# Patient Record
Sex: MALE | Race: WHITE | Employment: OTHER | URBAN - NONMETROPOLITAN AREA
[De-identification: names, ages, dates, MRNs, and addresses within clinical notes are randomized per-mention and may not be internally consistent; named-entity substitution may affect disease eponyms.]

---

## 2017-02-17 ENCOUNTER — TELEPHONE (OUTPATIENT)
Dept: NEUROLOGY | Age: 82
End: 2017-02-17

## 2017-02-19 RX ORDER — DULOXETIN HYDROCHLORIDE 30 MG/1
30 CAPSULE, DELAYED RELEASE ORAL DAILY
Qty: 30 CAPSULE | Refills: 2 | Status: SHIPPED | OUTPATIENT
Start: 2017-02-19 | End: 2017-04-21 | Stop reason: SDUPTHER

## 2017-03-21 ENCOUNTER — OFFICE VISIT (OUTPATIENT)
Dept: NEUROLOGY | Age: 82
End: 2017-03-21
Payer: MEDICARE

## 2017-03-21 VITALS
SYSTOLIC BLOOD PRESSURE: 164 MMHG | BODY MASS INDEX: 20.76 KG/M2 | HEIGHT: 70 IN | DIASTOLIC BLOOD PRESSURE: 83 MMHG | WEIGHT: 145 LBS | RESPIRATION RATE: 18 BRPM | HEART RATE: 65 BPM

## 2017-03-21 DIAGNOSIS — R41.3 MEMORY LOSS: ICD-10-CM

## 2017-03-21 DIAGNOSIS — L29.9 PRURITUS: ICD-10-CM

## 2017-03-21 DIAGNOSIS — G50.0 TRIGEMINAL NEURALGIA OF RIGHT SIDE OF FACE: Primary | ICD-10-CM

## 2017-03-21 DIAGNOSIS — F32.5 MAJOR DEPRESSIVE DISORDER WITH SINGLE EPISODE, IN FULL REMISSION (HCC): ICD-10-CM

## 2017-03-21 PROCEDURE — 99214 OFFICE O/P EST MOD 30 MIN: CPT | Performed by: PSYCHIATRY & NEUROLOGY

## 2017-03-21 RX ORDER — CETIRIZINE HYDROCHLORIDE 10 MG/1
10 TABLET ORAL DAILY
COMMUNITY
End: 2017-09-21

## 2017-03-21 RX ORDER — CARBAMAZEPINE 200 MG/1
200 TABLET ORAL DAILY
COMMUNITY
End: 2022-06-28 | Stop reason: SDUPTHER

## 2017-03-21 RX ORDER — HYDROXYZINE PAMOATE 25 MG/1
25 CAPSULE ORAL 3 TIMES DAILY PRN
Qty: 90 CAPSULE | Refills: 1 | Status: SHIPPED | OUTPATIENT
Start: 2017-03-21 | End: 2017-04-04

## 2017-04-21 RX ORDER — DULOXETIN HYDROCHLORIDE 30 MG/1
CAPSULE, DELAYED RELEASE ORAL
Qty: 30 CAPSULE | Refills: 2 | Status: SHIPPED | OUTPATIENT
Start: 2017-04-21 | End: 2020-09-24

## 2017-09-21 ENCOUNTER — OFFICE VISIT (OUTPATIENT)
Dept: NEUROLOGY | Age: 82
End: 2017-09-21
Payer: MEDICARE

## 2017-09-21 VITALS
DIASTOLIC BLOOD PRESSURE: 68 MMHG | WEIGHT: 147 LBS | BODY MASS INDEX: 21.05 KG/M2 | OXYGEN SATURATION: 97 % | HEART RATE: 70 BPM | SYSTOLIC BLOOD PRESSURE: 138 MMHG | HEIGHT: 70 IN

## 2017-09-21 DIAGNOSIS — R23.3 EASY BRUISABILITY: ICD-10-CM

## 2017-09-21 DIAGNOSIS — R41.3 MEMORY LOSS: ICD-10-CM

## 2017-09-21 DIAGNOSIS — Z86.73 HISTORY OF STROKE: ICD-10-CM

## 2017-09-21 DIAGNOSIS — L29.9 PRURITUS: ICD-10-CM

## 2017-09-21 DIAGNOSIS — G50.0 TRIGEMINAL NEURALGIA OF RIGHT SIDE OF FACE: Primary | ICD-10-CM

## 2017-09-21 PROCEDURE — 99214 OFFICE O/P EST MOD 30 MIN: CPT | Performed by: PSYCHIATRY & NEUROLOGY

## 2017-09-21 RX ORDER — PANTOPRAZOLE SODIUM 40 MG/1
TABLET, DELAYED RELEASE ORAL
Qty: 30 TABLET | Refills: 11 | Status: SHIPPED | OUTPATIENT
Start: 2017-09-21 | End: 2018-09-25 | Stop reason: SDUPTHER

## 2017-09-21 NOTE — MR AVS SNAPSHOT
After Visit Summary             Sunita Hdez   2017 11:30 AM   Office Visit    Description:  Male : 1935   Provider:  Alix Bailey MD   Department:  [facility] Neuro & Sleep              Your Follow-Up and Future Appointments         Below is a list of your follow-up and future appointments. This may not be a complete list as you may have made appointments directly with providers that we are not aware of or your providers may have made some for you. Please call your providers to confirm appointments. It is important to keep your appointments. Please bring your current insurance card, photo ID, co-pay, and all medication bottles to your appointment. If self-pay, payment is expected at the time of service. Your To-Do List     Future Appointments Provider Department Dept Phone    3/22/2018 11:45 AM Alix Bailey MD [facility] Neuro & Sleep [phone]    Please arrive 15 minutes prior to appointment, bring photo ID and insurance card. Follow-Up    Return in about 6 months (around 3/21/2018). Information from Your Visit        Department     Name Address Phone Fax    [address] [place] [address] [address]  [address] [phone]      You Were Seen for:         Comments    Trigeminal neuralgia of right side of face   [[id number]]         Vital Signs     Blood Pressure Pulse Height Weight Oxygen Saturation Body Mass Index    138/68 (Site: Right Arm, Position: Sitting, Cuff Size: Medium Adult) 70 5' 10\" (1.778 m) 147 lb (66.7 kg) 97% 21.09 kg/m2    Smoking Status                   Current Every Day Smoker           Instructions    INSTRUCTIONS:  1. Continue the Tegretol  2. Try using a full spectrum lamp for 20 minutes a day. Today's Medication Changes          These changes are accurate as of: 17 12:35 PM.  If you have any questions, ask your nurse or doctor.                CHANGE how you take these medications pantoprazole 40 MG tablet   Commonly known as:  PROTONIX   Instructions:  TAKE 1 TABLET DAILY IN THE MORNING. Quantity:  30 tablet   Refills:  11   What changed:  See the new instructions. Changed by: Jena Denton MD         STOP taking these medications           cetirizine 10 MG tablet   Commonly known as:  ZYRTEC   Stopped by: Jena Denton MD            Where to Get Your Medications      These medications were sent to [address], [address] [facility] [phone] [facility] [phone]  [address] [place] [id number]     Phone:  [phone]     pantoprazole 40 MG tablet               Your Current Medications Are              pantoprazole (PROTONIX) 40 MG tablet TAKE 1 TABLET DAILY IN THE MORNING.     DULoxetine (CYMBALTA) 30 MG extended release capsule TAKE 1 CAPSULE BY MOUTH ONCE A DAY    carBAMazepine (TEGRETOL) 200 MG tablet Take 200 mg by mouth 2 times daily 1 1/2 tablet morning and evening    albuterol sulfate  (90 BASE) MCG/ACT inhaler Inhale 2 puffs into the lungs    amLODIPine (NORVASC) 5 MG tablet Take 5 mg by mouth    dutasteride (AVODART) 0.5 MG capsule Take 0.5 mg by mouth    bisoprolol-hydrochlorothiazide (ZIAC) 2.5-6.25 MG per tablet Take 1 tablet by mouth    levothyroxine (SYNTHROID) 50 MCG tablet Take 50 mcg by mouth    rosuvastatin (CRESTOR) 10 MG tablet     felodipine (PLENDIL) 5 MG tablet Take 5 mg by mouth daily    ipratropium (ATROVENT) 0.03 % nasal spray 2 sprays by Nasal route every 12 hours    isosorbide mononitrate (IMDUR) 60 MG CR tablet Take 60 mg by mouth daily    magnesium chloride (MAG DELAY 64) 535 (64 MG) MG TBCR CR tablet Take 64 mg by mouth daily    nitroGLYCERIN (NITRODUR) 0.2 MG/HR Place 1 patch onto the skin daily    clopidogrel (PLAVIX) 75 MG tablet Take 75 mg by mouth daily      Allergies              Lyrica [Pregabalin] Swelling         Additional Information        Basic Information 9. Click Sign Up. You can now view your medical record. Additional Information  If you have questions, please contact the physician practice where you receive care. Remember, [employer] is NOT to be used for urgent needs. For medical emergencies, dial 911. For questions regarding your [employer] account call [phone]. If you have a clinical question, please call your doctor's office.

## 2017-09-21 NOTE — PROGRESS NOTES
[facility] Neurology Follow Up Encounter  2017 12:16 PM    Information:   Patient Name: Sunita Hdez  :   1935  Age:   80 y.o. MRN:   [id number]  Account #:  [de-identified]  Today:  17    Provider: Alix Bailey M.D. Chief Complaint:   Chief Complaint   Patient presents with    Numbness     6 month follow up for trigeminal neuralgia, pt states it is about the same       Subjective:   Sunita Hdez is a 80 y.o. man with a history of right trigeminal neuralgia who is following up. His neuralgia has been dong very well. He has had one brief jab in the last 6 months. His mood is doing well. He has had some forgetfulness. He remains independent. He complains a an itchy scalp. He has tried various creams, ointments, lotions, and shampoos. He has seen Dr. Gomes. Objective:     Past Medical History:  Past Medical History:   Diagnosis Date    CAD (coronary artery disease)     Cancer ([address]     Diabetes ([address]     HTN (hypertension)     Hyperlipidemia     Stroke ([address]     Trigeminal neuralgia of right side of face        Past Surgical History:   Procedure Laterality Date    CARDIAC CATHETERIZATION      CORONARY ARTERY BYPASS GRAFT      EXTERNAL EAR SURGERY      OTHER SURGICAL HISTORY  2013    Cyberknife treatment for RTGN       Recent Hospitalizations  · None    Significant Injuries  · None    Habits  Sunita Hdez reports that he has been smoking Cigarettes. He has a 30.00 pack-year smoking history. He has never used smokeless tobacco. He reports that he does not drink alcohol or use illicit drugs. History reviewed. No pertinent family history. Social History  Sunita Hdez is , lives in [place], [place], and is retired.     Medications:  Current Outpatient Prescriptions   Medication Sig Dispense Refill    DULoxetine (CYMBALTA) 30 MG extended release capsule TAKE 1 CAPSULE BY MOUTH ONCE A DAY 30 capsule 2    carBAMazepine (TEGRETOL) 200 MG tablet Take 200 mg by mouth 2 is precise. Extremity strength is normal in both uppers and lowers. Deep tendon reflexes are intact and symmetrical. Rapid alternating movements are unimpaired. Finger-to-nose testing is performed well, without dysmetria. Gait is normal.      Assessment:       ICD-10-CM ICD-9-CM    1. Trigeminal neuralgia of right side of face G50.0 350.1    2. Memory loss R41.3 780.93    3. Pruritus L29.9 698.9    I discussed the above with him. He has been doing clinically well. Plan:   1. Continue the Tegretol  2. Try using a full spectrum lamp for 20 minutes a day. 3.  FU in 6 months.     Electronically signed by Laverne Prater MD on 9/21/2017

## 2018-03-22 ENCOUNTER — OFFICE VISIT (OUTPATIENT)
Dept: NEUROLOGY | Age: 83
End: 2018-03-22
Payer: COMMERCIAL

## 2018-03-22 VITALS
BODY MASS INDEX: 21.13 KG/M2 | HEART RATE: 93 BPM | DIASTOLIC BLOOD PRESSURE: 71 MMHG | SYSTOLIC BLOOD PRESSURE: 121 MMHG | OXYGEN SATURATION: 96 % | WEIGHT: 147.6 LBS | HEIGHT: 70 IN

## 2018-03-22 DIAGNOSIS — G50.0 TRIGEMINAL NEURALGIA OF RIGHT SIDE OF FACE: Primary | ICD-10-CM

## 2018-03-22 DIAGNOSIS — G50.0 TRIGEMINAL NEURALGIA OF RIGHT SIDE OF FACE: ICD-10-CM

## 2018-03-22 DIAGNOSIS — R41.3 MEMORY LOSS: ICD-10-CM

## 2018-03-22 DIAGNOSIS — L29.9 PRURITUS: ICD-10-CM

## 2018-03-22 LAB
ALBUMIN SERPL-MCNC: 4.1 G/DL (ref 3.5–5.2)
ALP BLD-CCNC: 123 U/L (ref 40–130)
ALT SERPL-CCNC: 11 U/L (ref 5–41)
ANION GAP SERPL CALCULATED.3IONS-SCNC: 18 MMOL/L (ref 7–19)
AST SERPL-CCNC: 15 U/L (ref 5–40)
BASOPHILS ABSOLUTE: 0.1 K/UL (ref 0–0.2)
BASOPHILS RELATIVE PERCENT: 0.8 % (ref 0–1)
BILIRUB SERPL-MCNC: 0.3 MG/DL (ref 0.2–1.2)
BUN BLDV-MCNC: 22 MG/DL (ref 8–23)
CALCIUM SERPL-MCNC: 9.3 MG/DL (ref 8.8–10.2)
CARBAMAZEPINE LEVEL: 11.1 UG/ML (ref 4–12)
CHLORIDE BLD-SCNC: 95 MMOL/L (ref 98–111)
CO2: 22 MMOL/L (ref 22–29)
CREAT SERPL-MCNC: 0.8 MG/DL (ref 0.5–1.2)
EOSINOPHILS ABSOLUTE: 0.1 K/UL (ref 0–0.6)
EOSINOPHILS RELATIVE PERCENT: 1.3 % (ref 0–5)
FOLATE: 16.8 NG/ML (ref 4.5–32.2)
GFR NON-AFRICAN AMERICAN: >60
GLUCOSE BLD-MCNC: 91 MG/DL (ref 74–109)
HCT VFR BLD CALC: 41.8 % (ref 42–52)
HEMOGLOBIN: 14.1 G/DL (ref 14–18)
LYMPHOCYTES ABSOLUTE: 1.9 K/UL (ref 1.1–4.5)
LYMPHOCYTES RELATIVE PERCENT: 29.6 % (ref 20–40)
MCH RBC QN AUTO: 31.8 PG (ref 27–31)
MCHC RBC AUTO-ENTMCNC: 33.7 G/DL (ref 33–37)
MCV RBC AUTO: 94.1 FL (ref 80–94)
MONOCYTES ABSOLUTE: 0.8 K/UL (ref 0–0.9)
MONOCYTES RELATIVE PERCENT: 12.5 % (ref 0–10)
NEUTROPHILS ABSOLUTE: 3.5 K/UL (ref 1.5–7.5)
NEUTROPHILS RELATIVE PERCENT: 55.5 % (ref 50–65)
PDW BLD-RTO: 12.9 % (ref 11.5–14.5)
PLATELET # BLD: 243 K/UL (ref 130–400)
PMV BLD AUTO: 8.8 FL (ref 9.4–12.4)
POTASSIUM SERPL-SCNC: 4.6 MMOL/L (ref 3.5–5)
RBC # BLD: 4.44 M/UL (ref 4.7–6.1)
SODIUM BLD-SCNC: 135 MMOL/L (ref 136–145)
TOTAL PROTEIN: 7.3 G/DL (ref 6.6–8.7)
TSH SERPL DL<=0.05 MIU/L-ACNC: 0.95 UIU/ML (ref 0.27–4.2)
VITAMIN B-12: 551 PG/ML (ref 211–946)
WBC # BLD: 6.3 K/UL (ref 4.8–10.8)

## 2018-03-22 PROCEDURE — 99213 OFFICE O/P EST LOW 20 MIN: CPT | Performed by: PSYCHIATRY & NEUROLOGY

## 2018-03-27 ENCOUNTER — TELEPHONE (OUTPATIENT)
Dept: NEUROLOGY | Age: 83
End: 2018-03-27

## 2018-05-09 ENCOUNTER — APPOINTMENT (OUTPATIENT)
Dept: CT IMAGING | Facility: HOSPITAL | Age: 83
End: 2018-05-09

## 2018-05-09 ENCOUNTER — HOSPITAL ENCOUNTER (OUTPATIENT)
Facility: HOSPITAL | Age: 83
Setting detail: OBSERVATION
Discharge: HOME | End: 2018-05-10
Attending: EMERGENCY MEDICINE | Admitting: INTERNAL MEDICINE

## 2018-05-09 ENCOUNTER — APPOINTMENT (OUTPATIENT)
Dept: CARDIOLOGY | Facility: HOSPITAL | Age: 83
End: 2018-05-09
Attending: INTERNAL MEDICINE

## 2018-05-09 DIAGNOSIS — R07.9 CHEST PAIN, UNSPECIFIED TYPE: Primary | ICD-10-CM

## 2018-05-09 DIAGNOSIS — I48.0 PAROXYSMAL ATRIAL FIBRILLATION (HCC): ICD-10-CM

## 2018-05-09 DIAGNOSIS — J44.9 CHRONIC OBSTRUCTIVE PULMONARY DISEASE, UNSPECIFIED COPD TYPE (HCC): ICD-10-CM

## 2018-05-09 LAB
APTT PPP: 42.2 SECONDS (ref 24.1–34.8)
HOLD SPECIMEN: NORMAL
HOLD SPECIMEN: NORMAL
INR PPP: 1 (ref 0.91–1.09)
PROTHROMBIN TIME: 13.5 SECONDS (ref 11.9–14.6)
TROPONIN I SERPL-MCNC: <0.012 NG/ML (ref 0–0.03)
WHOLE BLOOD HOLD SPECIMEN: NORMAL
WHOLE BLOOD HOLD SPECIMEN: NORMAL

## 2018-05-09 PROCEDURE — 25010000002 ONDANSETRON PER 1 MG: Performed by: EMERGENCY MEDICINE

## 2018-05-09 PROCEDURE — 25010000002 PROMETHAZINE PER 50 MG: Performed by: INTERNAL MEDICINE

## 2018-05-09 PROCEDURE — 93005 ELECTROCARDIOGRAM TRACING: CPT | Performed by: EMERGENCY MEDICINE

## 2018-05-09 PROCEDURE — 25010000002 METHYLPREDNISOLONE PER 125 MG: Performed by: EMERGENCY MEDICINE

## 2018-05-09 PROCEDURE — 25010000002 ENOXAPARIN PER 10 MG: Performed by: INTERNAL MEDICINE

## 2018-05-09 PROCEDURE — 94799 UNLISTED PULMONARY SVC/PX: CPT

## 2018-05-09 PROCEDURE — 96374 THER/PROPH/DIAG INJ IV PUSH: CPT

## 2018-05-09 PROCEDURE — 0 IOPAMIDOL PER 1 ML: Performed by: EMERGENCY MEDICINE

## 2018-05-09 PROCEDURE — 99285 EMERGENCY DEPT VISIT HI MDM: CPT

## 2018-05-09 PROCEDURE — 96366 THER/PROPH/DIAG IV INF ADDON: CPT

## 2018-05-09 PROCEDURE — 96367 TX/PROPH/DG ADDL SEQ IV INF: CPT

## 2018-05-09 PROCEDURE — 94640 AIRWAY INHALATION TREATMENT: CPT

## 2018-05-09 PROCEDURE — 96375 TX/PRO/DX INJ NEW DRUG ADDON: CPT

## 2018-05-09 PROCEDURE — 96372 THER/PROPH/DIAG INJ SC/IM: CPT

## 2018-05-09 PROCEDURE — 93306 TTE W/DOPPLER COMPLETE: CPT | Performed by: INTERNAL MEDICINE

## 2018-05-09 PROCEDURE — 25010000002 HYDROMORPHONE PER 4 MG: Performed by: EMERGENCY MEDICINE

## 2018-05-09 PROCEDURE — 84484 ASSAY OF TROPONIN QUANT: CPT | Performed by: INTERNAL MEDICINE

## 2018-05-09 PROCEDURE — 96365 THER/PROPH/DIAG IV INF INIT: CPT

## 2018-05-09 PROCEDURE — 93010 ELECTROCARDIOGRAM REPORT: CPT | Performed by: INTERNAL MEDICINE

## 2018-05-09 PROCEDURE — 99204 OFFICE O/P NEW MOD 45 MIN: CPT | Performed by: INTERNAL MEDICINE

## 2018-05-09 PROCEDURE — G0378 HOSPITAL OBSERVATION PER HR: HCPCS

## 2018-05-09 PROCEDURE — 71275 CT ANGIOGRAPHY CHEST: CPT

## 2018-05-09 PROCEDURE — 93306 TTE W/DOPPLER COMPLETE: CPT

## 2018-05-09 PROCEDURE — 84484 ASSAY OF TROPONIN QUANT: CPT | Performed by: EMERGENCY MEDICINE

## 2018-05-09 PROCEDURE — 94760 N-INVAS EAR/PLS OXIMETRY 1: CPT

## 2018-05-09 PROCEDURE — 85730 THROMBOPLASTIN TIME PARTIAL: CPT | Performed by: EMERGENCY MEDICINE

## 2018-05-09 PROCEDURE — 85610 PROTHROMBIN TIME: CPT | Performed by: EMERGENCY MEDICINE

## 2018-05-09 PROCEDURE — 25010000002 MORPHINE PER 10 MG: Performed by: EMERGENCY MEDICINE

## 2018-05-09 RX ORDER — ROSUVASTATIN CALCIUM 10 MG/1
10 TABLET, COATED ORAL NIGHTLY
Status: DISCONTINUED | OUTPATIENT
Start: 2018-05-09 | End: 2018-05-10 | Stop reason: HOSPADM

## 2018-05-09 RX ORDER — ROSUVASTATIN CALCIUM 40 MG/1
20 TABLET, COATED ORAL NIGHTLY
COMMUNITY

## 2018-05-09 RX ORDER — AMLODIPINE BESYLATE 5 MG/1
5 TABLET ORAL DAILY
COMMUNITY
End: 2018-05-10 | Stop reason: HOSPADM

## 2018-05-09 RX ORDER — CARBAMAZEPINE 100 MG/1
200 TABLET, EXTENDED RELEASE ORAL EVERY 12 HOURS SCHEDULED
Status: DISCONTINUED | OUTPATIENT
Start: 2018-05-09 | End: 2018-05-10 | Stop reason: HOSPADM

## 2018-05-09 RX ORDER — HYDROMORPHONE HCL 110MG/55ML
0.5 PATIENT CONTROLLED ANALGESIA SYRINGE INTRAVENOUS ONCE
Status: COMPLETED | OUTPATIENT
Start: 2018-05-09 | End: 2018-05-09

## 2018-05-09 RX ORDER — CLOPIDOGREL BISULFATE 75 MG/1
75 TABLET ORAL DAILY
Status: ON HOLD | COMMUNITY
End: 2021-05-21

## 2018-05-09 RX ORDER — DILTIAZEM HYDROCHLORIDE 120 MG/1
120 CAPSULE, COATED, EXTENDED RELEASE ORAL
Status: DISCONTINUED | OUTPATIENT
Start: 2018-05-09 | End: 2018-05-09

## 2018-05-09 RX ORDER — DILTIAZEM HYDROCHLORIDE 5 MG/ML
20 INJECTION INTRAVENOUS ONCE
Status: COMPLETED | OUTPATIENT
Start: 2018-05-09 | End: 2018-05-09

## 2018-05-09 RX ORDER — IPRATROPIUM BROMIDE 21 UG/1
2 SPRAY, METERED NASAL EVERY 12 HOURS
Status: ON HOLD | COMMUNITY
End: 2021-05-21

## 2018-05-09 RX ORDER — VALSARTAN AND HYDROCHLOROTHIAZIDE 80; 12.5 MG/1; MG/1
1 TABLET, FILM COATED ORAL DAILY
COMMUNITY
End: 2018-05-10 | Stop reason: HOSPADM

## 2018-05-09 RX ORDER — PANTOPRAZOLE SODIUM 40 MG/1
40 TABLET, DELAYED RELEASE ORAL DAILY
COMMUNITY

## 2018-05-09 RX ORDER — LEVOTHYROXINE SODIUM 0.05 MG/1
50 TABLET ORAL DAILY
Status: DISCONTINUED | OUTPATIENT
Start: 2018-05-09 | End: 2018-05-10 | Stop reason: HOSPADM

## 2018-05-09 RX ORDER — SODIUM CHLORIDE 0.9 % (FLUSH) 0.9 %
1-10 SYRINGE (ML) INJECTION AS NEEDED
Status: DISCONTINUED | OUTPATIENT
Start: 2018-05-09 | End: 2018-05-10 | Stop reason: HOSPADM

## 2018-05-09 RX ORDER — METHYLPREDNISOLONE SODIUM SUCCINATE 125 MG/2ML
125 INJECTION, POWDER, LYOPHILIZED, FOR SOLUTION INTRAMUSCULAR; INTRAVENOUS ONCE
Status: COMPLETED | OUTPATIENT
Start: 2018-05-09 | End: 2018-05-09

## 2018-05-09 RX ORDER — LANOLIN ALCOHOL/MO/W.PET/CERES
1000 CREAM (GRAM) TOPICAL DAILY
Status: ON HOLD | COMMUNITY
End: 2021-05-21

## 2018-05-09 RX ORDER — MORPHINE SULFATE 4 MG/ML
4 INJECTION, SOLUTION INTRAMUSCULAR; INTRAVENOUS ONCE
Status: COMPLETED | OUTPATIENT
Start: 2018-05-09 | End: 2018-05-09

## 2018-05-09 RX ORDER — ACETAMINOPHEN 325 MG/1
650 TABLET ORAL EVERY 4 HOURS PRN
Status: DISCONTINUED | OUTPATIENT
Start: 2018-05-09 | End: 2018-05-10 | Stop reason: HOSPADM

## 2018-05-09 RX ORDER — SODIUM CHLORIDE 0.9 % (FLUSH) 0.9 %
10 SYRINGE (ML) INJECTION AS NEEDED
Status: DISCONTINUED | OUTPATIENT
Start: 2018-05-09 | End: 2018-05-10 | Stop reason: HOSPADM

## 2018-05-09 RX ORDER — BISOPROLOL FUMARATE AND HYDROCHLOROTHIAZIDE 2.5; 6.25 MG/1; MG/1
1 TABLET ORAL DAILY
Status: ON HOLD | COMMUNITY
End: 2018-05-09

## 2018-05-09 RX ORDER — LEVOTHYROXINE SODIUM 0.05 MG/1
50 TABLET ORAL DAILY
COMMUNITY

## 2018-05-09 RX ORDER — BUDESONIDE AND FORMOTEROL FUMARATE DIHYDRATE 80; 4.5 UG/1; UG/1
2 AEROSOL RESPIRATORY (INHALATION)
COMMUNITY
End: 2018-05-10 | Stop reason: HOSPADM

## 2018-05-09 RX ORDER — BUDESONIDE AND FORMOTEROL FUMARATE DIHYDRATE 160; 4.5 UG/1; UG/1
2 AEROSOL RESPIRATORY (INHALATION)
Status: DISCONTINUED | OUTPATIENT
Start: 2018-05-09 | End: 2018-05-10 | Stop reason: HOSPADM

## 2018-05-09 RX ORDER — NICOTINE 21 MG/24HR
1 PATCH, TRANSDERMAL 24 HOURS TRANSDERMAL EVERY 24 HOURS
Status: DISCONTINUED | OUTPATIENT
Start: 2018-05-09 | End: 2018-05-10 | Stop reason: HOSPADM

## 2018-05-09 RX ORDER — PROMETHAZINE HYDROCHLORIDE 25 MG/ML
12.5 INJECTION, SOLUTION INTRAMUSCULAR; INTRAVENOUS EVERY 6 HOURS PRN
Status: DISCONTINUED | OUTPATIENT
Start: 2018-05-09 | End: 2018-05-10 | Stop reason: HOSPADM

## 2018-05-09 RX ORDER — NITROGLYCERIN 0.4 MG/1
0.4 TABLET SUBLINGUAL
Status: DISCONTINUED | OUTPATIENT
Start: 2018-05-09 | End: 2018-05-10 | Stop reason: HOSPADM

## 2018-05-09 RX ORDER — AMLODIPINE BESYLATE 5 MG/1
5 TABLET ORAL DAILY
Status: DISCONTINUED | OUTPATIENT
Start: 2018-05-09 | End: 2018-05-09

## 2018-05-09 RX ORDER — DILTIAZEM HYDROCHLORIDE 120 MG/1
120 CAPSULE, COATED, EXTENDED RELEASE ORAL ONCE
Status: COMPLETED | OUTPATIENT
Start: 2018-05-09 | End: 2018-05-09

## 2018-05-09 RX ORDER — ONDANSETRON 2 MG/ML
4 INJECTION INTRAMUSCULAR; INTRAVENOUS EVERY 6 HOURS PRN
Status: DISCONTINUED | OUTPATIENT
Start: 2018-05-09 | End: 2018-05-10 | Stop reason: HOSPADM

## 2018-05-09 RX ORDER — ONDANSETRON 2 MG/ML
4 INJECTION INTRAMUSCULAR; INTRAVENOUS ONCE
Status: COMPLETED | OUTPATIENT
Start: 2018-05-09 | End: 2018-05-09

## 2018-05-09 RX ORDER — ASPIRIN 81 MG/1
81 TABLET ORAL DAILY
Status: DISCONTINUED | OUTPATIENT
Start: 2018-05-09 | End: 2018-05-10 | Stop reason: HOSPADM

## 2018-05-09 RX ORDER — FINASTERIDE 5 MG/1
5 TABLET, FILM COATED ORAL DAILY
Status: DISCONTINUED | OUTPATIENT
Start: 2018-05-09 | End: 2018-05-10 | Stop reason: HOSPADM

## 2018-05-09 RX ORDER — GUAIFENESIN 600 MG/1
1200 TABLET, EXTENDED RELEASE ORAL EVERY 12 HOURS SCHEDULED
Status: DISCONTINUED | OUTPATIENT
Start: 2018-05-09 | End: 2018-05-10 | Stop reason: HOSPADM

## 2018-05-09 RX ORDER — DUTASTERIDE 0.5 MG/1
0.5 CAPSULE, LIQUID FILLED ORAL DAILY
Status: ON HOLD | COMMUNITY
End: 2021-05-21

## 2018-05-09 RX ORDER — ALBUTEROL SULFATE 2.5 MG/3ML
2.5 SOLUTION RESPIRATORY (INHALATION) EVERY 4 HOURS PRN
COMMUNITY

## 2018-05-09 RX ORDER — PANTOPRAZOLE SODIUM 40 MG/1
40 TABLET, DELAYED RELEASE ORAL DAILY
Status: DISCONTINUED | OUTPATIENT
Start: 2018-05-09 | End: 2018-05-10 | Stop reason: HOSPADM

## 2018-05-09 RX ORDER — CARBAMAZEPINE 200 MG/1
200 TABLET, EXTENDED RELEASE ORAL DAILY
Status: ON HOLD | COMMUNITY
End: 2021-05-21

## 2018-05-09 RX ORDER — CLOPIDOGREL BISULFATE 75 MG/1
75 TABLET ORAL DAILY
Status: DISCONTINUED | OUTPATIENT
Start: 2018-05-09 | End: 2018-05-10 | Stop reason: HOSPADM

## 2018-05-09 RX ADMIN — GUAIFENESIN 1200 MG: 600 TABLET, EXTENDED RELEASE ORAL at 11:58

## 2018-05-09 RX ADMIN — ONDANSETRON 4 MG: 2 INJECTION, SOLUTION INTRAMUSCULAR; INTRAVENOUS at 06:28

## 2018-05-09 RX ADMIN — PANTOPRAZOLE SODIUM 40 MG: 40 TABLET, DELAYED RELEASE ORAL at 11:56

## 2018-05-09 RX ADMIN — CLOPIDOGREL 75 MG: 75 TABLET, FILM COATED ORAL at 11:56

## 2018-05-09 RX ADMIN — HYDROMORPHONE HYDROCHLORIDE 0.5 MG: 2 INJECTION INTRAMUSCULAR; INTRAVENOUS; SUBCUTANEOUS at 07:24

## 2018-05-09 RX ADMIN — MORPHINE SULFATE 4 MG: 4 INJECTION INTRAVENOUS at 06:28

## 2018-05-09 RX ADMIN — CARBAMAZEPINE 200 MG: 100 TABLET, EXTENDED RELEASE ORAL at 11:57

## 2018-05-09 RX ADMIN — DILTIAZEM HYDROCHLORIDE 20 MG: 5 INJECTION INTRAVENOUS at 07:44

## 2018-05-09 RX ADMIN — IOPAMIDOL 82 ML: 755 INJECTION, SOLUTION INTRAVENOUS at 04:42

## 2018-05-09 RX ADMIN — BUDESONIDE AND FORMOTEROL FUMARATE DIHYDRATE 2 PUFF: 160; 4.5 AEROSOL RESPIRATORY (INHALATION) at 20:24

## 2018-05-09 RX ADMIN — ASPIRIN 81 MG: 81 TABLET ORAL at 11:58

## 2018-05-09 RX ADMIN — PROMETHAZINE HYDROCHLORIDE 12.5 MG: 25 INJECTION INTRAMUSCULAR; INTRAVENOUS at 10:38

## 2018-05-09 RX ADMIN — FINASTERIDE 5 MG: 5 TABLET, FILM COATED ORAL at 12:01

## 2018-05-09 RX ADMIN — LEVOTHYROXINE SODIUM 50 MCG: 50 TABLET ORAL at 11:57

## 2018-05-09 RX ADMIN — NITROGLYCERIN 0.4 MG: 0.4 TABLET SUBLINGUAL at 07:57

## 2018-05-09 RX ADMIN — METHYLPREDNISOLONE SODIUM SUCCINATE 125 MG: 125 INJECTION, POWDER, FOR SOLUTION INTRAMUSCULAR; INTRAVENOUS at 06:31

## 2018-05-09 RX ADMIN — DILTIAZEM HYDROCHLORIDE 120 MG: 120 CAPSULE, COATED, EXTENDED RELEASE ORAL at 08:04

## 2018-05-09 RX ADMIN — DILTIAZEM HYDROCHLORIDE 5 MG/HR: 5 INJECTION INTRAVENOUS at 10:32

## 2018-05-09 RX ADMIN — BUDESONIDE AND FORMOTEROL FUMARATE DIHYDRATE 2 PUFF: 160; 4.5 AEROSOL RESPIRATORY (INHALATION) at 10:20

## 2018-05-09 RX ADMIN — ENOXAPARIN SODIUM 40 MG: 40 INJECTION SUBCUTANEOUS at 11:58

## 2018-05-09 RX ADMIN — ROSUVASTATIN CALCIUM 10 MG: 10 TABLET, FILM COATED ORAL at 20:19

## 2018-05-09 NOTE — H&P
[facility] Medicine Services  HISTORY AND PHYSICAL    Date of Admission: 5/9/2018  Primary Care Physician: Javid Grajeda MD    Subjective     Chief Complaint: chest discomfort    History of Present Illness  This is an 82-year-old  gentleman who resides in [place], [place].  He lives with his wife, Anita.  He sees Dr. Grajeda for primary care.  He presents to our emergency department early this morning after being transferred here from [facility] in [place], [place].  He went there in the middle of the night with complaints of chest discomfort and palpitations.  He has a history of coronary artery disease.  His stents were done very remotely by Dr. Sosa in [place], [place].  He tells me that Dr. Sosa has since passed away.  He follows with Dr. Brennon Araujo in [place] or his coronary disease.  He does not believe that he has had any formal stress testing her heart catheterizations in greater than 10 years.  He sees Dr. Jeremiah Christie in [place] for a history of peripheral vascular disease and abdominal aortic aneurysm repair.    His chest discomfort occurred yesterday at rest and has continued to be present since that point in time.  He describes it is a sharp discomfort.  It may be worse with deep breathing.  He has also had some pain radiating to his back at times.  No jaw pain or arm discomfort.  No significant increase in his baseline shortness of breath with his COPD.  He typically has a dry to productive cough of white sputum each morning that has not changed.  No hemoptysis.  No increased wheezing or chest congestion.    He has been having palpitations.  His wife tells me that he has had atrial fibrillation twice within the last 15 years that she knows of.  He apparently had to go to the emergency department several years ago in [place] and required being placed on a diltiazem drip.  He apparently has not  required any other interventions for this in the past and has never taken full dose anticoagulation.    He denies recent dizziness, headache.  No weakness.    He does not wear home oxygen.  He does continue to smoke.  He is down to half pack per day, but used to smoke much more heavily than this.  He does not see a pulmonologist.  He uses his albuterol nebulization twice per day whether he feels like he needs it or not.  He is on Symbicort.  However, he only takes the 80 formulation.    Review of Systems   Otherwise complete ROS reviewed and negative except as mentioned in the HPI.    Past Medical History:   Past Medical History:   Diagnosis Date   • CAD (coronary artery disease)    • COPD (chronic obstructive pulmonary disease)    • Hiatal hernia    • Hyperlipidemia    • Hypertension    • Stroke      Past Surgical History:  Past Surgical History:   Procedure Laterality Date   • ABDOMINAL AORTIC ANEURYSM REPAIR     • CORONARY ANGIOPLASTY WITH STENT PLACEMENT     • FEMORAL ARTERY STENT     • TRIGEMINAL NERVE DECOMPRESSION       Social History:  reports that he has been smoking.  He has been smoking about 0.50 packs per day. He does not have any smokeless tobacco history on file. He reports that he does not drink alcohol or use drugs.    Family History: Coronary disease, HTN.     Allergies:  Allergies   Allergen Reactions   • Lyrica [Pregabalin] Unknown (See Comments)     Pt does not remember     Medications:  Prior to Admission medications    Medication Sig Start Date End Date Taking? Authorizing Provider   albuterol (PROVENTIL) (2.5 MG/3ML) 0.083% nebulizer solution Take 2.5 mg by nebulization Every 4 (Four) Hours As Needed for Wheezing.   Yes Historical Provider, MD   amLODIPine (NORVASC) 5 MG tablet Take 5 mg by mouth Daily.   Yes Historical Provider, MD   bisoprolol-hydrochlorothiazide (ZIAC) 2.5-6.25 MG per tablet Take 1 tablet by mouth Daily.   Yes Historical Provider, MD   budesonide-formoterol (SYMBICORT)  "80-4.5 MCG/ACT inhaler Inhale 2 puffs 2 (Two) Times a Day.   Yes Historical Provider, MD   carBAMazepine XR (TEGretol  XR) 200 MG 12 hr tablet Take 200 mg by mouth 2 (Two) Times a Day.   Yes Historical Provider, MD   clopidogrel (PLAVIX) 75 MG tablet Take 75 mg by mouth Daily.   Yes Historical Provider, MD   dutasteride (AVODART) 0.5 MG capsule Take 0.5 mg by mouth Daily.   Yes Historical Provider, MD   levothyroxine (SYNTHROID, LEVOTHROID) 50 MCG tablet Take 50 mcg by mouth Daily.   Yes Historical Provider, MD   pantoprazole (PROTONIX) 40 MG EC tablet Take 40 mg by mouth Daily.   Yes Historical Provider, MD   rosuvastatin (CRESTOR) 10 MG tablet Take 10 mg by mouth Daily.   Yes Historical Provider, MD     Objective     Vital Signs: /81 (BP Location: Right arm, Patient Position: Lying)   Pulse 116   Temp 98.9 °F (37.2 °C) (Axillary)   Resp 16   Ht 177.8 cm (70\")   Wt 63.9 kg (140 lb 12.8 oz)   SpO2 94%   BMI 20.20 kg/m²   Physical Exam   Constitutional: He is oriented to person, place, and time. He appears well-developed and well-nourished.   Up in bed.  No distress.  His wife and son-in-law are present with him.   HENT:   Head: Normocephalic and atraumatic.   Eyes: Conjunctivae and EOM are normal. Pupils are equal, round, and reactive to light.   Neck: Neck supple. No JVD present.   Cardiovascular: Normal rate, regular rhythm, normal heart sounds and intact distal pulses.  Exam reveals no gallop and no friction rub.    No murmur heard.  Pulmonary/Chest: Effort normal and breath sounds normal. No respiratory distress. He has no wheezes. He has no rales. He exhibits no tenderness.   Abdominal: Soft. Bowel sounds are normal. He exhibits no distension. There is no tenderness. There is no rebound and no guarding.   Musculoskeletal: Normal range of motion. He exhibits no edema, tenderness or deformity.   Neurological: He is alert and oriented to person, place, and time. He displays normal reflexes. No cranial " nerve deficit. He exhibits normal muscle tone.   Skin: Skin is warm and dry. No rash noted.   Psychiatric: He has a normal mood and affect. His behavior is normal. Judgment and thought content normal.     Results Reviewed:  Marcin Castro workup:   1. EKG is not concerning for ischemia.    2. CBC showed white blood cell count 8.0.  Hemoglobin and hematocrit 10.4 and 40.8 respectively. Platelets 224,000.  3.  Chemistry showed a sodium of 133, potassium 4.5.  Chloride 100 and bicarbonate 23.  BUN and creatinine 19 and 1.05 respectively.  Calcium 8.3 and glucose 104.  Hepatic function panel within normal limits.  4.  Magnesium 2.2.  5.  BMP was just slightly elevated by their standards at 805.  6.  Troponin less than 0.02.    Lab Results (last 24 hours)     Procedure Component Value Units Date/Time    aPTT [[id number]]  (Abnormal) Collected:  05/09/18 0412    Specimen:  Blood Updated:  05/09/18 0818     PTT 42.2 (H) seconds     Protime-INR [[id number]]  (Normal) Collected:  05/09/18 0412    Specimen:  Blood Updated:  05/09/18 0818     Protime 13.5 Seconds      INR 1.00    Troponin [[id number]]  (Normal) Collected:  05/09/18 0736    Specimen:  Blood from Arm, Right Updated:  05/09/18 0814     Troponin I <0.012 ng/mL     [place] Draw [[id number]] Collected:  05/09/18 0412    Specimen:  Blood Updated:  05/09/18 0515    Narrative:       The following orders were created for panel order [place] Draw.  Procedure                               Abnormality         Status                     ---------                               -----------         ------                     Light Blue Top[[id number]]                                   Final result               Green Top (Gel)[[id number]]                                  Final result               Lavender Top[[id number]]                                     Final result               Red Top[[id number]]                                          Final result                 Please  view results for these tests on the individual orders.    Light Blue Top [[id number]] Collected:  05/09/18 0412    Specimen:  Blood Updated:  05/09/18 0515     Extra Tube hold for add-on     Comment: Auto resulted       Green Top (Gel) [[id number]] Collected:  05/09/18 0412    Specimen:  Blood Updated:  05/09/18 0515     Extra Tube Hold for add-ons.     Comment: Auto resulted.       Lavender Top [[id number]] Collected:  05/09/18 0412    Specimen:  Blood Updated:  05/09/18 0515     Extra Tube hold for add-on     Comment: Auto resulted       Red Top [[id number]] Collected:  05/09/18 0412    Specimen:  Blood Updated:  05/09/18 0515     Extra Tube Hold for add-ons.     Comment: Auto resulted.       Troponin [[id number]]  (Normal) Collected:  05/09/18 0412    Specimen:  Blood Updated:  05/09/18 0452     Troponin I <0.012 ng/mL         Imaging Results (last 24 hours)     Procedure Component Value Units Date/Time    CT Angiogram Chest With Contrast [[id number]] Collected:  05/09/18 0830     Updated:  05/09/18 0900    Narrative:       CT ANGIOGRAM CHEST W CONTRAST- 5/9/2018 4:36 AM CDT     HISTORY: Chest pain, acute, nonspecific, low prob CAD      COMPARISON: None     DOSE LENGTH PRODUCT: 342 mGy cm. Automated exposure control was also  utilized to decrease patient radiation dose.     TECHNIQUE: Axial images of the chest are obtained following IV contrast.  2-D and maximal intensity projection images are reconstructed and  reviewed.     FINDINGS:  No pulmonary emboli are identified. There is diffuse vascular  calcification involving the normal caliber thoracic aorta with dense  coronary artery calcification. There is no pericardial or pleural  effusion.     There is partial visualization of an endovascular graft within the  abdominal aorta.     There are emphysematous changes of the lungs. There is prominent  scarring within the lung apices. There is dependent basilar atelectasis.  There is no lobar consolidation. There is no  pneumothorax.     Osteopenia is suspected of the regional skeleton. Is chronic compression  of the T4 and T9 vertebra. Small stones seen along the endplates of T10.  There is minimal compression of the superior endplate of T2.       Impression:       1. No pulmonary emboli.  2. Emphysematous changes with prominent apical lung scarring. Dependent  basilar atelectasis.  3. Diffuse vascular calcification with involvement of the coronary  arteries.  4. Chronic appearing compression deformities of the thoracic spine (T2,  T4, T9). Osteopenia.        Comments: A preliminary report is issued to the ER by the [facility]  radiology service. I agree with this preliminary impression.  This report was finalized on 05/09/2018 08:57 by Dr. Hanane Coffey MD.        I have personally reviewed and interpreted the radiology studies and ECG obtained at time of admission.     Assessment / Plan     Assessment:   1.  Chest discomfort in the setting of known coronary artery disease.  2.  Paroxysmal atrial fibrillation with rapid ventricular response.  3.  Chronic obstructive pulmonary disease without exacerbation.  4.  Ongoing tobacco abuse.  5.  Peripheral vascular disease.        - with a history of lower extremity stenting and abdominal aortic aneurysm repair.  6.  Systemic arterial hypertension.  7.  Hyperlipidemia.  8.  Hypothyroidism.  9.  Seizure disorder followed by Dr. Kendrick.   10.  Mild hyponatremia.  11.  Normocytic anemia.    Plan:   He will be admitted my service here at [facility].  He currently is on observation status.  He has had 2 negative troponins.  Check a third.  EKG unrevealing for ischemia.  Follow on telemetry.  Myocardial perfusion scan tomorrow per Dr. Restrepo.  Continue Plavix.  Aspirin. Continue Crestor.    Check a 2-D echocardiogram today.  TSH.  Continue IV Cardizem as needed and start on oral Cardizem.  Discontinue amlodipine.    Check modifiable risk factors to include a fasting lipid panel,  hemoglobin A1c, and TSH.  Follow blood pressure closely.    Stop hydrochlorothiazide.  Recheck sodium in the morning.    Increase Symbicort dose.  As needed half-strength albuterol.  Mucinex and incentive spirometry.  No current need for steroids or antibiotics.  I do not believe that he is currently exacerbated.    Offer nicotine patch and counseled for tobacco cessation.    Lovenox for DVT prophylaxis.    Code Status: Full.      I discussed the patients findings and my recommendations with the patient and his family.     Estimated length of stay is at least overnight.     Solomon Falk,    05/09/18   9:13 AM

## 2018-05-09 NOTE — PLAN OF CARE
Problem: Fall Risk (Adult)  Goal: Identify Related Risk Factors and Signs and Symptoms  Outcome: Outcome(s) achieved Date Met: 05/09/18    Goal: Absence of Fall  Outcome: Ongoing (interventions implemented as appropriate)      Problem: Patient Care Overview  Goal: Plan of Care Review  Outcome: Ongoing (interventions implemented as appropriate)   05/09/18 5399   Coping/Psychosocial   Plan of Care Reviewed With patient   OTHER   Outcome Summary Patient has denied pain since being admitted from ER. Patient has been in and out of Afib on tele. Cardizem gtt continued per Md order. Continue to monitor overall condition and notify Md of any changes.        Problem: Cardiac: ACS (Acute Coronary Syndrome) (Adult)  Goal: Signs and Symptoms of Listed Potential Problems Will be Absent, Minimized or Managed (Cardiac: ACS)  Outcome: Ongoing (interventions implemented as appropriate)

## 2018-05-09 NOTE — ED PROVIDER NOTES
"Subjective   83 y/o male with history of CAD (cardiac stents in his 50s) , CVA, vascular stents and COPD who continues to smoke [place] [facility] [place] of chest pain that did radiate to his back worse with breathing and coughing with some sob and cough. He denies nausea, vomiting, hemoptysis, falls, trauma, history of PE or DVT. He states his pain has currently abated. At [facility] he was found to have a slightly elevated bnp to >800, normal trop and unrevealing chemistries and CBC. He was given a \"triple neb,\" lasix, nitro and morphine and arrives in NAD without active pain.             Review of Systems   All other systems reviewed and are negative.      Past Medical History:   Diagnosis Date   • CAD (coronary artery disease)    • COPD (chronic obstructive pulmonary disease)    • Hiatal hernia    • Hyperlipidemia    • Hypertension    • Stroke        Allergies   Allergen Reactions   • Lyrica [Pregabalin] Unknown (See Comments)     Pt does not remember       Past Surgical History:   Procedure Laterality Date   • ABDOMINAL AORTIC ANEURYSM REPAIR     • CORONARY ANGIOPLASTY WITH STENT PLACEMENT     • FEMORAL ARTERY STENT     • TRIGEMINAL NERVE DECOMPRESSION         History reviewed. No pertinent family history.    Social History     Social History   • Marital status:      Social History Main Topics   • Smoking status: Current Every Day Smoker     Packs/day: 0.50   • Alcohol use No   • Drug use: No     Other Topics Concern   • Not on file           Objective   Physical Exam   Constitutional: He is oriented to person, place, and time. He appears well-developed and well-nourished.   HENT:   Head: Normocephalic.   Nose: Nose normal.   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    Eyes: Conjunctivae and EOM are normal. Pupils are equal, round, and reactive to light.   Neck: Normal range of motion. Neck supple. No JVD present.   Cardiovascular: Normal rate, regular rhythm, normal heart " "sounds and intact distal pulses.    Pulmonary/Chest: Effort normal and breath sounds normal.   Abdominal: Soft. Bowel sounds are normal.   Musculoskeletal: Normal range of motion. He exhibits no edema.   Neurological: He is alert and oriented to person, place, and time. He has normal reflexes.   Gait is normal.    Skin: Skin is warm. Capillary refill takes less than 2 seconds.   Psychiatric: He has a normal mood and affect. His behavior is normal.   Vitals reviewed.      ECG 12 Lead    Date/Time: 5/9/2018 4:04 AM  Performed by: MARCELA SMITH  Authorized by: MARCELA SMITH   Interpreted by physician  Rhythm: sinus rhythm  Rate: normal  BPM: 86  QRS axis: normal  Comments: upsloaping of st segements in inferior leads is more concave in nature, no actual st elevation noted.     No change in EKG from [facility] which was done at 0156 and a later repeat.           ECG 12 Lead    Date/Time: 5/9/2018 7:24 AM  Performed by: MARCELA SMITH  Authorized by: MARCELA SMITH   Interpreted by physician  Rhythm: sinus tachycardia  Rate: tachycardic  BPM: 107  QRS axis: normal                   ED Course  ED Course      CT Angiogram Chest With Contrast    (Results Pending)           Labs, cxr reviewed from [facility]. CXR shows copd changse. Given pleuritic type pain will do CTA.     Dr. Rios does not wish to admit, wishes for stress in ED and discharge    CTA without acute findings.     Patient has had return of pain despite morphine. I did offer him further nitro but he stated this did not do anything last time. His wife is very anxious in the room stating that \"he is in afib! You must be missing something!\"     Aside from increased rate, the ekg really has not changed. The ST segments are unchanged. He has two CE that are unchanged from here and [facility]. Will re-check.    Given I cannot admit the patient at this time nor can I reassure the patient/family, I will see if cards can come to see the patient and weigh in " on their opinion.     After morphine he states his pain is still an 8/10    In the room I note his HR to go to 145 and appears to be afib. I cannot capture this on an EKG however and EKG looks to be sinus tach. Will provide cardizem however given likely afib.     Dr. Restrepo will admit     On rhythm strip I was able to capture afib.     He got ASA at other hospital.     No INR thus did not give lovenox     Pain now a 3/10                MDM      Final diagnoses:   Chest pain, unspecified type   Chronic obstructive pulmonary disease, unspecified COPD type   Paroxysmal atrial fibrillation            Lazaro Jimenez MD  05/09/18 0730       Lazaro Jimenez MD  05/09/18 0731       Lazaro Jimenez MD  05/09/18 0739       Lazaro Jimenez MD  05/09/18 0743       Lazaro Jimenez MD  05/09/18 0744       Lazaro Jimenez MD  05/09/18 0758       Lazaro Jimenez MD  05/09/18 0759       Lazaro Jimenez MD  05/09/18 0800

## 2018-05-10 ENCOUNTER — APPOINTMENT (OUTPATIENT)
Dept: CARDIOLOGY | Facility: HOSPITAL | Age: 83
End: 2018-05-10

## 2018-05-10 VITALS
HEART RATE: 72 BPM | WEIGHT: 140.8 LBS | RESPIRATION RATE: 20 BRPM | OXYGEN SATURATION: 96 % | DIASTOLIC BLOOD PRESSURE: 71 MMHG | BODY MASS INDEX: 20.16 KG/M2 | SYSTOLIC BLOOD PRESSURE: 160 MMHG | HEIGHT: 70 IN | TEMPERATURE: 98.2 F

## 2018-05-10 LAB
ANION GAP SERPL CALCULATED.3IONS-SCNC: 9 MMOL/L (ref 4–13)
ARTICHOKE IGE QN: 57 MG/DL (ref 0–99)
BH CV ECHO MEAS - AO MAX PG (FULL): 1.2 MMHG
BH CV ECHO MEAS - AO MAX PG: 7.4 MMHG
BH CV ECHO MEAS - AO MEAN PG (FULL): 0 MMHG
BH CV ECHO MEAS - AO MEAN PG: 4 MMHG
BH CV ECHO MEAS - AO ROOT AREA (BSA CORRECTED): 1.7
BH CV ECHO MEAS - AO ROOT AREA: 7.1 CM^2
BH CV ECHO MEAS - AO ROOT DIAM: 3 CM
BH CV ECHO MEAS - AO V2 MAX: 136 CM/SEC
BH CV ECHO MEAS - AO V2 MEAN: 89.1 CM/SEC
BH CV ECHO MEAS - AO V2 VTI: 22.9 CM
BH CV ECHO MEAS - AVA(I,A): 3.4 CM^2
BH CV ECHO MEAS - AVA(I,D): 3.4 CM^2
BH CV ECHO MEAS - AVA(V,A): 2.9 CM^2
BH CV ECHO MEAS - AVA(V,D): 2.9 CM^2
BH CV ECHO MEAS - BSA(HAYCOCK): 1.8 M^2
BH CV ECHO MEAS - BSA: 1.8 M^2
BH CV ECHO MEAS - BZI_BMI: 20.1 KILOGRAMS/M^2
BH CV ECHO MEAS - BZI_METRIC_HEIGHT: 177.8 CM
BH CV ECHO MEAS - BZI_METRIC_WEIGHT: 63.5 KG
BH CV ECHO MEAS - CONTRAST EF 4CH: 53 ML/M^2
BH CV ECHO MEAS - EDV(CUBED): 74.6 ML
BH CV ECHO MEAS - EDV(MOD-SP4): 46 ML
BH CV ECHO MEAS - EDV(TEICH): 79 ML
BH CV ECHO MEAS - EF(CUBED): 68 %
BH CV ECHO MEAS - EF(MOD-SP4): 53 %
BH CV ECHO MEAS - EF(TEICH): 59.9 %
BH CV ECHO MEAS - ESV(CUBED): 23.9 ML
BH CV ECHO MEAS - ESV(MOD-SP4): 21.6 ML
BH CV ECHO MEAS - ESV(TEICH): 31.7 ML
BH CV ECHO MEAS - FS: 31.6 %
BH CV ECHO MEAS - IVS/LVPW: 1.5
BH CV ECHO MEAS - IVSD: 1.2 CM
BH CV ECHO MEAS - LA DIMENSION: 3.4 CM
BH CV ECHO MEAS - LA/AO: 1.1
BH CV ECHO MEAS - LAT PEAK E' VEL: 3.9 CM/SEC
BH CV ECHO MEAS - LV DIASTOLIC VOL/BSA (35-75): 25.6 ML/M^2
BH CV ECHO MEAS - LV MASS(C)D: 143.4 GRAMS
BH CV ECHO MEAS - LV MASS(C)DI: 79.9 GRAMS/M^2
BH CV ECHO MEAS - LV MAX PG: 6.2 MMHG
BH CV ECHO MEAS - LV MEAN PG: 4 MMHG
BH CV ECHO MEAS - LV SYSTOLIC VOL/BSA (12-30): 12 ML/M^2
BH CV ECHO MEAS - LV V1 MAX: 124 CM/SEC
BH CV ECHO MEAS - LV V1 MEAN: 89.5 CM/SEC
BH CV ECHO MEAS - LV V1 VTI: 24.5 CM
BH CV ECHO MEAS - LVIDD: 4.2 CM
BH CV ECHO MEAS - LVIDS: 2.9 CM
BH CV ECHO MEAS - LVLD AP4: 7.2 CM
BH CV ECHO MEAS - LVLS AP4: 6.6 CM
BH CV ECHO MEAS - LVOT AREA (M): 3.1 CM^2
BH CV ECHO MEAS - LVOT AREA: 3.1 CM^2
BH CV ECHO MEAS - LVOT DIAM: 2 CM
BH CV ECHO MEAS - LVPWD: 0.83 CM
BH CV ECHO MEAS - MED PEAK E' VEL: 6.31 CM/SEC
BH CV ECHO MEAS - MV A MAX VEL: 106 CM/SEC
BH CV ECHO MEAS - MV DEC SLOPE: 273 CM/SEC^2
BH CV ECHO MEAS - MV DEC TIME: 0.28 SEC
BH CV ECHO MEAS - MV E MAX VEL: 71.8 CM/SEC
BH CV ECHO MEAS - MV E/A: 0.68
BH CV ECHO MEAS - MV MAX PG: 5.2 MMHG
BH CV ECHO MEAS - MV MEAN PG: 2 MMHG
BH CV ECHO MEAS - MV P1/2T MAX VEL: 81.4 CM/SEC
BH CV ECHO MEAS - MV P1/2T: 87.3 MSEC
BH CV ECHO MEAS - MV V2 MAX: 114 CM/SEC
BH CV ECHO MEAS - MV V2 MEAN: 54 CM/SEC
BH CV ECHO MEAS - MV V2 VTI: 29.1 CM
BH CV ECHO MEAS - MVA P1/2T LCG: 2.7 CM^2
BH CV ECHO MEAS - MVA(P1/2T): 2.5 CM^2
BH CV ECHO MEAS - MVA(VTI): 2.6 CM^2
BH CV ECHO MEAS - PA MAX PG: 2.1 MMHG
BH CV ECHO MEAS - PA V2 MAX: 72.8 CM/SEC
BH CV ECHO MEAS - RAP SYSTOLE: 5 MMHG
BH CV ECHO MEAS - RVSP: 35 MMHG
BH CV ECHO MEAS - SI(AO): 90.2 ML/M^2
BH CV ECHO MEAS - SI(CUBED): 28.3 ML/M^2
BH CV ECHO MEAS - SI(LVOT): 42.9 ML/M^2
BH CV ECHO MEAS - SI(MOD-SP4): 13.6 ML/M^2
BH CV ECHO MEAS - SI(TEICH): 26.4 ML/M^2
BH CV ECHO MEAS - SV(AO): 161.9 ML
BH CV ECHO MEAS - SV(CUBED): 50.7 ML
BH CV ECHO MEAS - SV(LVOT): 77 ML
BH CV ECHO MEAS - SV(MOD-SP4): 24.4 ML
BH CV ECHO MEAS - SV(TEICH): 47.4 ML
BH CV ECHO MEAS - TR MAX VEL: 274 CM/SEC
BH CV ECHO MEASUREMENTS AVERAGE E/E' RATIO: 14.06
BH CV NUCLEAR PRIOR STUDY: 3
BH CV STRESS BP STAGE 1: NORMAL
BH CV STRESS COMMENTS STAGE 1: NORMAL
BH CV STRESS DOSE REGADENOSON STAGE 1: 0.4
BH CV STRESS DURATION MIN STAGE 1: 0
BH CV STRESS DURATION SEC STAGE 1: 10
BH CV STRESS HR STAGE 1: 96
BH CV STRESS PROTOCOL 1: NORMAL
BH CV STRESS RECOVERY BP: NORMAL MMHG
BH CV STRESS RECOVERY HR: 88 BPM
BH CV STRESS STAGE 1: 1
BUN BLD-MCNC: 28 MG/DL (ref 5–21)
BUN/CREAT SERPL: 26.2 (ref 7–25)
CALCIUM SPEC-SCNC: 8.5 MG/DL (ref 8.4–10.4)
CHLORIDE SERPL-SCNC: 93 MMOL/L (ref 98–110)
CHOLEST SERPL-MCNC: 140 MG/DL (ref 130–200)
CO2 SERPL-SCNC: 27 MMOL/L (ref 24–31)
CREAT BLD-MCNC: 1.07 MG/DL (ref 0.5–1.4)
DEPRECATED RDW RBC AUTO: 39.3 FL (ref 40–54)
ERYTHROCYTE [DISTWIDTH] IN BLOOD BY AUTOMATED COUNT: 12.2 % (ref 12–15)
GFR SERPL CREATININE-BSD FRML MDRD: 66 ML/MIN/1.73
GLUCOSE BLD-MCNC: 103 MG/DL (ref 70–100)
HBA1C MFR BLD: 5.6 %
HCT VFR BLD AUTO: 36.9 % (ref 40–52)
HDLC SERPL-MCNC: 54 MG/DL
HGB BLD-MCNC: 13 G/DL (ref 14–18)
LDLC/HDLC SERPL: 1.17 {RATIO}
LEFT ATRIUM VOLUME INDEX: 16.1 ML/M2
LEFT ATRIUM VOLUME: 28.8 CM3
LV EF 2D ECHO EST: 65 %
LV EF NUC BP: 71 %
MAXIMAL PREDICTED HEART RATE: 140 BPM
MAXIMAL PREDICTED HEART RATE: 140 BPM
MCH RBC QN AUTO: 31 PG (ref 28–32)
MCHC RBC AUTO-ENTMCNC: 35.2 G/DL (ref 33–36)
MCV RBC AUTO: 87.9 FL (ref 82–95)
PERCENT MAX PREDICTED HR: 68.57 %
PLATELET # BLD AUTO: 204 10*3/MM3 (ref 130–400)
PMV BLD AUTO: 9.3 FL (ref 6–12)
POTASSIUM BLD-SCNC: 3.6 MMOL/L (ref 3.5–5.3)
RBC # BLD AUTO: 4.2 10*6/MM3 (ref 4.8–5.9)
SODIUM BLD-SCNC: 129 MMOL/L (ref 135–145)
STRESS BASELINE BP: NORMAL MMHG
STRESS BASELINE HR: 79 BPM
STRESS PERCENT HR: 81 %
STRESS POST EXERCISE DUR MIN: 0 MIN
STRESS POST EXERCISE DUR SEC: 10 SEC
STRESS POST PEAK BP: NORMAL MMHG
STRESS POST PEAK HR: 96 BPM
STRESS TARGET HR: 119 BPM
STRESS TARGET HR: 119 BPM
TRIGL SERPL-MCNC: 115 MG/DL (ref 0–149)
TSH SERPL DL<=0.05 MIU/L-ACNC: 0.84 MIU/ML (ref 0.47–4.68)
WBC NRBC COR # BLD: 8.19 10*3/MM3 (ref 4.8–10.8)

## 2018-05-10 PROCEDURE — G0378 HOSPITAL OBSERVATION PER HR: HCPCS

## 2018-05-10 PROCEDURE — 94799 UNLISTED PULMONARY SVC/PX: CPT

## 2018-05-10 PROCEDURE — 25010000002 ENOXAPARIN PER 10 MG: Performed by: INTERNAL MEDICINE

## 2018-05-10 PROCEDURE — 80061 LIPID PANEL: CPT | Performed by: INTERNAL MEDICINE

## 2018-05-10 PROCEDURE — 78452 HT MUSCLE IMAGE SPECT MULT: CPT

## 2018-05-10 PROCEDURE — 85027 COMPLETE CBC AUTOMATED: CPT | Performed by: INTERNAL MEDICINE

## 2018-05-10 PROCEDURE — 0 TECHNETIUM SESTAMIBI: Performed by: INTERNAL MEDICINE

## 2018-05-10 PROCEDURE — 96372 THER/PROPH/DIAG INJ SC/IM: CPT

## 2018-05-10 PROCEDURE — 80048 BASIC METABOLIC PNL TOTAL CA: CPT | Performed by: INTERNAL MEDICINE

## 2018-05-10 PROCEDURE — 84443 ASSAY THYROID STIM HORMONE: CPT | Performed by: INTERNAL MEDICINE

## 2018-05-10 PROCEDURE — 99213 OFFICE O/P EST LOW 20 MIN: CPT | Performed by: INTERNAL MEDICINE

## 2018-05-10 PROCEDURE — 93017 CV STRESS TEST TRACING ONLY: CPT

## 2018-05-10 PROCEDURE — 94760 N-INVAS EAR/PLS OXIMETRY 1: CPT

## 2018-05-10 PROCEDURE — 93018 CV STRESS TEST I&R ONLY: CPT | Performed by: INTERNAL MEDICINE

## 2018-05-10 PROCEDURE — 96366 THER/PROPH/DIAG IV INF ADDON: CPT

## 2018-05-10 PROCEDURE — A9500 TC99M SESTAMIBI: HCPCS | Performed by: INTERNAL MEDICINE

## 2018-05-10 PROCEDURE — 83036 HEMOGLOBIN GLYCOSYLATED A1C: CPT | Performed by: INTERNAL MEDICINE

## 2018-05-10 PROCEDURE — 78452 HT MUSCLE IMAGE SPECT MULT: CPT | Performed by: INTERNAL MEDICINE

## 2018-05-10 PROCEDURE — 25010000002 REGADENOSON 0.4 MG/5ML SOLUTION: Performed by: INTERNAL MEDICINE

## 2018-05-10 RX ORDER — BUDESONIDE AND FORMOTEROL FUMARATE DIHYDRATE 160; 4.5 UG/1; UG/1
2 AEROSOL RESPIRATORY (INHALATION)
Qty: 1 INHALER | Refills: 0 | Status: SHIPPED | OUTPATIENT
Start: 2018-05-10

## 2018-05-10 RX ORDER — VALSARTAN 80 MG/1
80 TABLET ORAL DAILY
Qty: 30 TABLET | Refills: 0 | Status: ON HOLD | OUTPATIENT
Start: 2018-05-10 | End: 2021-05-21

## 2018-05-10 RX ORDER — DILTIAZEM HYDROCHLORIDE 180 MG/1
180 CAPSULE, COATED, EXTENDED RELEASE ORAL DAILY
Qty: 30 CAPSULE | Refills: 0 | Status: SHIPPED | OUTPATIENT
Start: 2018-05-10

## 2018-05-10 RX ADMIN — DILTIAZEM HYDROCHLORIDE 30 MG: 30 TABLET, FILM COATED ORAL at 18:11

## 2018-05-10 RX ADMIN — FINASTERIDE 5 MG: 5 TABLET, FILM COATED ORAL at 08:46

## 2018-05-10 RX ADMIN — APIXABAN 5 MG: 5 TABLET, FILM COATED ORAL at 18:12

## 2018-05-10 RX ADMIN — CLOPIDOGREL 75 MG: 75 TABLET, FILM COATED ORAL at 13:51

## 2018-05-10 RX ADMIN — PANTOPRAZOLE SODIUM 40 MG: 40 TABLET, DELAYED RELEASE ORAL at 08:46

## 2018-05-10 RX ADMIN — LEVOTHYROXINE SODIUM 50 MCG: 50 TABLET ORAL at 08:46

## 2018-05-10 RX ADMIN — TECHNETIUM TC 99M SESTAMIBI 1 DOSE: 1 INJECTION INTRAVENOUS at 12:43

## 2018-05-10 RX ADMIN — TECHNETIUM TC 99M SESTAMIBI 1 DOSE: 1 INJECTION INTRAVENOUS at 10:25

## 2018-05-10 RX ADMIN — GUAIFENESIN 1200 MG: 600 TABLET, EXTENDED RELEASE ORAL at 08:46

## 2018-05-10 RX ADMIN — REGADENOSON 0.4 MG: 0.08 INJECTION, SOLUTION INTRAVENOUS at 12:42

## 2018-05-10 RX ADMIN — BUDESONIDE AND FORMOTEROL FUMARATE DIHYDRATE 2 PUFF: 160; 4.5 AEROSOL RESPIRATORY (INHALATION) at 08:53

## 2018-05-10 RX ADMIN — DILTIAZEM HYDROCHLORIDE 30 MG: 30 TABLET, FILM COATED ORAL at 08:46

## 2018-05-10 RX ADMIN — ENOXAPARIN SODIUM 40 MG: 40 INJECTION SUBCUTANEOUS at 13:51

## 2018-05-10 NOTE — DISCHARGE SUMMARY
[facility] Medicine Services  DISCHARGE SUMMARY       Date of Admission: 5/9/2018  Date of Discharge:  5/10/2018  Primary Care Physician: Javid Grajeda MD    Presenting Problem/History of Present Illness:  Chest discomfort.     Final Discharge Diagnoses:  1.  Chest discomfort in the setting of known coronary artery disease.  Lexiscan read as low risk for ischemia.  2.  Paroxysmal atrial fibrillation with rapid ventricular response.  3.  Chronic obstructive pulmonary disease without exacerbation.  4.  Ongoing tobacco abuse.  5.  Peripheral vascular disease.        - with a history of lower extremity stenting and abdominal aortic aneurysm repair.  6.  Systemic arterial hypertension.  7.  Hyperlipidemia.  8.  Hypothyroidism.  9.  Trigeminal neuralgia followed by Dr. Kendrick.   10.  Mild hyponatremia. Likely related to HCTZ  11.  Normocytic anemia.    Consults:   1.  Dr. Ildefonso Restrepo- Cardiology    Procedures Performed:     Pertinent Test Results:   Lab Results (all)     Procedure Component Value Units Date/Time    Hemoglobin A1c [[id number]] Collected:  05/10/18 0527    Specimen:  Blood Updated:  05/10/18 0737     Hemoglobin A1C 5.6 %     Narrative:       Less than 6.0           Non-Diabetic Range  6.0-7.0                 ADA Therapeutic Target  Greater than 7.0        Action Suggested    TSH [[id number]]  (Normal) Collected:  05/10/18 0527    Specimen:  Blood Updated:  05/10/18 0701     TSH 0.844 mIU/mL     Lipid Panel [[id number]] Collected:  05/10/18 0527    Specimen:  Blood Updated:  05/10/18 0634     Total Cholesterol 140 mg/dL      Triglycerides 115 mg/dL      HDL Cholesterol 54 mg/dL      LDL Cholesterol  57 mg/dL      LDL/HDL Ratio 1.17    Basic Metabolic Panel [[id number]]  (Abnormal) Collected:  05/10/18 0527    Specimen:  Blood Updated:  05/10/18 0624     Glucose 103 (H) mg/dL      BUN 28 (H) mg/dL      Creatinine 1.07 mg/dL      Sodium 129 (L) mmol/L       Potassium 3.6 mmol/L      Chloride 93 (L) mmol/L      CO2 27.0 mmol/L      Calcium 8.5 mg/dL      eGFR Non African Amer 66 mL/min/1.73      BUN/Creatinine Ratio 26.2 (H)     Anion Gap 9.0 mmol/L     Narrative:       The MDRD GFR formula is only valid for adults with stable renal function between ages 18 and 70.    CBC (No Diff) [[id number]]  (Abnormal) Collected:  05/10/18 0527    Specimen:  Blood Updated:  05/10/18 0613     WBC 8.19 10*3/mm3      RBC 4.20 (L) 10*6/mm3      Hemoglobin 13.0 (L) g/dL      Hematocrit 36.9 (L) %      MCV 87.9 fL      MCH 31.0 pg      MCHC 35.2 g/dL      RDW 12.2 %      RDW-SD 39.3 (L) fl      MPV 9.3 fL      Platelets 204 10*3/mm3     Troponin [[id number]]  (Normal) Collected:  05/09/18 1058    Specimen:  Blood Updated:  05/09/18 1144     Troponin I <0.012 ng/mL     aPTT [[id number]]  (Abnormal) Collected:  05/09/18 0412    Specimen:  Blood Updated:  05/09/18 0818     PTT 42.2 (H) seconds     Protime-INR [[id number]]  (Normal) Collected:  05/09/18 0412    Specimen:  Blood Updated:  05/09/18 0818     Protime 13.5 Seconds      INR 1.00    Troponin [[id number]]  (Normal) Collected:  05/09/18 0736    Specimen:  Blood from Arm, Right Updated:  05/09/18 0814     Troponin I <0.012 ng/mL      Comment: Auto resulted.       Troponin [[id number]]  (Normal) Collected:  05/09/18 0412    Specimen:  Blood Updated:  05/09/18 0452     Troponin I <0.012 ng/mL         History of Present Illness on Day of Discharge: Patient is resting in bed with wife and daughters at bedside.  He has had no complaints of chest pain today.  He denies any shortness of breath worse than his baseline.    Hospital Course:  Mr. Vance is a pleasant 82-year-old  male who follows Dr. Javid Grajeda for primary care.  He has a past medical history significant for coronary artery disease status post stent placement, chronic objective pulmonary disease, hyperlipidemia, hypertension, and CVA.  He was transferred to our facility  on 05/09/2018 from [facility].  He presented to their facility with complaints of chest discomfort and palpitations.  The chest discomfort occurred the day prior to admission at rest, described as sharp in nature and worse with deep inspiration.  This did radiate into his back.  He denied any worsening shortness of breath, nausea, or diaphoresis.  He also been having palpitations and did have two episodes of atrial fibrillation in the last 15 years that his wife is aware of.  EKG in our emergency department shows sinus rhythm with PACs.  The patient was transferred to our facility for cardiology services.  He was admitted to the hospitalist service for further evaluation and management with cardiology consulted.    The patient had 3 negative troponin levels at our facility.  TSH and hemoglobin A1c are within normal limits, as well as lipid panel.  The patient was seen in consultation by Dr. Ildefonso Restrepo with cardiology.  It was recommended to proceed with Lexiscan.  The test indicated a small sized area of ischemia located septal wall, but overall the impression was consistent with a low risk study.  2-D echocardiogram was also obtained which showed left ventricular diastolic dysfunction with preserved ejection fraction of 65% . He does follow cardiologist Dr. Brennon Araujo in [facility], and he can follow-up with him in the next 2-3 weeks.    The patient was placed on Cardizem drip for atrial fibrillation, which was weaned off earlier today and he has been placed on oral Cardizem.  He is tolerating this well.  He has remained in sinus arrhythmia with frequent PACs on telemetry with rate controlled in the 70s and 80s.  His amlodipine has been discontinued, as well as the HCTZ component of his Diovan due to hyponatremia.  With a CHADS2-VASC score of 5, the decision was made to place the patient on low-dose anticoagulation with Eliquis, and he will continue on his Plavix as well.    The  "patient is overall hemodynamically stable and is appropriate for discharge home today.  He will need to follow-up with Dr. Grajeda in 1 week, and will need his sodium monitored in the near future to gauge response to discontinuation of HCTZ.     Condition on Discharge:  Stable    Physical Exam on Discharge:  /71 (BP Location: Right arm, Patient Position: Lying)   Pulse 72   Temp 98.2 °F (36.8 °C) (Temporal Artery )   Resp 20   Ht 177.8 cm (70\")   Wt 63.9 kg (140 lb 12.8 oz)   SpO2 96%   BMI 20.20 kg/m²   Physical Exam   Constitutional: He is oriented to person, place, and time. He appears well-developed and well-nourished. No distress.   HENT:   Head: Normocephalic and atraumatic.   Neck: Normal range of motion. Neck supple. No JVD present. No tracheal deviation present. No thyromegaly present.   Cardiovascular: Normal rate, normal heart sounds and intact distal pulses.  Exam reveals no gallop and no friction rub.    No murmur heard.  Irregular rhythm. Sinus arrhythmia with frequent PAC's 70-80's   Pulmonary/Chest: Effort normal. He has wheezes (Right lower lobe exipratory wheeze). He has no rales.   Abdominal: Soft. Bowel sounds are normal. He exhibits no distension. There is no tenderness. There is no guarding.   Musculoskeletal: Normal range of motion. He exhibits no edema or tenderness.   Lymphadenopathy:     He has no cervical adenopathy.   Neurological: He is alert and oriented to person, place, and time. No cranial nerve deficit.   Skin: Skin is warm and dry. No erythema. No pallor.   Psychiatric: He has a normal mood and affect. His behavior is normal. Judgment and thought content normal.   Vitals reviewed.    Discharge Disposition:  Home or Self Care    Discharge Medications:   Brennon Vance   Home Medication Instructions ADRIEL:[id number]    Printed on:05/10/18 6113   Medication Information                      albuterol (PROVENTIL) (2.5 MG/3ML) 0.083% nebulizer solution  Take 2.5 mg by " nebulization Every 4 (Four) Hours As Needed for Wheezing.             apixaban (ELIQUIS) 5 MG tablet tablet  Take 1 tablet by mouth Every 12 (Twelve) Hours.             budesonide-formoterol (SYMBICORT) 160-4.5 MCG/ACT inhaler  Inhale 2 puffs 2 (Two) Times a Day.             carBAMazepine XR (TEGretol  XR) 200 MG 12 hr tablet  Take 200 mg by mouth Daily.             carbamide peroxide (DEBROX) 6.5 % otic solution  Administer 3 drops into both ears 2 (Two) Times a Day.             Cholecalciferol 20607 units tablet  Take 1,000 Units by mouth Daily.             clopidogrel (PLAVIX) 75 MG tablet  Take 75 mg by mouth Daily.             diltiaZEM CD (CARDIZEM CD) 180 MG 24 hr capsule  Take 1 capsule by mouth Daily.             dutasteride (AVODART) 0.5 MG capsule  Take 0.5 mg by mouth Daily.             ipratropium (ATROVENT) 0.03 % nasal spray  2 sprays into each nostril Every 12 (Twelve) Hours.             levothyroxine (SYNTHROID, LEVOTHROID) 50 MCG tablet  Take 50 mcg by mouth Daily.             MAGNESIUM PO  Take 64 mg by mouth 2 (Two) Times a Day.             pantoprazole (PROTONIX) 40 MG EC tablet  Take 40 mg by mouth Daily.             rosuvastatin (CRESTOR) 10 MG tablet  Take 20 mg by mouth Daily.             valsartan (DIOVAN) 80 MG tablet  Take 1 tablet by mouth Daily.             vitamin B-12 (CYANOCOBALAMIN) 1000 MCG tablet  Take 1,000 mcg by mouth Daily.               Discharge Diet:   Diet Instructions     Diet: Cardiac       Discharge Diet:  Cardiac        Activity at Discharge:   Activity Instructions     Activity as Tolerated           Discharge Care Plan/Instructions:   1.  Return for any acute or worsening symptoms  2.  Prescription for Eliquis 5 mg twice daily  3.  Valsartan/HCTZ discontinued in favor of plain valsartan  4.  Symbicort increased to COPD maintenance treatment of 160-4.5 mcg/act  5.  Amlodipine discontinued  6.  Prescription for Cardizem  mg Daily    Follow-up Appointments:   1.   Dr. Javid Grajeda in 1 week- will need sodium level monitored on an outpatient basis  2.  Dr. Brennon Araujo in 2-3 weeks    Test Results Pending at Discharge: None    CONCEPCION Celeste  05/10/18  5:11 PM    Time: 25 minutes    I personally evaluated and examined the patient in conjunction with CONCEPCION Miranda and agree with the assessment, treatment plan, and disposition of the patient as recorded by her. My history, exam, and further recommendations are:     I discussed the patient with Dr. Restrepo this evening and his echocardiogram was reasonable in his Lexiscan was read as a low risk study.  The patient feels well and wants to go home.  He is pain free. He is not short of breath. This case was discussed with he and his wife as well as his daughters. Maria Del Carmen and I saw them together.     He is currently maintaining sinus rhythm.  We will continue Cardizem CD.  We also discussed placing the patient on Eliquis for stroke prophylaxis of his atrial fibrillation.  He and his family are in agreement.  He will continue his Plavix.  No aspirin.    I changed the dose of his Symbicort.    I stopped his hydrochlorothiazide as this may be precipitating some of his hyponatremia.  He did have a CT angiogram of his chest that felt to show any pulmonary abnormalities that could drive hyponatremia.    Solomon Falk DO  05/10/18  5:35 PM

## 2018-05-10 NOTE — PROGRESS NOTES
Discharge Planning Assessment  [facility]     Patient Name: Brennon Vance  MRN: [id number]  Today's Date: 5/10/2018    Admit Date: 5/9/2018          Discharge Needs Assessment     Row Name 05/10/18 1402       Living Environment    Lives With spouse    Name(s) of Who Lives With Patient Anita    Current Living Arrangements home/apartment/condo    Primary Care Provided by self    Provides Primary Care For no one    Family Caregiver if Needed spouse    Quality of Family Relationships helpful;involved;supportive    Able to Return to Prior Arrangements yes       Resource/Environmental Concerns    Resource/Environmental Concerns none    Transportation Concerns car, none       Transition Planning    Patient/Family Anticipates Transition to home with family    Patient/Family Anticipated Services at Transition none    Transportation Anticipated family or friend will provide       Discharge Needs Assessment    Readmission Within the Last 30 Days no previous admission in last 30 days    Concerns to be Addressed no discharge needs identified    Equipment Currently Used at Home cane, straight;shower chair    Anticipated Changes Related to Illness none    Equipment Needed After Discharge none            Discharge Plan     Row Name 05/10/18 0368       Plan    Plan Home    Patient/Family in Agreement with Plan yes    Plan Comments Spoke with pt to assess for home needs. Pt lives with wife and plans same, she is currently in room.  Pt is independent at present and no home needs identified. They do not feel HH needed. Will follow.         Destination     No service coordination in this encounter.      Durable Medical Equipment     No service coordination in this encounter.      Dialysis/Infusion     No service coordination in this encounter.      Home Medical Care     No service coordination in this encounter.      Social Care     No service coordination in this encounter.                Demographic Summary    No documentation.            Functional Status    No documentation.           Psychosocial    No documentation.           Abuse/Neglect    No documentation.           Legal    No documentation.           Substance Abuse    No documentation.           Patient Forms    No documentation.         DIAZ Wang

## 2018-05-10 NOTE — PLAN OF CARE
Problem: Patient Care Overview  Goal: Plan of Care Review  Outcome: Ongoing (interventions implemented as appropriate)   05/10/18 7398   Coping/Psychosocial   Plan of Care Reviewed With patient   OTHER   Outcome Summary denies pain or nausea, stress test completed, awaiting final results. cardizem gtt dcd, continue po cardizem po every 6 hours, family at bedside. tele reads [id number] with frequesnt pacs. pt fed chf teaching started.   Plan of Care Review   Progress improving

## 2018-05-10 NOTE — CONSULTS
LOS: 0 days   Patient Care Team:  Javid Grajeda MD as PCP - General (Family Medicine)    Chief Complaint: Active Problems:    Chest pain    Chief complaints  chest pain    History of current illness 82 year  male presented to the emergency room with complaints of chest pain.  Emergency room physician asked me to see the patient.  The subsequently patient was admitted to internal medicine service and I will be seeing the patient in consultation.  Patient essentially presented to [facility] with complaints of substernal chest pain this is mostly pleuritic in nature.  Has had intermittent skipped beats at times he feels his heart races.  Has mild shortness of breath.  The pain sometimes radiates into the intrascapular region.  Does not have any high-grade fever or chills.  Has had mild cough.  Chest pain is not made worse it is eating or drinking.  Chest pain not related to postural changes    EKG shows sinus rhythm with premature atrial contractions      Telemetry: no malignant arrhythmia. No significant pauses.    Review of Systems   Constitutional: No chills   Has fatigue   No fever.   HENT: Negative.    Eyes: Negative.    Respiratory: Negative for cough,   No chest wall soreness,   Mild shortness of breath,   no wheezing, no stridor.    Cardiovascular:Positive for  chest pain, Intermittent  palpitations   No significant  leg swelling.   Gastrointestinal: Negative for abdominal distention,  No abdominal pain,   No blood in stool,   No constipation,   No diarrhea,   No nausea   No vomiting.   Endocrine: Negative.    Genitourinary: Negative for difficulty urinating, dysuria, flank pain and hematuria.   Musculoskeletal: Negative.    Skin: Negative for rash and wound.   Allergic/Immunologic: Negative.    Neurological: Negative for dizziness, syncope, weakness,   No light-headedness  No  headaches.   Hematological: Does not bruise/bleed easily.   Psychiatric/Behavioral: Negative  for agitation or behavioral problems,   No confusion,   the patient is  nervous/anxious.       History:   Past Medical History:   Diagnosis Date   • CAD (coronary artery disease)    • COPD (chronic obstructive pulmonary disease)    • Hiatal hernia    • Hyperlipidemia    • Hypertension    • Stroke      Past Surgical History:   Procedure Laterality Date   • ABDOMINAL AORTIC ANEURYSM REPAIR     • CORONARY ANGIOPLASTY WITH STENT PLACEMENT     • FEMORAL ARTERY STENT     • TRIGEMINAL NERVE DECOMPRESSION       Social History     Social History   • Marital status:      Spouse name: N/A   • Number of children: N/A   • Years of education: N/A     Occupational History   • Not on file.     Social History Main Topics   • Smoking status: Current Every Day Smoker     Packs/day: 0.50   • Smokeless tobacco: Not on file   • Alcohol use No   • Drug use: No   • Sexual activity: Not on file     Other Topics Concern   • Not on file     Social History Narrative   • No narrative on file     History reviewed. No pertinent family history.    Labs:  WBC No results found for: WBC   HGB No results found for: HGB   HCT No results found for: HCT   Platlets No results found for: PLT   MCV No results found for: MCV         Invalid input(s): LABALBU, PROT  Lab Results   Component Value Date    TROPONINI <0.012 05/09/2018     PT/INR:    Protime   Date Value Ref Range Status   05/09/2018 13.5 11.9 - 14.6 Seconds Final   /  INR   Date Value Ref Range Status   05/09/2018 1.00 0.91 - 1.09 Final       Imaging Results (last 72 hours)     Procedure Component Value Units Date/Time    CT Angiogram Chest With Contrast [[id number]] Collected:  05/09/18 0830     Updated:  05/09/18 0900    Narrative:       CT ANGIOGRAM CHEST W CONTRAST- 5/9/2018 4:36 AM CDT     HISTORY: Chest pain, acute, nonspecific, low prob CAD      COMPARISON: None     DOSE LENGTH PRODUCT: 342 mGy cm. Automated exposure control was also  utilized to decrease patient radiation dose.      TECHNIQUE: Axial images of the chest are obtained following IV contrast.  2-D and maximal intensity projection images are reconstructed and  reviewed.     FINDINGS:  No pulmonary emboli are identified. There is diffuse vascular  calcification involving the normal caliber thoracic aorta with dense  coronary artery calcification. There is no pericardial or pleural  effusion.     There is partial visualization of an endovascular graft within the  abdominal aorta.     There are emphysematous changes of the lungs. There is prominent  scarring within the lung apices. There is dependent basilar atelectasis.  There is no lobar consolidation. There is no pneumothorax.     Osteopenia is suspected of the regional skeleton. Is chronic compression  of the T4 and T9 vertebra. Small stones seen along the endplates of T10.  There is minimal compression of the superior endplate of T2.       Impression:       1. No pulmonary emboli.  2. Emphysematous changes with prominent apical lung scarring. Dependent  basilar atelectasis.  3. Diffuse vascular calcification with involvement of the coronary  arteries.  4. Chronic appearing compression deformities of the thoracic spine (T2,  T4, T9). Osteopenia.        Comments: A preliminary report is issued to the ER by the [facility]  radiology service. I agree with this preliminary impression.  This report was finalized on 05/09/2018 08:57 by Dr. Hanane Coffey MD.          Objective     Allergies   Allergen Reactions   • Lyrica [Pregabalin] Unknown (See Comments)     Pt does not remember       Medication Review: Performed  Current Facility-Administered Medications   Medication Dose Route Frequency Provider Last Rate Last Dose   • acetaminophen (TYLENOL) tablet 650 mg  650 mg Oral Q4H PRN Solomon Falk DO       • albuterol (PROVENTIL) nebulizer solution 0.5% 2.5 mg/0.5mL  1.25 mg Nebulization Q6H PRN Solomon Falk DO        And   • ipratropium (ATROVENT) nebulizer solution 0.5 mg  0.5 mg  Nebulization Q6H PRN Solomon Falk DO       • aspirin EC tablet 81 mg  81 mg Oral Daily Solomon Falk DO   81 mg at 05/09/18 1158   • budesonide-formoterol (SYMBICORT) 160-4.5 MCG/ACT inhaler 2 puff  2 puff Inhalation BID - RT Solomon Falk DO   2 puff at 05/09/18 2024   • carBAMazepine XR (TEGretol  XR) 12 hr tablet 200 mg  200 mg Oral Q12H Solomon Falk DO   200 mg at 05/09/18 1157   • clopidogrel (PLAVIX) tablet 75 mg  75 mg Oral Daily Solomon Falk DO   75 mg at 05/09/18 1156   • diltiaZEM (CARDIZEM) 125 mg in sodium chloride 0.9 % 125 mL (1 mg/mL) infusion  5-15 mg/hr Intravenous Titrated Solomon Falk DO 5 mL/hr at 05/09/18 1936 5 mg/hr at 05/09/18 1936   • [START ON 5/10/2018] diltiaZEM (CARDIZEM) tablet 30 mg  30 mg Oral Q6H Solomon Falk DO       • enoxaparin (LOVENOX) syringe 40 mg  40 mg Subcutaneous Q24H Solomon Falk DO   40 mg at 05/09/18 1158   • finasteride (PROSCAR) tablet 5 mg  5 mg Oral Daily Solomon Falk DO   5 mg at 05/09/18 1201   • guaiFENesin (MUCINEX) 12 hr tablet 1,200 mg  1,200 mg Oral Q12H Solomon Falk DO   1,200 mg at 05/09/18 1158   • levothyroxine (SYNTHROID, LEVOTHROID) tablet 50 mcg  50 mcg Oral Daily Solomon Falk DO   50 mcg at 05/09/18 1157   • nicotine (NICODERM CQ) 21 MG/24HR patch 1 patch  1 patch Transdermal Q24H Solomon Falk DO       • nitroglycerin (NITROSTAT) SL tablet 0.4 mg  0.4 mg Sublingual Q5 Min PRN Lazaro Jimenez MD   0.4 mg at 05/09/18 0757   • ondansetron (ZOFRAN) injection 4 mg  4 mg Intravenous Q6H PRN Solomon Falk DO       • pantoprazole (PROTONIX) EC tablet 40 mg  40 mg Oral Daily Solomon Falk DO   40 mg at 05/09/18 1156   • promethazine (PHENERGAN) injection 12.5 mg  12.5 mg Intravenous Q6H PRN Solomon Falk DO   12.5 mg at 05/09/18 1038   • rosuvastatin (CRESTOR) tablet 10 mg  10 mg Oral Nightly Solomon Falk DO   10 mg at 05/09/18 2019   • sodium chloride 0.9 % flush 1-10 mL  1-10 mL Intravenous PRN Solomon Falk,  "DO       • sodium chloride 0.9 % flush 10 mL  10 mL Intravenous PRN Lazaro Jimenez MD           Vital Sign Min/Max for last 24 hours  Temp  Min: 98 °F (36.7 °C)  Max: 98.9 °F (37.2 °C)   BP  Min: 103/68  Max: 169/66   Pulse  Min: 78  Max: 134   Resp  Min: 15  Max: 20   SpO2  Min: 90 %  Max: 99 %   No Data Recorded   Weight  Min: 63.9 kg (140 lb 12.8 oz)  Max: 65.8 kg (145 lb)     Flowsheet Rows    Flowsheet Row First Filed Value   Admission Height 177.8 cm (70\") Documented at 05/09/2018 0408   Admission Weight 65.8 kg (145 lb) Documented at 05/09/2018 0408               Physical Exam:  General Appearance: Awake, alert, in no acute distress  Eyes: Pupils equal and reactive    Ears: Appear intact with no abnormalities noted  Nose: Nares normal, no drainage  Neck: supple, trachea midline, no carotid bruit and no JVD  Back: no kyphosis present,    Lungs: respirations regular, respirations even and respirations unlabored  Heart: normal S1, S2,  2/6 pansystolic murmur in the left sternal border,  no rub and no click  Abdomen: normal bowel sounds, no masses, no hepatomegaly, no splenomegaly, guarding and no rebound tenderness   Skin: no bleeding, bruising or rash  Extremities: no cyanosis  Psychiatric/Behavioral: Negative for agitation, behavioral problems, confusion, the patient does  appear to be nervous/anxious.          Results Review:   I reviewed the patient's new clinical results.  I reviewed the patient's new imaging results and agree with the interpretation.  I reviewed the patient's other test results and agree with the interpretation  I personally viewed and interpreted the patient's EKG/Telemetry data  Discussed with patient, and present family member(s)     Assessment/Plan     Active Problems:    Chest pain  Coronary artery disease with prior stent placement  Pleuritic chest pain, CT angiography negative for pulmonary embolism.    Advanced age  Peripheral arterial disease  Paroxysmal atrial " fibrillation  Essential hypertension  Dyslipidemia  Hypothyroidism  Gastroesophageal reflux disease        Plan    Patient will be admitted to medicine service  Check echocardiogram  Would get Lexiscan Cardiolite stress test    avoid excessive beta agonists   Supportive care  Telemetry  Optimal medical therapy  Deep vein thrombosis prophylaxis/precautions  Appropriate diet, fluid, sodium, caffeine, stimulants intake   Compliance to diet and medications   Avoid NSAIDS  Will follow patient  Thank you for the option of participating in his care    Ildefonso Restrepo MD  05/09/18  9:33 PM

## 2018-05-10 NOTE — PLAN OF CARE
Problem: Fall Risk (Adult)  Goal: Absence of Fall  Outcome: Ongoing (interventions implemented as appropriate)      Problem: Patient Care Overview  Goal: Plan of Care Review  Outcome: Ongoing (interventions implemented as appropriate)   05/10/18 0604   Coping/Psychosocial   Plan of Care Reviewed With patient;family   OTHER   Outcome Summary Pt has had uneventful shift. Remains on Cardizem gtt. BP stable. Pt held NPO for Lexiscan this AM.    Plan of Care Review   Progress no change       Problem: Cardiac: ACS (Acute Coronary Syndrome) (Adult)  Goal: Signs and Symptoms of Listed Potential Problems Will be Absent, Minimized or Managed (Cardiac: ACS)  Outcome: Ongoing (interventions implemented as appropriate)      Problem: Arrhythmia/Dysrhythmia (Symptomatic) (Adult)  Goal: Signs and Symptoms of Listed Potential Problems Will be Absent, Minimized or Managed (Arrhythmia/Dysrhythmia)  Outcome: Ongoing (interventions implemented as appropriate)

## 2018-05-11 NOTE — PROGRESS NOTES
LOS: 0 days   Patient Care Team:  Javid Grajeda MD as PCP - General (Family Medicine)    Chief Complaint: Active Problems:    Chest pain    Shortness of breath    Subjective  Feeling  better  No chest pain   Mild shortness of breath  Moderate fatigue  Generalized weakness  Quite anxious  Physical debility      Telemetry: no malignant arrhythmia. No significant pauses.    Review of Systems   Constitutional: No chills   Has fatigue   No fever.   HENT: Negative.    Eyes: Negative.    Respiratory: Negative for cough,   No chest wall soreness,   Mild shortness of breath,   no wheezing, no stridor.    Cardiovascular: Negative for chest pain,   No palpitations   No significant  leg swelling.   Gastrointestinal: Negative for abdominal distention,  No abdominal pain,   No blood in stool,   No constipation,   No diarrhea,   No nausea   No vomiting.   Endocrine: Negative.    Genitourinary: Negative for difficulty urinating, dysuria, flank pain and hematuria.   Musculoskeletal: Negative.    Skin: Negative for rash and wound.   Allergic/Immunologic: Negative.    Neurological: Negative for dizziness, syncope, weakness,   No light-headedness  No  headaches.   Hematological: Does not bruise/bleed easily.   Psychiatric/Behavioral: Negative for agitation or behavioral problems,   No confusion,   the patient is  nervous/anxious.       History:   Past Medical History:   Diagnosis Date   • CAD (coronary artery disease)    • COPD (chronic obstructive pulmonary disease)    • Hiatal hernia    • Hyperlipidemia    • Hypertension    • Stroke      Past Surgical History:   Procedure Laterality Date   • ABDOMINAL AORTIC ANEURYSM REPAIR     • CORONARY ANGIOPLASTY WITH STENT PLACEMENT     • FEMORAL ARTERY STENT     • TRIGEMINAL NERVE DECOMPRESSION       Social History     Social History   • Marital status:      Spouse name: N/A   • Number of children: N/A   • Years of education: N/A     Occupational History   • Not on file.      Social History Main Topics   • Smoking status: Current Every Day Smoker     Packs/day: 0.50   • Smokeless tobacco: Not on file   • Alcohol use No   • Drug use: No   • Sexual activity: Not on file     Other Topics Concern   • Not on file     Social History Narrative   • No narrative on file     History reviewed. No pertinent family history.    Labs:  WBC WBC   Date Value Ref Range Status   05/10/2018 8.19 4.80 - 10.80 10*3/mm3 Final      HGB Hemoglobin   Date Value Ref Range Status   05/10/2018 13.0 (L) 14.0 - 18.0 g/dL Final      HCT Hematocrit   Date Value Ref Range Status   05/10/2018 36.9 (L) 40.0 - 52.0 % Final      Platlets Platelets   Date Value Ref Range Status   05/10/2018 204 130 - 400 10*3/mm3 Final      MCV MCV   Date Value Ref Range Status   05/10/2018 87.9 82.0 - 95.0 fL Final        Results from last 7 days  Lab Units 05/10/18  0527   SODIUM mmol/L 129*   POTASSIUM mmol/L 3.6   CHLORIDE mmol/L 93*   CO2 mmol/L 27.0   BUN mg/dL 28*   CREATININE mg/dL 1.07   CALCIUM mg/dL 8.5   GLUCOSE mg/dL 103*     Lab Results   Component Value Date    TROPONINI <0.012 05/09/2018     PT/INR:    Protime   Date Value Ref Range Status   05/09/2018 13.5 11.9 - 14.6 Seconds Final   /  INR   Date Value Ref Range Status   05/09/2018 1.00 0.91 - 1.09 Final       Imaging Results (last 72 hours)     Procedure Component Value Units Date/Time    SCANNED - IMAGING [[id number]] Resulted:  05/09/18      Updated:  05/10/18 1146    CT Angiogram Chest With Contrast [[id number]] Collected:  05/09/18 0830     Updated:  05/09/18 0900    Narrative:       CT ANGIOGRAM CHEST W CONTRAST- 5/9/2018 4:36 AM CDT     HISTORY: Chest pain, acute, nonspecific, low prob CAD      COMPARISON: None     DOSE LENGTH PRODUCT: 342 mGy cm. Automated exposure control was also  utilized to decrease patient radiation dose.     TECHNIQUE: Axial images of the chest are obtained following IV contrast.  2-D and maximal intensity projection images are reconstructed  and  reviewed.     FINDINGS:  No pulmonary emboli are identified. There is diffuse vascular  calcification involving the normal caliber thoracic aorta with dense  coronary artery calcification. There is no pericardial or pleural  effusion.     There is partial visualization of an endovascular graft within the  abdominal aorta.     There are emphysematous changes of the lungs. There is prominent  scarring within the lung apices. There is dependent basilar atelectasis.  There is no lobar consolidation. There is no pneumothorax.     Osteopenia is suspected of the regional skeleton. Is chronic compression  of the T4 and T9 vertebra. Small stones seen along the endplates of T10.  There is minimal compression of the superior endplate of T2.       Impression:       1. No pulmonary emboli.  2. Emphysematous changes with prominent apical lung scarring. Dependent  basilar atelectasis.  3. Diffuse vascular calcification with involvement of the coronary  arteries.  4. Chronic appearing compression deformities of the thoracic spine (T2,  T4, T9). Osteopenia.        Comments: A preliminary report is issued to the ER by the [facility]  radiology service. I agree with this preliminary impression.  This report was finalized on 05/09/2018 08:57 by Dr. Hanane Coffey MD.          Objective     Allergies   Allergen Reactions   • Lyrica [Pregabalin] Unknown (See Comments)     Pt does not remember       Medication Review: Performed  No current facility-administered medications for this encounter.      Current Outpatient Prescriptions   Medication Sig Dispense Refill   • albuterol (PROVENTIL) (2.5 MG/3ML) 0.083% nebulizer solution Take 2.5 mg by nebulization Every 4 (Four) Hours As Needed for Wheezing.     • carBAMazepine XR (TEGretol  XR) 200 MG 12 hr tablet Take 200 mg by mouth Daily.     • carbamide peroxide (DEBROX) 6.5 % otic solution Administer 3 drops into both ears 2 (Two) Times a Day.     • Cholecalciferol 49028 units tablet Take  "1,000 Units by mouth Daily.     • clopidogrel (PLAVIX) 75 MG tablet Take 75 mg by mouth Daily.     • dutasteride (AVODART) 0.5 MG capsule Take 0.5 mg by mouth Daily.     • ipratropium (ATROVENT) 0.03 % nasal spray 2 sprays into each nostril Every 12 (Twelve) Hours.     • levothyroxine (SYNTHROID, LEVOTHROID) 50 MCG tablet Take 50 mcg by mouth Daily.     • MAGNESIUM PO Take 64 mg by mouth 2 (Two) Times a Day.     • pantoprazole (PROTONIX) 40 MG EC tablet Take 40 mg by mouth Daily.     • rosuvastatin (CRESTOR) 10 MG tablet Take 20 mg by mouth Daily.     • vitamin B-12 (CYANOCOBALAMIN) 1000 MCG tablet Take 1,000 mcg by mouth Daily.     • apixaban (ELIQUIS) 5 MG tablet tablet Take 1 tablet by mouth Every 12 (Twelve) Hours. 60 tablet 0   • budesonide-formoterol (SYMBICORT) 160-4.5 MCG/ACT inhaler Inhale 2 puffs 2 (Two) Times a Day. 1 inhaler 0   • diltiaZEM CD (CARDIZEM CD) 180 MG 24 hr capsule Take 1 capsule by mouth Daily. 30 capsule 0   • valsartan (DIOVAN) 80 MG tablet Take 1 tablet by mouth Daily. 30 tablet 0       Vital Sign Min/Max for last 24 hours  Temp  Min: 97.2 °F (36.2 °C)  Max: 98.2 °F (36.8 °C)   BP  Min: 131/75  Max: 160/71   Pulse  Min: 72  Max: 84   Resp  Min: 16  Max: 20   SpO2  Min: 94 %  Max: 98 %   No Data Recorded   No Data Recorded     Flowsheet Rows    Flowsheet Row First Filed Value   Admission Height 177.8 cm (70\") Documented at 05/09/2018 0408   Admission Weight 65.8 kg (145 lb) Documented at 05/09/2018 0408          Results for orders placed during the hospital encounter of 05/09/18   Adult Transthoracic Echo Complete W/ Cont if Necessary Per Protocol    Narrative · Left ventricular systolic function is normal. Estimated EF = 65%.  · Left ventricular diastolic dysfunction.  · No evidence of pulmonary hypertension is present.          Physical Exam:  General Appearance: Awake, alert, in no acute distress  Eyes: Pupils equal and reactive    Ears: Appear intact with no abnormalities noted  Nose: " Nares normal, no drainage  Neck: supple, trachea midline, no carotid bruit and no JVD  Back: no kyphosis present,    Lungs: respirations regular, respirations even and respirations unlabored  Heart: normal S1, S2,  2/6 pansystolic murmur in the left sternal border,  no rub and no click  Abdomen: normal bowel sounds, no masses, no hepatomegaly, no splenomegaly, guarding and no rebound tenderness   Skin: no bleeding, bruising or rash  Extremities: no cyanosis  Psychiatric/Behavioral: Negative for agitation, behavioral problems, confusion, the patient does  appear to be nervous/anxious.          Results Review:   I reviewed the patient's new clinical results.  I reviewed the patient's new imaging results and agree with the interpretation.  I reviewed the patient's other test results and agree with the interpretation  I personally viewed and interpreted the patient's EKG/Telemetry data  Discussed with patient, and present family member(s)     Assessment/Plan     Active Problems:    Chest pain  Low risk stress test  Normal left ventricular ejection fraction  Left ventricle diastolic dysfunction  Benign prostatic hypertrophy  Gastroesophageal reflux disease.  Chronic compression fracture of thoracic spine    Plan    OK to discharge  Low salt cardiac diet.   Discharge to home and or self care with improvement or resolution of presenting symptoms or complaints  Ambulating  Optimal medical therapy  Deep vein thrombosis precautions with hydration and ambulation  Counseled regarding disease appropriate diet, fluid, sodium, caffeine, stimulants intake   Stressed compliance to diet and medications   Avoid NSAIDS  Follow up with primary provider and primary cardiologist as scheduled   Overall improved and deemed fit for discharge  Will be provided with written discharge instructions including diet and medications including prescriptions as required and labs as applicable  Go to nearest emergency room if recurrence of primary  complaints or any suspected disabling or life threatening symptoms or complaints such as chest pain, increasing shortness of breath, profound dizziness, weakness, significant palpitations, passing out episodes, cold sweats, excessive nausea etc  See me in office in approximately 3 months after discharge    Ildefonso Restrepo MD  05/10/18  9:43 PM

## 2018-09-25 RX ORDER — PANTOPRAZOLE SODIUM 40 MG/1
TABLET, DELAYED RELEASE ORAL
Qty: 30 TABLET | Refills: 11 | Status: SHIPPED | OUTPATIENT
Start: 2018-09-25 | End: 2019-08-26 | Stop reason: SDUPTHER

## 2019-05-02 ENCOUNTER — OFFICE VISIT (OUTPATIENT)
Dept: NEUROLOGY | Age: 84
End: 2019-05-02
Payer: COMMERCIAL

## 2019-05-02 VITALS
RESPIRATION RATE: 14 BRPM | HEIGHT: 70 IN | HEART RATE: 77 BPM | DIASTOLIC BLOOD PRESSURE: 81 MMHG | BODY MASS INDEX: 21.02 KG/M2 | WEIGHT: 146.8 LBS | SYSTOLIC BLOOD PRESSURE: 136 MMHG

## 2019-05-02 DIAGNOSIS — R41.3 MEMORY LOSS: ICD-10-CM

## 2019-05-02 DIAGNOSIS — G50.0 TRIGEMINAL NEURALGIA OF RIGHT SIDE OF FACE: Primary | ICD-10-CM

## 2019-05-02 DIAGNOSIS — Z86.73 HISTORY OF STROKE: ICD-10-CM

## 2019-05-02 PROCEDURE — 99214 OFFICE O/P EST MOD 30 MIN: CPT | Performed by: PSYCHIATRY & NEUROLOGY

## 2019-05-02 RX ORDER — LEVOTHYROXINE SODIUM 0.05 MG/1
TABLET ORAL
COMMUNITY
End: 2020-09-24

## 2019-05-02 RX ORDER — PANTOPRAZOLE SODIUM 40 MG/1
TABLET, DELAYED RELEASE ORAL
COMMUNITY
End: 2019-08-26

## 2019-05-02 RX ORDER — ALBUTEROL SULFATE 2.5 MG/3ML
2.5 SOLUTION RESPIRATORY (INHALATION)
COMMUNITY

## 2019-05-02 RX ORDER — BUDESONIDE AND FORMOTEROL FUMARATE DIHYDRATE 160; 4.5 UG/1; UG/1
2 AEROSOL RESPIRATORY (INHALATION)
COMMUNITY
Start: 2018-05-10

## 2019-05-02 RX ORDER — VALSARTAN 160 MG/1
160 TABLET ORAL
COMMUNITY
End: 2020-09-24

## 2019-05-02 RX ORDER — ISOSORBIDE MONONITRATE 60 MG/1
TABLET, EXTENDED RELEASE ORAL
COMMUNITY

## 2019-05-02 RX ORDER — CARVEDILOL 3.12 MG/1
3.12 TABLET ORAL
COMMUNITY

## 2019-05-02 NOTE — PROGRESS NOTES
tablet Take 3.125 mg by mouth      valsartan (DIOVAN) 160 MG tablet Take 160 mg by mouth      pantoprazole (PROTONIX) 40 MG tablet TAKE 1 TABLET BY MOUTH EVERY MORNING 30 tablet 11    carBAMazepine (TEGRETOL) 200 MG tablet Take 200 mg by mouth daily       albuterol sulfate  (90 BASE) MCG/ACT inhaler Inhale 2 puffs into the lungs      dutasteride (AVODART) 0.5 MG capsule Take 0.5 mg by mouth      levothyroxine (SYNTHROID) 50 MCG tablet Take 50 mcg by mouth      rosuvastatin (CRESTOR) 10 MG tablet       felodipine (PLENDIL) 5 MG tablet Take 5 mg by mouth daily      isosorbide mononitrate (IMDUR) 60 MG CR tablet Take 60 mg by mouth daily      magnesium chloride (MAG DELAY 64) 535 (64 MG) MG TBCR CR tablet Take 64 mg by mouth daily      albuterol (PROVENTIL) (2.5 MG/3ML) 0.083% nebulizer solution Inhale 2.5 mg into the lungs      isosorbide mononitrate (IMDUR) 60 MG extended release tablet isosorbide mononitrate ER 60 mg tablet,extended release 24 hr   Take 1 tablet every day by oral route.  levothyroxine (SYNTHROID) 50 MCG tablet levothyroxine 50 mcg tablet   Take 1 tablet every day by oral route.  pantoprazole (PROTONIX) 40 MG tablet Protonix 40 mg tablet,delayed release   Take 1 tablet every day by oral route.  DULoxetine (CYMBALTA) 30 MG extended release capsule TAKE 1 CAPSULE BY MOUTH ONCE A DAY 30 capsule 2    amLODIPine (NORVASC) 5 MG tablet Take 5 mg by mouth      bisoprolol-hydrochlorothiazide (ZIAC) 2.5-6.25 MG per tablet Take 1 tablet by mouth      ipratropium (ATROVENT) 0.03 % nasal spray 2 sprays by Nasal route every 12 hours      nitroGLYCERIN (NITRODUR) 0.2 MG/HR Place 1 patch onto the skin daily      clopidogrel (PLAVIX) 75 MG tablet Take 75 mg by mouth daily       No current facility-administered medications for this visit. Allergies:   Allergies as of 05/02/2019 - Review Complete 05/02/2019   Allergen Reaction Noted    Lyrica [pregabalin] Swelling 07/26/2016    Crestor  [rosuvastatin calcium]  05/02/2019       Review of Systems:  General ROS: negative for - chills or fever  Psychological ROS: negative for - anxiety or depression  ENT ROS: negative for - headaches or sinus pain  Hematological and Lymphatic ROS: negative for - bleeding problems, bruising or swollen lymph nodes  Respiratory ROS: negative for - cough, hemoptysis or shortness of breath  Cardiovascular ROS: negative for - chest pain or palpitations  Gastrointestinal ROS: negative for - blood in stools, constipation, diarrhea or nausea/vomiting  Genito-Urinary ROS: negative for - hematuria or urinary frequency/urgency  Musculoskeletal ROS: negative for - joint pain, joint stiffness or joint swelling  Neurological ROS: negative for - numbness/tingling or weakness     Examination:  Vitals:  /81   Pulse 77   Resp 14   Ht 5' 10\" (1.778 m)   Wt 146 lb 12.8 oz (66.6 kg)   BMI 21.06 kg/m²   General appearance: alert and cooperative with exam  HEENT: Sclera clear, anicteric, Oropharynx clear, no lesions, Neck supple with midline trachea, Thyroid without masses and Trachea midline  Heart:: regular rate and rhythm, S1, S2 normal, no murmur, click, rub or gallop  Lungs: clear to auscultation bilaterally  Extremities: extremities normal, atraumatic, no cyanosis or edema  Neurologic: Extraocular movements are intact without nystagmus. Visual fields are full to confrontation. Facial movements are symmetrical and normal. Speech is precise. Extremity strength is normal in both uppers and lowers. Deep tendon reflexes are intact and symmetrical. Rapid alternating movements are unimpaired. Finger-to-nose testing is performed well, without dysmetria. Gait is normal.    [facility] Mental Status Exam    1. Orientation (8)  Name, address, current location (building), city, state, date (day), month, year:       8  2. Attention (7)  Digit span:  2-5-1-0;  6-1-3-7-2;  2-8-9-6-3-4;  4-1-7-1-3-0-6:       6  3.

## 2019-08-26 RX ORDER — PANTOPRAZOLE SODIUM 40 MG/1
TABLET, DELAYED RELEASE ORAL
Qty: 30 TABLET | Refills: 11 | Status: SHIPPED | OUTPATIENT
Start: 2019-08-26 | End: 2020-09-22

## 2020-09-22 RX ORDER — PANTOPRAZOLE SODIUM 40 MG/1
TABLET, DELAYED RELEASE ORAL
Qty: 30 TABLET | Refills: 5 | Status: SHIPPED | OUTPATIENT
Start: 2020-09-22 | End: 2020-09-24

## 2020-09-22 NOTE — TELEPHONE ENCOUNTER
Requested Prescriptions     Pending Prescriptions Disp Refills    pantoprazole (PROTONIX) 40 MG tablet [Pharmacy Med Name: PANTOPRAZOLE SOD DR 40 MG T 40 TAB] 30 tablet 0     Sig: TAKE 1 TABLET BY MOUTH EVERY MORNING       Last Office Visit: 5/2/2019  Next Office Visit: 9/24/2020  Last Medication Refill: 8/26/19 with 11 refills

## 2020-09-24 ENCOUNTER — OFFICE VISIT (OUTPATIENT)
Dept: NEUROLOGY | Age: 85
End: 2020-09-24
Payer: COMMERCIAL

## 2020-09-24 VITALS
SYSTOLIC BLOOD PRESSURE: 134 MMHG | DIASTOLIC BLOOD PRESSURE: 79 MMHG | HEART RATE: 82 BPM | BODY MASS INDEX: 19.25 KG/M2 | WEIGHT: 127 LBS | RESPIRATION RATE: 16 BRPM | HEIGHT: 68 IN

## 2020-09-24 PROCEDURE — 99214 OFFICE O/P EST MOD 30 MIN: CPT | Performed by: PSYCHIATRY & NEUROLOGY

## 2020-09-24 RX ORDER — FUROSEMIDE 40 MG/1
40 TABLET ORAL 2 TIMES DAILY
COMMUNITY

## 2020-09-24 RX ORDER — PANTOPRAZOLE SODIUM 40 MG/1
40 TABLET, DELAYED RELEASE ORAL
Qty: 90 TABLET | Refills: 3 | Status: SHIPPED | OUTPATIENT
Start: 2020-09-24 | End: 2021-07-14

## 2020-09-24 RX ORDER — MEMANTINE HYDROCHLORIDE 10 MG/1
10 TABLET ORAL 2 TIMES DAILY
Qty: 180 TABLET | Refills: 3 | Status: SHIPPED | OUTPATIENT
Start: 2020-09-24

## 2020-09-24 NOTE — PROGRESS NOTES
[id number] [facility]  [address] [place] [address] [address]  [address] [facility] [address]  Phone [phone]  Fax [phone] [address] [facility] Neurology Follow Up Encounter  20 9:35 AM CDT    Information:   Patient Name: Johnnie Harmon  :   1935  Age:   82 y.o. MRN:   [id number]  Account #:  [de-identified]  Today:  20    Provider: Nakul Gupta M.D. Chief Complaint:   Chief Complaint   Patient presents with    Follow-up       Subjective:   Johnnie Harmon is a 82 y.o. man with a history of right trigeminal neuralgia, stroke, and memory loss who is following up. He has had rare and brief right facial pains. He has had no stroke symptoms. His memory has worsened. Last visit over a year ago, his MSE was 29/34. He declined medications for dementia at that time. Objective:     Past Medical History:  Past Medical History:   Diagnosis Date    CAD (coronary artery disease)     [facility]     Diabetes ([facility]     HTN (hypertension)     Hyperlipidemia     Stroke ([facility]     Trigeminal neuralgia of right side of face        Past Surgical History:   Procedure Laterality Date    CARDIAC CATHETERIZATION      CORONARY ARTERY BYPASS GRAFT      EXTERNAL EAR SURGERY      OTHER SURGICAL HISTORY  2013    Cyberknife treatment for RTGN       Recent Hospitalizations  · None    Significant Injuries  · None    Habits  Johnnie Harmon reports that he has been smoking cigarettes. He has a 30.00 pack-year smoking history. He has never used smokeless tobacco. He reports that he does not drink alcohol or use drugs. History reviewed. No pertinent family history. Social History  Lizzy Foy , lives in [address] [place] [address], and is retired.     Medications:  Current Outpatient Medications   Medication Sig Dispense Refill    furosemide (LASIX) 40 MG tablet Take 40 mg by mouth 2 times daily      pantoprazole (PROTONIX) 40 MG tablet TAKE 1 TABLET BY MOUTH EVERY MORNING 30 tablet 5    apixaban (ELIQUIS) 5 MG TABS tablet Eliquis 5 mg tablet   Take 1 tablet twice a day by oral route.  budesonide-formoterol (SYMBICORT) 160-4.5 MCG/ACT AERO Inhale 2 puffs into the lungs      carvedilol (COREG) 3.125 MG tablet Take 3.125 mg by mouth      albuterol (PROVENTIL) (2.5 MG/3ML) 0.083% nebulizer solution Inhale 2.5 mg into the lungs      carBAMazepine (TEGRETOL) 200 MG tablet Take 200 mg by mouth daily       albuterol sulfate  (90 BASE) MCG/ACT inhaler Inhale 2 puffs into the lungs      levothyroxine (SYNTHROID) 50 MCG tablet Take 50 mcg by mouth      rosuvastatin (CRESTOR) 10 MG tablet       ipratropium (ATROVENT) 0.03 % nasal spray 2 sprays by Nasal route every 12 hours      isosorbide mononitrate (IMDUR) 60 MG CR tablet Take 60 mg by mouth daily      magnesium chloride (MAG DELAY 64) 535 (64 MG) MG TBCR CR tablet Take 64 mg by mouth daily      isosorbide mononitrate (IMDUR) 60 MG extended release tablet isosorbide mononitrate ER 60 mg tablet,extended release 24 hr   Take 1 tablet every day by oral route. No current facility-administered medications for this visit. Allergies:   Allergies as of 09/24/2020 - Review Complete 09/24/2020   Allergen Reaction Noted    Lyrica [pregabalin] Swelling 07/26/2016    Crestor  [rosuvastatin calcium]  05/02/2019       Review of Systems:  Constitutional: negative for - chills and fever  Eyes:  negative for - visual disturbance and photophobia  HENMT: negative for - headaches and sinus pain  Respiratory: negative for - cough, hemoptysis, and shortness of breath  Cardiovascular: negative for - chest pain and palpitations  Gastrointestinal: negative for - blood in stools, constipation, diarrhea, nausea, and vomiting  Genito-Urinary: negative for - hematuria, urinary frequency, urinary urgency, and urinary retention  Musculoskeletal: positive for - joint pain, joint stiffness, and joint swelling  Hematological and Lymphatic: negative for - bleeding problems, abnormal bruising, are intact and symmetrical in both upper and lower extremities. Chao's signs are absent bilaterally. There is no ankle clonus on either side. Coordination:  Rapid alternating movements are normal in both upper and lower extremities. Finger to nose testing is unimpaired bilaterally. Gait:  Normal station and gait. Pertinent Diagnostic Studies:  Labs were OK. MRI head from 2020    MSE 5/2/2019 was 29/34    Assessment:       ICD-10-CM    1. Right trigeminal neuralgia  G50.0    2. History of stroke  Z86.73    3. Memory loss  R41.3    His symptoms or worsening memory are concerning for dementia. His MSE was abnormal last year. Plan:   1. Start Namenda 10 mg once a day for a week then increase to twice a day  2.  Continue the other medications  3. FU in 6 months    Electronically signed by Nakul Gupta MD on 9/24/20

## 2020-09-24 NOTE — PATIENT INSTRUCTIONS
INSTRUCTIONS:  1. Start Namenda 10 mg once a day for a week then increase to twice a day  2.  Continue the other medications

## 2020-09-29 ENCOUNTER — TELEPHONE (OUTPATIENT)
Dept: NEUROLOGY | Age: 85
End: 2020-09-29

## 2020-10-01 ENCOUNTER — TELEPHONE (OUTPATIENT)
Dept: NEUROLOGY | Age: 85
End: 2020-10-01

## 2020-10-01 NOTE — TELEPHONE ENCOUNTER
Patient would like to get his MRI done at [facility] call the patient.

## 2020-10-07 NOTE — TELEPHONE ENCOUNTER
[facility] called and said that have yet to receive the order for MRI for this patient. Please fax to: [phone].

## 2020-10-07 NOTE — TELEPHONE ENCOUNTER
Spoke to wife and let her know that I had faxed 2 x to a number I was given when I called [facility], and have now faxed it to a different number.

## 2020-10-08 ENCOUNTER — TELEPHONE (OUTPATIENT)
Dept: NEUROLOGY | Age: 85
End: 2020-10-08

## 2020-10-08 NOTE — TELEPHONE ENCOUNTER
Richy Campbell from [facility] left message requesting to know if we have a pre-cert for the MRI yet and if so she will need that information.  Patient has been scheduled for 10/12 and must arrive at 9:45 AM.  Please call [phone] if you have any questions

## 2020-10-08 NOTE — TELEPHONE ENCOUNTER
Called [facility] and left message that patients MRI that is Monday 10-12-20 will need to be rescheduled due to having trouble getting PA.

## 2020-10-12 ENCOUNTER — TELEPHONE (OUTPATIENT)
Dept: NEUROLOGY | Age: 85
End: 2020-10-12

## 2020-10-12 NOTE — TELEPHONE ENCOUNTER
Patient wife called requesting to speak to you, I tried to help her but she insisted on only speaking to you.

## 2020-11-03 ENCOUNTER — TELEPHONE (OUTPATIENT)
Dept: NEUROLOGY | Age: 85
End: 2020-11-03

## 2020-11-03 NOTE — TELEPHONE ENCOUNTER
Spoke to patient and gave results of MRI head, showed age related changes of small vessel cerebrovascular disease and atrophy. He voiced understanding.

## 2021-05-19 ENCOUNTER — HOSPITAL ENCOUNTER (INPATIENT)
Facility: HOSPITAL | Age: 86
LOS: 2 days | Discharge: HOME | End: 2021-05-21
Attending: INTERNAL MEDICINE | Admitting: INTERNAL MEDICINE

## 2021-05-19 DIAGNOSIS — I25.10 CORONARY ARTERY DISEASE INVOLVING NATIVE CORONARY ARTERY OF NATIVE HEART WITHOUT ANGINA PECTORIS: ICD-10-CM

## 2021-05-19 DIAGNOSIS — R07.2 PRECORDIAL PAIN: ICD-10-CM

## 2021-05-19 DIAGNOSIS — I21.4 NSTEMI, INITIAL EPISODE OF CARE (HCC): ICD-10-CM

## 2021-05-19 DIAGNOSIS — I21.4 NSTEMI (NON-ST ELEVATED MYOCARDIAL INFARCTION) (HCC): Primary | ICD-10-CM

## 2021-05-19 PROBLEM — I48.0 PAROXYSMAL ATRIAL FIBRILLATION (HCC): Status: ACTIVE | Noted: 2021-05-19

## 2021-05-19 PROBLEM — I42.9 CARDIOMYOPATHY (HCC): Status: ACTIVE | Noted: 2021-05-19

## 2021-05-19 PROBLEM — E78.5 HYPERLIPIDEMIA LDL GOAL <70: Status: ACTIVE | Noted: 2021-05-19

## 2021-05-19 PROBLEM — Z72.0 TOBACCO ABUSE: Status: ACTIVE | Noted: 2021-05-19

## 2021-05-19 PROBLEM — J43.8 OTHER EMPHYSEMA (HCC): Status: ACTIVE | Noted: 2021-05-19

## 2021-05-19 PROBLEM — I10 ESSENTIAL HYPERTENSION: Status: ACTIVE | Noted: 2021-05-19

## 2021-05-19 LAB
APTT PPP: 36.7 SECONDS (ref 24.1–35)
APTT PPP: 82.7 SECONDS (ref 24.1–35)
BASOPHILS # BLD AUTO: 0.06 10*3/MM3 (ref 0–0.2)
BASOPHILS NFR BLD AUTO: 1 % (ref 0–1.5)
DEPRECATED RDW RBC AUTO: 45.6 FL (ref 37–54)
EOSINOPHIL # BLD AUTO: 0.12 10*3/MM3 (ref 0–0.4)
EOSINOPHIL NFR BLD AUTO: 1.9 % (ref 0.3–6.2)
ERYTHROCYTE [DISTWIDTH] IN BLOOD BY AUTOMATED COUNT: 13.5 % (ref 12.3–15.4)
HCT VFR BLD AUTO: 39.8 % (ref 37.5–51)
HGB BLD-MCNC: 13.7 G/DL (ref 13–17.7)
IMM GRANULOCYTES # BLD AUTO: 0.02 10*3/MM3 (ref 0–0.05)
IMM GRANULOCYTES NFR BLD AUTO: 0.3 % (ref 0–0.5)
INR PPP: 1.07 (ref 0.91–1.09)
LYMPHOCYTES # BLD AUTO: 1.87 10*3/MM3 (ref 0.7–3.1)
LYMPHOCYTES NFR BLD AUTO: 30.2 % (ref 19.6–45.3)
MCH RBC QN AUTO: 31.4 PG (ref 26.6–33)
MCHC RBC AUTO-ENTMCNC: 34.4 G/DL (ref 31.5–35.7)
MCV RBC AUTO: 91.1 FL (ref 79–97)
MONOCYTES # BLD AUTO: 0.72 10*3/MM3 (ref 0.1–0.9)
MONOCYTES NFR BLD AUTO: 11.6 % (ref 5–12)
NEUTROPHILS NFR BLD AUTO: 3.4 10*3/MM3 (ref 1.7–7)
NEUTROPHILS NFR BLD AUTO: 55 % (ref 42.7–76)
NRBC BLD AUTO-RTO: 0 /100 WBC (ref 0–0.2)
PLATELET # BLD AUTO: 192 10*3/MM3 (ref 140–450)
PMV BLD AUTO: 9.7 FL (ref 6–12)
PROTHROMBIN TIME: 13.1 SECONDS (ref 11.5–13.4)
RBC # BLD AUTO: 4.37 10*6/MM3 (ref 4.14–5.8)
WBC # BLD AUTO: 6.19 10*3/MM3 (ref 3.4–10.8)

## 2021-05-19 PROCEDURE — 85025 COMPLETE CBC W/AUTO DIFF WBC: CPT | Performed by: NURSE PRACTITIONER

## 2021-05-19 PROCEDURE — 85730 THROMBOPLASTIN TIME PARTIAL: CPT | Performed by: NURSE PRACTITIONER

## 2021-05-19 PROCEDURE — 93010 ELECTROCARDIOGRAM REPORT: CPT | Performed by: INTERNAL MEDICINE

## 2021-05-19 PROCEDURE — 85610 PROTHROMBIN TIME: CPT | Performed by: NURSE PRACTITIONER

## 2021-05-19 PROCEDURE — 85730 THROMBOPLASTIN TIME PARTIAL: CPT | Performed by: INTERNAL MEDICINE

## 2021-05-19 PROCEDURE — 94799 UNLISTED PULMONARY SVC/PX: CPT

## 2021-05-19 PROCEDURE — 99223 1ST HOSP IP/OBS HIGH 75: CPT | Performed by: INTERNAL MEDICINE

## 2021-05-19 PROCEDURE — 25010000002 HEPARIN (PORCINE) PER 1000 UNITS: Performed by: NURSE PRACTITIONER

## 2021-05-19 PROCEDURE — 93005 ELECTROCARDIOGRAM TRACING: CPT | Performed by: NURSE PRACTITIONER

## 2021-05-19 PROCEDURE — 94640 AIRWAY INHALATION TREATMENT: CPT

## 2021-05-19 RX ORDER — CARVEDILOL 6.25 MG/1
6.25 TABLET ORAL 2 TIMES DAILY WITH MEALS
Status: DISCONTINUED | OUTPATIENT
Start: 2021-05-19 | End: 2021-05-19

## 2021-05-19 RX ORDER — DIAZEPAM 2 MG/1
2 TABLET ORAL EVERY 6 HOURS PRN
Status: DISCONTINUED | OUTPATIENT
Start: 2021-05-19 | End: 2021-05-19

## 2021-05-19 RX ORDER — FINASTERIDE 5 MG/1
5 TABLET, FILM COATED ORAL DAILY
Status: DISCONTINUED | OUTPATIENT
Start: 2021-05-19 | End: 2021-05-21

## 2021-05-19 RX ORDER — PANTOPRAZOLE SODIUM 40 MG/1
40 TABLET, DELAYED RELEASE ORAL DAILY
Status: DISCONTINUED | OUTPATIENT
Start: 2021-05-19 | End: 2021-05-21 | Stop reason: HOSPADM

## 2021-05-19 RX ORDER — HEPARIN SODIUM 1000 [USP'U]/ML
30 INJECTION, SOLUTION INTRAVENOUS; SUBCUTANEOUS AS NEEDED
Status: DISCONTINUED | OUTPATIENT
Start: 2021-05-19 | End: 2021-05-20

## 2021-05-19 RX ORDER — METOPROLOL SUCCINATE 25 MG/1
25 TABLET, EXTENDED RELEASE ORAL
Status: DISCONTINUED | OUTPATIENT
Start: 2021-05-20 | End: 2021-05-20

## 2021-05-19 RX ORDER — LEVOTHYROXINE SODIUM 0.05 MG/1
50 TABLET ORAL
Status: DISCONTINUED | OUTPATIENT
Start: 2021-05-20 | End: 2021-05-21 | Stop reason: HOSPADM

## 2021-05-19 RX ORDER — CHOLECALCIFEROL (VITAMIN D3) 125 MCG
1000 CAPSULE ORAL DAILY
Status: DISCONTINUED | OUTPATIENT
Start: 2021-05-19 | End: 2021-05-21 | Stop reason: HOSPADM

## 2021-05-19 RX ORDER — ATORVASTATIN CALCIUM 40 MG/1
40 TABLET, FILM COATED ORAL NIGHTLY
Status: DISCONTINUED | OUTPATIENT
Start: 2021-05-19 | End: 2021-05-21 | Stop reason: HOSPADM

## 2021-05-19 RX ORDER — ALBUTEROL SULFATE 2.5 MG/3ML
2.5 SOLUTION RESPIRATORY (INHALATION) EVERY 4 HOURS PRN
Status: DISCONTINUED | OUTPATIENT
Start: 2021-05-19 | End: 2021-05-21 | Stop reason: HOSPADM

## 2021-05-19 RX ORDER — BUDESONIDE AND FORMOTEROL FUMARATE DIHYDRATE 160; 4.5 UG/1; UG/1
2 AEROSOL RESPIRATORY (INHALATION)
Status: DISCONTINUED | OUTPATIENT
Start: 2021-05-19 | End: 2021-05-21 | Stop reason: HOSPADM

## 2021-05-19 RX ORDER — HEPARIN SODIUM 10000 [USP'U]/100ML
12 INJECTION, SOLUTION INTRAVENOUS
Status: DISCONTINUED | OUTPATIENT
Start: 2021-05-19 | End: 2021-05-20

## 2021-05-19 RX ORDER — CARBAMAZEPINE 100 MG/1
200 TABLET, EXTENDED RELEASE ORAL DAILY
Status: DISCONTINUED | OUTPATIENT
Start: 2021-05-20 | End: 2021-05-21

## 2021-05-19 RX ORDER — ASPIRIN 81 MG/1
81 TABLET ORAL DAILY
Status: DISCONTINUED | OUTPATIENT
Start: 2021-05-20 | End: 2021-05-21 | Stop reason: HOSPADM

## 2021-05-19 RX ORDER — HEPARIN SODIUM 1000 [USP'U]/ML
60 INJECTION, SOLUTION INTRAVENOUS; SUBCUTANEOUS AS NEEDED
Status: DISCONTINUED | OUTPATIENT
Start: 2021-05-19 | End: 2021-05-20

## 2021-05-19 RX ADMIN — HEPARIN SODIUM 3360 UNITS: 1000 INJECTION, SOLUTION INTRAVENOUS; SUBCUTANEOUS at 16:35

## 2021-05-19 RX ADMIN — HEPARIN SODIUM 14 UNITS/KG/HR: 10000 INJECTION, SOLUTION INTRAVENOUS at 16:39

## 2021-05-19 RX ADMIN — BUDESONIDE AND FORMOTEROL FUMARATE DIHYDRATE 2 PUFF: 160; 4.5 AEROSOL RESPIRATORY (INHALATION) at 21:50

## 2021-05-19 RX ADMIN — ATORVASTATIN CALCIUM 40 MG: 40 TABLET, FILM COATED ORAL at 21:36

## 2021-05-20 ENCOUNTER — APPOINTMENT (OUTPATIENT)
Dept: INTERVENTIONAL RADIOLOGY/VASCULAR | Facility: HOSPITAL | Age: 86
End: 2021-05-20

## 2021-05-20 ENCOUNTER — ANESTHESIA (OUTPATIENT)
Dept: PERIOP | Facility: HOSPITAL | Age: 86
End: 2021-05-20

## 2021-05-20 ENCOUNTER — ANESTHESIA EVENT (OUTPATIENT)
Dept: PERIOP | Facility: HOSPITAL | Age: 86
End: 2021-05-20

## 2021-05-20 ENCOUNTER — APPOINTMENT (OUTPATIENT)
Dept: ULTRASOUND IMAGING | Facility: HOSPITAL | Age: 86
End: 2021-05-20

## 2021-05-20 LAB
APTT PPP: 46.5 SECONDS (ref 24.1–35)
BASOPHILS # BLD AUTO: 0.06 10*3/MM3 (ref 0–0.2)
BASOPHILS NFR BLD AUTO: 1.1 % (ref 0–1.5)
DEPRECATED RDW RBC AUTO: 45.8 FL (ref 37–54)
EOSINOPHIL # BLD AUTO: 0.13 10*3/MM3 (ref 0–0.4)
EOSINOPHIL NFR BLD AUTO: 2.4 % (ref 0.3–6.2)
ERYTHROCYTE [DISTWIDTH] IN BLOOD BY AUTOMATED COUNT: 13.5 % (ref 12.3–15.4)
HCT VFR BLD AUTO: 38.6 % (ref 37.5–51)
HGB BLD-MCNC: 13.1 G/DL (ref 13–17.7)
IMM GRANULOCYTES # BLD AUTO: 0.02 10*3/MM3 (ref 0–0.05)
IMM GRANULOCYTES NFR BLD AUTO: 0.4 % (ref 0–0.5)
LYMPHOCYTES # BLD AUTO: 1.88 10*3/MM3 (ref 0.7–3.1)
LYMPHOCYTES NFR BLD AUTO: 34.4 % (ref 19.6–45.3)
MCH RBC QN AUTO: 31.2 PG (ref 26.6–33)
MCHC RBC AUTO-ENTMCNC: 33.9 G/DL (ref 31.5–35.7)
MCV RBC AUTO: 91.9 FL (ref 79–97)
MONOCYTES # BLD AUTO: 0.85 10*3/MM3 (ref 0.1–0.9)
MONOCYTES NFR BLD AUTO: 15.5 % (ref 5–12)
NEUTROPHILS NFR BLD AUTO: 2.53 10*3/MM3 (ref 1.7–7)
NEUTROPHILS NFR BLD AUTO: 46.2 % (ref 42.7–76)
NRBC BLD AUTO-RTO: 0 /100 WBC (ref 0–0.2)
PLATELET # BLD AUTO: 194 10*3/MM3 (ref 140–450)
PMV BLD AUTO: 10.1 FL (ref 6–12)
RBC # BLD AUTO: 4.2 10*6/MM3 (ref 4.14–5.8)
WBC # BLD AUTO: 5.47 10*3/MM3 (ref 3.4–10.8)

## 2021-05-20 PROCEDURE — 04CK3ZZ EXTIRPATION OF MATTER FROM RIGHT FEMORAL ARTERY, PERCUTANEOUS APPROACH: ICD-10-PCS | Performed by: SURGERY

## 2021-05-20 PROCEDURE — 85730 THROMBOPLASTIN TIME PARTIAL: CPT | Performed by: INTERNAL MEDICINE

## 2021-05-20 PROCEDURE — 99152 MOD SED SAME PHYS/QHP 5/>YRS: CPT | Performed by: INTERNAL MEDICINE

## 2021-05-20 PROCEDURE — 25010000002 HEPARIN (PORCINE) 1000-0.9 UT/500ML-% SOLUTION: Performed by: INTERNAL MEDICINE

## 2021-05-20 PROCEDURE — B41F1ZZ FLUOROSCOPY OF RIGHT LOWER EXTREMITY ARTERIES USING LOW OSMOLAR CONTRAST: ICD-10-PCS | Performed by: SURGERY

## 2021-05-20 PROCEDURE — 25010000002 FENTANYL CITRATE (PF) 100 MCG/2ML SOLUTION: Performed by: NURSE ANESTHETIST, CERTIFIED REGISTERED

## 2021-05-20 PROCEDURE — 93926 LOWER EXTREMITY STUDY: CPT

## 2021-05-20 PROCEDURE — C1769 GUIDE WIRE: HCPCS | Performed by: INTERNAL MEDICINE

## 2021-05-20 PROCEDURE — C1760 CLOSURE DEV, VASC: HCPCS | Performed by: INTERNAL MEDICINE

## 2021-05-20 PROCEDURE — 4A023N7 MEASUREMENT OF CARDIAC SAMPLING AND PRESSURE, LEFT HEART, PERCUTANEOUS APPROACH: ICD-10-PCS | Performed by: INTERNAL MEDICINE

## 2021-05-20 PROCEDURE — L1830 KO IMMOB CANVAS LONG PRE OTS: HCPCS | Performed by: INTERNAL MEDICINE

## 2021-05-20 PROCEDURE — 25010000002 CEFAZOLIN PER 500 MG: Performed by: SURGERY

## 2021-05-20 PROCEDURE — 25010000003 CEFAZOLIN PER 500 MG: Performed by: NURSE ANESTHETIST, CERTIFIED REGISTERED

## 2021-05-20 PROCEDURE — C1768 GRAFT, VASCULAR: HCPCS | Performed by: SURGERY

## 2021-05-20 PROCEDURE — B2151ZZ FLUOROSCOPY OF LEFT HEART USING LOW OSMOLAR CONTRAST: ICD-10-PCS | Performed by: INTERNAL MEDICINE

## 2021-05-20 PROCEDURE — C1889 IMPLANT/INSERT DEVICE, NOC: HCPCS | Performed by: SURGERY

## 2021-05-20 PROCEDURE — 25010000002 HEPARIN (PORCINE) PER 1000 UNITS: Performed by: SURGERY

## 2021-05-20 PROCEDURE — 04UK3KZ SUPPLEMENT RIGHT FEMORAL ARTERY WITH NONAUTOLOGOUS TISSUE SUBSTITUTE, PERCUTANEOUS APPROACH: ICD-10-PCS | Performed by: SURGERY

## 2021-05-20 PROCEDURE — 99222 1ST HOSP IP/OBS MODERATE 55: CPT | Performed by: SURGERY

## 2021-05-20 PROCEDURE — 0 IOPAMIDOL PER 1 ML: Performed by: INTERNAL MEDICINE

## 2021-05-20 PROCEDURE — 25010000003 CEFAZOLIN PER 500 MG: Performed by: SURGERY

## 2021-05-20 PROCEDURE — 25010000002 IOPAMIDOL 61 % SOLUTION: Performed by: SURGERY

## 2021-05-20 PROCEDURE — 94799 UNLISTED PULMONARY SVC/PX: CPT

## 2021-05-20 PROCEDURE — C1894 INTRO/SHEATH, NON-LASER: HCPCS | Performed by: INTERNAL MEDICINE

## 2021-05-20 PROCEDURE — 93458 L HRT ARTERY/VENTRICLE ANGIO: CPT | Performed by: INTERNAL MEDICINE

## 2021-05-20 PROCEDURE — 25010000002 DIPHENHYDRAMINE PER 50 MG: Performed by: INTERNAL MEDICINE

## 2021-05-20 PROCEDURE — C1757 CATH, THROMBECTOMY/EMBOLECT: HCPCS | Performed by: SURGERY

## 2021-05-20 PROCEDURE — 99223 1ST HOSP IP/OBS HIGH 75: CPT | Performed by: THORACIC SURGERY (CARDIOTHORACIC VASCULAR SURGERY)

## 2021-05-20 PROCEDURE — 85025 COMPLETE CBC W/AUTO DIFF WBC: CPT | Performed by: NURSE PRACTITIONER

## 2021-05-20 PROCEDURE — 25010000002 HEPARIN (PORCINE) 2000-0.9 UNIT/L-% SOLUTION: Performed by: INTERNAL MEDICINE

## 2021-05-20 PROCEDURE — 25010000002 MIDAZOLAM HCL (PF) 5 MG/5ML SOLUTION: Performed by: INTERNAL MEDICINE

## 2021-05-20 PROCEDURE — 76000 FLUOROSCOPY <1 HR PHYS/QHP: CPT

## 2021-05-20 PROCEDURE — 25010000002 HEPARIN (PORCINE) PER 1000 UNITS: Performed by: NURSE ANESTHETIST, CERTIFIED REGISTERED

## 2021-05-20 PROCEDURE — 25010000002 PROPOFOL 10 MG/ML EMULSION: Performed by: NURSE ANESTHETIST, CERTIFIED REGISTERED

## 2021-05-20 PROCEDURE — B2111ZZ FLUOROSCOPY OF MULTIPLE CORONARY ARTERIES USING LOW OSMOLAR CONTRAST: ICD-10-PCS | Performed by: INTERNAL MEDICINE

## 2021-05-20 PROCEDURE — 35371 RECHANNELING OF ARTERY: CPT | Performed by: SURGERY

## 2021-05-20 PROCEDURE — 75710 ARTERY X-RAYS ARM/LEG: CPT

## 2021-05-20 PROCEDURE — 25010000002 FENTANYL CITRATE (PF) 100 MCG/2ML SOLUTION: Performed by: INTERNAL MEDICINE

## 2021-05-20 DEVICE — PTCH VASC XENOSURE BIO 0.8X8CM: Type: IMPLANTABLE DEVICE | Site: GROIN | Status: FUNCTIONAL

## 2021-05-20 DEVICE — LIGACLIP MCA MULTIPLE CLIP APPLIERS, 20 MEDIUM CLIPS
Type: IMPLANTABLE DEVICE | Site: GROIN | Status: FUNCTIONAL
Brand: LIGACLIP

## 2021-05-20 DEVICE — LIGACLIP MCA MULTIPLE CLIP APPLIERS, 20 SMALL CLIPS
Type: IMPLANTABLE DEVICE | Site: GROIN | Status: FUNCTIONAL
Brand: LIGACLIP

## 2021-05-20 RX ORDER — FENTANYL CITRATE 50 UG/ML
INJECTION, SOLUTION INTRAMUSCULAR; INTRAVENOUS AS NEEDED
Status: DISCONTINUED | OUTPATIENT
Start: 2021-05-20 | End: 2021-05-20 | Stop reason: HOSPADM

## 2021-05-20 RX ORDER — ONDANSETRON 4 MG/1
4 TABLET, FILM COATED ORAL EVERY 6 HOURS PRN
Status: DISCONTINUED | OUTPATIENT
Start: 2021-05-20 | End: 2021-05-20 | Stop reason: SDUPTHER

## 2021-05-20 RX ORDER — BUPIVACAINE HCL/0.9 % NACL/PF 0.1 %
2 PLASTIC BAG, INJECTION (ML) EPIDURAL EVERY 8 HOURS
Status: COMPLETED | OUTPATIENT
Start: 2021-05-20 | End: 2021-05-21

## 2021-05-20 RX ORDER — HYDROMORPHONE HYDROCHLORIDE 1 MG/ML
0.2 INJECTION, SOLUTION INTRAMUSCULAR; INTRAVENOUS; SUBCUTANEOUS
Status: DISCONTINUED | OUTPATIENT
Start: 2021-05-20 | End: 2021-05-20 | Stop reason: HOSPADM

## 2021-05-20 RX ORDER — ASPIRIN 81 MG/1
81 TABLET ORAL DAILY
Status: DISCONTINUED | OUTPATIENT
Start: 2021-05-20 | End: 2021-05-20 | Stop reason: SDUPTHER

## 2021-05-20 RX ORDER — HEPARIN SODIUM 200 [USP'U]/100ML
INJECTION, SOLUTION INTRAVENOUS AS NEEDED
Status: DISCONTINUED | OUTPATIENT
Start: 2021-05-20 | End: 2021-05-20 | Stop reason: HOSPADM

## 2021-05-20 RX ORDER — SODIUM CHLORIDE, SODIUM LACTATE, POTASSIUM CHLORIDE, CALCIUM CHLORIDE 600; 310; 30; 20 MG/100ML; MG/100ML; MG/100ML; MG/100ML
100 INJECTION, SOLUTION INTRAVENOUS CONTINUOUS
Status: DISCONTINUED | OUTPATIENT
Start: 2021-05-20 | End: 2021-05-20

## 2021-05-20 RX ORDER — ACETAMINOPHEN 325 MG/1
650 TABLET ORAL EVERY 4 HOURS PRN
Status: DISCONTINUED | OUTPATIENT
Start: 2021-05-20 | End: 2021-05-21 | Stop reason: HOSPADM

## 2021-05-20 RX ORDER — NICOTINE 21 MG/24HR
1 PATCH, TRANSDERMAL 24 HOURS TRANSDERMAL EVERY 24 HOURS
Status: DISCONTINUED | OUTPATIENT
Start: 2021-05-20 | End: 2021-05-21 | Stop reason: HOSPADM

## 2021-05-20 RX ORDER — ONDANSETRON 2 MG/ML
4 INJECTION INTRAMUSCULAR; INTRAVENOUS EVERY 6 HOURS PRN
Status: DISCONTINUED | OUTPATIENT
Start: 2021-05-20 | End: 2021-05-20

## 2021-05-20 RX ORDER — LIDOCAINE HYDROCHLORIDE 10 MG/ML
0.5 INJECTION, SOLUTION EPIDURAL; INFILTRATION; INTRACAUDAL; PERINEURAL ONCE AS NEEDED
Status: DISCONTINUED | OUTPATIENT
Start: 2021-05-20 | End: 2021-05-20 | Stop reason: HOSPADM

## 2021-05-20 RX ORDER — DIPHENHYDRAMINE HCL 25 MG
25 CAPSULE ORAL EVERY 6 HOURS PRN
Status: DISCONTINUED | OUTPATIENT
Start: 2021-05-20 | End: 2021-05-21 | Stop reason: HOSPADM

## 2021-05-20 RX ORDER — DIPHENHYDRAMINE HYDROCHLORIDE 50 MG/ML
INJECTION INTRAMUSCULAR; INTRAVENOUS AS NEEDED
Status: DISCONTINUED | OUTPATIENT
Start: 2021-05-20 | End: 2021-05-20 | Stop reason: HOSPADM

## 2021-05-20 RX ORDER — NALOXONE HCL 0.4 MG/ML
0.04 VIAL (ML) INJECTION AS NEEDED
Status: DISCONTINUED | OUTPATIENT
Start: 2021-05-20 | End: 2021-05-20 | Stop reason: HOSPADM

## 2021-05-20 RX ORDER — NITROGLYCERIN 0.4 MG/1
0.4 TABLET SUBLINGUAL
Status: DISCONTINUED | OUTPATIENT
Start: 2021-05-20 | End: 2021-05-20

## 2021-05-20 RX ORDER — LIDOCAINE HYDROCHLORIDE 20 MG/ML
INJECTION, SOLUTION EPIDURAL; INFILTRATION; INTRACAUDAL; PERINEURAL AS NEEDED
Status: DISCONTINUED | OUTPATIENT
Start: 2021-05-20 | End: 2021-05-20 | Stop reason: SURG

## 2021-05-20 RX ORDER — BUPIVACAINE HYDROCHLORIDE 5 MG/ML
INJECTION, SOLUTION PERINEURAL AS NEEDED
Status: DISCONTINUED | OUTPATIENT
Start: 2021-05-20 | End: 2021-05-20 | Stop reason: HOSPADM

## 2021-05-20 RX ORDER — HYDROCODONE BITARTRATE AND ACETAMINOPHEN 5; 325 MG/1; MG/1
1 TABLET ORAL EVERY 4 HOURS PRN
Status: DISCONTINUED | OUTPATIENT
Start: 2021-05-20 | End: 2021-05-21 | Stop reason: HOSPADM

## 2021-05-20 RX ORDER — ONDANSETRON 4 MG/1
4 TABLET, FILM COATED ORAL EVERY 6 HOURS PRN
Status: DISCONTINUED | OUTPATIENT
Start: 2021-05-20 | End: 2021-05-21 | Stop reason: HOSPADM

## 2021-05-20 RX ORDER — ONDANSETRON 2 MG/ML
4 INJECTION INTRAMUSCULAR; INTRAVENOUS EVERY 6 HOURS PRN
Status: DISCONTINUED | OUTPATIENT
Start: 2021-05-20 | End: 2021-05-21 | Stop reason: HOSPADM

## 2021-05-20 RX ORDER — ACETAMINOPHEN 325 MG/1
650 TABLET ORAL EVERY 4 HOURS PRN
Status: DISCONTINUED | OUTPATIENT
Start: 2021-05-20 | End: 2021-05-21 | Stop reason: SDUPTHER

## 2021-05-20 RX ORDER — MIDAZOLAM HYDROCHLORIDE 1 MG/ML
INJECTION, SOLUTION INTRAMUSCULAR; INTRAVENOUS AS NEEDED
Status: DISCONTINUED | OUTPATIENT
Start: 2021-05-20 | End: 2021-05-20 | Stop reason: HOSPADM

## 2021-05-20 RX ORDER — SODIUM CHLORIDE 0.9 % (FLUSH) 0.9 %
3-10 SYRINGE (ML) INJECTION AS NEEDED
Status: DISCONTINUED | OUTPATIENT
Start: 2021-05-20 | End: 2021-05-20

## 2021-05-20 RX ORDER — SODIUM CHLORIDE 9 MG/ML
1 INJECTION, SOLUTION INTRAVENOUS CONTINUOUS
Status: DISPENSED | OUTPATIENT
Start: 2021-05-20 | End: 2021-05-20

## 2021-05-20 RX ORDER — SODIUM CHLORIDE 0.9 % (FLUSH) 0.9 %
3 SYRINGE (ML) INJECTION EVERY 12 HOURS SCHEDULED
Status: DISCONTINUED | OUTPATIENT
Start: 2021-05-20 | End: 2021-05-20 | Stop reason: HOSPADM

## 2021-05-20 RX ORDER — ONDANSETRON 2 MG/ML
4 INJECTION INTRAMUSCULAR; INTRAVENOUS AS NEEDED
Status: DISCONTINUED | OUTPATIENT
Start: 2021-05-20 | End: 2021-05-20 | Stop reason: HOSPADM

## 2021-05-20 RX ORDER — FLUMAZENIL 0.1 MG/ML
0.2 INJECTION INTRAVENOUS AS NEEDED
Status: DISCONTINUED | OUTPATIENT
Start: 2021-05-20 | End: 2021-05-20 | Stop reason: HOSPADM

## 2021-05-20 RX ORDER — ONDANSETRON 2 MG/ML
4 INJECTION INTRAMUSCULAR; INTRAVENOUS EVERY 6 HOURS PRN
Status: DISCONTINUED | OUTPATIENT
Start: 2021-05-20 | End: 2021-05-20 | Stop reason: SDUPTHER

## 2021-05-20 RX ORDER — LISINOPRIL 10 MG/1
10 TABLET ORAL
Status: DISCONTINUED | OUTPATIENT
Start: 2021-05-20 | End: 2021-05-21 | Stop reason: HOSPADM

## 2021-05-20 RX ORDER — SODIUM CHLORIDE 0.9 % (FLUSH) 0.9 %
3-10 SYRINGE (ML) INJECTION AS NEEDED
Status: DISCONTINUED | OUTPATIENT
Start: 2021-05-20 | End: 2021-05-20 | Stop reason: HOSPADM

## 2021-05-20 RX ORDER — CARVEDILOL 6.25 MG/1
12.5 TABLET ORAL 2 TIMES DAILY WITH MEALS
Status: DISCONTINUED | OUTPATIENT
Start: 2021-05-20 | End: 2021-05-21 | Stop reason: HOSPADM

## 2021-05-20 RX ORDER — NALOXONE HCL 0.4 MG/ML
0.4 VIAL (ML) INJECTION
Status: DISCONTINUED | OUTPATIENT
Start: 2021-05-20 | End: 2021-05-21 | Stop reason: HOSPADM

## 2021-05-20 RX ORDER — SODIUM CHLORIDE 9 MG/ML
50 INJECTION, SOLUTION INTRAVENOUS CONTINUOUS
Status: DISCONTINUED | OUTPATIENT
Start: 2021-05-20 | End: 2021-05-21 | Stop reason: HOSPADM

## 2021-05-20 RX ORDER — CLOPIDOGREL BISULFATE 75 MG/1
75 TABLET ORAL DAILY
Status: DISCONTINUED | OUTPATIENT
Start: 2021-05-20 | End: 2021-05-21 | Stop reason: HOSPADM

## 2021-05-20 RX ORDER — CALCIUM CARBONATE 200(500)MG
2 TABLET,CHEWABLE ORAL 2 TIMES DAILY PRN
Status: DISCONTINUED | OUTPATIENT
Start: 2021-05-20 | End: 2021-05-21 | Stop reason: HOSPADM

## 2021-05-20 RX ORDER — CEFAZOLIN SODIUM 1 G/3ML
INJECTION, POWDER, FOR SOLUTION INTRAMUSCULAR; INTRAVENOUS AS NEEDED
Status: DISCONTINUED | OUTPATIENT
Start: 2021-05-20 | End: 2021-05-20 | Stop reason: SURG

## 2021-05-20 RX ORDER — HYDROXYZINE HYDROCHLORIDE 25 MG/1
25 TABLET, FILM COATED ORAL ONCE
Status: COMPLETED | OUTPATIENT
Start: 2021-05-20 | End: 2021-05-20

## 2021-05-20 RX ORDER — FENTANYL CITRATE 50 UG/ML
25 INJECTION, SOLUTION INTRAMUSCULAR; INTRAVENOUS
Status: DISCONTINUED | OUTPATIENT
Start: 2021-05-20 | End: 2021-05-20 | Stop reason: HOSPADM

## 2021-05-20 RX ORDER — LABETALOL HYDROCHLORIDE 5 MG/ML
5 INJECTION, SOLUTION INTRAVENOUS
Status: DISCONTINUED | OUTPATIENT
Start: 2021-05-20 | End: 2021-05-20 | Stop reason: HOSPADM

## 2021-05-20 RX ORDER — SODIUM CHLORIDE 0.9 % (FLUSH) 0.9 %
3 SYRINGE (ML) INJECTION EVERY 12 HOURS SCHEDULED
Status: DISCONTINUED | OUTPATIENT
Start: 2021-05-20 | End: 2021-05-20

## 2021-05-20 RX ORDER — HEPARIN SODIUM 1000 [USP'U]/ML
INJECTION, SOLUTION INTRAVENOUS; SUBCUTANEOUS AS NEEDED
Status: DISCONTINUED | OUTPATIENT
Start: 2021-05-20 | End: 2021-05-20 | Stop reason: SURG

## 2021-05-20 RX ORDER — FENTANYL CITRATE 50 UG/ML
INJECTION, SOLUTION INTRAMUSCULAR; INTRAVENOUS AS NEEDED
Status: DISCONTINUED | OUTPATIENT
Start: 2021-05-20 | End: 2021-05-20 | Stop reason: SURG

## 2021-05-20 RX ORDER — LORAZEPAM 2 MG/ML
1 INJECTION INTRAMUSCULAR EVERY 6 HOURS PRN
Status: DISCONTINUED | OUTPATIENT
Start: 2021-05-20 | End: 2021-05-21 | Stop reason: HOSPADM

## 2021-05-20 RX ORDER — LIDOCAINE HYDROCHLORIDE 20 MG/ML
INJECTION, SOLUTION INFILTRATION; PERINEURAL AS NEEDED
Status: DISCONTINUED | OUTPATIENT
Start: 2021-05-20 | End: 2021-05-20 | Stop reason: HOSPADM

## 2021-05-20 RX ADMIN — CEFAZOLIN 1 G: 330 INJECTION, POWDER, FOR SOLUTION INTRAMUSCULAR; INTRAVENOUS at 11:43

## 2021-05-20 RX ADMIN — ASPIRIN 81 MG: 81 TABLET, FILM COATED ORAL at 07:08

## 2021-05-20 RX ADMIN — HEPARIN SODIUM 5000 UNITS: 1000 INJECTION, SOLUTION INTRAVENOUS; SUBCUTANEOUS at 12:04

## 2021-05-20 RX ADMIN — CARVEDILOL 12.5 MG: 6.25 TABLET, FILM COATED ORAL at 17:36

## 2021-05-20 RX ADMIN — ATORVASTATIN CALCIUM 40 MG: 40 TABLET, FILM COATED ORAL at 20:55

## 2021-05-20 RX ADMIN — VASOPRESSIN 1 ML: 20 INJECTION INTRAVENOUS at 12:05

## 2021-05-20 RX ADMIN — HYDROCODONE BITARTRATE AND ACETAMINOPHEN 1 TABLET: 5; 325 TABLET ORAL at 16:03

## 2021-05-20 RX ADMIN — CEFAZOLIN SODIUM 2 G: 10 INJECTION, POWDER, FOR SOLUTION INTRAVENOUS at 20:55

## 2021-05-20 RX ADMIN — NICOTINE 1 PATCH: 21 PATCH, EXTENDED RELEASE TRANSDERMAL at 15:02

## 2021-05-20 RX ADMIN — CLOPIDOGREL 75 MG: 75 TABLET, FILM COATED ORAL at 15:04

## 2021-05-20 RX ADMIN — SODIUM CHLORIDE 50 ML/HR: 9 INJECTION, SOLUTION INTRAVENOUS at 14:54

## 2021-05-20 RX ADMIN — SODIUM CHLORIDE, POTASSIUM CHLORIDE, SODIUM LACTATE AND CALCIUM CHLORIDE 100 ML/HR: 600; 310; 30; 20 INJECTION, SOLUTION INTRAVENOUS at 14:18

## 2021-05-20 RX ADMIN — FENTANYL CITRATE 50 MCG: 50 INJECTION, SOLUTION INTRAMUSCULAR; INTRAVENOUS at 11:54

## 2021-05-20 RX ADMIN — VASOPRESSIN 0.5 ML: 20 INJECTION INTRAVENOUS at 12:35

## 2021-05-20 RX ADMIN — LISINOPRIL 10 MG: 10 TABLET ORAL at 15:03

## 2021-05-20 RX ADMIN — FENTANYL CITRATE 50 MCG: 50 INJECTION, SOLUTION INTRAMUSCULAR; INTRAVENOUS at 11:43

## 2021-05-20 RX ADMIN — HYDROXYZINE HYDROCHLORIDE 25 MG: 25 TABLET, FILM COATED ORAL at 20:55

## 2021-05-20 RX ADMIN — PROPOFOL 50 MCG/KG/MIN: 10 INJECTION, EMULSION INTRAVENOUS at 11:41

## 2021-05-20 RX ADMIN — LIDOCAINE HYDROCHLORIDE 25 MG: 20 INJECTION, SOLUTION EPIDURAL; INFILTRATION; INTRACAUDAL; PERINEURAL at 11:39

## 2021-05-20 RX ADMIN — BUDESONIDE AND FORMOTEROL FUMARATE DIHYDRATE 2 PUFF: 160; 4.5 AEROSOL RESPIRATORY (INHALATION) at 20:35

## 2021-05-20 NOTE — ANESTHESIA POSTPROCEDURE EVALUATION
"Patient: Brennon Vance    Procedure Summary     Date: 05/20/21 Room / Location: [facility] OR  /  PAD HYBRID OR 12    Anesthesia Start: 1133 Anesthesia Stop: 1301    Procedure: RT GROIN EXPLORATION (Right Groin) Diagnosis:     Surgeons: Geoff Remy DO Provider: Randall Bell CRNA    Anesthesia Type: Not recorded ASA Status: 4 - Emergent          Anesthesia Type: No value filed.    Vitals  Vitals Value Taken Time   /72 05/20/21 1416   Temp 98.5 °F (36.9 °C) 05/20/21 1416   Pulse 66 05/20/21 1416   Resp 16 05/20/21 1416   SpO2 97 % 05/20/21 1416           Post Anesthesia Care and Evaluation    Patient location during evaluation: PACU  Patient participation: complete - patient participated  Level of consciousness: awake and alert  Pain management: adequate  Airway patency: patent  Anesthetic complications: No anesthetic complications    Cardiovascular status: acceptable  Respiratory status: acceptable  Hydration status: acceptable    Comments: Blood pressure 170/72, pulse 66, temperature 98.5 °F (36.9 °C), temperature source Temporal, resp. rate 16, height 177.8 cm (70\"), weight 56 kg (123 lb 6.4 oz), SpO2 97 %.    Pt discharged from PACU based on joe score >8      "

## 2021-05-20 NOTE — ANESTHESIA PREPROCEDURE EVALUATION
Anesthesia Evaluation     NPO Solid Status: > 8 hours  NPO Liquid Status: > 8 hours           Airway   Mallampati: I  TM distance: >3 FB  Neck ROM: full  No difficulty expected  Dental      Pulmonary    (+) COPD,   Cardiovascular   Exercise tolerance: poor (<4 METS)    (+) hypertension, past MI , CAD, dysrhythmias Atrial Fib, hyperlipidemia,     ROS comment: Pt admitted with NSTEMI, recurrent chest pain years after stent placement in [place]    Heart cath 5/20/21  Left ventriculography:  1. The aortic and mitral valves appear normal. The aortic outflow tract is normal. Trace MR noted  2. The left ventricle is normal in wall thickness and chamber size.  3. The contractility pattern of the LV shows mild inferior wall hypokinesis  4.  LVEF=60%     Selective coronary angiography:   Dominance: right  Left Main coronary artery: no obstructive disease   Left anterior descending artery: proximal Ca+. Mid 50%   Left circumflex:  The first OM is high and tiny and ok. The OM#2 has stent material and at the proximal end of the stent is a 75% stenosis and this stenosis starts in the AVG. The distal Cx is small   Right coronary artery:  This is a dominant vessel. There is a proximal  and left to right collaterals distally. There are several distal branches but their size and condition cannot be assessed. There is stent material from the proximal thru mid vessel/     Left subclavian:  The ostium could not be cannulated. Large RUE/LUE bp differential noted suggesting a severe stenosis in the left subclavian artery    Impression:  1. MINIMAL LV DYSFUNCTION  2. 3V CAD - COMPLEX  3. 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 SYSTOLIC HTN    Neuro/Psych  GI/Hepatic/Renal/Endo    (+)   thyroid problem hypothyroidism    Musculoskeletal     Abdominal    Substance History      OB/GYN          Other                      Anesthesia Plan    ASA 4 - emergent     intravenous induction     Anesthetic plan, all risks, benefits, and alternatives  have been provided, discussed and informed consent has been obtained with: patient.

## 2021-05-21 VITALS
BODY MASS INDEX: 16.81 KG/M2 | HEIGHT: 70 IN | DIASTOLIC BLOOD PRESSURE: 72 MMHG | TEMPERATURE: 97.4 F | RESPIRATION RATE: 16 BRPM | SYSTOLIC BLOOD PRESSURE: 120 MMHG | HEART RATE: 82 BPM | WEIGHT: 117.4 LBS | OXYGEN SATURATION: 97 %

## 2021-05-21 LAB
ANION GAP SERPL CALCULATED.3IONS-SCNC: 11 MMOL/L (ref 5–15)
BUN SERPL-MCNC: 23 MG/DL (ref 8–23)
BUN/CREAT SERPL: 13.6 (ref 7–25)
CALCIUM SPEC-SCNC: 8.2 MG/DL (ref 8.6–10.5)
CHLORIDE SERPL-SCNC: 105 MMOL/L (ref 98–107)
CO2 SERPL-SCNC: 24 MMOL/L (ref 22–29)
CREAT SERPL-MCNC: 1.69 MG/DL (ref 0.76–1.27)
DEPRECATED RDW RBC AUTO: 46.2 FL (ref 37–54)
ERYTHROCYTE [DISTWIDTH] IN BLOOD BY AUTOMATED COUNT: 13.5 % (ref 12.3–15.4)
GFR SERPL CREATININE-BSD FRML MDRD: 39 ML/MIN/1.73
GLUCOSE SERPL-MCNC: 85 MG/DL (ref 65–99)
HCT VFR BLD AUTO: 36.1 % (ref 37.5–51)
HGB BLD-MCNC: 12 G/DL (ref 13–17.7)
MCH RBC QN AUTO: 30.8 PG (ref 26.6–33)
MCHC RBC AUTO-ENTMCNC: 33.2 G/DL (ref 31.5–35.7)
MCV RBC AUTO: 92.8 FL (ref 79–97)
PLATELET # BLD AUTO: 170 10*3/MM3 (ref 140–450)
PMV BLD AUTO: 10 FL (ref 6–12)
POTASSIUM SERPL-SCNC: 3.7 MMOL/L (ref 3.5–5.2)
QT INTERVAL: 476 MS
QTC INTERVAL: 491 MS
RBC # BLD AUTO: 3.89 10*6/MM3 (ref 4.14–5.8)
SODIUM SERPL-SCNC: 140 MMOL/L (ref 136–145)
WBC # BLD AUTO: 6.72 10*3/MM3 (ref 3.4–10.8)

## 2021-05-21 PROCEDURE — 80048 BASIC METABOLIC PNL TOTAL CA: CPT | Performed by: SURGERY

## 2021-05-21 PROCEDURE — 85027 COMPLETE CBC AUTOMATED: CPT | Performed by: SURGERY

## 2021-05-21 PROCEDURE — 99024 POSTOP FOLLOW-UP VISIT: CPT | Performed by: NURSE PRACTITIONER

## 2021-05-21 PROCEDURE — 94799 UNLISTED PULMONARY SVC/PX: CPT

## 2021-05-21 PROCEDURE — 99231 SBSQ HOSP IP/OBS SF/LOW 25: CPT | Performed by: NURSE PRACTITIONER

## 2021-05-21 PROCEDURE — 25010000002 CEFAZOLIN PER 500 MG: Performed by: SURGERY

## 2021-05-21 PROCEDURE — 99239 HOSP IP/OBS DSCHRG MGMT >30: CPT | Performed by: INTERNAL MEDICINE

## 2021-05-21 RX ORDER — CARBAMAZEPINE 200 MG/1
200 TABLET ORAL DAILY
COMMUNITY

## 2021-05-21 RX ORDER — FELODIPINE 5 MG/1
5 TABLET, EXTENDED RELEASE ORAL EVERY EVENING
COMMUNITY
End: 2021-06-07

## 2021-05-21 RX ORDER — GUAIFENESIN 600 MG/1
1200 TABLET, EXTENDED RELEASE ORAL 2 TIMES DAILY
COMMUNITY

## 2021-05-21 RX ORDER — MELATONIN
1000 DAILY
COMMUNITY

## 2021-05-21 RX ORDER — CARBAMAZEPINE 200 MG/1
200 TABLET ORAL DAILY
Status: DISCONTINUED | OUTPATIENT
Start: 2021-05-22 | End: 2021-05-21 | Stop reason: HOSPADM

## 2021-05-21 RX ORDER — CARVEDILOL 6.25 MG/1
3.12 TABLET ORAL 2 TIMES DAILY WITH MEALS
COMMUNITY
End: 2021-05-21 | Stop reason: HOSPADM

## 2021-05-21 RX ORDER — HYDROXYZINE HYDROCHLORIDE 25 MG/1
25 TABLET, FILM COATED ORAL 4 TIMES DAILY PRN
Status: DISCONTINUED | OUTPATIENT
Start: 2021-05-21 | End: 2021-05-21 | Stop reason: HOSPADM

## 2021-05-21 RX ORDER — ISOSORBIDE MONONITRATE 60 MG/1
60 TABLET, EXTENDED RELEASE ORAL DAILY
COMMUNITY

## 2021-05-21 RX ORDER — ASPIRIN 81 MG/1
81 TABLET ORAL DAILY
Refills: 6
Start: 2021-05-22 | End: 2021-06-07

## 2021-05-21 RX ORDER — CARVEDILOL 12.5 MG/1
12.5 TABLET ORAL 2 TIMES DAILY WITH MEALS
Qty: 60 TABLET | Refills: 6 | Status: SHIPPED | OUTPATIENT
Start: 2021-05-22 | End: 2021-05-21

## 2021-05-21 RX ORDER — TESTOSTERONE CYPIONATE 200 MG/ML
100 INJECTION, SOLUTION INTRAMUSCULAR
COMMUNITY

## 2021-05-21 RX ORDER — ASPIRIN 81 MG/1
81 TABLET ORAL DAILY
COMMUNITY
End: 2021-05-21 | Stop reason: HOSPADM

## 2021-05-21 RX ORDER — ALBUTEROL SULFATE 90 UG/1
2 AEROSOL, METERED RESPIRATORY (INHALATION) EVERY 4 HOURS PRN
COMMUNITY

## 2021-05-21 RX ORDER — CARVEDILOL 12.5 MG/1
12.5 TABLET ORAL 2 TIMES DAILY WITH MEALS
Qty: 60 TABLET | Refills: 6 | Status: SHIPPED | OUTPATIENT
Start: 2021-05-22

## 2021-05-21 RX ORDER — ASPIRIN 81 MG/1
81 TABLET ORAL DAILY
Refills: 6
Start: 2021-05-22 | End: 2021-05-21

## 2021-05-21 RX ORDER — POTASSIUM CHLORIDE 20 MEQ/1
20 TABLET, EXTENDED RELEASE ORAL DAILY
COMMUNITY

## 2021-05-21 RX ADMIN — BUDESONIDE AND FORMOTEROL FUMARATE DIHYDRATE 2 PUFF: 160; 4.5 AEROSOL RESPIRATORY (INHALATION) at 07:42

## 2021-05-21 RX ADMIN — HYDROXYZINE HYDROCHLORIDE 25 MG: 25 TABLET ORAL at 18:14

## 2021-05-21 RX ADMIN — CARVEDILOL 12.5 MG: 6.25 TABLET, FILM COATED ORAL at 08:16

## 2021-05-21 RX ADMIN — HYDROXYZINE HYDROCHLORIDE 25 MG: 25 TABLET ORAL at 00:39

## 2021-05-21 RX ADMIN — ASPIRIN 81 MG: 81 TABLET, FILM COATED ORAL at 08:16

## 2021-05-21 RX ADMIN — FINASTERIDE 5 MG: 5 TABLET, FILM COATED ORAL at 08:17

## 2021-05-21 RX ADMIN — CEFAZOLIN SODIUM 2 G: 10 INJECTION, POWDER, FOR SOLUTION INTRAVENOUS at 05:04

## 2021-05-21 RX ADMIN — CARBAMAZEPINE 200 MG: 100 TABLET, EXTENDED RELEASE ORAL at 08:16

## 2021-05-21 RX ADMIN — Medication 1000 MCG: at 08:17

## 2021-05-21 RX ADMIN — CLOPIDOGREL 75 MG: 75 TABLET, FILM COATED ORAL at 08:16

## 2021-05-21 RX ADMIN — PANTOPRAZOLE SODIUM 40 MG: 40 TABLET, DELAYED RELEASE ORAL at 08:16

## 2021-05-21 RX ADMIN — NICOTINE 1 PATCH: 21 PATCH, EXTENDED RELEASE TRANSDERMAL at 08:19

## 2021-05-21 RX ADMIN — CARVEDILOL 12.5 MG: 6.25 TABLET, FILM COATED ORAL at 17:26

## 2021-05-21 RX ADMIN — LISINOPRIL 10 MG: 10 TABLET ORAL at 08:16

## 2021-05-21 RX ADMIN — LEVOTHYROXINE SODIUM 50 MCG: 50 TABLET ORAL at 05:04

## 2021-05-22 ENCOUNTER — READMISSION MANAGEMENT (OUTPATIENT)
Dept: CALL CENTER | Facility: HOSPITAL | Age: 86
End: 2021-05-22

## 2021-05-22 NOTE — OUTREACH NOTE
Prep Survey      Responses   [facility] facility patient discharged from?  [facility]   Is LACE score < 7 ?  No   Emergency Room discharge w/ pulse ox?  No   Eligibility  Readm Mgmt   Discharge diagnosis  NSTEMI   Does the patient have one of the following disease processes/diagnoses(primary or secondary)?  Acute MI (STEMI,NSTEMI)   Does the patient have Home health ordered?  No   Is there a DME ordered?  No   Comments regarding appointments  to be notified   Prep survey completed?  Yes          Rosa Isela Estrada RN

## 2021-05-23 NOTE — PAYOR COMM NOTE
"[facility] HOME 5-21-21  [id number]    Haley Plummer (85 y.o. Male)     Date of Birth Social Security Number Address Home Phone MRN    1935  [id number] [facility] [address] [phone] [id number]    Zoroastrianism Marital Status          Hindu        Admission Date Admission Type Admitting Provider Attending Provider Department, Room/Bed    5/19/21 Urgent Harrison Alcantara MD  [facility] 4B, 406/1    Discharge Date Discharge Disposition Discharge Destination        5/21/2021 Home or Self Care              Attending Provider: (none)   Allergies: Lyrica [Pregabalin]    Isolation: None   Infection: None   Code Status: Prior    Ht: 177.8 cm (70\")   Wt: 53.3 kg (117 lb 6.4 oz)    Admission Cmt: None   Principal Problem: NSTEMI, initial episode of care (CMS/HCC) [I21.4]                 Active Insurance as of 5/19/2021     Primary Coverage     Payor Plan Insurance Group Employer/Plan Group    ANTHEM MEDICARE REPLACEMENT ANTHEM MEDICARE ADVANTAGE [id number]     Payor Plan Address Payor Plan Phone Number Payor Plan Fax Number Effective Dates    [address] [phone]  1/1/2018 - None Entered    [facility] [address]       Subscriber Name Subscriber Birth Date Member ID       HALEY PLUMMER 1935 [id number]                 Emergency Contacts      (Rel.) Home Phone Work Phone Mobile Phone    Anita Plummer (Spouse) [phone] -- --    Tierra Gorman (Relative) [phone] -- --    Gaby Rankin (Daughter) [phone] -- --    Homa Patrick (Daughter) [phone] -- --               Discharge Summary      Haley Dorado MD at 05/21/21 1820          [facility] DISCHARGE    Date of Discharge:  5/21/2021    Discharge Diagnosis:     NSTEMI, initial episode of care (CMS/HCC)    Coronary artery disease involving native coronary artery of native heart without angina pectoris    Paroxysmal atrial fibrillation (CMS/HCC)    Other emphysema (CMS/HCC)    Essential " hypertension    Precordial pain    Hyperlipidemia LDL goal <70    Tobacco abuse    Cardiomyopathy (CMS/HCC)    NSTEMI (non-ST elevated myocardial infarction) (CMS/[facility])      Presenting Problem/History of Present Illness  NSTEMI (non-ST elevated myocardial infarction) (CMS/[facility]) [I21.4]    Hospital Course  Patient is a 85 y.o. male who presented to [facility] with a complaint of chest pain. MI ruled in. Cath was performed my Dr. Alcantara which revealed a 75% stenosis of the LCX/OM and a  of the RCA. Post cath he developed acute closure of his right femoral artery and required vasscular surgery to repair the artery. CT surgery consult was obtained and they recommended CABG but patient refused. He was offered a  procedure but he wants to think about it. He was started on a beta blocker and discharged home. He will follow up with Dr. Araujo in [facility]. He will call us if he decides to proceed with a  procedure of the RCA and stent to the LCX    Procedures Performed  Procedure(s):  RT GROIN EXPLORATION       Consults:   Consults     Date and Time Order Name Status Description    5/20/2021 10:11 AM Inpatient Vascular Surgery Consult Completed     5/20/2021  9:21 AM Inpatient Consult to Cardiothoracic Surgery Completed           Condition on Discharge:  stable    Physical Exam at Discharge  Patient Vitals for the past 24 hrs:   BP Temp Temp src Pulse Resp SpO2 Weight   05/21/21 1536 120/72 97.4 °F (36.3 °C) Oral 82 16 97 % --   05/21/21 0815 140/80 98.4 °F (36.9 °C) Oral 78 16 95 % --   05/21/21 0746 -- -- -- 76 16 -- --   05/21/21 0742 -- -- -- 95 16 96 % --   05/21/21 0500 -- -- -- -- -- -- 53.3 kg (117 lb 6.4 oz)   05/21/21 0340 107/62 98.3 °F (36.8 °C) Oral 72 16 96 % --   05/20/21 2152 96/69 96.8 °F (36 °C) Oral 85 18 96 % --   05/20/21 2035 -- -- -- 70 18 97 % --     Pulmonary:      Effort: Pulmonary effort is normal.      Breath sounds: Normal breath sounds.   Cardiovascular:      Normal rate.  Regular rhythm.      Murmurs: There is no murmur.   Edema:     Peripheral edema absent.         Discharge Disposition  Home or Self Care    Discharge Medications     Discharge Medications      Changes to Medications      Instructions Start Date   aspirin 81 MG EC tablet  What changed: Another medication with the same name was added. Make sure you understand how and when to take each.   81 mg, Oral, Daily      aspirin 81 MG EC tablet  What changed: You were already taking a medication with the same name, and this prescription was added. Make sure you understand how and when to take each.   81 mg, Oral, Daily   Start Date: May 22, 2021     carvedilol 6.25 MG tablet  Commonly known as: COREG  What changed: Another medication with the same name was added. Make sure you understand how and when to take each.   3.125 mg, Oral, 2 Times Daily With Meals      carvedilol 12.5 MG tablet  Commonly known as: COREG  What changed: You were already taking a medication with the same name, and this prescription was added. Make sure you understand how and when to take each.   12.5 mg, Oral, 2 Times Daily With Meals   Start Date: May 22, 2021        Continue These Medications      Instructions Start Date   albuterol (2.5 MG/3ML) 0.083% nebulizer solution  Commonly known as: PROVENTIL   2.5 mg, Nebulization, Every 4 Hours PRN      albuterol sulfate  (90 Base) MCG/ACT inhaler  Commonly known as: PROVENTIL HFA;VENTOLIN HFA;PROAIR HFA   2 puffs, Inhalation, Every 4 Hours PRN      apixaban 5 MG tablet tablet  Commonly known as: ELIQUIS   2.5 mg, Oral, 2 Times Daily      budesonide-formoterol 160-4.5 MCG/ACT inhaler  Commonly known as: SYMBICORT   2 puffs, Inhalation, 2 Times Daily - RT      carBAMazepine 200 MG tablet  Commonly known as: TEGretol   200 mg, Oral, Daily      cholecalciferol 25 MCG (1000 UT) tablet  Commonly known as: VITAMIN D3   1,000 Units, Oral, Daily      Depo-Testosterone 200 MG/ML injection  Generic drug:  Testosterone Cypionate   100 mg, Intramuscular, Every 28 Days, Pt gets 0.5 ml every month.       dilTIAZem  MG 24 hr capsule  Commonly known as: Cardizem CD   180 mg, Oral, Daily      felodipine 5 MG 24 hr tablet  Commonly known as: PLENDIL   5 mg, Oral, Every Evening      guaiFENesin 600 MG 12 hr tablet  Commonly known as: MUCINEX   1,200 mg, Oral, 2 Times Daily      isosorbide mononitrate 60 MG 24 hr tablet  Commonly known as: IMDUR   60 mg, Oral, Daily      levothyroxine 50 MCG tablet  Commonly known as: SYNTHROID, LEVOTHROID   50 mcg, Oral, Daily      mg-plus protein 133 MG elemental magnesium tablet   1 tablet, Oral, 2 Times Daily      pantoprazole 40 MG EC tablet  Commonly known as: PROTONIX   40 mg, Oral, Daily      potassium chloride 20 MEQ CR tablet  Commonly known as: K-DUR,KLOR-CON   20 mEq, Oral, Daily      rosuvastatin 40 MG tablet  Commonly known as: CRESTOR   20 mg, Oral, Nightly             Activity at Discharge: gradually increase activity    Follow-up Appointments  In my office prn.  Follow up with his regular cardilogist Dr. Araujo in [place]        Brennon Dorado MD  05/21/21  18:29 CDT    Time: Discharge 35 min       Electronically signed by Brennon Dorado MD at 05/21/21 1270

## 2021-05-26 ENCOUNTER — READMISSION MANAGEMENT (OUTPATIENT)
Dept: CALL CENTER | Facility: HOSPITAL | Age: 86
End: 2021-05-26

## 2021-05-26 NOTE — OUTREACH NOTE
AMI Week 1 Survey      Responses   [facility] [place] patient discharged from?  [facility]   Does the patient have one of the following disease processes/diagnoses(primary or secondary)?  Acute MI (STEMI,NSTEMI)   Week 1 attempt successful?  No   Unsuccessful attempts  Attempt 1          Dominique Whitley RN

## 2021-06-02 ENCOUNTER — TELEPHONE (OUTPATIENT)
Dept: VASCULAR SURGERY | Facility: CLINIC | Age: 86
End: 2021-06-02

## 2021-06-02 NOTE — TELEPHONE ENCOUNTER
I returned phone call and spoke with patient and his wife.  It does sound like he is having some nerve irritation related to his open incision.  He reports his foot is nice and warm he is walking without difficulty but along his thigh below the incision is tender to pressure against.  He reports no significant swelling.  We will discuss further at his appointment on the June 7.  He voices understanding.  He was also seen by cardiologist at a different facility 2 days who reported everything looked okay to him.      ----- Message from Paula Jensen sent at 6/2/2021  3:48 PM CDT -----  Mr Vance called in stating he had a procedure with Dr Remy on 05/20/2021 after a heart cath and he is having pain on the inner thigh and back of the knee. He comes Monday, 06/07/2021 for his post op appointment. Looks like Dr Remy done a Right Groin Exploration

## 2021-06-04 ENCOUNTER — TELEPHONE (OUTPATIENT)
Dept: VASCULAR SURGERY | Facility: CLINIC | Age: 86
End: 2021-06-04

## 2021-06-04 NOTE — TELEPHONE ENCOUNTER
Spoke with Mr Vance reminding him of his appointment for Monday, June 7th, 2021 at 1130 am with Katelyn JAVIER. Mr Vance confirmed he would be here.

## 2021-06-07 ENCOUNTER — OFFICE VISIT (OUTPATIENT)
Dept: VASCULAR SURGERY | Facility: CLINIC | Age: 86
End: 2021-06-07

## 2021-06-07 VITALS
SYSTOLIC BLOOD PRESSURE: 126 MMHG | OXYGEN SATURATION: 97 % | BODY MASS INDEX: 17.9 KG/M2 | HEART RATE: 71 BPM | WEIGHT: 125 LBS | HEIGHT: 70 IN | DIASTOLIC BLOOD PRESSURE: 68 MMHG

## 2021-06-07 DIAGNOSIS — I65.23 BILATERAL CAROTID ARTERY STENOSIS: ICD-10-CM

## 2021-06-07 DIAGNOSIS — I10 ESSENTIAL HYPERTENSION: ICD-10-CM

## 2021-06-07 DIAGNOSIS — E78.5 HYPERLIPIDEMIA LDL GOAL <70: ICD-10-CM

## 2021-06-07 DIAGNOSIS — I73.9 PAD (PERIPHERAL ARTERY DISEASE) (HCC): Primary | ICD-10-CM

## 2021-06-07 PROCEDURE — 99024 POSTOP FOLLOW-UP VISIT: CPT | Performed by: NURSE PRACTITIONER

## 2021-06-07 RX ORDER — FUROSEMIDE 40 MG/1
40 TABLET ORAL 2 TIMES DAILY
COMMUNITY

## 2021-06-07 NOTE — PROGRESS NOTES
"06/07/2021      Javid Grajeda MD  [address] [place] [address] [place] [address]        Brennon Vance  1935    Chief Complaint   Patient presents with   • Follow-up     2 Week Post-Op Follow Up For RT GROIN EXPLORATION. Patient denies any stroke like symptoms.    • Smoker     Patient is a Current Everyday Smoker    • Med Management     Verbally verified medications with patient/wife        Dear Javid Grajeda MD:    HPI     I had the pleasure of seeing you patient in the office today for follow up.  As you recall, the patient is a 85 y.o. male who we recently saw while hospitalized.  He initially presented to [facility] with chest pain and found to be in atrial fibrillation with RVR.  Once he arrived here, he was taken for heart catheterization by Dr. Alcantara.  Postoperatively, he was noted to have no pulses from the groin down with decreased motor and sensation.  He was taken urgently for right common femoral artery cutdown with thrombectomy of the common femoral and superficial arteries.  His groin has healed.  Cardiology will not be able to access right groin for minimum of 3 months per Dr. Remy.  CT surgery was consulted and recommended CABG but patient wanted to further discuss possible  procedure with Dr. Dorado.  He reports he has not heard from their office at this time.  He is maintained on Eliquis and Crestor.    Review of Systems   Constitutional: Negative.    HENT: Negative.    Eyes: Negative.    Respiratory: Negative.    Cardiovascular: Negative.    Gastrointestinal: Negative.    Endocrine: Negative.    Genitourinary: Negative.    Musculoskeletal: Negative.    Skin: Negative.    Allergic/Immunologic: Negative.    Neurological: Negative.    Hematological: Negative.    Psychiatric/Behavioral: Negative.    All other systems reviewed and are negative.      /68 (BP Location: Right arm, Patient Position: Sitting, Cuff Size: Adult)   Pulse 71   Ht 177.8 cm (70\")   Wt 56.7 kg " (125 lb)   SpO2 97%   BMI 17.94 kg/m²   Physical Exam  Vitals and nursing note reviewed.   Constitutional:       General: He is not in acute distress.     Appearance: Normal appearance. He is well-developed. He is not diaphoretic.   HENT:      Head: Normocephalic and atraumatic.   Neck:      Vascular: No carotid bruit or JVD.   Cardiovascular:      Rate and Rhythm: Normal rate and regular rhythm.      Pulses: Normal pulses.           Femoral pulses are 2+ on the right side and 2+ on the left side.       Popliteal pulses are 2+ on the right side and 2+ on the left side.        Dorsalis pedis pulses are 2+ on the right side and 2+ on the left side.        Posterior tibial pulses are 2+ on the right side and 2+ on the left side.      Heart sounds: Normal heart sounds, S1 normal and S2 normal. No murmur heard.   No friction rub. No gallop.       Comments: Right groin healed  Pulmonary:      Effort: Pulmonary effort is normal.      Breath sounds: Normal breath sounds.   Abdominal:      General: Bowel sounds are normal. There is no abdominal bruit.      Palpations: Abdomen is soft.      Tenderness: There is no abdominal tenderness.   Musculoskeletal:         General: Normal range of motion.   Skin:     General: Skin is warm and dry.   Neurological:      General: No focal deficit present.      Mental Status: He is alert and oriented to person, place, and time.      Cranial Nerves: No cranial nerve deficit.   Psychiatric:         Mood and Affect: Mood normal.         Behavior: Behavior normal.         Thought Content: Thought content normal.         Judgment: Judgment normal.         Patient Active Problem List   Diagnosis   • Chest pain   • NSTEMI, initial episode of care (CMS/[facility])   • Coronary artery disease involving native coronary artery of native heart without angina pectoris   • Paroxysmal atrial fibrillation (CMS/[facility])   • Other emphysema (CMS/[facility])   • Essential hypertension   • Precordial pain   • Hyperlipidemia  LDL goal <70   • Tobacco abuse   • Cardiomyopathy (CMS/[facility])   • NSTEMI (non-ST elevated myocardial infarction) (CMS/[facility])         ICD-10-CM ICD-9-CM   1. PAD (peripheral artery disease) (CMS/[facility])  I73.9 443.9   2. Bilateral carotid artery stenosis  I65.23 433.10     433.30   3. Essential hypertension  I10 401.9   4. Hyperlipidemia LDL goal <70  E78.5 272.4           PLAN: After thoroughly evaluating Brennon Vance, I and please report overall he is doing well status post right groin exploration and thrombectomy.  His groin incision has healed.  He does routinely follow with Dr. Araujo in [place].  He reports he is going to undergo cardiac rehab in [place].  He is awaiting an appointment with Dr. Dorado to discuss possible heart catheterization.  He wanted to pursue this versus CABG.  Dr. Remy does not want anyone to access his right groin for minimum of 3 months from surgery.  He is free to resume normal activity from our standpoint.  We will see him back in 6 months with repeat noninvasive testing for continued surveillance, including ABIs and a carotid duplex for surveillance. I did discuss vascular risk factors as they pertain to the progression of vascular disease including controlling his hypertension, hyperlipidemia, and smoking cessation.  His blood pressure is stable on his current medication regimen.  He is maintained on Crestor for his hyperlipidemia.  Unfortunately he does not have a desire to quit smoking at this time.  The patient is to continue taking their medications as previously discussed.   This was all discussed in full with complete understanding.  Thank you for allowing me to participate in the care of your patient.  Please do not hesitate to call with any questions or concerns.  We will keep you aware of any further encounters with Brennon Vance.      Sincerely Yours,      CONCEPCION Albrecht

## 2021-06-14 ENCOUNTER — OFFICE VISIT (OUTPATIENT)
Dept: NEUROLOGY | Age: 86
End: 2021-06-14
Payer: COMMERCIAL

## 2021-06-14 VITALS
BODY MASS INDEX: 19.25 KG/M2 | HEART RATE: 67 BPM | WEIGHT: 127 LBS | TEMPERATURE: 96.5 F | SYSTOLIC BLOOD PRESSURE: 142 MMHG | DIASTOLIC BLOOD PRESSURE: 72 MMHG | OXYGEN SATURATION: 97 % | HEIGHT: 68 IN

## 2021-06-14 DIAGNOSIS — G50.0 RIGHT TRIGEMINAL NEURALGIA: Primary | ICD-10-CM

## 2021-06-14 DIAGNOSIS — R41.3 MEMORY LOSS: ICD-10-CM

## 2021-06-14 DIAGNOSIS — Z86.73 HISTORY OF STROKE: ICD-10-CM

## 2021-06-14 PROCEDURE — 99214 OFFICE O/P EST MOD 30 MIN: CPT | Performed by: PSYCHIATRY & NEUROLOGY

## 2021-06-14 NOTE — PROGRESS NOTES
[facility] Neurology  [address] [place] [address] Drive, [address]  [place], [place]  Phone [phone]  Fax [phone]     [facility] Neurology Follow Up Encounter  21 2:46 PM CDT    Information:   Patient Name: Jean Claude Sánchez  :   1935  Age:   82 y.o. MRN:   [id number]  Account #:  [de-identified]  Today:  21    Provider: Bria Jones M.D. Chief Complaint:   Chief Complaint   Patient presents with    Follow-up     right trigeminal neuralgia        Subjective:   Jean Claude Sánchez is a 82 y.o. man with a history of right trigeminal neuralgia, history of stroke, and memory losswho is following up. He has had rare and brief right facial pains.  He has had no stroke symptoms.  Last visit, he had had worsened memory and was started on Namenda. That seems to have helped. He remains independent. He says his memory is about the same. He is forgetful but functions fine. Objective:     Past Medical History:  Past Medical History:   Diagnosis Date    CAD (coronary artery disease)     [facility]     Diabetes ([facility]     HTN (hypertension)     Hyperlipidemia     Stroke ([facility]     Trigeminal neuralgia of right side of face        Past Surgical History:   Procedure Laterality Date    CARDIAC CATHETERIZATION      CORONARY ARTERY BYPASS GRAFT      EXTERNAL EAR SURGERY      OTHER SURGICAL HISTORY  2013    Cyberknife treatment for RTGN       Recent Hospitalizations  · None    Significant Injuries  · None    Habits  Jean Claude Sánchez reports that he has been smoking cigarettes. He has a 30.00 pack-year smoking history. He has never used smokeless tobacco. He reports that he does not drink alcohol and does not use drugs. No family history on file. Social History  Ruth Crawley , lives in [place] [address] [address], and is retired.     Medications:  Current Outpatient Medications   Medication Sig Dispense Refill    furosemide (LASIX) 40 MG tablet Take 40 mg by mouth 2 times daily      pantoprazole (PROTONIX) 40 MG tablet Take 1 tablet by mouth every morning (before breakfast) 90 tablet 3    memantine (NAMENDA) 10 MG tablet Take 1 tablet by mouth 2 times daily 180 tablet 3    apixaban (ELIQUIS) 5 MG TABS tablet Eliquis 5 mg tablet   Take 1 tablet twice a day by oral route.  budesonide-formoterol (SYMBICORT) 160-4.5 MCG/ACT AERO Inhale 2 puffs into the lungs      carvedilol (COREG) 3.125 MG tablet Take 3.125 mg by mouth      albuterol (PROVENTIL) (2.5 MG/3ML) 0.083% nebulizer solution Inhale 2.5 mg into the lungs      isosorbide mononitrate (IMDUR) 60 MG extended release tablet isosorbide mononitrate ER 60 mg tablet,extended release 24 hr   Take 1 tablet every day by oral route.  carBAMazepine (TEGRETOL) 200 MG tablet Take 200 mg by mouth daily       albuterol sulfate  (90 BASE) MCG/ACT inhaler Inhale 2 puffs into the lungs      levothyroxine (SYNTHROID) 50 MCG tablet Take 50 mcg by mouth      rosuvastatin (CRESTOR) 10 MG tablet       ipratropium (ATROVENT) 0.03 % nasal spray 2 sprays by Nasal route every 12 hours      magnesium chloride (MAG DELAY 64) 535 (64 MG) MG TBCR CR tablet Take 64 mg by mouth daily       No current facility-administered medications for this visit. Allergies:   Allergies as of 06/14/2021 - Fully Reviewed 06/14/2021   Allergen Reaction Noted    Lyrica [pregabalin] Swelling 07/26/2016    Crestor  [rosuvastatin calcium]  05/02/2019       Review of Systems:  Constitutional: negative for - chills and fever  Eyes:  negative for - visual disturbance and photophobia  HENMT: negative for - headaches and sinus pain  Respiratory: negative for - cough, hemoptysis, and shortness of breath  Cardiovascular: negative for - chest pain and palpitations  Gastrointestinal: negative for - blood in stools, constipation, diarrhea, nausea, and vomiting  Genito-Urinary: negative for - hematuria, urinary frequency, urinary urgency, and urinary retention  Musculoskeletal: positive for - joint pain, joint stiffness, and joint swelling  Hematological and Lymphatic: negative for - bleeding problems, abnormal bruising, and swollen lymph nodes  Endocrine:  negative for - polydipsia and polyphagia  Allergy/Immunology:  negative for - rhinorrhea, sinus congestion, hives  Integument:  negative for - negative for - rash, change in moles, new or changing lesions  Psychological: negative for - anxiety and depression  Neurological: positive for - memory loss  Negative for - numbness/tingling, and weakness     PHYSICAL EXAMINATION:  Vitals:  BP (!) 142/72 (Site: Left Upper Arm, Position: Sitting, Cuff Size: Medium Adult) Comment: pt says bp runs high on the top number  Pulse 67   Temp 96.5 °F (35.8 °C)   Ht 5' 8\" (1.727 m)   Wt 127 lb (57.6 kg)   SpO2 97%   BMI 19.31 kg/m²   General appearance:  Alert, well developed, well nourished, in no distress  HEENT:  normocephalic, atraumatic, sclera appear normal, no nasal abnormalities, no rhinorrhea, Ears appear normal, oral mucous membranes are moist without erythema, trachea midline, thyroid is normal, no lymphadenopathy or neck mass. Cardiovascular:  Regular rate and rhythm without murmer. No peripheral edema, No cyanosis or clubbing. No carotid bruits. Pulmonary:  Lungs are clear to auscultation. Breathing appears normal, good expansion, normal effort without use of accessory muscles  Musculoskeletal:  Joints are arthritic. Integument:  No rash, erythema, or pallor  Psychiatric:  Mood, affect, and behavior appear normal      NEUROLOGIC EXAMINATION:  Mental Status:  alert, oriented to person, place, and time.   Speech:  Clear without dysarthria or dysphonia  Language:  Fluent without aphasia  Cranial Nerves:   II Visual fields are full to confrontation   III,IV, VI Extraocular movements are full   VII Facial movements are symmetrical without weakness   VIII Hearing is intact   IX,X Shoulder shrug and head rotation strength are intact   XII No tongue atrophy or fasciculations. Normal tongue protrusion. No tongue weakness  Motor:  Normal strength in both upper and lower extremities. Normal muscle tone and bulk. Deep tendon reflexes are intact and symmetrical in both upper and lower extremities. Chao's signs are absent bilaterally. There is no ankle clonus on either side. Coordination:  Rapid alternating movements are normal in both upper and lower extremities. Finger to nose testing is unimpaired bilaterally. Gait:  Mildly unsteady. Assessment:       ICD-10-CM    1. Right trigeminal neuralgia  G50.0    2. History of stroke  Z86.73    3. Memory loss  R41.3    Clinically stable    Plan:   1. Continue Tegretol 200 mg 1 to 2 a day  2.  Continue Namenda 10 mg BID  3. FU in a year    Electronically signed by Merwyn Baumgarten, MD on 6/14/21

## 2021-06-22 ENCOUNTER — TELEPHONE (OUTPATIENT)
Dept: CARDIOLOGY | Facility: CLINIC | Age: 86
End: 2021-06-22

## 2021-06-22 NOTE — TELEPHONE ENCOUNTER
----- Message from CONCEPCION Thomas sent at 6/21/2021  9:20 PM CDT -----  From the discharge summary it looks like he was going to follow up with Dr. Araujo in [place]. Can you call this patient and see who he plans to follow with, cardiology wise?     Thanks.   ----- Message -----  From: Katelyn Amanda APRN  Sent: 6/18/2021   7:02 PM CDT  To: CONCEPCION Thomas    Can you check on this patient?  I saw him in office and he wanted me to contact as he thought he was going to be contacted regarding possible intervention with Dr. Dorado.  I know they discussed another cath and Dr. Remy did not want to allow access to the right groin for minimum of 3 months.  Looks like per discharge note the patient was going to contact urologist office however I just think this got lost in translation.  He stated to me that he was awaiting an appointment with Dr. Dorado but nobody had contacted him.  Can you please reach out to him?

## 2021-06-22 NOTE — TELEPHONE ENCOUNTER
Attempted to reach the patient with no answer.  Message is left asking him to call back to clarify who he is planning on seeing for his cardiology f/u from his recent hospital admission.  We are under the impression you were going to f/u with Dr. Araujo in [place], but if not, we would like to get you scheduled here.  Please call us back to advise.  Lakeshia Patrick MA

## 2021-06-28 NOTE — TELEPHONE ENCOUNTER
Once again attempted to reach the patient with no answer.  A message is left to call back and advise us on who he plans to have follow his cardiac care and if Dr. Dorado to please schedule a f/u appointment.    Lakeshia Patrick MA

## 2021-07-14 RX ORDER — PANTOPRAZOLE SODIUM 40 MG/1
TABLET, DELAYED RELEASE ORAL
Qty: 90 TABLET | Refills: 3 | Status: SHIPPED | OUTPATIENT
Start: 2021-07-14 | End: 2022-06-27

## 2021-07-14 NOTE — TELEPHONE ENCOUNTER
Requested Prescriptions     Pending Prescriptions Disp Refills    pantoprazole (PROTONIX) 40 MG tablet [Pharmacy Med Name: PANTOPRAZOLE SOD DR 40 MG T 40 Tablet] 90 tablet 3     Sig: TAKE 1 TABLET BY MOUTH EVERY MORNING BEFORE BREAKFAST       Last Office Visit: 6/14/2021  Next Office Visit: 6/14/2022  Last Medication Refill: 9/24/2020 3 refills    *Dr. Mateus Mahan patient

## 2021-12-02 ENCOUNTER — TELEPHONE (OUTPATIENT)
Dept: VASCULAR SURGERY | Facility: CLINIC | Age: 86
End: 2021-12-02

## 2021-12-02 NOTE — TELEPHONE ENCOUNTER
PT CALLED STATING AS OF RIGHT NOW HE NO LONGER WANTS TO COME TO THE OFFICE. HE CANCELLED TESTING AND APPT. HE STATED HE WILL CALL IF HE NEEDS TO COME BACK TO SEE ANYONE.

## 2021-12-17 ENCOUNTER — APPOINTMENT (OUTPATIENT)
Dept: ULTRASOUND IMAGING | Facility: HOSPITAL | Age: 86
End: 2021-12-17

## 2022-06-23 ENCOUNTER — TELEPHONE (OUTPATIENT)
Dept: NEUROLOGY | Age: 87
End: 2022-06-23

## 2022-06-23 NOTE — TELEPHONE ENCOUNTER
Patients wife called today stating that the [place] will not give the patient theyre Tegretol and the patient would need a new script. The [facility] Ell[facility]. Would you like the patient on Tegretol and if so it needs to be sent to [address] [place] [address] per the patients wife. Patient feels they still need to continue the medication.      Please advise

## 2022-06-27 RX ORDER — PANTOPRAZOLE SODIUM 40 MG/1
TABLET, DELAYED RELEASE ORAL
Qty: 90 TABLET | Refills: 3 | Status: SHIPPED | OUTPATIENT
Start: 2022-06-27 | End: 2022-09-20 | Stop reason: SDUPTHER

## 2022-06-27 NOTE — TELEPHONE ENCOUNTER
Requested Prescriptions     Pending Prescriptions Disp Refills    pantoprazole (PROTONIX) 40 MG tablet [Pharmacy Med Name: PANTOPRAZOLE SOD DR 40 MG T 40 Tablet] 90 tablet 3     Sig: TAKE 1 TABLET BY MOUTH EVERY MORNING BEFORE BREAKFAST       Last Office Visit: 6/14/2021  Next Office Visit: 9/20/2022  Last Medication Refill: 07/14/2021 with 3 RF

## 2022-06-27 NOTE — TELEPHONE ENCOUNTER
Patients wife called stated that patient has been on Carbamazepine 200 mg for some time he has been taking it for his TN and has always gotten it from the [facility]. Now they will not give it to him due to him being on Eliquis .  Please advise

## 2022-06-28 RX ORDER — CARBAMAZEPINE 200 MG/1
200 TABLET ORAL DAILY
Qty: 90 TABLET | Refills: 3 | Status: SHIPPED | OUTPATIENT
Start: 2022-06-28 | End: 2022-09-20 | Stop reason: SDUPTHER

## 2022-06-28 NOTE — TELEPHONE ENCOUNTER
Requested Prescriptions     Pending Prescriptions Disp Refills    carBAMazepine (TEGRETOL) 200 MG tablet 90 tablet 3     Sig: Take 1 tablet by mouth daily       Last Office Visit: 6/14/2021  Next Office Visit: 9/20/2022

## 2022-09-20 ENCOUNTER — OFFICE VISIT (OUTPATIENT)
Dept: NEUROLOGY | Age: 87
End: 2022-09-20
Payer: COMMERCIAL

## 2022-09-20 VITALS
RESPIRATION RATE: 16 BRPM | BODY MASS INDEX: 20.61 KG/M2 | HEART RATE: 77 BPM | DIASTOLIC BLOOD PRESSURE: 82 MMHG | HEIGHT: 68 IN | SYSTOLIC BLOOD PRESSURE: 138 MMHG | WEIGHT: 136 LBS

## 2022-09-20 DIAGNOSIS — R41.3 MEMORY LOSS: ICD-10-CM

## 2022-09-20 DIAGNOSIS — G50.0 RIGHT TRIGEMINAL NEURALGIA: Primary | ICD-10-CM

## 2022-09-20 DIAGNOSIS — Z86.73 HISTORY OF STROKE: ICD-10-CM

## 2022-09-20 PROCEDURE — 1123F ACP DISCUSS/DSCN MKR DOCD: CPT | Performed by: PSYCHIATRY & NEUROLOGY

## 2022-09-20 PROCEDURE — 99213 OFFICE O/P EST LOW 20 MIN: CPT | Performed by: PSYCHIATRY & NEUROLOGY

## 2022-09-20 RX ORDER — PANTOPRAZOLE SODIUM 40 MG/1
TABLET, DELAYED RELEASE ORAL
Qty: 90 TABLET | Refills: 3 | Status: SHIPPED | OUTPATIENT
Start: 2022-09-20

## 2022-09-20 RX ORDER — CARBAMAZEPINE 200 MG/1
200 TABLET ORAL 2 TIMES DAILY
Qty: 180 TABLET | Refills: 3 | Status: SHIPPED | OUTPATIENT
Start: 2022-09-20 | End: 2023-03-19

## 2022-09-20 NOTE — PROGRESS NOTES
[address] [facility] Neurology  [address] [place] [address] Drive, [address] [facility] [address]  [place]  Phone [phone]  Fax [phone] [address] [facility] Neurology Follow Up Encounter  22 10:22 AM CDT    Information:   Patient Name: Cedrick Guido  :   1935  Age:   82 y.o. MRN:   [id number]  Account #:  [de-identified]  Today:  22    Provider: Willam Lyons M.D. Chief Complaint:   Chief Complaint   Patient presents with    Follow-up       Subjective:   Cedrick Guido is a 82 y.o. man with a history of right trigeminal neuralgia, stroke, and memory loss who is following up. He is doing well. He has had no stroke symptoms. He remains on Eliquis. He has had some minor right periocular pains for which an extra Tegretol for a few days helped. He tolerated it. He generally just takes 200 mg once a day. Objective:     Past Medical History:  Past Medical History:   Diagnosis Date    CAD (coronary artery disease)     Cancer ([facility]     Diabetes ([facility]     HTN (hypertension)     Hyperlipidemia     Stroke ([facility]     Trigeminal neuralgia of right side of face        Past Surgical History:   Procedure Laterality Date    CARDIAC CATHETERIZATION      CORONARY ARTERY BYPASS GRAFT      EXTERNAL EAR SURGERY      OTHER SURGICAL HISTORY  2013    Cyberknife treatment for RTGN       Recent Hospitalizations  None    Significant Injuries  None    Habits  Cedrick Guido reports that he has been smoking cigarettes. He has a 30.00 pack-year smoking history. He has never used smokeless tobacco. He reports that he does not drink alcohol and does not use drugs. No family history on file. Social History  Nicanor Chaudhry is , lives in [place], [place], and is retired.     Medications:  Current Outpatient Medications   Medication Sig Dispense Refill    carBAMazepine (TEGRETOL) 200 MG tablet Take 1 tablet by mouth daily 90 tablet 3    pantoprazole (PROTONIX) 40 MG tablet TAKE 1 TABLET BY MOUTH EVERY MORNING BEFORE BREAKFAST 90 tablet 3    furosemide (LASIX) 40 MG tablet Take 40 mg by mouth 2 times daily      memantine (NAMENDA) 10 MG tablet Take 1 tablet by mouth 2 times daily 180 tablet 3    apixaban (ELIQUIS) 5 MG TABS tablet Eliquis 5 mg tablet   Take 1 tablet twice a day by oral route. budesonide-formoterol (SYMBICORT) 160-4.5 MCG/ACT AERO Inhale 2 puffs into the lungs      carvedilol (COREG) 3.125 MG tablet Take 3.125 mg by mouth      albuterol (PROVENTIL) (2.5 MG/3ML) 0.083% nebulizer solution Inhale 2.5 mg into the lungs      isosorbide mononitrate (IMDUR) 60 MG extended release tablet isosorbide mononitrate ER 60 mg tablet,extended release 24 hr   Take 1 tablet every day by oral route. albuterol sulfate  (90 BASE) MCG/ACT inhaler Inhale 2 puffs into the lungs      levothyroxine (SYNTHROID) 50 MCG tablet Take 50 mcg by mouth      rosuvastatin (CRESTOR) 10 MG tablet       ipratropium (ATROVENT) 0.03 % nasal spray 2 sprays by Nasal route every 12 hours      magnesium chloride (MAG DELAY 64) 535 (64 MG) MG TBCR CR tablet Take 64 mg by mouth daily       No current facility-administered medications for this visit. Allergies:   Allergies as of 09/20/2022 - Fully Reviewed 09/20/2022   Allergen Reaction Noted    Lyrica [pregabalin] Swelling 07/26/2016    Crestor  [rosuvastatin calcium]  05/02/2019       Review of Systems:  Constitutional: negative for - chills and fever  Eyes:  negative for - visual disturbance and photophobia  HENMT: negative for - headaches and sinus pain  Respiratory: negative for - cough, hemoptysis, and shortness of breath  Cardiovascular: negative for - chest pain and palpitations  Gastrointestinal: negative for - blood in stools, constipation, diarrhea, nausea, and vomiting  Genito-Urinary: negative for - hematuria, urinary frequency, urinary urgency, and urinary retention  Musculoskeletal: posiive for - joint pain, joint stiffness, and joint swelling  Hematological and Lymphatic: negative for - bleeding problems, abnormal bruising, and swollen lymph nodes  Endocrine:  negative for - polydipsia and polyphagia  Allergy/Immunology:  negative for - rhinorrhea, sinus congestion, hives  Integument:  negative for - negative for - rash, change in moles, new or changing lesions  Psychological: negative for - anxiety and depression  Neurological: positive for - memory loss  Negative for - numbness/tingling, and weakness     PHYSICAL EXAMINATION:  Vitals:  /82   Pulse 77   Resp 16   Ht 5' 8\" (1.727 m)   Wt 136 lb (61.7 kg)   BMI 20.68 kg/m²   General appearance:  Alert, well developed, well nourished, in no distress  HEENT:  normocephalic, atraumatic, sclera appear normal, no nasal abnormalities, no rhinorrhea, Ears appear normal, oral mucous membranes are moist without erythema, trachea midline, thyroid is normal, no lymphadenopathy or neck mass. Cardiovascular:  Regular rate and rhythm without murmer. No peripheral edema, No cyanosis or clubbing. No carotid bruits. Pulmonary:  Lungs are clear to auscultation. Breathing appears normal, good expansion, normal effort without use of accessory muscles  Musculoskeletal:  Joints are osteoarthritic  Integument:  No rash, erythema, or pallor  Psychiatric:  Mood, affect, and behavior appear normal      NEUROLOGIC EXAMINATION:  Mental Status:  alert, oriented to person, place, and time. Speech:  Clear without dysarthria or dysphonia  Language:  Fluent without aphasia  Cranial Nerves:   II Visual fields are full to confrontation   III,IV, VI Extraocular movements are full   VII Facial movements are symmetrical without weakness   VIII Hearing is intact   IX,X Shoulder shrug and head rotation strength are intact   XII No tongue atrophy or fasciculations. Normal tongue protrusion. No tongue weakness  Motor:  Normal strength in both upper and lower extremities. Normal muscle tone and bulk. Deep tendon reflexes are intact and symmetrical in both upper and lower extremities.   Chao's

## 2023-03-27 RX ORDER — CARBAMAZEPINE 200 MG/1
200 TABLET ORAL 2 TIMES DAILY
Qty: 180 TABLET | Refills: 3 | Status: SHIPPED | OUTPATIENT
Start: 2023-03-27

## 2023-03-27 NOTE — TELEPHONE ENCOUNTER
Requested Prescriptions     Pending Prescriptions Disp Refills    carBAMazepine (TEGRETOL) 200 MG tablet [Pharmacy Med Name: CARBAMAZEPINE 200 MG TABS 200 Tablet] 180 tablet 3     Sig: Take 1 tablet by mouth 2 times daily       Last Office Visit: 9/20/2022  Next Office Visit: 9/21/2023  Last Medication Refill: 9/20/22 with 3 RF

## 2023-06-26 RX ORDER — CARBAMAZEPINE 200 MG/1
200 TABLET ORAL 2 TIMES DAILY
Qty: 180 TABLET | Refills: 3 | Status: SHIPPED | OUTPATIENT
Start: 2023-06-26

## 2023-09-20 RX ORDER — PANTOPRAZOLE SODIUM 40 MG/1
TABLET, DELAYED RELEASE ORAL
Qty: 90 TABLET | Refills: 3 | Status: SHIPPED | OUTPATIENT
Start: 2023-09-20

## 2023-09-20 NOTE — TELEPHONE ENCOUNTER
Requested Prescriptions     Pending Prescriptions Disp Refills    pantoprazole (PROTONIX) 40 MG tablet 90 tablet 3     Sig: TAKE 1 TABLET BY MOUTH EVERY MORNING BEFORE BREAKFAST       Last Office Visit: 9/20/2022  Next Office Visit: 1/9/2024  Last Medication Refill: 9/20/2022 with 3 RF

## 2023-09-24 ENCOUNTER — APPOINTMENT (OUTPATIENT)
Dept: CT IMAGING | Facility: HOSPITAL | Age: 88
End: 2023-09-24
Payer: MEDICARE

## 2023-09-24 ENCOUNTER — HOSPITAL ENCOUNTER (OUTPATIENT)
Facility: HOSPITAL | Age: 88
Discharge: NURSING, REHAB OR LONG-TERM CARE | End: 2023-10-05
Attending: INTERNAL MEDICINE | Admitting: STUDENT IN AN ORGANIZED HEALTH CARE EDUCATION/TRAINING PROGRAM
Payer: MEDICARE

## 2023-09-24 DIAGNOSIS — S32.030G CLOSED COMPRESSION FRACTURE OF L3 LUMBAR VERTEBRA WITH DELAYED HEALING, SUBSEQUENT ENCOUNTER: ICD-10-CM

## 2023-09-24 DIAGNOSIS — Z74.09 IMPAIRED MOBILITY: ICD-10-CM

## 2023-09-24 DIAGNOSIS — S22.080G COMPRESSION FRACTURE OF T12 VERTEBRA WITH DELAYED HEALING: ICD-10-CM

## 2023-09-24 DIAGNOSIS — R06.02 SHORTNESS OF BREATH: ICD-10-CM

## 2023-09-24 DIAGNOSIS — Z78.9 DECREASED ACTIVITIES OF DAILY LIVING (ADL): ICD-10-CM

## 2023-09-24 DIAGNOSIS — N30.00 ACUTE CYSTITIS WITHOUT HEMATURIA: Primary | ICD-10-CM

## 2023-09-24 DIAGNOSIS — M54.40 ACUTE BILATERAL LOW BACK PAIN WITH SCIATICA, SCIATICA LATERALITY UNSPECIFIED: ICD-10-CM

## 2023-09-24 PROBLEM — N39.0 UTI (URINARY TRACT INFECTION): Status: ACTIVE | Noted: 2023-09-24

## 2023-09-24 PROBLEM — M54.59 INTRACTABLE LOW BACK PAIN: Status: ACTIVE | Noted: 2023-09-24

## 2023-09-24 PROBLEM — J69.0 ASPIRATION PNEUMONIA: Status: ACTIVE | Noted: 2023-09-24

## 2023-09-24 PROBLEM — N18.32 STAGE 3B CHRONIC KIDNEY DISEASE: Status: ACTIVE | Noted: 2023-09-24

## 2023-09-24 LAB
ALBUMIN SERPL-MCNC: 3.8 G/DL (ref 3.5–5.2)
ALBUMIN/GLOB SERPL: 1.2 G/DL
ALP SERPL-CCNC: 109 U/L (ref 39–117)
ALT SERPL W P-5'-P-CCNC: 7 U/L (ref 1–41)
ANION GAP SERPL CALCULATED.3IONS-SCNC: 21 MMOL/L (ref 5–15)
AST SERPL-CCNC: 17 U/L (ref 1–40)
BACTERIA UR QL AUTO: ABNORMAL /HPF
BASOPHILS # BLD AUTO: 0.06 10*3/MM3 (ref 0–0.2)
BASOPHILS NFR BLD AUTO: 0.6 % (ref 0–1.5)
BILIRUB SERPL-MCNC: 0.3 MG/DL (ref 0–1.2)
BILIRUB UR QL STRIP: NEGATIVE
BUN SERPL-MCNC: 40 MG/DL (ref 8–23)
BUN/CREAT SERPL: 23.7 (ref 7–25)
CALCIUM SPEC-SCNC: 9.3 MG/DL (ref 8.6–10.5)
CHLORIDE SERPL-SCNC: 101 MMOL/L (ref 98–107)
CLARITY UR: CLEAR
CO2 SERPL-SCNC: 14 MMOL/L (ref 22–29)
COLOR UR: YELLOW
CREAT SERPL-MCNC: 1.69 MG/DL (ref 0.76–1.27)
D DIMER PPP FEU-MCNC: 3.5 MCGFEU/ML (ref 0–0.85)
D-LACTATE SERPL-SCNC: 1.2 MMOL/L (ref 0.5–2)
DEPRECATED RDW RBC AUTO: 45.6 FL (ref 37–54)
EGFRCR SERPLBLD CKD-EPI 2021: 39.3 ML/MIN/1.73
EOSINOPHIL # BLD AUTO: 0.04 10*3/MM3 (ref 0–0.4)
EOSINOPHIL NFR BLD AUTO: 0.4 % (ref 0.3–6.2)
ERYTHROCYTE [DISTWIDTH] IN BLOOD BY AUTOMATED COUNT: 13.3 % (ref 12.3–15.4)
GLOBULIN UR ELPH-MCNC: 3.2 GM/DL
GLUCOSE SERPL-MCNC: 88 MG/DL (ref 65–99)
GLUCOSE UR STRIP-MCNC: NEGATIVE MG/DL
HCT VFR BLD AUTO: 38.1 % (ref 37.5–51)
HGB BLD-MCNC: 12.4 G/DL (ref 13–17.7)
HGB UR QL STRIP.AUTO: ABNORMAL
HYALINE CASTS UR QL AUTO: ABNORMAL /LPF
IMM GRANULOCYTES # BLD AUTO: 0.07 10*3/MM3 (ref 0–0.05)
IMM GRANULOCYTES NFR BLD AUTO: 0.7 % (ref 0–0.5)
KETONES UR QL STRIP: ABNORMAL
LEUKOCYTE ESTERASE UR QL STRIP.AUTO: ABNORMAL
LYMPHOCYTES # BLD AUTO: 1.71 10*3/MM3 (ref 0.7–3.1)
LYMPHOCYTES NFR BLD AUTO: 16.5 % (ref 19.6–45.3)
MCH RBC QN AUTO: 30.6 PG (ref 26.6–33)
MCHC RBC AUTO-ENTMCNC: 32.5 G/DL (ref 31.5–35.7)
MCV RBC AUTO: 94.1 FL (ref 79–97)
MONOCYTES # BLD AUTO: 0.58 10*3/MM3 (ref 0.1–0.9)
MONOCYTES NFR BLD AUTO: 5.6 % (ref 5–12)
NEUTROPHILS NFR BLD AUTO: 7.91 10*3/MM3 (ref 1.7–7)
NEUTROPHILS NFR BLD AUTO: 76.2 % (ref 42.7–76)
NITRITE UR QL STRIP: POSITIVE
NRBC BLD AUTO-RTO: 0 /100 WBC (ref 0–0.2)
PH UR STRIP.AUTO: 5.5 [PH] (ref 5–8)
PLATELET # BLD AUTO: 295 10*3/MM3 (ref 140–450)
PMV BLD AUTO: 9 FL (ref 6–12)
POTASSIUM SERPL-SCNC: 4.8 MMOL/L (ref 3.5–5.2)
PROT SERPL-MCNC: 7 G/DL (ref 6–8.5)
PROT UR QL STRIP: ABNORMAL
RBC # BLD AUTO: 4.05 10*6/MM3 (ref 4.14–5.8)
RBC # UR STRIP: ABNORMAL /HPF
REF LAB TEST METHOD: ABNORMAL
SODIUM SERPL-SCNC: 136 MMOL/L (ref 136–145)
SP GR UR STRIP: 1.02 (ref 1–1.03)
SQUAMOUS #/AREA URNS HPF: ABNORMAL /HPF
UROBILINOGEN UR QL STRIP: ABNORMAL
WBC # UR STRIP: ABNORMAL /HPF
WBC NRBC COR # BLD: 10.37 10*3/MM3 (ref 3.4–10.8)

## 2023-09-24 PROCEDURE — G0378 HOSPITAL OBSERVATION PER HR: HCPCS

## 2023-09-24 PROCEDURE — 99285 EMERGENCY DEPT VISIT HI MDM: CPT

## 2023-09-24 PROCEDURE — 96375 TX/PRO/DX INJ NEW DRUG ADDON: CPT

## 2023-09-24 PROCEDURE — 80053 COMPREHEN METABOLIC PANEL: CPT | Performed by: INTERNAL MEDICINE

## 2023-09-24 PROCEDURE — 85025 COMPLETE CBC W/AUTO DIFF WBC: CPT | Performed by: INTERNAL MEDICINE

## 2023-09-24 PROCEDURE — 25010000002 PIPERACILLIN SOD-TAZOBACTAM PER 1 G: Performed by: STUDENT IN AN ORGANIZED HEALTH CARE EDUCATION/TRAINING PROGRAM

## 2023-09-24 PROCEDURE — 93005 ELECTROCARDIOGRAM TRACING: CPT | Performed by: INTERNAL MEDICINE

## 2023-09-24 PROCEDURE — 72131 CT LUMBAR SPINE W/O DYE: CPT

## 2023-09-24 PROCEDURE — 25010000002 HYDROMORPHONE PER 4 MG: Performed by: INTERNAL MEDICINE

## 2023-09-24 PROCEDURE — 96365 THER/PROPH/DIAG IV INF INIT: CPT

## 2023-09-24 PROCEDURE — 85379 FIBRIN DEGRADATION QUANT: CPT | Performed by: INTERNAL MEDICINE

## 2023-09-24 PROCEDURE — 81001 URINALYSIS AUTO W/SCOPE: CPT | Performed by: INTERNAL MEDICINE

## 2023-09-24 PROCEDURE — 71275 CT ANGIOGRAPHY CHEST: CPT

## 2023-09-24 PROCEDURE — 25510000001 IOPAMIDOL PER 1 ML: Performed by: INTERNAL MEDICINE

## 2023-09-24 PROCEDURE — 94640 AIRWAY INHALATION TREATMENT: CPT

## 2023-09-24 PROCEDURE — 25010000002 MORPHINE PER 10 MG: Performed by: INTERNAL MEDICINE

## 2023-09-24 PROCEDURE — 87186 SC STD MICRODIL/AGAR DIL: CPT | Performed by: INTERNAL MEDICINE

## 2023-09-24 PROCEDURE — 25010000002 CEFTRIAXONE PER 250 MG: Performed by: INTERNAL MEDICINE

## 2023-09-24 PROCEDURE — 87077 CULTURE AEROBIC IDENTIFY: CPT | Performed by: INTERNAL MEDICINE

## 2023-09-24 PROCEDURE — P9612 CATHETERIZE FOR URINE SPEC: HCPCS

## 2023-09-24 PROCEDURE — 83605 ASSAY OF LACTIC ACID: CPT | Performed by: INTERNAL MEDICINE

## 2023-09-24 PROCEDURE — 87086 URINE CULTURE/COLONY COUNT: CPT | Performed by: INTERNAL MEDICINE

## 2023-09-24 PROCEDURE — 93010 ELECTROCARDIOGRAM REPORT: CPT | Performed by: STUDENT IN AN ORGANIZED HEALTH CARE EDUCATION/TRAINING PROGRAM

## 2023-09-24 RX ORDER — GUAIFENESIN 600 MG/1
600 TABLET, EXTENDED RELEASE ORAL EVERY 12 HOURS SCHEDULED
Status: DISCONTINUED | OUTPATIENT
Start: 2023-09-24 | End: 2023-10-05 | Stop reason: HOSPADM

## 2023-09-24 RX ORDER — PANTOPRAZOLE SODIUM 40 MG/1
40 TABLET, DELAYED RELEASE ORAL DAILY
Status: DISCONTINUED | OUTPATIENT
Start: 2023-09-24 | End: 2023-10-05 | Stop reason: HOSPADM

## 2023-09-24 RX ORDER — AMOXICILLIN 250 MG
2 CAPSULE ORAL 2 TIMES DAILY
Status: DISCONTINUED | OUTPATIENT
Start: 2023-09-24 | End: 2023-10-05 | Stop reason: HOSPADM

## 2023-09-24 RX ORDER — FINASTERIDE 5 MG/1
5 TABLET, FILM COATED ORAL DAILY
Status: DISCONTINUED | OUTPATIENT
Start: 2023-09-25 | End: 2023-10-05 | Stop reason: HOSPADM

## 2023-09-24 RX ORDER — SODIUM CHLORIDE 9 MG/ML
40 INJECTION, SOLUTION INTRAVENOUS AS NEEDED
Status: DISCONTINUED | OUTPATIENT
Start: 2023-09-24 | End: 2023-10-05 | Stop reason: HOSPADM

## 2023-09-24 RX ORDER — ISOSORBIDE MONONITRATE 60 MG/1
60 TABLET, EXTENDED RELEASE ORAL DAILY
Status: DISCONTINUED | OUTPATIENT
Start: 2023-09-24 | End: 2023-10-05 | Stop reason: HOSPADM

## 2023-09-24 RX ORDER — CARBAMAZEPINE 200 MG/1
200 TABLET ORAL DAILY
Status: DISCONTINUED | OUTPATIENT
Start: 2023-09-24 | End: 2023-10-05 | Stop reason: HOSPADM

## 2023-09-24 RX ORDER — MORPHINE SULFATE 2 MG/ML
1 INJECTION, SOLUTION INTRAMUSCULAR; INTRAVENOUS ONCE
Status: COMPLETED | OUTPATIENT
Start: 2023-09-24 | End: 2023-09-24

## 2023-09-24 RX ORDER — HYDROCODONE BITARTRATE AND ACETAMINOPHEN 5; 325 MG/1; MG/1
1 TABLET ORAL EVERY 4 HOURS PRN
Status: DISPENSED | OUTPATIENT
Start: 2023-09-24 | End: 2023-10-01

## 2023-09-24 RX ORDER — DILTIAZEM HYDROCHLORIDE 180 MG/1
180 CAPSULE, COATED, EXTENDED RELEASE ORAL DAILY
Status: DISCONTINUED | OUTPATIENT
Start: 2023-09-24 | End: 2023-10-05 | Stop reason: HOSPADM

## 2023-09-24 RX ORDER — CHOLECALCIFEROL (VITAMIN D3) 125 MCG
5 CAPSULE ORAL NIGHTLY PRN
Status: DISCONTINUED | OUTPATIENT
Start: 2023-09-24 | End: 2023-10-05 | Stop reason: HOSPADM

## 2023-09-24 RX ORDER — BISACODYL 10 MG
10 SUPPOSITORY, RECTAL RECTAL DAILY PRN
Status: DISCONTINUED | OUTPATIENT
Start: 2023-09-24 | End: 2023-10-05 | Stop reason: HOSPADM

## 2023-09-24 RX ORDER — HYDROCODONE BITARTRATE AND ACETAMINOPHEN 7.5; 325 MG/1; MG/1
2 TABLET ORAL EVERY 4 HOURS PRN
Status: DISPENSED | OUTPATIENT
Start: 2023-09-24 | End: 2023-10-01

## 2023-09-24 RX ORDER — BUDESONIDE AND FORMOTEROL FUMARATE DIHYDRATE 160; 4.5 UG/1; UG/1
2 AEROSOL RESPIRATORY (INHALATION)
Status: DISCONTINUED | OUTPATIENT
Start: 2023-09-24 | End: 2023-10-05 | Stop reason: HOSPADM

## 2023-09-24 RX ORDER — LEVOTHYROXINE SODIUM 0.05 MG/1
50 TABLET ORAL
Status: DISCONTINUED | OUTPATIENT
Start: 2023-09-25 | End: 2023-10-05 | Stop reason: HOSPADM

## 2023-09-24 RX ORDER — HYDROMORPHONE HYDROCHLORIDE 1 MG/ML
0.5 INJECTION, SOLUTION INTRAMUSCULAR; INTRAVENOUS; SUBCUTANEOUS ONCE
Status: COMPLETED | OUTPATIENT
Start: 2023-09-24 | End: 2023-09-24

## 2023-09-24 RX ORDER — TAMSULOSIN HYDROCHLORIDE 0.4 MG/1
0.4 CAPSULE ORAL DAILY
Status: DISCONTINUED | OUTPATIENT
Start: 2023-09-25 | End: 2023-10-05 | Stop reason: HOSPADM

## 2023-09-24 RX ORDER — ROSUVASTATIN CALCIUM 20 MG/1
20 TABLET, COATED ORAL NIGHTLY
Status: DISCONTINUED | OUTPATIENT
Start: 2023-09-24 | End: 2023-10-05 | Stop reason: HOSPADM

## 2023-09-24 RX ORDER — SODIUM CHLORIDE 0.9 % (FLUSH) 0.9 %
10 SYRINGE (ML) INJECTION AS NEEDED
Status: DISCONTINUED | OUTPATIENT
Start: 2023-09-24 | End: 2023-10-05 | Stop reason: HOSPADM

## 2023-09-24 RX ORDER — ACETAMINOPHEN 325 MG/1
650 TABLET ORAL EVERY 4 HOURS PRN
Status: DISCONTINUED | OUTPATIENT
Start: 2023-09-24 | End: 2023-10-05 | Stop reason: HOSPADM

## 2023-09-24 RX ORDER — MELATONIN
1000 DAILY
Status: DISCONTINUED | OUTPATIENT
Start: 2023-09-24 | End: 2023-10-05 | Stop reason: HOSPADM

## 2023-09-24 RX ORDER — POTASSIUM CHLORIDE 20 MEQ/1
20 TABLET, EXTENDED RELEASE ORAL DAILY
Status: DISCONTINUED | OUTPATIENT
Start: 2023-09-24 | End: 2023-10-05 | Stop reason: HOSPADM

## 2023-09-24 RX ORDER — ACETAMINOPHEN 160 MG/5ML
650 SOLUTION ORAL EVERY 4 HOURS PRN
Status: DISCONTINUED | OUTPATIENT
Start: 2023-09-24 | End: 2023-10-05 | Stop reason: HOSPADM

## 2023-09-24 RX ORDER — SODIUM CHLORIDE 0.9 % (FLUSH) 0.9 %
10 SYRINGE (ML) INJECTION EVERY 12 HOURS SCHEDULED
Status: DISCONTINUED | OUTPATIENT
Start: 2023-09-24 | End: 2023-10-05 | Stop reason: HOSPADM

## 2023-09-24 RX ORDER — BISACODYL 5 MG/1
5 TABLET, DELAYED RELEASE ORAL DAILY PRN
Status: DISCONTINUED | OUTPATIENT
Start: 2023-09-24 | End: 2023-10-05 | Stop reason: HOSPADM

## 2023-09-24 RX ORDER — CARVEDILOL 6.25 MG/1
12.5 TABLET ORAL 2 TIMES DAILY WITH MEALS
Status: DISCONTINUED | OUTPATIENT
Start: 2023-09-24 | End: 2023-10-05 | Stop reason: HOSPADM

## 2023-09-24 RX ORDER — MIRTAZAPINE 15 MG/1
45 TABLET, FILM COATED ORAL NIGHTLY
Status: DISCONTINUED | OUTPATIENT
Start: 2023-09-24 | End: 2023-10-05 | Stop reason: HOSPADM

## 2023-09-24 RX ORDER — ALBUTEROL SULFATE 2.5 MG/3ML
2.5 SOLUTION RESPIRATORY (INHALATION) EVERY 4 HOURS PRN
Status: DISCONTINUED | OUTPATIENT
Start: 2023-09-24 | End: 2023-10-05 | Stop reason: HOSPADM

## 2023-09-24 RX ORDER — POLYETHYLENE GLYCOL 3350 17 G/17G
17 POWDER, FOR SOLUTION ORAL DAILY PRN
Status: DISCONTINUED | OUTPATIENT
Start: 2023-09-24 | End: 2023-10-05 | Stop reason: HOSPADM

## 2023-09-24 RX ORDER — ACETAMINOPHEN 650 MG/1
650 SUPPOSITORY RECTAL EVERY 4 HOURS PRN
Status: DISCONTINUED | OUTPATIENT
Start: 2023-09-24 | End: 2023-10-05 | Stop reason: HOSPADM

## 2023-09-24 RX ORDER — FUROSEMIDE 40 MG/1
40 TABLET ORAL DAILY
Status: DISCONTINUED | OUTPATIENT
Start: 2023-09-25 | End: 2023-09-26

## 2023-09-24 RX ADMIN — CARVEDILOL 12.5 MG: 6.25 TABLET, FILM COATED ORAL at 21:29

## 2023-09-24 RX ADMIN — ISOSORBIDE MONONITRATE 60 MG: 60 TABLET, EXTENDED RELEASE ORAL at 21:30

## 2023-09-24 RX ADMIN — ROSUVASTATIN CALCIUM 20 MG: 20 TABLET, FILM COATED ORAL at 21:30

## 2023-09-24 RX ADMIN — MIRTAZAPINE 45 MG: 15 TABLET, FILM COATED ORAL at 21:30

## 2023-09-24 RX ADMIN — BUDESONIDE AND FORMOTEROL FUMARATE DIHYDRATE 2 PUFF: 160; 4.5 AEROSOL RESPIRATORY (INHALATION) at 20:52

## 2023-09-24 RX ADMIN — IOPAMIDOL 70 ML: 755 INJECTION, SOLUTION INTRAVENOUS at 15:10

## 2023-09-24 RX ADMIN — GUAIFENESIN 600 MG: 600 TABLET, EXTENDED RELEASE ORAL at 21:29

## 2023-09-24 RX ADMIN — HYDROMORPHONE HYDROCHLORIDE 0.5 MG: 1 INJECTION, SOLUTION INTRAMUSCULAR; INTRAVENOUS; SUBCUTANEOUS at 14:31

## 2023-09-24 RX ADMIN — CARBAMAZEPINE 200 MG: 200 TABLET ORAL at 21:28

## 2023-09-24 RX ADMIN — APIXABAN 2.5 MG: 2.5 TABLET, FILM COATED ORAL at 21:28

## 2023-09-24 RX ADMIN — Medication 10 ML: at 21:32

## 2023-09-24 RX ADMIN — MORPHINE SULFATE 1 MG: 2 INJECTION, SOLUTION INTRAMUSCULAR; INTRAVENOUS at 13:43

## 2023-09-24 RX ADMIN — CEFTRIAXONE SODIUM 2000 MG: 2 INJECTION, POWDER, FOR SOLUTION INTRAMUSCULAR; INTRAVENOUS at 13:44

## 2023-09-24 RX ADMIN — Medication 1000 UNITS: at 21:30

## 2023-09-24 RX ADMIN — PANTOPRAZOLE SODIUM 40 MG: 40 TABLET, DELAYED RELEASE ORAL at 21:28

## 2023-09-24 RX ADMIN — Medication 5 MG: at 23:27

## 2023-09-24 RX ADMIN — DILTIAZEM HYDROCHLORIDE 180 MG: 180 CAPSULE, EXTENDED RELEASE ORAL at 21:29

## 2023-09-24 RX ADMIN — PIPERACILLIN SODIUM AND TAZOBACTAM SODIUM 3.38 G: 3; .375 INJECTION, SOLUTION INTRAVENOUS at 21:32

## 2023-09-24 RX ADMIN — POTASSIUM CHLORIDE 20 MEQ: 1500 TABLET, EXTENDED RELEASE ORAL at 21:28

## 2023-09-24 RX ADMIN — HYDROCODONE BITARTRATE AND ACETAMINOPHEN 1 TABLET: 5; 325 TABLET ORAL at 23:27

## 2023-09-24 NOTE — PROGRESS NOTES
Pharmacy Dosing Service  Antimicrobials  Zosyn    Assessment/Action/Plan:  Pharmacy to dose Zosyn for Pneumonia/UTI.   Initiated: Zosyn 3.375 gm IV every 8 hours via EI.   Day 1 of therapy  End date: 9/29/23  Pharmacy will continue to follow daily and adjust dose accordingly.     Subjective:  Brennon Vance is a 87 y.o. male     Objective:  Estimated Creatinine Clearance: 24.4 mL/min (A) (by C-G formula based on SCr of 1.69 mg/dL (H)).   Creatinine   Date Value Ref Range Status   09/24/2023 1.69 (H) 0.76 - 1.27 mg/dL Final   05/21/2021 1.69 (H) 0.76 - 1.27 mg/dL Final   09/06/2019 1.40 (H) 0.60 - 1.30 mg/dL Final   03/18/2019 1.00 0.60 - 1.30 mg/dL Final   05/10/2018 1.07 0.50 - 1.40 mg/dL Final   03/22/2018 0.8 0.5 - 1.2 mg/dL Final     Lab Results   Component Value Date    WBC 10.37 09/24/2023     Temp Readings from Last 1 Encounters:   09/24/23 98.3 °F (36.8 °C) (Temporal)       Culture Results:  Microbiology Results (last 10 days)       ** No results found for the last 240 hours. **          Current Antimicrobials:   Anti-Infectives (From admission, onward)      Ordered     Dose/Rate Route Frequency Start Stop    09/24/23 1823  piperacillin-tazobactam (ZOSYN) 3.375 g in iso-osmotic dextrose 50 ml (premix)        Ordering Provider: Micki Solano MD    3.375 g  over 4 Hours Intravenous Every 8 Hours 09/25/23 0100 09/29/23 1659    09/24/23 1823  piperacillin-tazobactam (ZOSYN) 3.375 g in iso-osmotic dextrose 50 ml (premix)        Ordering Provider: Micki Solano MD    3.375 g  over 30 Minutes Intravenous Once 09/24/23 1915      09/24/23 1637  Pharmacy to Dose Zosyn        Ordering Provider: Micki Solano MD     Does not apply Continuous PRN 09/24/23 1636 09/29/23 1635    09/24/23 1333  cefTRIAXone (ROCEPHIN) 2,000 mg in sodium chloride 0.9 % 100 mL IVPB        Ordering Provider: Erica Brown DO    2,000 mg  200 mL/hr over 30 Minutes Intravenous Once 09/24/23 1349 09/24/23 1431             Amador Mendez, PharmD  09/24/23 18:23 CDT

## 2023-09-24 NOTE — ED NOTES
The following fall interventions were initiated:    [] Patient and/or family given education   [] Call light within reach and educated on how to use   [] Bed rails up per protocol    [x] Bed locked and in the lowest position   [] Bed alarm set and on loudest setting   [x] Fall wrist band applied   [x] Non skid footwear applied   [x] Room free of clutter   [x] Patient items within reach   [x] Adequate lighting provided  [x] Falls sign present    [x] Patient moved closer to nursing station   [] Restraints applied

## 2023-09-24 NOTE — ED PROVIDER NOTES
"Subjective   History of Present Illness  87-year-old gentleman who presents emergency department with complaints of back pain and shortness of breath.  He is very thin and frail.  He states that his wife started seeing a new neurosurgeon.  He notes that he is having back pain where he hit his back at home.  This happened greater than a week ago.  He points to the left side about the waistband.  He does have a over-the-counter pain patch in this area.  He states that he is just \"full of pain.\"  He states he cannot get up due to back soreness and has a difficult time lying down and has to get help.  He also notes shortness of breath.  He denies any chest pain.  He has no acute bowel or kidney changes.  He does have urinary incontinence, this is chronic.    Review of Systems   Respiratory:  Positive for shortness of breath.    Musculoskeletal:  Positive for arthralgias and back pain.     Past Medical History:   Diagnosis Date    CAD (coronary artery disease)     COPD (chronic obstructive pulmonary disease)     Hiatal hernia     Hyperlipidemia     Hypertension     Stroke        Allergies   Allergen Reactions    Lyrica [Pregabalin] Other (See Comments)     Passing out/syncope       Past Surgical History:   Procedure Laterality Date    ABDOMINAL AORTIC ANEURYSM REPAIR      CARDIAC CATHETERIZATION N/A 5/20/2021    Procedure: Left Heart Cath;  Surgeon: Hrarison Alcantara MD;  Location:  [facility] CATH [facility]VASIVE LOCATION;  Service: Cardiology;  Laterality: N/A;    CORONARY ANGIOPLASTY WITH STENT PLACEMENT      FEMORAL ARTERY STENT      LEG THROMBECTOMY/EMBOLECTOMY Right 5/20/2021    Procedure: RT GROIN EXPLORATION;  Surgeon: Geoff Remy DO;  Location: [facility] HYBRID OR 12;  Service: Vascular;  Laterality: Right;    TRIGEMINAL NERVE DECOMPRESSION         History reviewed. No pertinent family history.    Social History     Socioeconomic History    Marital status:    Tobacco Use    Smoking status: Every Day     " Packs/day: 0.50     Types: Cigarettes   Substance and Sexual Activity    Alcohol use: No    Drug use: No           Objective   Physical Exam  Vitals reviewed.   Constitutional:       Comments: Very thin and frail.   HENT:      Head: Normocephalic and atraumatic.      Nose: Nose normal.   Eyes:      Conjunctiva/sclera: Conjunctivae normal.   Cardiovascular:      Rate and Rhythm: Normal rate and regular rhythm.      Heart sounds: Normal heart sounds.   Pulmonary:      Effort: Pulmonary effort is normal.      Breath sounds: Normal breath sounds.   Abdominal:      General: Bowel sounds are normal.      Palpations: Abdomen is soft.   Musculoskeletal:         General: No tenderness.      Cervical back: Normal range of motion and neck supple.   Skin:     General: Skin is warm and dry.      Comments: He has some redness in the skin in between his legs and on the lower part of the bottom.  At the area of pain, remove the pain patch and there is no redness bruising sores erythema.  He does not appear to have a sacral decubitus ulcer or any skin breakdown in the sacral area.  His area of pain he locates me to his the left SI joint.   Neurological:      General: No focal deficit present.      Mental Status: He is alert.      Cranial Nerves: No cranial nerve deficit.   Psychiatric:         Mood and Affect: Mood normal. Mood is not anxious.       Procedures           ED Course  ED Course as of 09/24/23 1544   Sun Sep 24, 2023   1415 The patient's daughter is currently at bedside.  She notes that the patient's pain was not controlled with morphine, and that usually Dilaudid is what controls his pain.  She does state he was given Percocet at home, #6.  He took these and it did not control his pain. [AJ]   1540 Hospitalist agreeable to admission. [AJ]      ED Course User Index  [AJ] Erica Brown DO           Lab Results (last 24 hours)       Procedure Component Value Units Date/Time    CBC & Differential [[id number]]   (Abnormal) Collected: 09/24/23 1236    Specimen: Blood Updated: 09/24/23 1248    Narrative:      The following orders were created for panel order CBC & Differential.  Procedure                               Abnormality         Status                     ---------                               -----------         ------                     CBC Auto Differential[[id number]]        Abnormal            Final result                 Please view results for these tests on the individual orders.    Comprehensive Metabolic Panel [[id number]]  (Abnormal) Collected: 09/24/23 1236    Specimen: Blood Updated: 09/24/23 1306     Glucose 88 mg/dL      BUN 40 mg/dL      Creatinine 1.69 mg/dL      Sodium 136 mmol/L      Potassium 4.8 mmol/L      Chloride 101 mmol/L      CO2 14.0 mmol/L      Calcium 9.3 mg/dL      Total Protein 7.0 g/dL      Albumin 3.8 g/dL      ALT (SGPT) 7 U/L      AST (SGOT) 17 U/L      Alkaline Phosphatase 109 U/L      Total Bilirubin 0.3 mg/dL      Globulin 3.2 gm/dL      A/G Ratio 1.2 g/dL      BUN/Creatinine Ratio 23.7     Anion Gap 21.0 mmol/L      eGFR 39.3 mL/min/1.73     Narrative:      GFR Normal >60  Chronic Kidney Disease <60  Kidney Failure <15    The GFR formula is only valid for adults with stable renal function between ages 18 and 70.    Lactic Acid, Plasma [[id number]]  (Normal) Collected: 09/24/23 1236    Specimen: Blood Updated: 09/24/23 1304     Lactate 1.2 mmol/L     CBC Auto Differential [[id number]]  (Abnormal) Collected: 09/24/23 1236    Specimen: Blood Updated: 09/24/23 1248     WBC 10.37 10*3/mm3      RBC 4.05 10*6/mm3      Hemoglobin 12.4 g/dL      Hematocrit 38.1 %      MCV 94.1 fL      MCH 30.6 pg      MCHC 32.5 g/dL      RDW 13.3 %      RDW-SD 45.6 fl      MPV 9.0 fL      Platelets 295 10*3/mm3      Neutrophil % 76.2 %      Lymphocyte % 16.5 %      Monocyte % 5.6 %      Eosinophil % 0.4 %      Basophil % 0.6 %      Immature Grans % 0.7 %      Neutrophils, Absolute 7.91 10*3/mm3       "Lymphocytes, Absolute 1.71 10*3/mm3      Monocytes, Absolute 0.58 10*3/mm3      Eosinophils, Absolute 0.04 10*3/mm3      Basophils, Absolute 0.06 10*3/mm3      Immature Grans, Absolute 0.07 10*3/mm3      nRBC 0.0 /100 WBC     D-dimer, Quantitative [[id number]]  (Abnormal) Collected: 09/24/23 1236    Specimen: Blood Updated: 09/24/23 1259     D-Dimer, Quantitative 3.50 MCGFEU/mL     Narrative:      According to the assay 's published package insert, a normal (<0.50 MCGFEU/mL) D-dimer result in conjunction with a non-high clinical probability assessment, excludes deep vein thrombosis (DVT) and pulmonary embolism (PE) with high sensitivity.    D-dimer values increase with age and this can make VTE exclusion of an older population difficult. To address this, the [employer], based on best available evidence and recent guidelines, recommends that clinicians use age-adjusted D-dimer thresholds in patients greater than 50 years of age with: a) a low probability of PE who do not meet all Pulmonary Embolism Rule Out Criteria, or b) in those with intermediate probability of PE.   The formula for an age-adjusted D-dimer cut-off is \"age/100\".  For example, a 60 year old patient would have an age-adjusted cut-off of 0.60 MCGFEU/mL and an 80 year old 0.80 MCGFEU/mL.    Urinalysis With Culture If Indicated - Straight Cath [[id number]]  (Abnormal) Collected: 09/24/23 1247    Specimen: Urine from Straight Cath Updated: 09/24/23 1317     Color, UA Yellow     Appearance, UA Clear     pH, UA 5.5     Specific Gravity, UA 1.019     Glucose, UA Negative     Ketones, UA 15 mg/dL (1+)     Bilirubin, UA Negative     Blood, UA Trace     Protein, UA Trace     Leuk Esterase, UA Moderate (2+)     Nitrite, UA Positive     Urobilinogen, UA 0.2 E.U./dL    Narrative:      In absence of clinical symptoms, the presence of pyuria, bacteria, and/or nitrites on the urinalysis result does not correlate with infection.    " Urinalysis, Microscopic Only - Straight Cath [[id number]]  (Abnormal) Collected: 09/24/23 1247    Specimen: Urine from Straight Cath Updated: 09/24/23 1317     RBC, UA 0-2 /HPF      WBC, UA Too Numerous to Count /HPF      Bacteria, UA Trace /HPF      Squamous Epithelial Cells, UA None Seen /HPF      Hyaline Casts, UA 0-2 /LPF      Methodology Manual Light Microscopy    Urine Culture - Urine, Straight Cath [[id number]] Collected: 09/24/23 1247    Specimen: Urine from Straight Cath Updated: 09/24/23 1317          CT Angiogram Chest   Final Result       No pulmonary embolism.       Mild left lower lobe bronchial wall thickening with airway secretions   which can be seen with aspiration.       Moderate emphysematous changes.       4 mm right upper lobe pulmonary nodule, previously 3 mm in 2018. Most   likely benign. Recommend follow-up chest CT in 12 months given minimal   enlargement.       Severe left subclavian artery origin stenosis.               This report was signed and finalized on 9/24/2023 3:32 PM CDT by Jorge Chau.          CT Lumbar Spine Without Contrast   Final Result   1. Chronic appearing T12 and L3 compression fractures.   2. No sign of recent lumbar spine surgery.   3. No significant stenosis is seen.                                                       This report was signed and finalized on 9/24/2023 1:42 PM CDT by Dr. Johnny Lubin MD.                                    [facility] reviewed by Micki Solano MD, Erica Brown DO       Medical Decision Making  Problems Addressed:  Acute bilateral low back pain with sciatica, sciatica laterality unspecified: complicated acute illness or injury  Acute cystitis without hematuria: complicated acute illness or injury  Shortness of breath: complicated acute illness or injury    Amount and/or Complexity of Data Reviewed  Labs: ordered.     Details: CBC CMP D-dimer UA  Radiology: ordered.     Details: CT lumbar spine  ECG/medicine tests:  ordered.    Risk  Prescription drug management.  Decision regarding hospitalization.        Final diagnoses:   Acute cystitis without hematuria   Acute bilateral low back pain with sciatica, sciatica laterality unspecified   Shortness of breath       ED Disposition  ED Disposition       ED Disposition   Decision to Admit    Condition   --    Comment   Level of Care: Med/Surg [1]   Diagnosis: UTI (urinary tract infection) [129703]   Admitting Physician: LAURA LUGO [[id number]]                 No follow-up provider specified.       Medication List      No changes were made to your prescriptions during this visit.            Erica Brown,   09/24/23 1302       Erica Brown,   09/24/23 1549

## 2023-09-24 NOTE — H&P
[facility] Medicine Services  HISTORY AND PHYSICAL    Date of Admission: 9/24/2023  Primary Care Physician: Javid Grajeda MD    Subjective   Primary Historian: Patient    Chief Complaint: Back pain    History of Present Illness  Patient is a 87-year-old male with history of chronic low back pain with chronic spinal compression fracture, CAD, COPD, hypertension, hyperlipidemia, BPH and stroke who presented to the ER due to worsening lower back pain with radiation to the left leg.  Patient's daughter who was in the room stated she spoke with neurosurgeon Dr. Brantley and mentioned that he will see the patient in the hospital.  In the ED patient was found to have UTI, and CTA of the chest showed possible aspiration pneumonia and moderate emphysematous changes.  CT of the lumbar spine showed chronic appearing T12 and L3 compression fractures with no signs of recent lumbar spine surgery or significant stenosis.  Patient was started on ceftriaxone and given morphine and Dilaudid in the ED.        Review of Systems   Constitutional:  Positive for activity change. Negative for chills and fever.   Respiratory:  Positive for cough. Negative for shortness of breath.    Cardiovascular:  Negative for chest pain, palpitations and leg swelling.   Gastrointestinal:  Negative for abdominal pain, diarrhea, nausea and vomiting.   Genitourinary:  Positive for frequency. Negative for difficulty urinating, dysuria, flank pain and hematuria.   Musculoskeletal:  Positive for arthralgias. Negative for neck pain.        Lower back pain   Skin:  Negative for rash and wound.   Neurological: Negative.     Otherwise complete ROS reviewed and negative except as mentioned in the HPI.    Past Medical History:   Past Medical History:   Diagnosis Date    CAD (coronary artery disease)     COPD (chronic obstructive pulmonary disease)     Hiatal hernia     Hyperlipidemia     Hypertension     Stroke      Past  Surgical History:  Past Surgical History:   Procedure Laterality Date    ABDOMINAL AORTIC ANEURYSM REPAIR      CARDIAC CATHETERIZATION N/A 5/20/2021    Procedure: Left Heart Cath;  Surgeon: Harrison Alcantara MD;  Location:  [facility] CATH INVASIVE LOCATION;  Service: Cardiology;  Laterality: N/A;    CORONARY ANGIOPLASTY WITH STENT PLACEMENT      FEMORAL ARTERY STENT      LEG THROMBECTOMY/EMBOLECTOMY Right 5/20/2021    Procedure: RT GROIN EXPLORATION;  Surgeon: Geoff Remy DO;  Location:  [facility] HYBRID OR 12;  Service: Vascular;  Laterality: Right;    TRIGEMINAL NERVE DECOMPRESSION       Social History:  reports that he has been smoking. He has been smoking an average of .5 packs per day. He does not have any smokeless tobacco history on file. He reports that he does not drink alcohol and does not use drugs.    Family History: family history is not on file.       Allergies:  Allergies   Allergen Reactions    Lyrica [Pregabalin] Other (See Comments)     Passing out/syncope       Medications:  Prior to Admission medications    Medication Sig Start Date End Date Taking? Authorizing Provider   albuterol (PROVENTIL) (2.5 MG/3ML) 0.083% nebulizer solution Take 2.5 mg by nebulization Every 4 (Four) Hours As Needed for Wheezing.    ProviderSarah MD   albuterol sulfate  (90 Base) MCG/ACT inhaler Inhale 2 puffs Every 4 (Four) Hours As Needed for Shortness of Air.    ProviderSarah MD   apixaban (ELIQUIS) 5 MG tablet tablet Take 2.5 mg by mouth 2 (Two) Times a Day.    Sarah Berg MD   budesonide-formoterol (SYMBICORT) 160-4.5 MCG/ACT inhaler Inhale 2 puffs 2 (Two) Times a Day. 5/10/18   Zulema Marc APRN   carBAMazepine (TEGretol) 200 MG tablet Take 200 mg by mouth Daily.    Sarah Berg MD   carvedilol (COREG) 12.5 MG tablet Take 1 tablet by mouth 2 (Two) Times a Day With Meals. 5/22/21   Brennon Dorado MD   cholecalciferol (VITAMIN D3) 25 MCG (1000 UT) tablet Take  "1,000 Units by mouth Daily.    Sarah Berg MD   diltiaZEM CD (CARDIZEM CD) 180 MG 24 hr capsule Take 1 capsule by mouth Daily. 5/10/18   Zulema Marc APRN   furosemide (LASIX) 40 MG tablet Take 40 mg by mouth 2 (Two) Times a Day.    Sarah Berg MD   guaiFENesin (MUCINEX) 600 MG 12 hr tablet Take 1,200 mg by mouth 2 (Two) Times a Day.    Sarah Berg MD   isosorbide mononitrate (IMDUR) 60 MG 24 hr tablet Take 60 mg by mouth Daily.    Sarah Berg MD   levothyroxine (SYNTHROID, LEVOTHROID) 50 MCG tablet Take 50 mcg by mouth Daily.    Sarah Berg MD   pantoprazole (PROTONIX) 40 MG EC tablet Take 40 mg by mouth Daily.    Sarah Berg MD   potassium chloride (K-DUR,KLOR-CON) 20 MEQ CR tablet Take 20 mEq by mouth Daily.    Sarah Berg MD   rosuvastatin (CRESTOR) 40 MG tablet Take 20 mg by mouth Every Night.    Sarah Berg MD   Specialty Vitamins Products (mg-plus protein) 133 MG elemental magnesium tablet Take 1 tablet by mouth 2 (Two) Times a Day.    Sarah Berg MD   Testosterone Cypionate (Depo-Testosterone) 200 MG/ML injection Inject 100 mg into the appropriate muscle as directed by prescriber Every 28 (Twenty-Eight) Days. Pt gets 0.5 ml every month.    Sarah Berg MD     I have utilized all available immediate resources to obtain, update, or review the patient's current medications (including all prescriptions, over-the-counter products, herbals, cannabis/cannabidiol products, and vitamin/mineral/dietary (nutritional) supplements).    Objective     Vital Signs: /60 (BP Location: Right arm, Patient Position: Sitting)   Pulse 83   Temp 98.3 °F (36.8 °C) (Temporal)   Resp 14   Ht 177.8 cm (70\")   Wt 54 kg (119 lb)   SpO2 98%   BMI 17.07 kg/m²   Physical Exam  Vitals and nursing note reviewed.   Constitutional:       General: He is in acute distress.      Appearance: He is underweight. He is not " ill-appearing, toxic-appearing or diaphoretic.   HENT:      Head: Normocephalic and atraumatic.      Right Ear: External ear normal.      Left Ear: External ear normal.   Eyes:      General: No scleral icterus.     Extraocular Movements: Extraocular movements intact.      Conjunctiva/sclera: Conjunctivae normal.   Cardiovascular:      Rate and Rhythm: Normal rate and regular rhythm.      Heart sounds: Normal heart sounds.   Pulmonary:      Effort: Pulmonary effort is normal. No respiratory distress.      Breath sounds: Normal breath sounds.   Chest:      Chest wall: No tenderness.   Abdominal:      General: Bowel sounds are normal.      Palpations: Abdomen is soft.      Tenderness: There is no abdominal tenderness. There is no right CVA tenderness or left CVA tenderness.   Musculoskeletal:         General: Tenderness (lumbar spine) present. No swelling or signs of injury.      Cervical back: Normal range of motion and neck supple.      Right lower leg: No edema.      Left lower leg: No edema.   Skin:     General: Skin is warm and dry.      Capillary Refill: Capillary refill takes less than 2 seconds.      Findings: No erythema or rash.   Neurological:      General: No focal deficit present.      Mental Status: He is alert and oriented to person, place, and time.      Cranial Nerves: No cranial nerve deficit.      Motor: No weakness.   Psychiatric:         Behavior: Behavior normal.            Results Reviewed:  Lab Results (last 24 hours)       Procedure Component Value Units Date/Time    Urinalysis With Culture If Indicated - Straight Cath [[id number]]  (Abnormal) Collected: 09/24/23 1247    Specimen: Urine from Straight Cath Updated: 09/24/23 1317     Color, UA Yellow     Appearance, UA Clear     pH, UA 5.5     Specific Gravity, UA 1.019     Glucose, UA Negative     Ketones, UA 15 mg/dL (1+)     Bilirubin, UA Negative     Blood, UA Trace     Protein, UA Trace     Leuk Esterase, UA Moderate (2+)     Nitrite, UA  Positive     Urobilinogen, UA 0.2 E.U./dL    Narrative:      In absence of clinical symptoms, the presence of pyuria, bacteria, and/or nitrites on the urinalysis result does not correlate with infection.    Urinalysis, Microscopic Only - Straight Cath [[id number]]  (Abnormal) Collected: 09/24/23 1247    Specimen: Urine from Straight Cath Updated: 09/24/23 1317     RBC, UA 0-2 /HPF      WBC, UA Too Numerous to Count /HPF      Bacteria, UA Trace /HPF      Squamous Epithelial Cells, UA None Seen /HPF      Hyaline Casts, UA 0-2 /LPF      Methodology Manual Light Microscopy    Urine Culture - Urine, Straight Cath [[id number]] Collected: 09/24/23 1247    Specimen: Urine from Straight Cath Updated: 09/24/23 1317    Comprehensive Metabolic Panel [[id number]]  (Abnormal) Collected: 09/24/23 1236    Specimen: Blood Updated: 09/24/23 1306     Glucose 88 mg/dL      BUN 40 mg/dL      Creatinine 1.69 mg/dL      Sodium 136 mmol/L      Potassium 4.8 mmol/L      Chloride 101 mmol/L      CO2 14.0 mmol/L      Calcium 9.3 mg/dL      Total Protein 7.0 g/dL      Albumin 3.8 g/dL      ALT (SGPT) 7 U/L      AST (SGOT) 17 U/L      Alkaline Phosphatase 109 U/L      Total Bilirubin 0.3 mg/dL      Globulin 3.2 gm/dL      A/G Ratio 1.2 g/dL      BUN/Creatinine Ratio 23.7     Anion Gap 21.0 mmol/L      eGFR 39.3 mL/min/1.73     Narrative:      GFR Normal >60  Chronic Kidney Disease <60  Kidney Failure <15    The GFR formula is only valid for adults with stable renal function between ages 18 and 70.    Lactic Acid, Plasma [[id number]]  (Normal) Collected: 09/24/23 1236    Specimen: Blood Updated: 09/24/23 1304     Lactate 1.2 mmol/L     D-dimer, Quantitative [[id number]]  (Abnormal) Collected: 09/24/23 1236    Specimen: Blood Updated: 09/24/23 1259     D-Dimer, Quantitative 3.50 MCGFEU/mL     Narrative:      According to the assay 's published package insert, a normal (<0.50 MCGFEU/mL) D-dimer result in conjunction with a non-high clinical  "probability assessment, excludes deep vein thrombosis (DVT) and pulmonary embolism (PE) with high sensitivity.    D-dimer values increase with age and this can make VTE exclusion of an older population difficult. To address this, the [employer], based on best available evidence and recent guidelines, recommends that clinicians use age-adjusted D-dimer thresholds in patients greater than 50 years of age with: a) a low probability of PE who do not meet all Pulmonary Embolism Rule Out Criteria, or b) in those with intermediate probability of PE.   The formula for an age-adjusted D-dimer cut-off is \"age/100\".  For example, a 60 year old patient would have an age-adjusted cut-off of 0.60 MCGFEU/mL and an 80 year old 0.80 MCGFEU/mL.    CBC & Differential [[id number]]  (Abnormal) Collected: 09/24/23 1236    Specimen: Blood Updated: 09/24/23 1248    Narrative:      The following orders were created for panel order CBC & Differential.  Procedure                               Abnormality         Status                     ---------                               -----------         ------                     CBC Auto Differential[[id number]]        Abnormal            Final result                 Please view results for these tests on the individual orders.    CBC Auto Differential [[id number]]  (Abnormal) Collected: 09/24/23 1236    Specimen: Blood Updated: 09/24/23 1248     WBC 10.37 10*3/mm3      RBC 4.05 10*6/mm3      Hemoglobin 12.4 g/dL      Hematocrit 38.1 %      MCV 94.1 fL      MCH 30.6 pg      MCHC 32.5 g/dL      RDW 13.3 %      RDW-SD 45.6 fl      MPV 9.0 fL      Platelets 295 10*3/mm3      Neutrophil % 76.2 %      Lymphocyte % 16.5 %      Monocyte % 5.6 %      Eosinophil % 0.4 %      Basophil % 0.6 %      Immature Grans % 0.7 %      Neutrophils, Absolute 7.91 10*3/mm3      Lymphocytes, Absolute 1.71 10*3/mm3      Monocytes, Absolute 0.58 10*3/mm3      Eosinophils, Absolute 0.04 10*3/mm3      " Basophils, Absolute 0.06 10*3/mm3      Immature Grans, Absolute 0.07 10*3/mm3      nRBC 0.0 /100 WBC           Imaging Results (Last 24 Hours)       Procedure Component Value Units Date/Time    CT Angiogram Chest [[id number]] Collected: 09/24/23 1520     Updated: 09/24/23 1535    Narrative:      EXAM: CT ANGIOGRAM CHEST- 9/24/2023 3:03 PM CDT     HISTORY: Shortness of air, SOA with positive d-dimer      COMPARISON: 5/9/2019     DOSE LENGTH PRODUCT: 218 mGy cm. Automated exposure control was also  utilized to decrease patient radiation dose.     TECHNIQUE: Serial helical tomographic images of the chest were acquired  following the intravenous infusion of contrast according to pulmonary  angiographic protocol. 3D and multiplanar reformatted images were  provided for review.     FINDINGS:      Support Devices: None.     Central Airways: Patent.     Lungs/Pleura:      Mild left lower lobe bronchial wall thickening with airway secretions.  Additionally there are secretions within the left mainstem bronchus.     Biapical scarring with calcifications, unchanged.     Moderate emphysematous changes.     Nodules: Anterior right apical 4 mm pulmonary nodule, previously 3 mm  (series 5 image 40).     Lower Neck: Subcentimeter left thyroid nodule.     Mediastinum/Hilum: No enlarged lymphadenopathy.     Heart/Vasculature: Cardiac size is normal. No pericardial effusion.  Severe coronary artery calcifications and/or stents. Mild aortic and  branch vessel valvular calcifications. Severe stenosis of the left  subclavian artery origin (series 4 image 50). Partially visualized  abdominal aortic stent.     Pulmonary artery: Pulmonary arteries are well-opacified to the segmental  level. No pulmonary embolism. Main pulmonary artery is normal in  caliber.     Chest wall/Soft Tissues: Mild symmetric bilateral gynecomastia.     Upper Abdomen: No acute or suspicious findings.     Osseous Structures: Remote T4, T8, T9, T12 compression  deformities. No  acute or suspicious osseous findings.       Impression:         No pulmonary embolism.     Mild left lower lobe bronchial wall thickening with airway secretions  which can be seen with aspiration.     Moderate emphysematous changes.     4 mm right upper lobe pulmonary nodule, previously 3 mm in 2018. Most  likely benign. Recommend follow-up chest CT in 12 months given minimal  enlargement.     Severe left subclavian artery origin stenosis.           This report was signed and finalized on 9/24/2023 3:32 PM CDT by Jorge Chau.       CT Lumbar Spine Without Contrast [[id number]] Collected: 09/24/23 1338     Updated: 09/24/23 1345    Narrative:      EXAMINATION: CT LUMBAR SPINE WO CONTRAST-      9/24/2023 1:07 PM CDT     HISTORY: Provided history of recent back surgery. Back pain and dysuria.     In order to have a CT radiation dose as low as reasonably achievable  Automated Exposure Control was utilized for adjustment of the mA and/or  KV according to patient size.     DLP in mGycm= 231.     Noncontrast lumbar spine CT.  Axial, sagittal, and coronal sequences.     No comparison.     No sign of lumbar spine surgery.  8 degrees LEFT convex scoliosis.  Aortic endograft present.     45% chronic appearing superior endplate compression of T12.  50% chronic appearing superior endplate compression of L3.  No paraspinal soft tissue edema is seen.  No spinal canal stenosis or high-grade foraminal stenosis.     Intact sacrum and symmetric SI joints.       Impression:      1. Chronic appearing T12 and L3 compression fractures.  2. No sign of recent lumbar spine surgery.  3. No significant stenosis is seen.                                         This report was signed and finalized on 9/24/2023 1:42 PM CDT by Dr. Johnny Lubin MD.             I have personally reviewed and interpreted the radiology studies and ECG obtained at time of admission.     Assessment / Plan   Assessment:   Active Hospital Problems     Diagnosis     **Intractable low back pain     UTI (urinary tract infection)     Aspiration pneumonia     Stage 3b chronic kidney disease     Paroxysmal atrial fibrillation     Other emphysema     Essential hypertension     Coronary artery disease involving native coronary artery of native heart without angina pectoris        Treatment Plan  The patient will be admitted to my service here at [facility].    Intractable low back pain: chronic appearing T12 and L3 compression fractures       Patient's daughter spoke with neurosurgeon Dr. Brantley who is agreeable to seeing patient.    - Neurosurgery consult placed  - Continue as needed pain medications    UTI: Continue IV antibiotics.  Incentive spirometry.  Obtain atypical labs.    3.  Aspiration pneumonia: Aspiration precautions.  Bedside swallow.  Incentive spirometry.  Continue antibiotics    4.  Paroxysmal A-fib: Currently on Eliquis.  Continue chronic medications.     Resumed chronic meds   PT/OT consult     DVT prophylaxis: Eliquis    Medical Decision Making  Number and Complexity of problems: 3 acute problems  Differential Diagnosis: As above    Conditions and Status        Condition is unchanged.     [facility] Data  External documents reviewed: Epic records  Cardiac tracing (EKG, telemetry) interpretation: EKG reviewed  Radiology interpretation: Imaging reviewed  Labs reviewed: Yes  Any tests that were considered but not ordered: None     Decision rules/scores evaluated (example [id number], [place], etc): Currently on chronic anticoagulation     Discussed with: Patient and daughter who was by the bedside.     Care Planning  Shared decision making: Patient apprised of current labs, vitals, imaging and treatment plan.  They are agreeable with proceeding with plans as discussed.    Code status and discussions:   Code Status and Medical Interventions:   Ordered at: 09/24/23 1620     Medical Intervention Limits:    NO intubation (DNI)     Level Of Support  Discussed With:    Patient    Health Care Surrogate     Code Status (Patient has no pulse and is not breathing):    CPR (Attempt to Resuscitate)     Medical Interventions (Patient has pulse or is breathing):    Limited Support         Disposition  Social Determinants of Health that impact treatment or disposition: Not able to care for self.  Estimated length of stay is 1 to 2 days.     I confirmed that the patient's advanced care plan is present, code status is documented, and a surrogate decision maker is listed in the patient's medical record.     The patient's surrogate decision maker is daughter Gaby Rankin.  Updated phone number on ACP documentation.    The patient was seen and examined by me on 9/24/2020 at 3:40 PM.    Electronically signed by Micki Solano MD, 09/24/23, 16:40 CDT.

## 2023-09-24 NOTE — ED NOTES
"Nursing report ED to floor  Brennon Vance  87 y.o.  male    HPI:   Chief Complaint   Patient presents with    Back Pain    Shortness of Breath       Admitting doctor:   Micki Solano MD    Consulting provider(s):  Consults       No orders found from 8/26/2023 to 9/25/2023.             Admitting diagnosis:   The primary encounter diagnosis was Acute cystitis without hematuria. Diagnoses of Acute bilateral low back pain with sciatica, sciatica laterality unspecified and Shortness of breath were also pertinent to this visit.    Code status:   Current Code Status       Date Active Code Status Order ID Comments User Context       Prior            Allergies:   Lyrica [pregabalin]    Intake and Output  No intake or output data in the 24 hours ending 09/24/23 1607    Weight:       09/24/23  1221   Weight: 54 kg (119 lb)       Most recent vitals:   Vitals:    09/24/23 1221   BP: 118/60   BP Location: Right arm   Patient Position: Sitting   Pulse: 83   Resp: 14   Temp: 98.3 °F (36.8 °C)   TempSrc: Temporal   SpO2: 98%   Weight: 54 kg (119 lb)   Height: 177.8 cm (70\")     Oxygen Therapy: .    Active LDAs/IV Access:   Lines, Drains & Airways       Active LDAs       Name Placement date Placement time Site Days    Peripheral IV 09/24/23 1300 Anterior;Distal;Left;Upper Arm 09/24/23  1300  Arm  less than 1                    Labs (abnormal labs have a star):   Labs Reviewed   COMPREHENSIVE METABOLIC PANEL - Abnormal; Notable for the following components:       Result Value    BUN 40 (*)     Creatinine 1.69 (*)     CO2 14.0 (*)     Anion Gap 21.0 (*)     eGFR 39.3 (*)     All other components within normal limits    Narrative:     GFR Normal >60  Chronic Kidney Disease <60  Kidney Failure <15    The GFR formula is only valid for adults with stable renal function between ages 18 and 70.   URINALYSIS W/ CULTURE IF INDICATED - Abnormal; Notable for the following components:    Ketones, UA 15 mg/dL (1+) (*)     Blood, UA Trace (*)     " "Protein, UA Trace (*)     Leuk Esterase, UA Moderate (2+) (*)     Nitrite, UA Positive (*)     All other components within normal limits    Narrative:     In absence of clinical symptoms, the presence of pyuria, bacteria, and/or nitrites on the urinalysis result does not correlate with infection.   CBC WITH AUTO DIFFERENTIAL - Abnormal; Notable for the following components:    RBC 4.05 (*)     Hemoglobin 12.4 (*)     Neutrophil % 76.2 (*)     Lymphocyte % 16.5 (*)     Immature Grans % 0.7 (*)     Neutrophils, Absolute 7.91 (*)     Immature Grans, Absolute 0.07 (*)     All other components within normal limits   D-DIMER, QUANTITATIVE - Abnormal; Notable for the following components:    D-Dimer, Quantitative 3.50 (*)     All other components within normal limits    Narrative:     According to the assay 's published package insert, a normal (<0.50 MCGFEU/mL) D-dimer result in conjunction with a non-high clinical probability assessment, excludes deep vein thrombosis (DVT) and pulmonary embolism (PE) with high sensitivity.    D-dimer values increase with age and this can make VTE exclusion of an older population difficult. To address this, the [employer], based on best available evidence and recent guidelines, recommends that clinicians use age-adjusted D-dimer thresholds in patients greater than 50 years of age with: a) a low probability of PE who do not meet all Pulmonary Embolism Rule Out Criteria, or b) in those with intermediate probability of PE.   The formula for an age-adjusted D-dimer cut-off is \"age/100\".  For example, a 60 year old patient would have an age-adjusted cut-off of 0.60 MCGFEU/mL and an 80 year old 0.80 MCGFEU/mL.   URINALYSIS, MICROSCOPIC ONLY - Abnormal; Notable for the following components:    RBC, UA 0-2 (*)     WBC, UA Too Numerous to Count (*)     Bacteria, UA Trace (*)     All other components within normal limits   LACTIC ACID, PLASMA - Normal   URINE CULTURE "   CBC AND DIFFERENTIAL    Narrative:     The following orders were created for panel order CBC & Differential.  Procedure                               Abnormality         Status                     ---------                               -----------         ------                     CBC Auto Differential[[id number]]        Abnormal            Final result                 Please view results for these tests on the individual orders.       Meds given in ED:   Medications   cefTRIAXone (ROCEPHIN) 2,000 mg in sodium chloride 0.9 % 100 mL IVPB (0 mg Intravenous Stopped 9/24/23 1431)   Morphine sulfate (PF) injection 1 mg (1 mg Intravenous Given 9/24/23 1343)   HYDROmorphone (DILAUDID) injection 0.5 mg (0.5 mg Intravenous Given 9/24/23 1431)   iopamidol (ISOVUE-370) 76 % injection 100 mL (70 mL Intravenous Given 9/24/23 1510)     No current facility-administered medications for this encounter.       NIH Stroke Scale:       Isolation/Infection(s):  No active isolations   No active infections     COVID Testing  Collected .  Resulted .    Nursing report ED to floor:  Mental status: .  Ambulatory status: .  Precautions: .    ED nurse phone extentsion- ..

## 2023-09-25 ENCOUNTER — APPOINTMENT (OUTPATIENT)
Dept: MRI IMAGING | Facility: HOSPITAL | Age: 88
End: 2023-09-25
Payer: MEDICARE

## 2023-09-25 LAB
ALBUMIN SERPL-MCNC: 3.2 G/DL (ref 3.5–5.2)
ALBUMIN/GLOB SERPL: 1.1 G/DL
ALP SERPL-CCNC: 94 U/L (ref 39–117)
ALT SERPL W P-5'-P-CCNC: 6 U/L (ref 1–41)
ANION GAP SERPL CALCULATED.3IONS-SCNC: 19 MMOL/L (ref 5–15)
AST SERPL-CCNC: 15 U/L (ref 1–40)
BASOPHILS # BLD AUTO: 0.05 10*3/MM3 (ref 0–0.2)
BASOPHILS NFR BLD AUTO: 0.6 % (ref 0–1.5)
BILIRUB SERPL-MCNC: 0.2 MG/DL (ref 0–1.2)
BUN SERPL-MCNC: 40 MG/DL (ref 8–23)
BUN/CREAT SERPL: 20.6 (ref 7–25)
CALCIUM SPEC-SCNC: 9.1 MG/DL (ref 8.6–10.5)
CHLORIDE SERPL-SCNC: 102 MMOL/L (ref 98–107)
CO2 SERPL-SCNC: 15 MMOL/L (ref 22–29)
CREAT SERPL-MCNC: 1.94 MG/DL (ref 0.76–1.27)
DEPRECATED RDW RBC AUTO: 47.7 FL (ref 37–54)
EGFRCR SERPLBLD CKD-EPI 2021: 33.3 ML/MIN/1.73
EOSINOPHIL # BLD AUTO: 0.05 10*3/MM3 (ref 0–0.4)
EOSINOPHIL NFR BLD AUTO: 0.6 % (ref 0.3–6.2)
ERYTHROCYTE [DISTWIDTH] IN BLOOD BY AUTOMATED COUNT: 13.5 % (ref 12.3–15.4)
GLOBULIN UR ELPH-MCNC: 3 GM/DL
GLUCOSE BLDC GLUCOMTR-MCNC: 187 MG/DL (ref 70–130)
GLUCOSE SERPL-MCNC: 103 MG/DL (ref 65–99)
HCT VFR BLD AUTO: 33.2 % (ref 37.5–51)
HGB BLD-MCNC: 10.5 G/DL (ref 13–17.7)
IMM GRANULOCYTES # BLD AUTO: 0.05 10*3/MM3 (ref 0–0.05)
IMM GRANULOCYTES NFR BLD AUTO: 0.6 % (ref 0–0.5)
LYMPHOCYTES # BLD AUTO: 1.81 10*3/MM3 (ref 0.7–3.1)
LYMPHOCYTES NFR BLD AUTO: 22 % (ref 19.6–45.3)
MCH RBC QN AUTO: 30.6 PG (ref 26.6–33)
MCHC RBC AUTO-ENTMCNC: 31.6 G/DL (ref 31.5–35.7)
MCV RBC AUTO: 96.8 FL (ref 79–97)
MONOCYTES # BLD AUTO: 0.71 10*3/MM3 (ref 0.1–0.9)
MONOCYTES NFR BLD AUTO: 8.6 % (ref 5–12)
NEUTROPHILS NFR BLD AUTO: 5.55 10*3/MM3 (ref 1.7–7)
NEUTROPHILS NFR BLD AUTO: 67.6 % (ref 42.7–76)
NRBC BLD AUTO-RTO: 0 /100 WBC (ref 0–0.2)
PLATELET # BLD AUTO: 268 10*3/MM3 (ref 140–450)
PMV BLD AUTO: 9.1 FL (ref 6–12)
POTASSIUM SERPL-SCNC: 5 MMOL/L (ref 3.5–5.2)
PROT SERPL-MCNC: 6.2 G/DL (ref 6–8.5)
RBC # BLD AUTO: 3.43 10*6/MM3 (ref 4.14–5.8)
SODIUM SERPL-SCNC: 136 MMOL/L (ref 136–145)
WBC NRBC COR # BLD: 8.22 10*3/MM3 (ref 3.4–10.8)

## 2023-09-25 PROCEDURE — 25010000002 LORAZEPAM PER 2 MG: Performed by: NURSE PRACTITIONER

## 2023-09-25 PROCEDURE — G0378 HOSPITAL OBSERVATION PER HR: HCPCS

## 2023-09-25 PROCEDURE — 80053 COMPREHEN METABOLIC PANEL: CPT | Performed by: STUDENT IN AN ORGANIZED HEALTH CARE EDUCATION/TRAINING PROGRAM

## 2023-09-25 PROCEDURE — 94660 CPAP INITIATION&MGMT: CPT

## 2023-09-25 PROCEDURE — 99214 OFFICE O/P EST MOD 30 MIN: CPT | Performed by: NURSE PRACTITIONER

## 2023-09-25 PROCEDURE — 94799 UNLISTED PULMONARY SVC/PX: CPT

## 2023-09-25 PROCEDURE — 36415 COLL VENOUS BLD VENIPUNCTURE: CPT | Performed by: STUDENT IN AN ORGANIZED HEALTH CARE EDUCATION/TRAINING PROGRAM

## 2023-09-25 PROCEDURE — 85025 COMPLETE CBC W/AUTO DIFF WBC: CPT | Performed by: STUDENT IN AN ORGANIZED HEALTH CARE EDUCATION/TRAINING PROGRAM

## 2023-09-25 PROCEDURE — 96375 TX/PRO/DX INJ NEW DRUG ADDON: CPT

## 2023-09-25 PROCEDURE — 96361 HYDRATE IV INFUSION ADD-ON: CPT

## 2023-09-25 PROCEDURE — 94664 DEMO&/EVAL PT USE INHALER: CPT

## 2023-09-25 PROCEDURE — 72148 MRI LUMBAR SPINE W/O DYE: CPT

## 2023-09-25 PROCEDURE — 82948 REAGENT STRIP/BLOOD GLUCOSE: CPT

## 2023-09-25 PROCEDURE — 25010000002 PIPERACILLIN SOD-TAZOBACTAM PER 1 G: Performed by: STUDENT IN AN ORGANIZED HEALTH CARE EDUCATION/TRAINING PROGRAM

## 2023-09-25 RX ORDER — HYDROXYZINE HYDROCHLORIDE 25 MG/1
50 TABLET, FILM COATED ORAL 2 TIMES DAILY PRN
Status: DISCONTINUED | OUTPATIENT
Start: 2023-09-25 | End: 2023-10-01

## 2023-09-25 RX ORDER — LORAZEPAM 2 MG/ML
0.5 INJECTION INTRAMUSCULAR ONCE
Status: COMPLETED | OUTPATIENT
Start: 2023-09-25 | End: 2023-09-25

## 2023-09-25 RX ORDER — AMOXICILLIN AND CLAVULANATE POTASSIUM 500; 125 MG/1; MG/1
1 TABLET, FILM COATED ORAL 2 TIMES DAILY
Status: COMPLETED | OUTPATIENT
Start: 2023-09-25 | End: 2023-09-29

## 2023-09-25 RX ADMIN — Medication 1000 UNITS: at 08:06

## 2023-09-25 RX ADMIN — HYDROCODONE BITARTRATE AND ACETAMINOPHEN 2 TABLET: 7.5; 325 TABLET ORAL at 05:48

## 2023-09-25 RX ADMIN — GUAIFENESIN 600 MG: 600 TABLET, EXTENDED RELEASE ORAL at 08:06

## 2023-09-25 RX ADMIN — PIPERACILLIN SODIUM AND TAZOBACTAM SODIUM 3.38 G: 3; .375 INJECTION, SOLUTION INTRAVENOUS at 02:19

## 2023-09-25 RX ADMIN — CARVEDILOL 12.5 MG: 6.25 TABLET, FILM COATED ORAL at 08:06

## 2023-09-25 RX ADMIN — BUDESONIDE AND FORMOTEROL FUMARATE DIHYDRATE 2 PUFF: 160; 4.5 AEROSOL RESPIRATORY (INHALATION) at 06:43

## 2023-09-25 RX ADMIN — SODIUM CHLORIDE 1000 ML: 9 INJECTION, SOLUTION INTRAVENOUS at 17:21

## 2023-09-25 RX ADMIN — FINASTERIDE 5 MG: 5 TABLET, FILM COATED ORAL at 08:08

## 2023-09-25 RX ADMIN — CARBAMAZEPINE 200 MG: 200 TABLET ORAL at 08:07

## 2023-09-25 RX ADMIN — Medication 10 ML: at 08:08

## 2023-09-25 RX ADMIN — ISOSORBIDE MONONITRATE 60 MG: 60 TABLET, EXTENDED RELEASE ORAL at 08:07

## 2023-09-25 RX ADMIN — PANTOPRAZOLE SODIUM 40 MG: 40 TABLET, DELAYED RELEASE ORAL at 08:05

## 2023-09-25 RX ADMIN — LEVOTHYROXINE SODIUM 50 MCG: 50 TABLET ORAL at 05:48

## 2023-09-25 RX ADMIN — PIPERACILLIN SODIUM AND TAZOBACTAM SODIUM 3.38 G: 3; .375 INJECTION, SOLUTION INTRAVENOUS at 08:00

## 2023-09-25 RX ADMIN — DOCUSATE SODIUM 50 MG AND SENNOSIDES 8.6 MG 2 TABLET: 8.6; 5 TABLET, FILM COATED ORAL at 08:06

## 2023-09-25 RX ADMIN — APIXABAN 2.5 MG: 2.5 TABLET, FILM COATED ORAL at 08:08

## 2023-09-25 RX ADMIN — SODIUM CHLORIDE 40 ML: 9 INJECTION, SOLUTION INTRAVENOUS at 08:08

## 2023-09-25 RX ADMIN — FUROSEMIDE 40 MG: 40 TABLET ORAL at 08:07

## 2023-09-25 RX ADMIN — POTASSIUM CHLORIDE 20 MEQ: 1500 TABLET, EXTENDED RELEASE ORAL at 08:07

## 2023-09-25 RX ADMIN — TAMSULOSIN HYDROCHLORIDE 0.4 MG: 0.4 CAPSULE ORAL at 08:06

## 2023-09-25 RX ADMIN — DILTIAZEM HYDROCHLORIDE 180 MG: 180 CAPSULE, EXTENDED RELEASE ORAL at 08:08

## 2023-09-25 RX ADMIN — LORAZEPAM 0.5 MG: 2 INJECTION INTRAMUSCULAR; INTRAVENOUS at 12:48

## 2023-09-25 NOTE — PROGRESS NOTES
[facility] Medicine Services  INPATIENT PROGRESS NOTE    Patient Name: Brennon Vance  Date of Admission: 9/24/2023  Today's Date: 09/25/23  Length of Stay: 0  Primary Care Physician: Javid Grajeda MD    Subjective   Chief Complaint: Low back pain  HPI  Mr. Vance is an 87-year-old male who presented to [facility] on 9/24 with worsening lower back pain with radiation to the left leg.  In the ED, he was found to have a urinary tract infection and CTA of the chest interpreted by radiology showed possible aspiration pneumonia and moderate emphysematous changes. CT of the lumbar spine showed chronic appearing T12 and L3 compression fractures with no signs of recent lumbar spine surgery or significant stenosis. Patient was started on ceftriaxone and given morphine and Dilaudid in the ED.     Lying in bed.  States he is feeling better today.  He has no shortness of breath, cough.  No fever.  No nausea, vomiting or abdominal pain.  No urinary symptoms.  Afebrile.  His only complaint is feeling tired today.  He passed bedside swallow.  He tolerated pills well this morning per RN.  He last had pain medicine around 6 AM.    Neurosurgery has evaluated and is recommending MRI lumbar spine.    Review of Systems   All pertinent negatives and positives are as above. All other systems have been reviewed and are negative unless otherwise stated.     Objective    Temp:  [97.6 °F (36.4 °C)-98.3 °F (36.8 °C)] 98 °F (36.7 °C)  Heart Rate:  [73-84] 81  Resp:  [14-19] 16  BP: (106-156)/(53-67) 119/56  Physical Exam  Vitals reviewed.   Constitutional:       General: He is not in acute distress.     Appearance: He is not toxic-appearing.      Comments: Lying in bed.  No acute distress.  On room air.  No family at bedside.  Discussed with his nurse Rhoda.   HENT:      Head: Normocephalic and atraumatic.      Mouth/Throat:      Mouth: Mucous membranes are moist.      Pharynx: Oropharynx is  clear.   Eyes:      Extraocular Movements: Extraocular movements intact.      Conjunctiva/sclera: Conjunctivae normal.      Pupils: Pupils are equal, round, and reactive to light.   Cardiovascular:      Rate and Rhythm: Normal rate and regular rhythm.      Pulses: Normal pulses.   Pulmonary:      Effort: Pulmonary effort is normal. No respiratory distress.      Breath sounds: Normal breath sounds. No wheezing or rhonchi.   Abdominal:      General: Bowel sounds are normal. There is no distension.      Palpations: Abdomen is soft.      Tenderness: There is no abdominal tenderness.   Musculoskeletal:         General: No swelling or tenderness. Normal range of motion.      Cervical back: Normal range of motion and neck supple. No muscular tenderness.   Skin:     General: Skin is warm and dry.      Findings: No erythema or rash.   Neurological:      General: No focal deficit present.      Mental Status: He is alert and oriented to person, place, and time.      Cranial Nerves: No cranial nerve deficit.      Motor: No weakness.   Psychiatric:         Mood and Affect: Mood normal.         Behavior: Behavior normal.     Results Review:  I have reviewed the labs, radiology results, and diagnostic studies.    Laboratory Data:   Results from last 7 days   Lab Units 09/25/23  0646 09/24/23  1236   WBC 10*3/mm3 8.22 10.37   HEMOGLOBIN g/dL 10.5* 12.4*   HEMATOCRIT % 33.2* 38.1   PLATELETS 10*3/mm3 268 295        Results from last 7 days   Lab Units 09/25/23  0646 09/24/23  1236   SODIUM mmol/L 136 136   POTASSIUM mmol/L 5.0 4.8   CHLORIDE mmol/L 102 101   CO2 mmol/L 15.0* 14.0*   BUN mg/dL 40* 40*   CREATININE mg/dL 1.94* 1.69*   CALCIUM mg/dL 9.1 9.3   BILIRUBIN mg/dL 0.2 0.3   ALK PHOS U/L 94 109   ALT (SGPT) U/L 6 7   AST (SGOT) U/L 15 17   GLUCOSE mg/dL 103* 88       Culture Data:   Microbiology Results (last 10 days)       ** No results found for the last 240 hours. **          Radiology Data:   Imaging Results (Last 24  Hours)       Procedure Component Value Units Date/Time    CT Angiogram Chest [[id number]] Collected: 09/24/23 1520     Updated: 09/24/23 1535    Narrative:      EXAM: CT ANGIOGRAM CHEST- 9/24/2023 3:03 PM CDT     HISTORY: Shortness of air, SOA with positive d-dimer      COMPARISON: 5/9/2019     DOSE LENGTH PRODUCT: 218 mGy cm. Automated exposure control was also  utilized to decrease patient radiation dose.     TECHNIQUE: Serial helical tomographic images of the chest were acquired  following the intravenous infusion of contrast according to pulmonary  angiographic protocol. 3D and multiplanar reformatted images were  provided for review.     FINDINGS:      Support Devices: None.     Central Airways: Patent.     Lungs/Pleura:      Mild left lower lobe bronchial wall thickening with airway secretions.  Additionally there are secretions within the left mainstem bronchus.     Biapical scarring with calcifications, unchanged.     Moderate emphysematous changes.     Nodules: Anterior right apical 4 mm pulmonary nodule, previously 3 mm  (series 5 image 40).     Lower Neck: Subcentimeter left thyroid nodule.     Mediastinum/Hilum: No enlarged lymphadenopathy.     Heart/Vasculature: Cardiac size is normal. No pericardial effusion.  Severe coronary artery calcifications and/or stents. Mild aortic and  branch vessel valvular calcifications. Severe stenosis of the left  subclavian artery origin (series 4 image 50). Partially visualized  abdominal aortic stent.     Pulmonary artery: Pulmonary arteries are well-opacified to the segmental  level. No pulmonary embolism. Main pulmonary artery is normal in  caliber.     Chest wall/Soft Tissues: Mild symmetric bilateral gynecomastia.     Upper Abdomen: No acute or suspicious findings.     Osseous Structures: Remote T4, T8, T9, T12 compression deformities. No  acute or suspicious osseous findings.       Impression:         No pulmonary embolism.     Mild left lower lobe bronchial wall  thickening with airway secretions  which can be seen with aspiration.     Moderate emphysematous changes.     4 mm right upper lobe pulmonary nodule, previously 3 mm in 2018. Most  likely benign. Recommend follow-up chest CT in 12 months given minimal  enlargement.     Severe left subclavian artery origin stenosis.           This report was signed and finalized on 9/24/2023 3:32 PM CDT by Jorge Chau.       CT Lumbar Spine Without Contrast [[id number]] Collected: 09/24/23 1338     Updated: 09/24/23 1345    Narrative:      EXAMINATION: CT LUMBAR SPINE WO CONTRAST-      9/24/2023 1:07 PM CDT     HISTORY: Provided history of recent back surgery. Back pain and dysuria.     In order to have a CT radiation dose as low as reasonably achievable  Automated Exposure Control was utilized for adjustment of the mA and/or  KV according to patient size.     DLP in mGycm= 231.     Noncontrast lumbar spine CT.  Axial, sagittal, and coronal sequences.     No comparison.     No sign of lumbar spine surgery.  8 degrees LEFT convex scoliosis.  Aortic endograft present.     45% chronic appearing superior endplate compression of T12.  50% chronic appearing superior endplate compression of L3.  No paraspinal soft tissue edema is seen.  No spinal canal stenosis or high-grade foraminal stenosis.     Intact sacrum and symmetric SI joints.       Impression:      1. Chronic appearing T12 and L3 compression fractures.  2. No sign of recent lumbar spine surgery.  3. No significant stenosis is seen.                                         This report was signed and finalized on 9/24/2023 1:42 PM CDT by Dr. Johnny Lubin MD.               I have reviewed the patient's current medications.     Assessment/Plan   Assessment  Active Hospital Problems    Diagnosis     **Intractable low back pain     UTI (urinary tract infection)     Aspiration pneumonia     Stage 3b chronic kidney disease     Paroxysmal atrial fibrillation     Other emphysema      Essential hypertension     Coronary artery disease involving native coronary artery of native heart without angina pectoris        Treatment Plan  Neurosurgery, Dr. Brantley consulted.  CT of the lumbar spine showed chronic appearing T12 and L3 compression fractures with no signs of recent lumbar spine surgery or significant stenosis. MRI lumbar spine ordered by neurosurgery.    CTA showed aspiration pneumonia.  He passed bedside swallow.  Continue Zosyn.  On room air.  Normal WBC.    Urinalysis showed trace blood, positive nitrate, moderate leukocyte, TNTC WBC and trace bacteria.  He denies any urinary symptoms.  Zosyn will cover for UTI.  Follow urine culture.    He has a history of paroxysmal atrial fibrillation.  Continue Cardizem and Coreg.    Consult PT/OT.    Eliquis will serve as DVT prophylaxis.    He will need follow-up CT chest in 12 months to monitor 4 mm right upper lobe pulmonary nodule.    Medical Decision Making  Number and Complexity of problems: 3 acute problems in the form of intractable low back pain, aspiration pneumonia and urinary tract infection  Differential Diagnosis: None considered at present    Conditions and Status        Condition is unchanged.     [facility] Data  External documents reviewed: Prior [facility] records  Cardiac tracing (EKG, telemetry) interpretation: Sinus rhythm  Radiology interpretation: Interpreted by radiology  Labs reviewed: As above  Any tests that were considered but not ordered: None considered at present     Decision rules/scores evaluated (example [id number], Wells, etc): None considered at present     Discussed with: Patient and Dr. Prater     Care Planning  Shared decision making: Patient is agreeable to ongoing work-up and treatment  Code status and discussions: Full code with full interventions    Disposition  Social Determinants of Health that impact treatment or disposition:?  SNF  I expect the patient to be discharged to home with home health versus SNF in 2-3  days.     Electronically signed by Radha Aaron, [facility], 09/25/23, 11:07 CDT.

## 2023-09-25 NOTE — PLAN OF CARE
Goal Outcome Evaluation:              Outcome Evaluation: NTN assessment due to BMI 14.38kg/m^2 and 60% IBW. MD in room with three visitors at time of attempted encounter. PO 25% dinner last night, snack x 1 @ 50%. 440 mL oral fluid total (admit to present). Added Magic Cup and Boost Plus twice daily. Receives Remeron nightly. No skin breakdown on admit. Will monitor while inpatient. NTN following per protocol.

## 2023-09-25 NOTE — CONSULTS
Reason for consult: Compression fractures    Chief Complaint   Patient presents with    Back Pain    Shortness of Breath         Requesting provider: Dr. Chaitanya Prater    HPI: This is an 87-year-old male gentleman who we are asked to see in consult for back pain.  The patient says that the pain has been going on for about 2 months after he started lifting something.  The pain had been improving but he lifted something again last week and started having recurrent pain in his back.  Patient is having difficulty with mobilizing.  He denies any lower extremity pain.  His wife has been taking care of him however she was just recently hospitalized.  Imaging and testing did show that he has UTI as well as possible aspiration pneumonia.  Patient's main complaint is in his lower back.  Denies any numbness or tingling in his legs.  Denies any bowel or bladder incontinence.  He does relate to difficulty with mobilization due to his pain issues.  The patient has been admitted to the hospital due to his other medical problems and we are asked to see the patient in consult    Review of Systems   Constitutional:  Positive for activity change. Negative for fever.   Musculoskeletal:  Positive for back pain.   All other systems reviewed and are negative.     Pertinent positives/negatives documented in HPI.  All other systems reviewed and negative.    Past Medical History:  has a past medical history of CAD (coronary artery disease), COPD (chronic obstructive pulmonary disease), Hiatal hernia, Hyperlipidemia, Hypertension, and Stroke.    Past Surgical History:  has a past surgical history that includes Coronary angioplasty with stent; Femoral artery stent; Trigeminal nerve decompression; AAA repair, open; Cardiac catheterization (N/A, 5/20/2021); and Leg Thrombectomy/Embolectomy (Right, 5/20/2021).    Family History: family history is not on file.    Social History:  reports that he has been smoking cigarettes. He has been smoking  "an average of .5 packs per day. He does not have any smokeless tobacco history on file. He reports that he does not drink alcohol and does not use drugs.    Allergies: Lyrica [pregabalin]    Medications: Scheduled Meds:apixaban, 2.5 mg, Oral, Q12H  budesonide-formoterol, 2 puff, Inhalation, BID - RT  carBAMazepine, 200 mg, Oral, Daily  carvedilol, 12.5 mg, Oral, BID With Meals  cholecalciferol, 1,000 Units, Oral, Daily  dilTIAZem CD, 180 mg, Oral, Daily  finasteride, 5 mg, Oral, Daily  furosemide, 40 mg, Oral, Daily  guaiFENesin, 600 mg, Oral, Q12H  isosorbide mononitrate, 60 mg, Oral, Daily  levothyroxine, 50 mcg, Oral, Q AM  mirtazapine, 45 mg, Oral, Nightly  pantoprazole, 40 mg, Oral, Daily  piperacillin-tazobactam, 3.375 g, Intravenous, Q8H  potassium chloride, 20 mEq, Oral, Daily  rosuvastatin, 20 mg, Oral, Nightly  senna-docusate sodium, 2 tablet, Oral, BID  sodium chloride, 10 mL, Intravenous, Q12H  tamsulosin, 0.4 mg, Oral, Daily      Continuous Infusions:Pharmacy to Dose Zosyn,       PRN Meds:.  acetaminophen **OR** acetaminophen **OR** acetaminophen    albuterol    senna-docusate sodium **AND** polyethylene glycol **AND** bisacodyl **AND** bisacodyl    HYDROcodone-acetaminophen    HYDROcodone-acetaminophen    hydrOXYzine    melatonin    Pharmacy to Dose Zosyn    sodium chloride    sodium chloride     Objective:  General Appearance:  Comfortable, well-appearing, in no acute distress and in pain.    Vital signs: (most recent): Blood pressure 119/56, pulse 81, temperature 98 °F (36.7 °C), temperature source Oral, resp. rate 16, height 177.8 cm (70\"), weight 45.5 kg (100 lb 3.2 oz), SpO2 98 %.  Vital signs are normal.  No fever.    Output: Producing urine.    HEENT: Normal HEENT exam.    Lungs:  Normal effort and normal respiratory rate.  He is not in respiratory distress.    Heart: Normal rate.  Regular rhythm.    Chest: Symmetric chest wall expansion.   Extremities: Normal range of motion.    Skin:  Warm and " dry.    Abdomen: Abdomen is soft and non-distended.  Bowel sounds are normal.   There is no abdominal tenderness.     Pulses: Distal pulses are intact.      Neurologic Exam     Mental Status   Oriented to person, place, and time.   Attention: normal. Concentration: normal.   Speech: speech is normal   Level of consciousness: alert  Normal comprehension.     Cranial Nerves     CN II   Visual fields full to confrontation.     CN III, IV, VI   Pupils are equal, round, and reactive to light.  Extraocular motions are normal.     CN V   Facial sensation intact.     CN VII   Facial expression full, symmetric.     CN VIII   CN VIII normal.     CN IX, X   CN IX normal.   CN X normal.     CN XI   CN XI normal.     CN XII   CN XII normal.     Motor Exam   Muscle bulk: normal    Strength   Strength 5/5 throughout.     Sensory Exam   Light touch normal.     Gait, Coordination, and Reflexes     Gait  Gait: normal    Reflexes   Reflexes 2+ except as noted.     Vital Signs  Temp:  [97.6 °F (36.4 °C)-98.3 °F (36.8 °C)] 98 °F (36.7 °C)  Heart Rate:  [73-84] 81  Resp:  [14-19] 16  BP: (106-156)/(53-67) 119/56    Physical Exam  Constitutional:       General: Vital signs are normal.      Appearance: Normal appearance. He is well-developed.   HENT:      Head: Normocephalic.   Eyes:      General: Lids are normal.      Extraocular Movements: EOM normal.      Conjunctiva/sclera: Conjunctivae normal.      Pupils: Pupils are equal, round, and reactive to light.   Cardiovascular:      Rate and Rhythm: Normal rate and regular rhythm.      Pulses: Intact distal pulses.   Pulmonary:      Effort: Pulmonary effort is normal. No respiratory distress.      Breath sounds: Normal breath sounds.   Abdominal:      General: Bowel sounds are normal. There is no distension.      Palpations: Abdomen is soft.   Musculoskeletal:         General: Normal range of motion.      Cervical back: Normal range of motion.   Skin:     General: Skin is warm and dry.    Neurological:      Mental Status: He is alert and oriented to person, place, and time.      GCS: GCS eye subscore is 4. GCS verbal subscore is 5. GCS motor subscore is 6.      Cranial Nerves: No cranial nerve deficit.      Sensory: No sensory deficit.      Motor: Motor strength is normal.      Gait: Gait is intact.      Deep Tendon Reflexes: Reflexes are normal and symmetric. Reflexes normal.   Psychiatric:         Speech: Speech normal.         Behavior: Behavior normal.         Thought Content: Thought content normal.       Results Review:   I reviewed the patient's new clinical results.  I reviewed the patient's new imaging results and agree with the interpretation.  I reviewed the patient's other test results and agree with the interpretation          Lab Results (last 24 hours)       Procedure Component Value Units Date/Time    Comprehensive Metabolic Panel [[id number]]  (Abnormal) Collected: 09/25/23 0646    Specimen: Blood Updated: 09/25/23 0723     Glucose 103 mg/dL      BUN 40 mg/dL      Creatinine 1.94 mg/dL      Sodium 136 mmol/L      Potassium 5.0 mmol/L      Chloride 102 mmol/L      CO2 15.0 mmol/L      Calcium 9.1 mg/dL      Total Protein 6.2 g/dL      Albumin 3.2 g/dL      ALT (SGPT) 6 U/L      AST (SGOT) 15 U/L      Alkaline Phosphatase 94 U/L      Total Bilirubin 0.2 mg/dL      Globulin 3.0 gm/dL      A/G Ratio 1.1 g/dL      BUN/Creatinine Ratio 20.6     Anion Gap 19.0 mmol/L      eGFR 33.3 mL/min/1.73     Narrative:      GFR Normal >60  Chronic Kidney Disease <60  Kidney Failure <15    The GFR formula is only valid for adults with stable renal function between ages 18 and 70.    CBC & Differential [[id number]]  (Abnormal) Collected: 09/25/23 0646    Specimen: Blood Updated: 09/25/23 0708    Narrative:      The following orders were created for panel order CBC & Differential.  Procedure                               Abnormality         Status                     ---------                                -----------         ------                     CBC Auto Differential[[id number]]        Abnormal            Final result                 Please view results for these tests on the individual orders.    CBC Auto Differential [[id number]]  (Abnormal) Collected: 09/25/23 0646    Specimen: Blood Updated: 09/25/23 0708     WBC 8.22 10*3/mm3      RBC 3.43 10*6/mm3      Hemoglobin 10.5 g/dL      Hematocrit 33.2 %      MCV 96.8 fL      MCH 30.6 pg      MCHC 31.6 g/dL      RDW 13.5 %      RDW-SD 47.7 fl      MPV 9.1 fL      Platelets 268 10*3/mm3      Neutrophil % 67.6 %      Lymphocyte % 22.0 %      Monocyte % 8.6 %      Eosinophil % 0.6 %      Basophil % 0.6 %      Immature Grans % 0.6 %      Neutrophils, Absolute 5.55 10*3/mm3      Lymphocytes, Absolute 1.81 10*3/mm3      Monocytes, Absolute 0.71 10*3/mm3      Eosinophils, Absolute 0.05 10*3/mm3      Basophils, Absolute 0.05 10*3/mm3      Immature Grans, Absolute 0.05 10*3/mm3      nRBC 0.0 /100 WBC     Urinalysis With Culture If Indicated - Straight Cath [[id number]]  (Abnormal) Collected: 09/24/23 1247    Specimen: Urine from Straight Cath Updated: 09/24/23 1317     Color, UA Yellow     Appearance, UA Clear     pH, UA 5.5     Specific Gravity, UA 1.019     Glucose, UA Negative     Ketones, UA 15 mg/dL (1+)     Bilirubin, UA Negative     Blood, UA Trace     Protein, UA Trace     Leuk Esterase, UA Moderate (2+)     Nitrite, UA Positive     Urobilinogen, UA 0.2 E.U./dL    Narrative:      In absence of clinical symptoms, the presence of pyuria, bacteria, and/or nitrites on the urinalysis result does not correlate with infection.    Urinalysis, Microscopic Only - Straight Cath [[id number]]  (Abnormal) Collected: 09/24/23 1247    Specimen: Urine from Straight Cath Updated: 09/24/23 1317     RBC, UA 0-2 /HPF      WBC, UA Too Numerous to Count /HPF      Bacteria, UA Trace /HPF      Squamous Epithelial Cells, UA None Seen /HPF      Hyaline Casts, UA 0-2 /LPF      Methodology  Manual Light Microscopy    Urine Culture - Urine, Straight Cath [[id number]] Collected: 09/24/23 1247    Specimen: Urine from Straight Cath Updated: 09/24/23 1317    Comprehensive Metabolic Panel [[id number]]  (Abnormal) Collected: 09/24/23 1236    Specimen: Blood Updated: 09/24/23 1306     Glucose 88 mg/dL      BUN 40 mg/dL      Creatinine 1.69 mg/dL      Sodium 136 mmol/L      Potassium 4.8 mmol/L      Chloride 101 mmol/L      CO2 14.0 mmol/L      Calcium 9.3 mg/dL      Total Protein 7.0 g/dL      Albumin 3.8 g/dL      ALT (SGPT) 7 U/L      AST (SGOT) 17 U/L      Alkaline Phosphatase 109 U/L      Total Bilirubin 0.3 mg/dL      Globulin 3.2 gm/dL      A/G Ratio 1.2 g/dL      BUN/Creatinine Ratio 23.7     Anion Gap 21.0 mmol/L      eGFR 39.3 mL/min/1.73     Narrative:      GFR Normal >60  Chronic Kidney Disease <60  Kidney Failure <15    The GFR formula is only valid for adults with stable renal function between ages 18 and 70.    Lactic Acid, Plasma [[id number]]  (Normal) Collected: 09/24/23 1236    Specimen: Blood Updated: 09/24/23 1304     Lactate 1.2 mmol/L     D-dimer, Quantitative [[id number]]  (Abnormal) Collected: 09/24/23 1236    Specimen: Blood Updated: 09/24/23 1259     D-Dimer, Quantitative 3.50 MCGFEU/mL     Narrative:      According to the assay 's published package insert, a normal (<0.50 MCGFEU/mL) D-dimer result in conjunction with a non-high clinical probability assessment, excludes deep vein thrombosis (DVT) and pulmonary embolism (PE) with high sensitivity.    D-dimer values increase with age and this can make VTE exclusion of an older population difficult. To address this, the [employer], based on best available evidence and recent guidelines, recommends that clinicians use age-adjusted D-dimer thresholds in patients greater than 50 years of age with: a) a low probability of PE who do not meet all Pulmonary Embolism Rule Out Criteria, or b) in those with intermediate  "probability of PE.   The formula for an age-adjusted D-dimer cut-off is \"age/100\".  For example, a 60 year old patient would have an age-adjusted cut-off of 0.60 MCGFEU/mL and an 80 year old 0.80 MCGFEU/mL.    CBC & Differential [[id number]]  (Abnormal) Collected: 09/24/23 1236    Specimen: Blood Updated: 09/24/23 1248    Narrative:      The following orders were created for panel order CBC & Differential.  Procedure                               Abnormality         Status                     ---------                               -----------         ------                     CBC Auto Differential[[id number]]        Abnormal            Final result                 Please view results for these tests on the individual orders.    CBC Auto Differential [[id number]]  (Abnormal) Collected: 09/24/23 1236    Specimen: Blood Updated: 09/24/23 1248     WBC 10.37 10*3/mm3      RBC 4.05 10*6/mm3      Hemoglobin 12.4 g/dL      Hematocrit 38.1 %      MCV 94.1 fL      MCH 30.6 pg      MCHC 32.5 g/dL      RDW 13.3 %      RDW-SD 45.6 fl      MPV 9.0 fL      Platelets 295 10*3/mm3      Neutrophil % 76.2 %      Lymphocyte % 16.5 %      Monocyte % 5.6 %      Eosinophil % 0.4 %      Basophil % 0.6 %      Immature Grans % 0.7 %      Neutrophils, Absolute 7.91 10*3/mm3      Lymphocytes, Absolute 1.71 10*3/mm3      Monocytes, Absolute 0.58 10*3/mm3      Eosinophils, Absolute 0.04 10*3/mm3      Basophils, Absolute 0.06 10*3/mm3      Immature Grans, Absolute 0.07 10*3/mm3      nRBC 0.0 /100 WBC               Independent imaging review  CT scan of the chest does show compression fractures in the mid thoracic region as well as around and T12.  CT scan of the lumbar spine shows a L3 compression fracture as well as a T12 compression fracture.    Assessment and plan  The patient does have low back pain.  He denies any lower extremity radiculopathy.  We are going to have the patient to go for an MRI of the lumbar spine to further evaluate the " fracture see if these are acute versus chronic.  If the fractures are acute we can look into a possible kyphoplasty procedure.  Given the fact the patient has been him difficulty with mobilization and that his caregiver is now in the hospital will likely require some sort of rehab placement post hospital stay.   to see the patient as well.  I did discuss the patient's situation with his daughters.  Further planning to come after MRI is completed        Intractable low back pain    Coronary artery disease involving native coronary artery of native heart without angina pectoris    Paroxysmal atrial fibrillation    Other emphysema    Essential hypertension    UTI (urinary tract infection)    Aspiration pneumonia    Stage 3b chronic kidney disease      I discussed the patient's findings and my recommendations with patient and family    Rohan Arizmendi, APRN  09/25/23  09:18 CDT    I spent 68 minutes caring for Brennon on this date of service. This time includes time spent by me in the following activities: preparing for the visit, reviewing tests, obtaining and/or reviewing a separately obtained history, performing a medically appropriate examination and/or evaluation, counseling and educating the patient/family/caregiver, ordering medications, tests, or procedures, referring and communicating with other health care professionals, documenting information in the medical record, independently interpreting results and communicating that information with the patient/family/caregiver, and care coordination

## 2023-09-25 NOTE — CASE MANAGEMENT/SOCIAL WORK
Continued Stay Note  VIVIAN Espino     Patient Name: Brennon Vance  MRN: [id number]  Today's Date: 9/25/2023    Admit Date: 9/24/2023        Discharge Plan       Row Name 09/25/23 1300       Plan    Plan Comments Attempted to see but pt is being taken off floor for MRI. Discussed with APRN earlier today about possible need for placement. Noted possible Kypho later in the week. Pt insurance will require a precert therefore PT/OT evaluations needed. Will follow up later.                   Discharge Codes    No documentation.                       FAISAL Nava

## 2023-09-25 NOTE — PLAN OF CARE
Goal Outcome Evaluation:  Plan of Care Reviewed With: patient        Progress: no change  Outcome Evaluation: Pt AxOx4 this AM,  Daughter stated pt would not tolerate MRI and needed something prior. 0.5 mg Ativan was given.  Pt returned from MRI, lethargic and with apneic breathing.  Pt would arouse but only for a few seconds and would not answer questions.  Family very upset,contacted, Dr. Prater he visited pt and stated to monitor pt.    Family still very concerned and requested CPAP, notified Dr. Prater and CPAP ordered and placed on pt.   BP 90/56 contacted Dr. Prater and 1L bolus ordred and currently infusing.   Pt incontinent.  Refused turns.  Family at bedside.

## 2023-09-25 NOTE — PLAN OF CARE
Goal Outcome Evaluation:                 Pt resting in bed currently, experienced some restlessness and confusion during the night. Pt on RA, VSS, adequate pain control. Meds administered without difficulty, daughter at bedside, pt and family educated on fall risk prevention. Will continue to monitor.

## 2023-09-25 NOTE — PROGRESS NOTES
Pharmacy Dosing Service  Antimicrobials  Zosyn    Assessment/Action/Plan:  Pharmacy to dose Zosyn for Pneumonia/UTI.   Patient is currently receiving: Zosyn 3.375 gm IV every 8 hours via EI.   Day 2 of therapy  End date: 9/29/23  Renal function has worsened this AM. Will adjust dose to 3.375 g IV every 12 hours at this time.  Pharmacy will continue to follow daily and adjust dose accordingly.     Subjective:  Brennon Vance is a 87 y.o. male     Objective:  Estimated Creatinine Clearance: 17.9 mL/min (A) (by C-G formula based on SCr of 1.94 mg/dL (H)).   Creatinine   Date Value Ref Range Status   09/25/2023 1.94 (H) 0.76 - 1.27 mg/dL Final   09/24/2023 1.69 (H) 0.76 - 1.27 mg/dL Final   05/21/2021 1.69 (H) 0.76 - 1.27 mg/dL Final   09/06/2019 1.40 (H) 0.60 - 1.30 mg/dL Final   03/18/2019 1.00 0.60 - 1.30 mg/dL Final   03/22/2018 0.8 0.5 - 1.2 mg/dL Final     Lab Results   Component Value Date    WBC 8.22 09/25/2023     Temp Readings from Last 1 Encounters:   09/25/23 97.7 °F (36.5 °C) (Oral)       Culture Results:  Microbiology Results (last 10 days)       Procedure Component Value - Date/Time    Urine Culture - Urine, Straight Cath [[id number]]  (Abnormal) Collected: 09/24/23 1247    Lab Status: Preliminary result Specimen: Urine from Straight Cath Updated: 09/25/23 1148     Urine Culture 25,000 CFU/mL Gram Negative Bacilli    Narrative:      Colonization of the urinary tract without infection is common. Treatment is discouraged unless the patient is symptomatic, pregnant, or undergoing an invasive urologic procedure.          Current Antimicrobials:   Anti-Infectives (From admission, onward)      Ordered     Dose/Rate Route Frequency Start Stop    09/24/23 1823  piperacillin-tazobactam (ZOSYN) 3.375 g in iso-osmotic dextrose 50 ml (premix)        Ordering Provider: Micki Solano MD    3.375 g  over 4 Hours Intravenous Every 8 Hours 09/25/23 0100 09/29/23 1659    09/24/23 1823  piperacillin-tazobactam (ZOSYN)  3.375 g in iso-osmotic dextrose 50 ml (premix)        Ordering Provider: Micki Solano MD    3.375 g  over 30 Minutes Intravenous Once 09/24/23 1915 09/24/23 2202    09/24/23 1637  Pharmacy to Dose Zosyn        Ordering Provider: Micki Solano MD     Does not apply Continuous PRN 09/24/23 1636 09/29/23 1635    09/24/23 1333  cefTRIAXone (ROCEPHIN) 2,000 mg in sodium chloride 0.9 % 100 mL IVPB        Ordering Provider: Erica Brown DO    2,000 mg  200 mL/hr over 30 Minutes Intravenous Once 09/24/23 1349 09/24/23 1431            Amador Mendez, PharmD  09/25/23 12:40 CDT

## 2023-09-26 ENCOUNTER — ANESTHESIA (OUTPATIENT)
Dept: PERIOP | Facility: HOSPITAL | Age: 88
End: 2023-09-26
Payer: MEDICARE

## 2023-09-26 ENCOUNTER — APPOINTMENT (OUTPATIENT)
Dept: GENERAL RADIOLOGY | Facility: HOSPITAL | Age: 88
End: 2023-09-26
Payer: MEDICARE

## 2023-09-26 ENCOUNTER — ANESTHESIA EVENT (OUTPATIENT)
Dept: PERIOP | Facility: HOSPITAL | Age: 88
End: 2023-09-26
Payer: MEDICARE

## 2023-09-26 PROBLEM — S32.030A CLOSED COMPRESSION FRACTURE OF THIRD LUMBAR VERTEBRA: Status: ACTIVE | Noted: 2023-09-24

## 2023-09-26 PROBLEM — S22.080G COMPRESSION FRACTURE OF T12 VERTEBRA WITH DELAYED HEALING: Status: ACTIVE | Noted: 2023-09-24

## 2023-09-26 LAB
ALBUMIN SERPL-MCNC: 3.4 G/DL (ref 3.5–5.2)
ALBUMIN/GLOB SERPL: 1.2 G/DL
ALP SERPL-CCNC: 90 U/L (ref 39–117)
ALT SERPL W P-5'-P-CCNC: 8 U/L (ref 1–41)
ANION GAP SERPL CALCULATED.3IONS-SCNC: 17 MMOL/L (ref 5–15)
AST SERPL-CCNC: 21 U/L (ref 1–40)
BASOPHILS # BLD AUTO: 0.04 10*3/MM3 (ref 0–0.2)
BASOPHILS NFR BLD AUTO: 0.5 % (ref 0–1.5)
BILIRUB SERPL-MCNC: 0.2 MG/DL (ref 0–1.2)
BUN SERPL-MCNC: 40 MG/DL (ref 8–23)
BUN/CREAT SERPL: 20.5 (ref 7–25)
CALCIUM SPEC-SCNC: 9 MG/DL (ref 8.6–10.5)
CHLORIDE SERPL-SCNC: 106 MMOL/L (ref 98–107)
CO2 SERPL-SCNC: 17 MMOL/L (ref 22–29)
CREAT SERPL-MCNC: 1.95 MG/DL (ref 0.76–1.27)
DEPRECATED RDW RBC AUTO: 48.1 FL (ref 37–54)
EGFRCR SERPLBLD CKD-EPI 2021: 33.1 ML/MIN/1.73
EOSINOPHIL # BLD AUTO: 0.09 10*3/MM3 (ref 0–0.4)
EOSINOPHIL NFR BLD AUTO: 1.2 % (ref 0.3–6.2)
ERYTHROCYTE [DISTWIDTH] IN BLOOD BY AUTOMATED COUNT: 13.7 % (ref 12.3–15.4)
GLOBULIN UR ELPH-MCNC: 2.9 GM/DL
GLUCOSE SERPL-MCNC: 85 MG/DL (ref 65–99)
HCT VFR BLD AUTO: 32.6 % (ref 37.5–51)
HGB BLD-MCNC: 10.3 G/DL (ref 13–17.7)
IMM GRANULOCYTES # BLD AUTO: 0.04 10*3/MM3 (ref 0–0.05)
IMM GRANULOCYTES NFR BLD AUTO: 0.5 % (ref 0–0.5)
LYMPHOCYTES # BLD AUTO: 1.54 10*3/MM3 (ref 0.7–3.1)
LYMPHOCYTES NFR BLD AUTO: 20.1 % (ref 19.6–45.3)
MCH RBC QN AUTO: 30.6 PG (ref 26.6–33)
MCHC RBC AUTO-ENTMCNC: 31.6 G/DL (ref 31.5–35.7)
MCV RBC AUTO: 96.7 FL (ref 79–97)
MONOCYTES # BLD AUTO: 0.71 10*3/MM3 (ref 0.1–0.9)
MONOCYTES NFR BLD AUTO: 9.3 % (ref 5–12)
NEUTROPHILS NFR BLD AUTO: 5.24 10*3/MM3 (ref 1.7–7)
NEUTROPHILS NFR BLD AUTO: 68.4 % (ref 42.7–76)
NRBC BLD AUTO-RTO: 0 /100 WBC (ref 0–0.2)
PLATELET # BLD AUTO: 257 10*3/MM3 (ref 140–450)
PMV BLD AUTO: 9.3 FL (ref 6–12)
POTASSIUM SERPL-SCNC: 4.2 MMOL/L (ref 3.5–5.2)
PROT SERPL-MCNC: 6.3 G/DL (ref 6–8.5)
RBC # BLD AUTO: 3.37 10*6/MM3 (ref 4.14–5.8)
SODIUM SERPL-SCNC: 140 MMOL/L (ref 136–145)
WBC NRBC COR # BLD: 7.66 10*3/MM3 (ref 3.4–10.8)

## 2023-09-26 PROCEDURE — A9270 NON-COVERED ITEM OR SERVICE: HCPCS | Performed by: NURSE PRACTITIONER

## 2023-09-26 PROCEDURE — 63710000001 MIRTAZAPINE 15 MG TABLET: Performed by: NURSE PRACTITIONER

## 2023-09-26 PROCEDURE — 76000 FLUOROSCOPY <1 HR PHYS/QHP: CPT

## 2023-09-26 PROCEDURE — 25010000002 ONDANSETRON PER 1 MG

## 2023-09-26 PROCEDURE — 25010000002 FENTANYL CITRATE (PF) 100 MCG/2ML SOLUTION: Performed by: NURSE ANESTHETIST, CERTIFIED REGISTERED

## 2023-09-26 PROCEDURE — 63710000001 APIXABAN 2.5 MG TABLET: Performed by: NURSE PRACTITIONER

## 2023-09-26 PROCEDURE — 25510000001 IOPAMIDOL 61 % SOLUTION: Performed by: NEUROLOGICAL SURGERY

## 2023-09-26 PROCEDURE — 63710000001 SODIUM BICARBONATE 650 MG TABLET: Performed by: NURSE PRACTITIONER

## 2023-09-26 PROCEDURE — 63710000001 GUAIFENESIN 600 MG TABLET SUSTAINED-RELEASE 12 HOUR: Performed by: NURSE PRACTITIONER

## 2023-09-26 PROCEDURE — 85025 COMPLETE CBC W/AUTO DIFF WBC: CPT | Performed by: STUDENT IN AN ORGANIZED HEALTH CARE EDUCATION/TRAINING PROGRAM

## 2023-09-26 PROCEDURE — 94799 UNLISTED PULMONARY SVC/PX: CPT

## 2023-09-26 PROCEDURE — 63710000001 AMOXICILLIN-CLAVULANATE 500-125 MG TABLET: Performed by: NURSE PRACTITIONER

## 2023-09-26 PROCEDURE — 94664 DEMO&/EVAL PT USE INHALER: CPT

## 2023-09-26 PROCEDURE — 63710000001 ROSUVASTATIN 20 MG TABLET: Performed by: NURSE PRACTITIONER

## 2023-09-26 PROCEDURE — 22515 PERQ VERTEBRAL AUGMENTATION: CPT | Performed by: NEUROLOGICAL SURGERY

## 2023-09-26 PROCEDURE — G0378 HOSPITAL OBSERVATION PER HR: HCPCS

## 2023-09-26 PROCEDURE — 25810000003 SODIUM CHLORIDE 0.9 % SOLUTION: Performed by: NURSE PRACTITIONER

## 2023-09-26 PROCEDURE — 25010000002 SUGAMMADEX 200 MG/2ML SOLUTION

## 2023-09-26 PROCEDURE — 25010000002 CEFAZOLIN PER 500 MG: Performed by: NURSE ANESTHETIST, CERTIFIED REGISTERED

## 2023-09-26 PROCEDURE — 63710000001 SENNOSIDES-DOCUSATE 8.6-50 MG TABLET: Performed by: NURSE PRACTITIONER

## 2023-09-26 PROCEDURE — C1713 ANCHOR/SCREW BN/BN,TIS/BN: HCPCS | Performed by: NEUROLOGICAL SURGERY

## 2023-09-26 PROCEDURE — 94660 CPAP INITIATION&MGMT: CPT

## 2023-09-26 PROCEDURE — 80053 COMPREHEN METABOLIC PANEL: CPT | Performed by: STUDENT IN AN ORGANIZED HEALTH CARE EDUCATION/TRAINING PROGRAM

## 2023-09-26 PROCEDURE — 99497 ADVNCD CARE PLAN 30 MIN: CPT | Performed by: CLINICAL NURSE SPECIALIST

## 2023-09-26 PROCEDURE — 99214 OFFICE O/P EST MOD 30 MIN: CPT | Performed by: CLINICAL NURSE SPECIALIST

## 2023-09-26 PROCEDURE — 36415 COLL VENOUS BLD VENIPUNCTURE: CPT | Performed by: STUDENT IN AN ORGANIZED HEALTH CARE EDUCATION/TRAINING PROGRAM

## 2023-09-26 PROCEDURE — 25010000002 VASOPRESSIN 20 UNIT/ML SOLUTION: Performed by: NURSE ANESTHETIST, CERTIFIED REGISTERED

## 2023-09-26 PROCEDURE — 63710000001 HYDROCODONE-ACETAMINOPHEN 5-325 MG TABLET: Performed by: NURSE PRACTITIONER

## 2023-09-26 PROCEDURE — 22513 PERQ VERTEBRAL AUGMENTATION: CPT | Performed by: NEUROLOGICAL SURGERY

## 2023-09-26 PROCEDURE — 72100 X-RAY EXAM L-S SPINE 2/3 VWS: CPT

## 2023-09-26 PROCEDURE — 25010000002 PROPOFOL 10 MG/ML EMULSION: Performed by: NURSE ANESTHETIST, CERTIFIED REGISTERED

## 2023-09-26 DEVICE — BONE CEMENT C01B HV-R WITH MIXER  US
Type: IMPLANTABLE DEVICE | Site: SPINE LUMBAR | Status: FUNCTIONAL
Brand: KYPHON® HV-R® BONE CEMENT AND KYPHON® MIXER PACK

## 2023-09-26 RX ORDER — SODIUM CHLORIDE 0.9 % (FLUSH) 0.9 %
10 SYRINGE (ML) INJECTION AS NEEDED
Status: DISCONTINUED | OUTPATIENT
Start: 2023-09-26 | End: 2023-10-05 | Stop reason: HOSPADM

## 2023-09-26 RX ORDER — CEFAZOLIN SODIUM 1 G/3ML
INJECTION, POWDER, FOR SOLUTION INTRAMUSCULAR; INTRAVENOUS AS NEEDED
Status: DISCONTINUED | OUTPATIENT
Start: 2023-09-26 | End: 2023-09-26 | Stop reason: SURG

## 2023-09-26 RX ORDER — SODIUM CHLORIDE 9 MG/ML
75 INJECTION, SOLUTION INTRAVENOUS CONTINUOUS
Status: DISCONTINUED | OUTPATIENT
Start: 2023-09-26 | End: 2023-09-27

## 2023-09-26 RX ORDER — PROPOFOL 10 MG/ML
VIAL (ML) INTRAVENOUS AS NEEDED
Status: DISCONTINUED | OUTPATIENT
Start: 2023-09-26 | End: 2023-09-26 | Stop reason: SURG

## 2023-09-26 RX ORDER — ACETAMINOPHEN 325 MG/1
650 TABLET ORAL EVERY 4 HOURS PRN
Status: DISCONTINUED | OUTPATIENT
Start: 2023-09-26 | End: 2023-09-26

## 2023-09-26 RX ORDER — ROCURONIUM BROMIDE 10 MG/ML
INJECTION, SOLUTION INTRAVENOUS AS NEEDED
Status: DISCONTINUED | OUTPATIENT
Start: 2023-09-26 | End: 2023-09-26 | Stop reason: SURG

## 2023-09-26 RX ORDER — ONDANSETRON 2 MG/ML
4 INJECTION INTRAMUSCULAR; INTRAVENOUS EVERY 6 HOURS PRN
Status: DISCONTINUED | OUTPATIENT
Start: 2023-09-26 | End: 2023-10-05 | Stop reason: HOSPADM

## 2023-09-26 RX ORDER — MORPHINE SULFATE 2 MG/ML
1 INJECTION, SOLUTION INTRAMUSCULAR; INTRAVENOUS EVERY 4 HOURS PRN
Status: DISCONTINUED | OUTPATIENT
Start: 2023-09-26 | End: 2023-09-29

## 2023-09-26 RX ORDER — CEFAZOLIN SODIUM 1 G/50ML
1000 INJECTION, SOLUTION INTRAVENOUS EVERY 8 HOURS
Status: COMPLETED | OUTPATIENT
Start: 2023-09-27 | End: 2023-09-27

## 2023-09-26 RX ORDER — SODIUM BICARBONATE 650 MG/1
650 TABLET ORAL 2 TIMES DAILY
Status: DISCONTINUED | OUTPATIENT
Start: 2023-09-26 | End: 2023-10-05 | Stop reason: HOSPADM

## 2023-09-26 RX ORDER — SODIUM CHLORIDE, SODIUM LACTATE, POTASSIUM CHLORIDE, CALCIUM CHLORIDE 600; 310; 30; 20 MG/100ML; MG/100ML; MG/100ML; MG/100ML
INJECTION, SOLUTION INTRAVENOUS CONTINUOUS PRN
Status: DISCONTINUED | OUTPATIENT
Start: 2023-09-26 | End: 2023-09-26 | Stop reason: SURG

## 2023-09-26 RX ORDER — SODIUM CHLORIDE 0.9 % (FLUSH) 0.9 %
3 SYRINGE (ML) INJECTION EVERY 12 HOURS SCHEDULED
Status: DISCONTINUED | OUTPATIENT
Start: 2023-09-26 | End: 2023-10-05 | Stop reason: HOSPADM

## 2023-09-26 RX ORDER — IOPAMIDOL 612 MG/ML
INJECTION, SOLUTION INTRATHECAL AS NEEDED
Status: DISCONTINUED | OUTPATIENT
Start: 2023-09-26 | End: 2023-09-26 | Stop reason: HOSPADM

## 2023-09-26 RX ORDER — SODIUM CHLORIDE 9 MG/ML
40 INJECTION, SOLUTION INTRAVENOUS AS NEEDED
Status: DISCONTINUED | OUTPATIENT
Start: 2023-09-26 | End: 2023-10-05 | Stop reason: HOSPADM

## 2023-09-26 RX ORDER — BUPIVACAINE HCL/0.9 % NACL/PF 0.125 %
PLASTIC BAG, INJECTION (ML) EPIDURAL AS NEEDED
Status: DISCONTINUED | OUTPATIENT
Start: 2023-09-26 | End: 2023-09-26 | Stop reason: SURG

## 2023-09-26 RX ORDER — BUPIVACAINE HYDROCHLORIDE AND EPINEPHRINE 2.5; 5 MG/ML; UG/ML
INJECTION, SOLUTION INFILTRATION; PERINEURAL AS NEEDED
Status: DISCONTINUED | OUTPATIENT
Start: 2023-09-26 | End: 2023-09-26 | Stop reason: HOSPADM

## 2023-09-26 RX ORDER — NALOXONE HCL 0.4 MG/ML
0.4 VIAL (ML) INJECTION
Status: DISCONTINUED | OUTPATIENT
Start: 2023-09-26 | End: 2023-10-05 | Stop reason: HOSPADM

## 2023-09-26 RX ORDER — FENTANYL CITRATE 50 UG/ML
INJECTION, SOLUTION INTRAMUSCULAR; INTRAVENOUS AS NEEDED
Status: DISCONTINUED | OUTPATIENT
Start: 2023-09-26 | End: 2023-09-26 | Stop reason: SURG

## 2023-09-26 RX ORDER — ONDANSETRON 2 MG/ML
INJECTION INTRAMUSCULAR; INTRAVENOUS AS NEEDED
Status: DISCONTINUED | OUTPATIENT
Start: 2023-09-26 | End: 2023-09-26 | Stop reason: SURG

## 2023-09-26 RX ORDER — LIDOCAINE HYDROCHLORIDE 20 MG/ML
INJECTION, SOLUTION EPIDURAL; INFILTRATION; INTRACAUDAL; PERINEURAL AS NEEDED
Status: DISCONTINUED | OUTPATIENT
Start: 2023-09-26 | End: 2023-09-26 | Stop reason: SURG

## 2023-09-26 RX ADMIN — FINASTERIDE 5 MG: 5 TABLET, FILM COATED ORAL at 09:20

## 2023-09-26 RX ADMIN — FENTANYL CITRATE 100 MCG: 50 INJECTION, SOLUTION INTRAMUSCULAR; INTRAVENOUS at 16:17

## 2023-09-26 RX ADMIN — SODIUM CHLORIDE 75 ML/HR: 9 INJECTION, SOLUTION INTRAVENOUS at 17:57

## 2023-09-26 RX ADMIN — HYDROCODONE BITARTRATE AND ACETAMINOPHEN 1 TABLET: 5; 325 TABLET ORAL at 09:31

## 2023-09-26 RX ADMIN — AMOXICILLIN AND CLAVULANATE POTASSIUM 500 MG: 500; 125 TABLET, FILM COATED ORAL at 09:19

## 2023-09-26 RX ADMIN — ONDANSETRON 4 MG: 2 INJECTION INTRAMUSCULAR; INTRAVENOUS at 16:57

## 2023-09-26 RX ADMIN — HYDROCODONE BITARTRATE AND ACETAMINOPHEN 1 TABLET: 5; 325 TABLET ORAL at 21:41

## 2023-09-26 RX ADMIN — SUGAMMADEX 200 MG: 100 INJECTION, SOLUTION INTRAVENOUS at 16:59

## 2023-09-26 RX ADMIN — LIDOCAINE HYDROCHLORIDE 20 MG: 20 INJECTION, SOLUTION EPIDURAL; INFILTRATION; INTRACAUDAL; PERINEURAL at 16:19

## 2023-09-26 RX ADMIN — BUDESONIDE AND FORMOTEROL FUMARATE DIHYDRATE 2 PUFF: 160; 4.5 AEROSOL RESPIRATORY (INHALATION) at 18:35

## 2023-09-26 RX ADMIN — GUAIFENESIN 600 MG: 600 TABLET, EXTENDED RELEASE ORAL at 00:21

## 2023-09-26 RX ADMIN — CARBAMAZEPINE 200 MG: 200 TABLET ORAL at 09:20

## 2023-09-26 RX ADMIN — MIRTAZAPINE 45 MG: 15 TABLET, FILM COATED ORAL at 20:10

## 2023-09-26 RX ADMIN — GUAIFENESIN 600 MG: 600 TABLET, EXTENDED RELEASE ORAL at 20:10

## 2023-09-26 RX ADMIN — ROCURONIUM BROMIDE 40 MG: 10 SOLUTION INTRAVENOUS at 16:19

## 2023-09-26 RX ADMIN — DILTIAZEM HYDROCHLORIDE 180 MG: 180 CAPSULE, EXTENDED RELEASE ORAL at 09:18

## 2023-09-26 RX ADMIN — Medication 10 ML: at 20:12

## 2023-09-26 RX ADMIN — LEVOTHYROXINE SODIUM 50 MCG: 50 TABLET ORAL at 05:40

## 2023-09-26 RX ADMIN — TAMSULOSIN HYDROCHLORIDE 0.4 MG: 0.4 CAPSULE ORAL at 09:18

## 2023-09-26 RX ADMIN — AMOXICILLIN AND CLAVULANATE POTASSIUM 500 MG: 500; 125 TABLET, FILM COATED ORAL at 00:21

## 2023-09-26 RX ADMIN — Medication 10 ML: at 09:00

## 2023-09-26 RX ADMIN — DOCUSATE SODIUM 50 MG AND SENNOSIDES 8.6 MG 2 TABLET: 8.6; 5 TABLET, FILM COATED ORAL at 20:10

## 2023-09-26 RX ADMIN — Medication 200 MCG: at 16:22

## 2023-09-26 RX ADMIN — APIXABAN 2.5 MG: 2.5 TABLET, FILM COATED ORAL at 20:10

## 2023-09-26 RX ADMIN — Medication 200 MCG: at 16:24

## 2023-09-26 RX ADMIN — PANTOPRAZOLE SODIUM 40 MG: 40 TABLET, DELAYED RELEASE ORAL at 09:18

## 2023-09-26 RX ADMIN — ROSUVASTATIN CALCIUM 20 MG: 20 TABLET, FILM COATED ORAL at 00:21

## 2023-09-26 RX ADMIN — Medication 10 ML: at 00:22

## 2023-09-26 RX ADMIN — SODIUM BICARBONATE 650 MG: 650 TABLET ORAL at 20:10

## 2023-09-26 RX ADMIN — DOCUSATE SODIUM 50 MG AND SENNOSIDES 8.6 MG 2 TABLET: 8.6; 5 TABLET, FILM COATED ORAL at 09:18

## 2023-09-26 RX ADMIN — BUDESONIDE AND FORMOTEROL FUMARATE DIHYDRATE 2 PUFF: 160; 4.5 AEROSOL RESPIRATORY (INHALATION) at 06:13

## 2023-09-26 RX ADMIN — APIXABAN 2.5 MG: 2.5 TABLET, FILM COATED ORAL at 00:21

## 2023-09-26 RX ADMIN — POTASSIUM CHLORIDE 20 MEQ: 1500 TABLET, EXTENDED RELEASE ORAL at 09:18

## 2023-09-26 RX ADMIN — AMOXICILLIN AND CLAVULANATE POTASSIUM 500 MG: 500; 125 TABLET, FILM COATED ORAL at 20:10

## 2023-09-26 RX ADMIN — CEFAZOLIN 2 G: 330 INJECTION, POWDER, FOR SOLUTION INTRAMUSCULAR; INTRAVENOUS at 16:25

## 2023-09-26 RX ADMIN — ROSUVASTATIN CALCIUM 20 MG: 20 TABLET, FILM COATED ORAL at 20:10

## 2023-09-26 RX ADMIN — Medication 3 ML: at 20:10

## 2023-09-26 RX ADMIN — CARVEDILOL 12.5 MG: 6.25 TABLET, FILM COATED ORAL at 09:17

## 2023-09-26 RX ADMIN — Medication 1000 UNITS: at 09:20

## 2023-09-26 RX ADMIN — SODIUM CHLORIDE, POTASSIUM CHLORIDE, SODIUM LACTATE AND CALCIUM CHLORIDE: 600; 310; 30; 20 INJECTION, SOLUTION INTRAVENOUS at 16:16

## 2023-09-26 RX ADMIN — HYDROXYZINE HYDROCHLORIDE 50 MG: 25 TABLET, FILM COATED ORAL at 10:54

## 2023-09-26 RX ADMIN — ISOSORBIDE MONONITRATE 60 MG: 60 TABLET, EXTENDED RELEASE ORAL at 09:17

## 2023-09-26 RX ADMIN — PROPOFOL 50 MG: 10 INJECTION, EMULSION INTRAVENOUS at 16:19

## 2023-09-26 NOTE — ANESTHESIA PROCEDURE NOTES
Airway  Date/Time: 9/26/2023 4:20 PM    General Information and Staff    Anesthesiologist: Rohan Gamez MD    Indications and Patient Condition    Preoxygenated: yes  Mask difficulty assessment: 0 - not attempted    Final Airway Details  Final airway type: endotracheal airway      Successful airway: ETT  Cuffed: yes   Successful intubation technique: direct laryngoscopy  Endotracheal tube insertion site: oral  Blade: Sahni  Blade size: 2  ETT size (mm): 7.5  Placement verified by: chest auscultation, capnometry and radiography   Measured from: lips  ETT/EBT  to lips (cm): 21  Number of attempts at approach: 1  Assessment: lips, teeth, and gum same as pre-op and atraumatic intubation

## 2023-09-26 NOTE — PLAN OF CARE
Goal Outcome Evaluation:   A&O. RA. Pt resting. Complained of back pain, pain medication per chart given. No complaints of SOB, no fever noted. NPO for upcoming surgery.. Family at bedside. SCDs. VSS.

## 2023-09-26 NOTE — PROGRESS NOTES
[facility] Medicine Services  INPATIENT PROGRESS NOTE    Patient Name: Brennon Vance  Date of Admission: 9/24/2023  Today's Date: 09/26/23  Length of Stay: 0  Primary Care Physician: Javid Grajeda MD    Subjective   Chief Complaint: Low back pain  HPI  Mr. Vance is an 87-year-old male who presented to [facility] on 9/24 with worsening lower back pain with radiation to the left leg.  Patient's wife is also admitted to our facility.  Daughters would like for patient to go to rehab at time of discharge.  In the ED, he was found to have a urinary tract infection and CTA of the chest interpreted by radiology showed possible aspiration pneumonia and moderate emphysematous changes. CT of the lumbar spine showed chronic appearing T12 and L3 compression fractures with no signs of recent lumbar spine surgery or significant stenosis. Patient was started on ceftriaxone and given morphine and Dilaudid in the ED.     Sitting up in bed with nurse aides at bedside.  States he is feeling well today.  Yesterday he required Ativan to tolerate MRI and developed lethargy and apneic breathing in addition to hypotension.  He was given fluid bolus and treated with CPAP.  Today he is awake and alert.  On room air.  Denies any acute complaints.    Review of Systems   All pertinent negatives and positives are as above. All other systems have been reviewed and are negative unless otherwise stated.     Objective    Temp:  [97.2 °F (36.2 °C)-98 °F (36.7 °C)] 98 °F (36.7 °C)  Heart Rate:  [68-87] 81  Resp:  [10-25] 16  BP: ()/(46-82) 143/59  Physical Exam  Vitals reviewed.   Constitutional:       General: He is not in acute distress.     Appearance: He is not toxic-appearing.      Comments: Lying in bed.  No acute distress.  On room air.  No family at bedside.   HENT:      Head: Normocephalic and atraumatic.      Mouth/Throat:      Mouth: Mucous membranes are moist.      Pharynx:  Oropharynx is clear.   Eyes:      Extraocular Movements: Extraocular movements intact.      Conjunctiva/sclera: Conjunctivae normal.      Pupils: Pupils are equal, round, and reactive to light.   Cardiovascular:      Rate and Rhythm: Normal rate and regular rhythm.      Pulses: Normal pulses.   Pulmonary:      Effort: Pulmonary effort is normal. No respiratory distress.      Breath sounds: Normal breath sounds. No wheezing or rhonchi.   Abdominal:      General: Bowel sounds are normal. There is no distension.      Palpations: Abdomen is soft.      Tenderness: There is no abdominal tenderness.   Musculoskeletal:         General: No swelling or tenderness. Normal range of motion.      Cervical back: Normal range of motion and neck supple. No muscular tenderness.   Skin:     General: Skin is warm and dry.      Findings: No erythema or rash.   Neurological:      General: No focal deficit present.      Mental Status: He is alert and oriented to person, place, and time.      Cranial Nerves: No cranial nerve deficit.      Motor: No weakness.   Psychiatric:         Mood and Affect: Mood normal.         Behavior: Behavior normal.     Results Review:  I have reviewed the labs, radiology results, and diagnostic studies.    Laboratory Data:   Results from last 7 days   Lab Units 09/26/23  0522 09/25/23  0646 09/24/23  1236   WBC 10*3/mm3 7.66 8.22 10.37   HEMOGLOBIN g/dL 10.3* 10.5* 12.4*   HEMATOCRIT % 32.6* 33.2* 38.1   PLATELETS 10*3/mm3 257 268 295          Results from last 7 days   Lab Units 09/26/23  0522 09/25/23  0646 09/24/23  1236   SODIUM mmol/L 140 136 136   POTASSIUM mmol/L 4.2 5.0 4.8   CHLORIDE mmol/L 106 102 101   CO2 mmol/L 17.0* 15.0* 14.0*   BUN mg/dL 40* 40* 40*   CREATININE mg/dL 1.95* 1.94* 1.69*   CALCIUM mg/dL 9.0 9.1 9.3   BILIRUBIN mg/dL 0.2 0.2 0.3   ALK PHOS U/L 90 94 109   ALT (SGPT) U/L 8 6 7   AST (SGOT) U/L 21 15 17   GLUCOSE mg/dL 85 103* 88         Culture Data:   Microbiology Results (last 10  days)       Procedure Component Value - Date/Time    Urine Culture - Urine, Straight Cath [[id number]]  (Abnormal) Collected: 09/24/23 1247    Lab Status: Preliminary result Specimen: Urine from Straight Cath Updated: 09/26/23 0712     Urine Culture 25,000 CFU/mL Pseudomonas aeruginosa    Narrative:      Colonization of the urinary tract without infection is common. Treatment is discouraged unless the patient is symptomatic, pregnant, or undergoing an invasive urologic procedure.          Radiology Data:   Imaging Results (Last 24 Hours)       Procedure Component Value Units Date/Time    MRI Lumbar Spine Without Contrast [[id number]] Collected: 09/25/23 1415     Updated: 09/25/23 1426    Narrative:      EXAMINATION: MRI LUMBAR SPINE WO CONTRAST-     9/25/2023 1:15 PM CDT     HISTORY: compression fracture; N30.00-Acute cystitis without hematuria;  M54.40-Lumbago with sciatica, unspecified side; R06.02-Shortness of  breath.     Detail is limited by patient motion.     FINDINGS:  Noncontrast MR imaging of the lumbar spine.  Axial, sagittal, and coronal sequences.     12 degrees LEFT convex scoliosis.  Mildly exaggerated lordosis.  T12 and L3 superior endplate compression fractures.  Recent injuries with marrow edema.  T12 with 35% loss of height.  L3 with 50% loss of height.  Normal conus medullaris.  Endograft present. Maximum infrarenal aortic diameter of 41-42 mm.     T11-T12:  No disc abnormality or stenosis.     T12-L1:  No significant disc abnormality or stenosis is seen.     L1-L2:  No significant disc abnormality or stenosis is seen.     L2-L3:  Mild facet and ligamentous hypertrophy.  No significant disc abnormality or stenosis.     L3-L4:  Mild facet and ligamentous hypertrophy.  Mild broad-based disc protrusion with mild inferior foraminal narrowing  on both sides.     L4-L5:  Moderate facet hypertrophy.  Mild relative foraminal narrowing.  No high-grade stenosis.     L5-S1:  Mild facet hypertrophy.  Mild  relative foraminal narrowing.       Impression:      1.  Recent superior endplate compression deformities of T12 and L3 with  marrow edema.  2.  Multilevel degenerative disc and facet changes. No more than mild  foraminal narrowing.              This report was signed and finalized on 9/25/2023 2:23 PM CDT by Dr. Johnny Lubin MD.               I have reviewed the patient's current medications.     Assessment/Plan   Assessment  Active Hospital Problems    Diagnosis     **Intractable low back pain     UTI (urinary tract infection)     Aspiration pneumonia     Stage 3b chronic kidney disease     Paroxysmal atrial fibrillation     Other emphysema     Essential hypertension     Coronary artery disease involving native coronary artery of native heart without angina pectoris        Treatment Plan  Neurosurgery, Dr. Brantley consulted.  CT of the lumbar spine showed chronic appearing T12 and L3 compression fractures with no signs of recent lumbar spine surgery or significant stenosis. MRI lumbar spine ordered by neurosurgery.  Discussed with CONCEPCION Garcia - planning for kyphoplasty this evening.    CTA showed aspiration pneumonia.  He passed bedside swallow.  On room air.  No fever.  Normal WBC. Zosyn de-escalated to Augmentin.      Urinalysis showed trace blood, positive nitrate, moderate leukocyte, TNTC WBC and trace bacteria.  He denies any urinary symptoms.  Zosyn de-escalated to Augmentin.  Follow urine culture.    He has a history of paroxysmal atrial fibrillation chronically anticoagulated on Eliquis.  Continue Cardizem and Coreg.    Consult PT/OT.    Eliquis will serve as DVT prophylaxis.    He will need follow-up CT chest in 12 months to monitor 4 mm right upper lobe pulmonary nodule.    Palliative care consult per family request.    Medical Decision Making  Number and Complexity of problems: 3 acute problems in the form of intractable low back pain, aspiration pneumonia and urinary tract  infection  Differential Diagnosis: None considered at present    Conditions and Status        Condition is unchanged.     [facility] Data  External documents reviewed: Prior epic records  Cardiac tracing (EKG, telemetry) interpretation: Sinus rhythm  Radiology interpretation: Interpreted by radiology  Labs reviewed: As above  Any tests that were considered but not ordered: None considered at present     Decision rules/scores evaluated (example [id number], Wells, etc): None considered at present     Discussed with: Patient and Dr. Prater     Care Planning  Shared decision making: Patient is agreeable to ongoing work-up and treatment  Code status and discussions: Full code with full interventions    Disposition  Social Determinants of Health that impact treatment or disposition: SNF -family is requesting placement  I expect the patient to be discharged to SNF in 2-3 days.     Electronically signed by CONCEPCION Da Silva, 09/26/23, 08:31 CDT.

## 2023-09-26 NOTE — CONSULTS
[facility]    Palliative Care Initial Consult   Attending Physician: Chaitanya Prater MD  Referring Provider: Rhoda Keating RN/Chaitanya Prater MD    Reason for Referral: pain  Family/Support: Spouse and children    Code Status and Medical Interventions:   Ordered at: 09/24/23 1620     Medical Intervention Limits:    NO intubation (DNI)     Level Of Support Discussed With:    Patient    Health Care Surrogate     Code Status (Patient has no pulse and is not breathing):    CPR (Attempt to Resuscitate)     Medical Interventions (Patient has pulse or is breathing):    Limited Support     Goals of Care: TBD.    HPI:   87 y.o. male has a past medical history of coronary artery disease, chronic obstructive pulmonary disease, Hiatal hernia, Hyperlipidemia, Hypertension, chronic low back pain with chronic spinal compression fracture, paroxysmal atrial fibrillation-on Eliquis, BPH, and Stroke. Patient presented to [facility] on 9/24/2023 related to lower back pain with radiation to left leg.  ED work-up showed creatinine 1.69 anemia, elevated D-dimer, urinalysis positive nitrite and numerous WBCs, urine culture with Pseudomonas aeruginosa.  CT angiogram chest negative for PE, positive for mild left lower lobe bronchial thickening with airway secretions which can be seen with aspiration, moderate emphysematous changes, 4 mm right upper lobe pulmonary nodule previously 3 mm in 2018, severe left subclavian artery origin stenosis.  MRI lumbar spine showed recent superior endplate compression deformities of T12 and L3 with marrow edema, multilevel degenerative disc and facet changes.  No more than mild foraminal narrowing. Treated with antibiotics and started on opiates for pain management.  Neurosurgery consult placed.  Apparently patient required Ativan to tolerate MRI developed lethargy and apneic breathing in addition to hypotension.  Patient was treated with  "CPAP and fluid bolus.  Appears patient is taking Tegretol as a home medication.  Last dose of opiates yesterday at 0548, 7.5 mg oral morphine equivalent.  Laying in bed without apparent distress.  States pain is fairly well controlled without movement currently.  Reports that pain is substantial 10/10 when repositioned by staff.  Reports he had an incident a few months ago on the right side which improved with time after bending over and lifting his small dog up on the bed \"heard a pop\".  Most recently he was reaching for a paper sack and again same experience, but on left side and this continues to be bothersome for him.  He is hopeful for some improvement in pain management and plans to undergo kyphoplasty this afternoon with Dr. Brantley.  States that his wife is also hospitalized in this facility after an injury to her foot and his 2 daughters are present in hospital to look in on them.  States that he would like to have a cigarette but declines nicotine patch.  He is a longtime smoker since the age of 16 years old, half pack per day and no plans to stop smoking. States that he has been pretty sleepy with medications \"I just don't take much medicine other than for my heart\". He takes Tegretol for trigeminal neuralgia.  He is aware of left-sided subclavian artery stenosis and reports evaluated by a physician in the past who recommended an intervention and he declined. States at times his left arm gets weak \"but why mess with it, just let me live my life\". He is without other complaint.     Advance Care Planning care conference with patient and 2 daughters Homa and Gaby.  Explored events leading to current hospitalization.  According to daughters patient's spouse who is currently hospitalized is patient's primary caregiver.  Family report history of \"memory issues\".  They have concerns with regard to discharge plan given patient's spouse acute caregiver needs/health issues and recent findings of a brain tumor.  " Family is hopeful for short-term rehab placement and considering long-term care options in the near future.  Family would like to speak further with  to discuss discharge options.  We further reviewed medical priorities including CODE STATUS and medical interventions and patient and family have elected to de-escalate care measures to no CPR/limited support interventions, no intubation.  Patient verbalizes that he has a living will directive and this is in line with his wishes.  Patient and family aware that during surgical procedure CODE STATUS will be rescinded temporarily and may return postprocedure as prior.  Further questions encouraged and support given.     Review of Systems   Constitutional: Positive for malaise/fatigue.   Cardiovascular:  Negative for leg swelling.   Respiratory:  Negative for shortness of breath.    Hematologic/Lymphatic: Negative for bleeding problem.   Skin:  Negative for poor wound healing.   Musculoskeletal:  Positive for back pain.   Gastrointestinal:  Negative for nausea.   Genitourinary:  Positive for bladder incontinence.   Psychiatric/Behavioral:  The patient is not nervous/anxious.      1- Pain Assessment  Pain Location: back  Pain Description: constant, aching    Past Medical History:   Diagnosis Date    CAD (coronary artery disease)     COPD (chronic obstructive pulmonary disease)     Hiatal hernia     Hyperlipidemia     Hypertension     Stroke      Past Surgical History:   Procedure Laterality Date    ABDOMINAL AORTIC ANEURYSM REPAIR      CARDIAC CATHETERIZATION N/A 5/20/2021    Procedure: Left Heart Cath;  Surgeon: Harrison Alcantara MD;  Location:  [facility] CATH [facility]AS[facility];  Service: Cardiology;  Laterality: N/A;    CORONARY ANGIOPLASTY WITH STENT PLACEMENT      FEMORAL ARTERY STENT      LEG THROMBECTOMY/EMBOLECTOMY Right 5/20/2021    Procedure: RT GROIN EXPLORATION;  Surgeon: Geoff Remy DO;  [facility] OR 12;  Service: Vascular;   Laterality: Right;    TRIGEMINAL NERVE DECOMPRESSION       Social History     Socioeconomic History    Marital status:    Tobacco Use    Smoking status: Every Day     Packs/day: 0.50     Types: Cigarettes   Vaping Use    Vaping Use: Never used   Substance and Sexual Activity    Alcohol use: No    Drug use: No    Sexual activity: Not Currently         Current Facility-Administered Medications:     acetaminophen (TYLENOL) tablet 650 mg, 650 mg, Oral, Q4H PRN **OR** acetaminophen (TYLENOL) 160 MG/5ML oral solution 650 mg, 650 mg, Oral, Q4H PRN **OR** acetaminophen (TYLENOL) suppository 650 mg, 650 mg, Rectal, Q4H PRN, Micki Solano MD    albuterol (PROVENTIL) nebulizer solution 0.083% 2.5 mg/3mL, 2.5 mg, Nebulization, Q4H PRN, Micki Solano MD    amoxicillin-clavulanate (AUGMENTIN) 500-125 MG per tablet 500 mg, 1 tablet, Oral, BID, Radha Aaron, APRN, 500 mg at 09/26/23 0021    apixaban (ELIQUIS) tablet 2.5 mg, 2.5 mg, Oral, Q12H, Micki Solano MD, 2.5 mg at 09/26/23 0021    sennosides-docusate (PERICOLACE) 8.6-50 MG per tablet 2 tablet, 2 tablet, Oral, BID, 2 tablet at 09/25/23 0806 **AND** polyethylene glycol (MIRALAX) packet 17 g, 17 g, Oral, Daily PRN **AND** bisacodyl (DULCOLAX) EC tablet 5 mg, 5 mg, Oral, Daily PRN **AND** bisacodyl (DULCOLAX) suppository 10 mg, 10 mg, Rectal, Daily PRN, Micki Solano MD    budesonide-formoterol (SYMBICORT) 160-4.5 MCG/ACT inhaler 2 puff, 2 puff, Inhalation, BID - RT, Micki Solano MD, 2 puff at 09/26/23 0613    carBAMazepine (TEGretol) tablet 200 mg, 200 mg, Oral, Daily, Micki Solano MD, 200 mg at 09/25/23 0807    carvedilol (COREG) tablet 12.5 mg, 12.5 mg, Oral, BID With Meals, Micki Solano MD, 12.5 mg at 09/25/23 0806    cholecalciferol (VITAMIN D3) tablet 1,000 Units, 1,000 Units, Oral, Daily, Micki Solano MD, 1,000 Units at 09/25/23 0806    dilTIAZem CD (CARDIZEM CD) 24 hr capsule 180 mg, 180 mg, Oral, Daily, Russ  MD Micki, 180 mg at 09/25/23 0808    finasteride (PROSCAR) tablet 5 mg, 5 mg, Oral, Daily, Micki Solano MD, 5 mg at 09/25/23 0808    furosemide (LASIX) tablet 40 mg, 40 mg, Oral, Daily, Micki Solano MD, 40 mg at 09/25/23 0807    guaiFENesin (MUCINEX) 12 hr tablet 600 mg, 600 mg, Oral, Q12H, Micki Solano MD, 600 mg at 09/26/23 0021    HYDROcodone-acetaminophen (NORCO) 5-325 MG per tablet 1 tablet, 1 tablet, Oral, Q4H PRN, Micki Solano MD, 1 tablet at 09/24/23 2327    HYDROcodone-acetaminophen (NORCO) 7.5-325 MG per tablet 2 tablet, 2 tablet, Oral, Q4H PRN, Micki Solano MD, 2 tablet at 09/25/23 0548    hydrOXYzine (ATARAX) tablet 50 mg, 50 mg, Oral, BID PRN, Rohan Arizmendi, CONCEPCION    isosorbide mononitrate (IMDUR) 24 hr tablet 60 mg, 60 mg, Oral, Daily, Micki Solano MD, 60 mg at 09/25/23 0807    levothyroxine (SYNTHROID, LEVOTHROID) tablet 50 mcg, 50 mcg, Oral, Q AM, Micki Solano MD, 50 mcg at 09/26/23 0540    melatonin tablet 5 mg, 5 mg, Oral, Nightly PRN, Micki Solano MD, 5 mg at 09/24/23 2327    mirtazapine (REMERON) tablet 45 mg, 45 mg, Oral, Nightly, Micki Solano MD, 45 mg at 09/24/23 2130    pantoprazole (PROTONIX) EC tablet 40 mg, 40 mg, Oral, Daily, Micki Solano MD, 40 mg at 09/25/23 0805    potassium chloride (K-DUR,KLOR-CON) CR tablet 20 mEq, 20 mEq, Oral, Daily, Micki Solano MD, 20 mEq at 09/25/23 0807    rosuvastatin (CRESTOR) tablet 20 mg, 20 mg, Oral, Nightly, Micki Solano MD, 20 mg at 09/26/23 0021    sodium chloride 0.9 % flush 10 mL, 10 mL, Intravenous, Q12H, Micki Solano MD, 10 mL at 09/26/23 0022    sodium chloride 0.9 % flush 10 mL, 10 mL, Intravenous, PRN, Micki Solano MD    sodium chloride 0.9 % infusion 40 mL, 40 mL, Intravenous, PRN, Micki Solano MD, 40 mL at 09/25/23 0808    tamsulosin (FLOMAX) 24 hr capsule 0.4 mg, 0.4 mg, Oral, Daily, Micki Solano MD, 0.4 mg at 09/25/23  "0806    Allergies   Allergen Reactions    Lyrica [Pregabalin] Other (See Comments)     Passing out/syncope     I have utilized all available immediate resources to obtain, update, or review the patient's current medications (including all prescriptions, over-the-counter products, herbals, cannabis/cannabidiol products, and vitamin/mineral/dietary (nutritional) supplements) for name, route of administration, type, dose and frequency.      Intake/Output Summary (Last 24 hours) at 9/26/2023 0746  Last data filed at 9/25/2023 1421  Gross per 24 hour   Intake 240 ml   Output --   Net 240 ml       Physical Exam:    Diagnostics: Reviewed  /62 (BP Location: Right arm, Patient Position: Lying)   Pulse 85   Temp 97.8 °F (36.6 °C) (Oral)   Resp 16   Ht 177.8 cm (70\")   Wt 45.5 kg (100 lb 3.2 oz)   SpO2 96%   BMI 14.38 kg/m²     Vitals and nursing note reviewed.   Constitutional:       Appearance: Not in distress. Cachectic and frail.   Eyes:      General: Lids are normal.      Pupils: Pupils are equal, round, and reactive to light.   HENT:      Head: Normocephalic.   Pulmonary:      Effort: Pulmonary effort is normal.   Cardiovascular:      Normal rate.   Edema:     Peripheral edema absent.   Abdominal:      Palpations: Abdomen is soft.   Musculoskeletal: Normal range of motion.      Cervical back: Neck supple. Skin:     General: Skin is warm.   Genitourinary:     Comments: No reported dysuria  Neurological:      Mental Status: Alert, oriented to person, place, and time and oriented to person, place and time.   Psychiatric:         Mood and Affect: Mood normal.         Cognition and Memory: Cognition normal.     Patient status: Disease state: Controlled with current treatments.  Current Functional status: Palliative Performance Scale Score: Performance 60% based on the following measures: Ambulation: Reduced, Activity and Evidence of Disease: Unable to do hobby or some work, significant evidence of disease, " Self-Care: Occasional assist necessary,  Intake: Normal or reduced, LOC: Full or confusion   Baseline Functional status: Palliative Performance Scale Score: Performance 60% based on the following measures: Ambulation: Reduced, Activity and Evidence of Disease: Unable to do hobby or some work, significant evidence of disease, Self-Care: Occasional assist necessary,  Intake: Normal or reduced, LOC: Full or confusion   Nutritional status: Albumin 3.4 Body mass index is 14.38 kg/m².         Hospital Problem List      Intractable low back pain    Coronary artery disease involving native coronary artery of native heart without angina pectoris    Paroxysmal atrial fibrillation    Other emphysema    Essential hypertension    UTI (urinary tract infection)    Aspiration pneumonia    Stage 3b chronic kidney disease    Impression/Problem List:    Intractable low back pain     Compression deformities of T12 and L3, recent superior endplate  Multilevel degenerative disc disease  Coronary artery disease  Chronic obstructive pulmonary disease, emphysema  Paroxysmal atrial fibrillation  Pulmonary nodule, right upper lobe 4 mm previously 3 mm in 2018  Subclavian artery stenosis, severe left  Anemia  Hypertension  Urinary tract infection, Pseudomonas aeruginosa?  Colonization  Aspiration pneumonia  Chronic kidney disease, stage III  Hyperlipidemia  History of stroke  Advanced age       Recommendations/Plan:  1. plan: Goals of care include CODE STATUS CPR/limited support interventions per attending.    Family support: The patient receives support from his wife and children..  Advance Directives: Advance Directive Status: Patient does not have advance directive   POA/Healthcare [id number] next of kin.    2.  Palliative care encounter  - Prognosis is good long-term secondary to compression deformity, degenerative disc disease, multiple comorbidities, and advanced age.  -Patient appears to have fair prognostic awareness.      -Treated with antibiotics and started on opiates for pain management.   -Neurosurgery consult placed.    9/25-Apparently patient required Ativan to tolerate MRI developed lethargy and apneic breathing in addition to hypotension.  Patient was treated with CPAP and fluid bolus.      3.  Intractable back pain   -Tegretol as a home medication.   -Lyrica intolerance documented  -Followed by neurosurgery, MRI shows recent superior endplate compression deformities of T12 and L3.  Plans for kyphoplasty this afternoon per neurosurgery.  Discussed with CONCEPCION Madrigal.  -Opiates as needed.  Minimal usage noted.  Would continue at this juncture.      Thank you for this consult and allowing us to participate in patient's plan of care. Palliative Care Team will continue to follow patient.     35 minutes spent on advance care planning-discussing with patient and/or family, answering questions, providing some guidance about a plan and documentation of care, and coordinating care face to face.     CONCEPCION Alba  9/26/2023  07:46 CDT

## 2023-09-26 NOTE — PLAN OF CARE
Goal Outcome Evaluation:  Plan of Care Reviewed With: patient        Progress: improving  Outcome Evaluation: A&Ox2, disoriented to situation and place. Pt was very lethargic at beginning of shift from medication admin prior to this shift. RA with . Pt became more arousable as shift progressed. Pt assisted with positioning. Incont with brief. Pt reports  no c/o pain , thus far. Daughter at bs. Call ligt within reach. Safety maintained.

## 2023-09-26 NOTE — OP NOTE
Procedure Note  Preop Diagnosis: Compression fracture of T12 vertebra with delayed healing [S22.080G]  Closed compression fracture of L3 lumbar vertebra with delayed healing, subsequent encounter [S32.030G]    Post-Op Diagnosis Codes:     * Compression fracture of T12 vertebra with delayed healing [S22.080G]     * Closed compression fracture of L3 lumbar vertebra with delayed healing, subsequent encounter [S32.030G]     Procedure Name: T12 and L3 Kyphoplasty and vertebral body biopsy    Indications:  A MRI of the T12 and L3 revealed findings of compression fracture of T12 and L3.  The patient now presents for T12 and L3 kyphoplasty and vertebral biopsy after discussing therapeutic alternatives.          Surgeon: Jeremiah Brantley MD     Assistants: none    Anesthesia: General endotracheal anesthesia    ASA Class: 3    Procedure Details   After obtaining informed consent, having the risks and benefits of the procedure explained including but not limited to infection, bleeding, paralysis, spinal fluid leak, bowel bladder incontinence, extravasation of kyphoplasty cement resulting in neurocompression, pulmonary embolism, stroke, coma, and death, the patient was brought to the operating room.  The patient was given general anesthesia via an endotracheal tube. The patient was flipped prone onto a Marcin axis table.  Portable fluoroscopy was used to localize level of T12 and L3 . Preplanned incisions of the left and right of midline were then marked with an indelible marker.  The patient was then prepped and draped in a standard sterile fashion.  The preplanned incisions were infiltrated with Marcaine and epinephrine.  A 10 blade scalpel was used to make an incision at the dermis and epidermis.  Jamshidi needles were then advanced to the pedicles at the identified levels on the left and the right under direct fluoroscopic guidance.  The inner cannula was removed.  Hand drill was then used to drill channel through the  fractured vertebral bodies from the left and the right under direct fluoroscopic guidance.  Kyphoplasty balloons were then inserted from the left side and the right side under direct fluoroscopic guidance into the vertebral bodies.  The balloons were inflated and deflated and then removed.  And then several syringes of kyphoplasty cement were injected into the fractured vertebral bodies under direct fluoroscopic guidance from the left and the right.  The cement was allowed to harden.  The Jamshidi needles were removed.  The wounds were then irrigated with an antibiotic solution and closed with Mastisol and Steri-Strips.  All sponge, needle and instrument counts were correct at the end of the procedure.  The patient was extubated in stable condition and returned to recovery room with about 10 mL of blood loss.        Findings:  Pathologic osteoporotic compression fracture T12, L3        Estimated Blood Loss:   10           Drains: none           Total IV Fluids: ml           Specimens: None           Implants:   Implant Name Type Inv. Item Serial No.  Lot No. LRB No. Used Action   KT MIXER KYPHON W/CMT BONE KYPHX HV R - [id number] Implant KT MIXER KYPHON W/CMT BONE KYPHX HV R  [employer] NR N/A 1 Implanted              Complications:  none           Disposition: PACU - hemodynamically stable.           Condition: stable        Jeremiah Brantley MD

## 2023-09-26 NOTE — ANESTHESIA PREPROCEDURE EVALUATION
Anesthesia Evaluation     Patient summary reviewed and Nursing notes reviewed   NPO Solid Status: > 8 hours  NPO Liquid Status: > 8 hours           Airway   Mallampati: II  No difficulty expected  Dental - normal exam     Pulmonary - normal exam   (+) pneumonia , COPD,  Cardiovascular - normal exam  Exercise tolerance: good (4-7 METS)    ECG reviewed  PT is on anticoagulation therapy    (+) hypertension, past MI , CAD, dysrhythmias Atrial Fib, hyperlipidemia      Neuro/Psych  (+) CVA  GI/Hepatic/Renal/Endo    (+) hiatal hernia, renal disease    Musculoskeletal     Abdominal    Substance History      OB/GYN          Other                      Anesthesia Plan    ASA 3     general     (Ppoor historian)    Anesthetic plan, risks, benefits, and alternatives have been provided, discussed and informed consent has been obtained with: patient.    Plan discussed with CRNA.    CODE STATUS:    Medical Intervention Limits: NO intubation (DNI)  Level Of Support Discussed With: Patient; Next of Kin (If No Surrogate)  Code Status (Patient has no pulse and is not breathing): No CPR (Do Not Attempt to Resuscitate)  Medical Interventions (Patient has pulse or is breathing): Limited Support  Comments: patient and daughters x 2

## 2023-09-26 NOTE — CASE MANAGEMENT/SOCIAL WORK
Discharge Planning Assessment  [facility]     Patient Name: Brennon Vance  MRN: [id number]  Today's Date: 9/26/2023    Admit Date: 9/24/2023        Discharge Needs Assessment       Row Name 09/26/23 1434       Living Environment    People in Home spouse    Current Living Arrangements home    Potentially Unsafe Housing Conditions none    Primary Care Provided by self    Provides Primary Care For no one    Family Caregiver if Needed child(clem), adult;spouse    Quality of Family Relationships helpful;involved    Able to Return to Prior Arrangements other (see comments)    Living Arrangement Comments snf       Resource/Environmental Concerns    Resource/Environmental Concerns none    Transportation Concerns none       Food Insecurity    Within the past 12 months, you worried that your food would run out before you got the money to buy more. Never true    Within the past 12 months, the food you bought just didn't last and you didn't have money to get more. Never true       Transition Planning    Patient/Family Anticipates Transition to long-term care facility;inpatient rehabilitation facility    Patient/Family Anticipated Services at Transition skilled nursing;rehabilitation services    Transportation Anticipated family or friend will provide       Discharge Needs Assessment    Readmission Within the Last 30 Days no previous admission in last 30 days    Equipment Currently Used at Home cane, quad tip;wheelchair    Concerns to be Addressed adjustment to diagnosis/illness    Anticipated Changes Related to Illness inability to care for self    Equipment Needed After Discharge other (see comments)    Outpatient/Agency/Support Group Needs skilled nursing facility;inpatient rehabilitation facility    Discharge Facility/Level of Care Needs nursing facility, skilled;rehabilitation facility                   Discharge Plan       Row Name 09/26/23 1432       Plan    Plan Comments SW spoke to pt dtr in room ([facility]) regarding dc  plans/needs. Rehab options discussed and dtr states they prefer [facility], if possible. Dtr is requesting a list of [facility] facilities. SW will send dtr a list of [facility] for her to review. Pt is going for [facility] later today.                  Continued Care and Services - Admitted Since 9/24/2023    Coordination has not been started for this encounter.       Expected Discharge Date and Time       Expected Discharge Date Expected Discharge Time    Sep 29, 2023            Demographic Summary    No documentation.                  Functional Status    No documentation.                  Psychosocial    No documentation.                  Abuse/Neglect    No documentation.                  Legal    No documentation.                  Substance Abuse    No documentation.                  Patient Forms    No documentation.                     FAISAL Nava

## 2023-09-27 LAB
ALBUMIN SERPL-MCNC: 3 G/DL (ref 3.5–5.2)
ALBUMIN/GLOB SERPL: 1.1 G/DL
ALP SERPL-CCNC: 98 U/L (ref 39–117)
ALT SERPL W P-5'-P-CCNC: 8 U/L (ref 1–41)
ANION GAP SERPL CALCULATED.3IONS-SCNC: 16 MMOL/L (ref 5–15)
AST SERPL-CCNC: 27 U/L (ref 1–40)
BACTERIA SPEC AEROBE CULT: ABNORMAL
BASOPHILS # BLD AUTO: 0.05 10*3/MM3 (ref 0–0.2)
BASOPHILS NFR BLD AUTO: 0.7 % (ref 0–1.5)
BILIRUB SERPL-MCNC: 0.2 MG/DL (ref 0–1.2)
BUN SERPL-MCNC: 29 MG/DL (ref 8–23)
BUN/CREAT SERPL: 18.2 (ref 7–25)
CALCIUM SPEC-SCNC: 8.4 MG/DL (ref 8.6–10.5)
CHLORIDE SERPL-SCNC: 107 MMOL/L (ref 98–107)
CO2 SERPL-SCNC: 18 MMOL/L (ref 22–29)
CREAT SERPL-MCNC: 1.59 MG/DL (ref 0.76–1.27)
DEPRECATED RDW RBC AUTO: 47.9 FL (ref 37–54)
EGFRCR SERPLBLD CKD-EPI 2021: 42.3 ML/MIN/1.73
EOSINOPHIL # BLD AUTO: 0.04 10*3/MM3 (ref 0–0.4)
EOSINOPHIL NFR BLD AUTO: 0.6 % (ref 0.3–6.2)
ERYTHROCYTE [DISTWIDTH] IN BLOOD BY AUTOMATED COUNT: 13.7 % (ref 12.3–15.4)
GLOBULIN UR ELPH-MCNC: 2.8 GM/DL
GLUCOSE SERPL-MCNC: 89 MG/DL (ref 65–99)
HCT VFR BLD AUTO: 30.4 % (ref 37.5–51)
HGB BLD-MCNC: 9.7 G/DL (ref 13–17.7)
IMM GRANULOCYTES # BLD AUTO: 0.05 10*3/MM3 (ref 0–0.05)
IMM GRANULOCYTES NFR BLD AUTO: 0.7 % (ref 0–0.5)
LYMPHOCYTES # BLD AUTO: 1.14 10*3/MM3 (ref 0.7–3.1)
LYMPHOCYTES NFR BLD AUTO: 16.4 % (ref 19.6–45.3)
MCH RBC QN AUTO: 30.8 PG (ref 26.6–33)
MCHC RBC AUTO-ENTMCNC: 31.9 G/DL (ref 31.5–35.7)
MCV RBC AUTO: 96.5 FL (ref 79–97)
MONOCYTES # BLD AUTO: 0.65 10*3/MM3 (ref 0.1–0.9)
MONOCYTES NFR BLD AUTO: 9.4 % (ref 5–12)
NEUTROPHILS NFR BLD AUTO: 5.01 10*3/MM3 (ref 1.7–7)
NEUTROPHILS NFR BLD AUTO: 72.2 % (ref 42.7–76)
NRBC BLD AUTO-RTO: 0 /100 WBC (ref 0–0.2)
PLATELET # BLD AUTO: 253 10*3/MM3 (ref 140–450)
PMV BLD AUTO: 9.5 FL (ref 6–12)
POTASSIUM SERPL-SCNC: 4 MMOL/L (ref 3.5–5.2)
PROT SERPL-MCNC: 5.8 G/DL (ref 6–8.5)
RBC # BLD AUTO: 3.15 10*6/MM3 (ref 4.14–5.8)
SODIUM SERPL-SCNC: 141 MMOL/L (ref 136–145)
WBC NRBC COR # BLD: 6.94 10*3/MM3 (ref 3.4–10.8)

## 2023-09-27 PROCEDURE — 63710000001 APIXABAN 2.5 MG TABLET: Performed by: NURSE PRACTITIONER

## 2023-09-27 PROCEDURE — 63710000001 FINASTERIDE 5 MG TABLET: Performed by: NURSE PRACTITIONER

## 2023-09-27 PROCEDURE — 63710000001 SENNOSIDES-DOCUSATE 8.6-50 MG TABLET: Performed by: NURSE PRACTITIONER

## 2023-09-27 PROCEDURE — 97161 PT EVAL LOW COMPLEX 20 MIN: CPT | Performed by: PHYSICAL THERAPIST

## 2023-09-27 PROCEDURE — A9270 NON-COVERED ITEM OR SERVICE: HCPCS | Performed by: NURSE PRACTITIONER

## 2023-09-27 PROCEDURE — 63710000001 HYDROXYZINE 25 MG TABLET: Performed by: NURSE PRACTITIONER

## 2023-09-27 PROCEDURE — 85025 COMPLETE CBC W/AUTO DIFF WBC: CPT | Performed by: NURSE PRACTITIONER

## 2023-09-27 PROCEDURE — G0378 HOSPITAL OBSERVATION PER HR: HCPCS

## 2023-09-27 PROCEDURE — 99024 POSTOP FOLLOW-UP VISIT: CPT | Performed by: NURSE PRACTITIONER

## 2023-09-27 PROCEDURE — 63710000001 CARVEDILOL 6.25 MG TABLET: Performed by: NURSE PRACTITIONER

## 2023-09-27 PROCEDURE — 94799 UNLISTED PULMONARY SVC/PX: CPT

## 2023-09-27 PROCEDURE — 63710000001 AMOXICILLIN-CLAVULANATE 500-125 MG TABLET: Performed by: NURSE PRACTITIONER

## 2023-09-27 PROCEDURE — 97166 OT EVAL MOD COMPLEX 45 MIN: CPT

## 2023-09-27 PROCEDURE — 63710000001 PANTOPRAZOLE 40 MG TABLET DELAYED-RELEASE: Performed by: NURSE PRACTITIONER

## 2023-09-27 PROCEDURE — 25010000002 CEFAZOLIN 1-4 GM/50ML-% SOLUTION: Performed by: NURSE PRACTITIONER

## 2023-09-27 PROCEDURE — 63710000001 CHOLECALCIFEROL 25 MCG (1000 UT) TABLET: Performed by: NURSE PRACTITIONER

## 2023-09-27 PROCEDURE — 63710000001 ISOSORBIDE MONONITRATE 60 MG TABLET SUSTAINED-RELEASE 24 HOUR: Performed by: NURSE PRACTITIONER

## 2023-09-27 PROCEDURE — 63710000001 MIRTAZAPINE 15 MG TABLET: Performed by: NURSE PRACTITIONER

## 2023-09-27 PROCEDURE — 94660 CPAP INITIATION&MGMT: CPT

## 2023-09-27 PROCEDURE — 94664 DEMO&/EVAL PT USE INHALER: CPT

## 2023-09-27 PROCEDURE — 63710000001 TAMSULOSIN 0.4 MG CAPSULE: Performed by: NURSE PRACTITIONER

## 2023-09-27 PROCEDURE — 63710000001 NICOTINE 21 MG/24HR PATCH 24 HOUR: Performed by: NURSE PRACTITIONER

## 2023-09-27 PROCEDURE — 63710000001 DILTIAZEM CD 180 MG CAPSULE SUSTAINED-RELEASE 24 HR: Performed by: NURSE PRACTITIONER

## 2023-09-27 PROCEDURE — 63710000001 GUAIFENESIN 600 MG TABLET SUSTAINED-RELEASE 12 HOUR: Performed by: NURSE PRACTITIONER

## 2023-09-27 PROCEDURE — 63710000001 SODIUM BICARBONATE 650 MG TABLET: Performed by: NURSE PRACTITIONER

## 2023-09-27 PROCEDURE — 63710000001 ROSUVASTATIN 20 MG TABLET: Performed by: NURSE PRACTITIONER

## 2023-09-27 PROCEDURE — 63710000001 POTASSIUM CHLORIDE 20 MEQ TABLET CONTROLLED-RELEASE: Performed by: NURSE PRACTITIONER

## 2023-09-27 PROCEDURE — 80053 COMPREHEN METABOLIC PANEL: CPT | Performed by: NURSE PRACTITIONER

## 2023-09-27 PROCEDURE — 63710000001 HYDROCODONE-ACETAMINOPHEN 5-325 MG TABLET: Performed by: NURSE PRACTITIONER

## 2023-09-27 PROCEDURE — 25810000003 SODIUM CHLORIDE 0.9 % SOLUTION: Performed by: NURSE PRACTITIONER

## 2023-09-27 PROCEDURE — 63710000001 LEVOFLOXACIN 750 MG TABLET: Performed by: NURSE PRACTITIONER

## 2023-09-27 PROCEDURE — 63710000001 LEVOTHYROXINE 50 MCG TABLET: Performed by: NURSE PRACTITIONER

## 2023-09-27 PROCEDURE — 97110 THERAPEUTIC EXERCISES: CPT

## 2023-09-27 PROCEDURE — 63710000001 CARBAMAZEPINE 200 MG TABLET: Performed by: NURSE PRACTITIONER

## 2023-09-27 PROCEDURE — 97116 GAIT TRAINING THERAPY: CPT

## 2023-09-27 RX ORDER — LEVOFLOXACIN 750 MG/1
750 TABLET ORAL EVERY OTHER DAY
Status: DISCONTINUED | OUTPATIENT
Start: 2023-09-27 | End: 2023-09-28

## 2023-09-27 RX ORDER — NICOTINE 21 MG/24HR
1 PATCH, TRANSDERMAL 24 HOURS TRANSDERMAL
Status: DISCONTINUED | OUTPATIENT
Start: 2023-09-27 | End: 2023-10-05 | Stop reason: HOSPADM

## 2023-09-27 RX ADMIN — DOCUSATE SODIUM 50 MG AND SENNOSIDES 8.6 MG 2 TABLET: 8.6; 5 TABLET, FILM COATED ORAL at 21:19

## 2023-09-27 RX ADMIN — DILTIAZEM HYDROCHLORIDE 180 MG: 180 CAPSULE, EXTENDED RELEASE ORAL at 08:54

## 2023-09-27 RX ADMIN — CARVEDILOL 12.5 MG: 6.25 TABLET, FILM COATED ORAL at 17:19

## 2023-09-27 RX ADMIN — LEVOTHYROXINE SODIUM 50 MCG: 50 TABLET ORAL at 06:03

## 2023-09-27 RX ADMIN — Medication 10 ML: at 08:55

## 2023-09-27 RX ADMIN — Medication 3 ML: at 08:55

## 2023-09-27 RX ADMIN — CARVEDILOL 12.5 MG: 6.25 TABLET, FILM COATED ORAL at 08:54

## 2023-09-27 RX ADMIN — AMOXICILLIN AND CLAVULANATE POTASSIUM 500 MG: 500; 125 TABLET, FILM COATED ORAL at 08:54

## 2023-09-27 RX ADMIN — NICOTINE 1 PATCH: 21 PATCH, EXTENDED RELEASE TRANSDERMAL at 11:58

## 2023-09-27 RX ADMIN — TAMSULOSIN HYDROCHLORIDE 0.4 MG: 0.4 CAPSULE ORAL at 08:54

## 2023-09-27 RX ADMIN — ROSUVASTATIN CALCIUM 20 MG: 20 TABLET, FILM COATED ORAL at 21:19

## 2023-09-27 RX ADMIN — POTASSIUM CHLORIDE 20 MEQ: 1500 TABLET, EXTENDED RELEASE ORAL at 08:54

## 2023-09-27 RX ADMIN — HYDROCODONE BITARTRATE AND ACETAMINOPHEN 1 TABLET: 5; 325 TABLET ORAL at 14:59

## 2023-09-27 RX ADMIN — CEFAZOLIN SODIUM 1000 MG: 1 INJECTION, SOLUTION INTRAVENOUS at 08:54

## 2023-09-27 RX ADMIN — APIXABAN 2.5 MG: 2.5 TABLET, FILM COATED ORAL at 21:19

## 2023-09-27 RX ADMIN — BUDESONIDE AND FORMOTEROL FUMARATE DIHYDRATE 2 PUFF: 160; 4.5 AEROSOL RESPIRATORY (INHALATION) at 19:31

## 2023-09-27 RX ADMIN — APIXABAN 2.5 MG: 2.5 TABLET, FILM COATED ORAL at 08:54

## 2023-09-27 RX ADMIN — CARBAMAZEPINE 200 MG: 200 TABLET ORAL at 08:53

## 2023-09-27 RX ADMIN — GUAIFENESIN 600 MG: 600 TABLET, EXTENDED RELEASE ORAL at 08:54

## 2023-09-27 RX ADMIN — CEFAZOLIN SODIUM 1000 MG: 1 INJECTION, SOLUTION INTRAVENOUS at 00:14

## 2023-09-27 RX ADMIN — Medication 1000 UNITS: at 08:54

## 2023-09-27 RX ADMIN — Medication 10 ML: at 21:19

## 2023-09-27 RX ADMIN — CEFAZOLIN SODIUM 1000 MG: 1 INJECTION, SOLUTION INTRAVENOUS at 17:19

## 2023-09-27 RX ADMIN — SODIUM BICARBONATE 650 MG: 650 TABLET ORAL at 08:54

## 2023-09-27 RX ADMIN — SODIUM CHLORIDE 75 ML/HR: 9 INJECTION, SOLUTION INTRAVENOUS at 09:03

## 2023-09-27 RX ADMIN — FINASTERIDE 5 MG: 5 TABLET, FILM COATED ORAL at 08:54

## 2023-09-27 RX ADMIN — AMOXICILLIN AND CLAVULANATE POTASSIUM 500 MG: 500; 125 TABLET, FILM COATED ORAL at 21:19

## 2023-09-27 RX ADMIN — BUDESONIDE AND FORMOTEROL FUMARATE DIHYDRATE 2 PUFF: 160; 4.5 AEROSOL RESPIRATORY (INHALATION) at 05:41

## 2023-09-27 RX ADMIN — HYDROXYZINE HYDROCHLORIDE 50 MG: 25 TABLET, FILM COATED ORAL at 13:02

## 2023-09-27 RX ADMIN — MIRTAZAPINE 45 MG: 15 TABLET, FILM COATED ORAL at 21:19

## 2023-09-27 RX ADMIN — ISOSORBIDE MONONITRATE 60 MG: 60 TABLET, EXTENDED RELEASE ORAL at 08:54

## 2023-09-27 RX ADMIN — LEVOFLOXACIN 750 MG: 750 TABLET, FILM COATED ORAL at 11:58

## 2023-09-27 RX ADMIN — PANTOPRAZOLE SODIUM 40 MG: 40 TABLET, DELAYED RELEASE ORAL at 08:54

## 2023-09-27 RX ADMIN — Medication 3 ML: at 21:20

## 2023-09-27 RX ADMIN — GUAIFENESIN 600 MG: 600 TABLET, EXTENDED RELEASE ORAL at 21:19

## 2023-09-27 RX ADMIN — SODIUM BICARBONATE 650 MG: 650 TABLET ORAL at 21:19

## 2023-09-27 NOTE — PLAN OF CARE
Goal Outcome Evaluation:  Plan of Care Reviewed With: patient, family        Progress: improving     VSS, worked well with therapy after encouragement, atarax and po pain medication x1 required this shift, IV ABX as ordered, otherwise IID, tolerating diet, family at the bedside the majority of the shift, pt is oriented mostly but very forgetful, dressing to low mid back of gauze and medipore is c/d/I, pt nor family have any further questions or concerns at this time, safety maintained, will continue to monitor.

## 2023-09-27 NOTE — THERAPY EVALUATION
Patient Name: Brennon Vanec  : 1935    MRN: [id number]                              Today's Date: 2023       Admit Date: 2023    Visit Dx:     ICD-10-CM ICD-9-CM   1. Acute cystitis without hematuria  N30.00 595.0   2. Acute bilateral low back pain with sciatica, sciatica laterality unspecified  M54.40 724.2     724.3   3. Shortness of breath  R06.02 786.05   4. Compression fracture of T12 vertebra with delayed healing  S22.080G V54.27   5. Closed compression fracture of L3 lumbar vertebra with delayed healing, subsequent encounter  S32.030G V54.17   6. Impaired mobility [Z74.09 (ICD-10-CM)]  Z74.09 799.89     Patient Active Problem List   Diagnosis    Chest pain    NSTEMI, initial episode of care    Coronary artery disease involving native coronary artery of native heart without angina pectoris    Paroxysmal atrial fibrillation    Other emphysema    Essential hypertension    Precordial pain    Hyperlipidemia LDL goal <70    Tobacco abuse    Cardiomyopathy    NSTEMI (non-ST elevated myocardial infarction)    UTI (urinary tract infection)    Aspiration pneumonia    Intractable low back pain    Stage 3b chronic kidney disease    Compression fracture of T12 vertebra with delayed healing    Closed compression fracture of third lumbar vertebra     Past Medical History:   Diagnosis Date    CAD (coronary artery disease)     COPD (chronic obstructive pulmonary disease)     Hiatal hernia     Hyperlipidemia     Hypertension     Stroke      Past Surgical History:   Procedure Laterality Date    ABDOMINAL AORTIC ANEURYSM REPAIR      CARDIAC CATHETERIZATION N/A 2021    Procedure: Left Heart Cath;  Surgeon: Harrison Alcantara MD;  Location: [facility] CATH INVASIVE LOCATION;  Service: Cardiology;  Laterality: N/A;    CORONARY ANGIOPLASTY WITH STENT PLACEMENT      FEMORAL ARTERY STENT      KYPHOPLASTY WITH BIOPSY N/A 2023    Procedure: KYPHOPLASTY WITH BIOPSY T12 and L3;  Surgeon: Jeremiah Brantley MD;   Location:  [facility] OR;  Service: Neurosurgery;  Laterality: N/A;    LEG THROMBECTOMY/EMBOLECTOMY Right 5/20/2021    Procedure: RT GROIN EXPLORATION;  Surgeon: Geoff Remy DO;  Location:  [facility] HYBRID OR 12;  Service: Vascular;  Laterality: Right;    TRIGEMINAL NERVE DECOMPRESSION        General Information       Row Name 09/27/23 60          Physical Therapy Time and Intention    Document Type evaluation  S/p T12, L3 kyphoplasty w/ biopsy  -MS (r) JS (t) MS (c)     Mode of Treatment physical therapy  -MS (r) JS (t) MS (c)       Row Name 09/27/23 76          General Information    Patient Profile Reviewed yes  -MS (r) JS (t) MS (c)     Prior Level of Function independent:;transfer;gait;bed mobility;max assist:;ADL's;dependent:;home management;driving  -MS (r) JS (t) MS (c)     Existing Precautions/Restrictions spinal;fall  -MS (r) JS (t) MS (c)     Barriers to Rehab medically complex  -MS (r) JS (t) MS (c)       Row Name 09/27/23 5308          Living Environment    People in Home spouse  -MS (r) JS (t) MS (c)       Row Name 09/27/23 09          Home Main Entrance    Number of Stairs, Main Entrance two  -MS (r) JS (t) MS (c)     Stair Railings, Main Entrance none  -MS (r) JS (t) MS (c)       Row Name 09/27/23 1996          Stairs Within Home, Primary    Number of Stairs, Within Home, Primary none  -MS (r) JS (t) MS (c)       Row Name 09/27/23 3163          Cognition    Orientation Status (Cognition) oriented to;person;place  -MS (r) JS (t) MS (c)       Row Name 09/27/23 6918          Safety Issues, Functional Mobility    Safety Issues Affecting Function (Mobility) at risk behavior observed;safety precaution awareness;insight into deficits/self-awareness  -MS (r) JS (t) MS (c)     Impairments Affecting Function (Mobility) balance;cognition;endurance/activity tolerance;pain;strength;postural/trunk control  -MS (r) JS (t) MS (c)     Cognitive Impairments, Mobility Safety/Performance safety precaution  awareness;insight into deficits/self-awareness  -MS (r) JS (t) MS (c)     Comment, Safety Issues/Impairments (Mobility) Used office chair to be wheeled around following fx. Has rollator and single point cane. Has walk in shower with grab bars and shower chair.  -MS (r) JS (t) MS (c)               User Key  (r) = Recorded By, (t) = Taken By, (c) = Cosigned By      Initials Name Provider Type    Yelena Oreilly PRIMITIVO, PT, DPT, NCS Physical Therapist    Marcin Catalan, PT Student PT Student                   Mobility       Row Name 09/27/23 0727          Bed Mobility    Bed Mobility rolling right;sit-sidelying;sidelying-sit  -MS (r) JS (t) MS (c)     Rolling Right [place] (Bed Mobility) minimum assist (75% patient effort);verbal cues;nonverbal cues (demo/gesture)  -MS (r) JS (t) MS (c)     Sidelying-Sit [place] (Bed Mobility) contact guard;verbal cues  -MS (r) JS (t) MS (c)     Sit-Sidelying [place] (Bed Mobility) contact guard;minimum assist (75% patient effort)  -MS (r) JS (t) MS (c)       Row Name 09/27/23 8665          Sit-Stand Transfer    Sit-Stand [place] (Transfers) contact guard;verbal cues  -MS (r) JS (t) MS (c)     Assistive Device (Sit-Stand Transfers) walker, front-wheeled  -MS (r) JS (t) MS (c)               User Key  (r) = Recorded By, (t) = Taken By, (c) = Cosigned By      Initials Name Provider Type    Yelena Oreilly PRIMITIVO, PT, DPT, NCS Physical Therapist    Marcin Catalan, PT Student PT Student                   Obj/Interventions       Row Name 09/27/23 0577          Range of Motion Comprehensive    Comment, General Range of Motion RLE: Lacking TKE on R knee, DF limited to 75% AROM. All other planes WFL.  -MS (r) JS (t) MS (c)       Row Name 09/27/23 0761          Strength Comprehensive (MMT)    Comment, General Manual Muscle Testing (MMT) Assessment Grossly 3+/5 except B PF 4/5; Hip flexion not formally tested d/t pain, but able to flex hips in standing against gravity.  -MS  (r) JS (t) MS (c)       Row Name 09/27/23 0745          Balance    Balance Assessment sitting static balance;sitting dynamic balance;standing static balance;standing dynamic balance  -MS (r) JS (t) MS (c)     Static Sitting Balance standby assist  -MS (r) JS (t) MS (c)     Dynamic Sitting Balance contact guard;verbal cues  -MS (r) JS (t) MS (c)     Position, Sitting Balance unsupported;sitting edge of bed  -MS (r) JS (t) MS (c)     Static Standing Balance contact guard  -MS (r) JS (t) MS (c)     Dynamic Standing Balance contact guard  -MS (r) JS (t) MS (c)     Position/Device Used, Standing Balance walker, front-wheeled  -MS (r) JS (t) MS (c)     Balance Interventions standing;weight shifting activity  -MS (r) JS (t) MS (c)     Comment, Balance Patient required CGA for all balance activities except needing SBA for static sitting. When performing MMT, he required verbal cueing to correct posterior lean. Had loss of balance x1 when standing, but was able to catch self with CGA and support of FWW. Weight shifting performed sufficiently without LOB.  -MS (r) JS (t) MS (c)       Row Name 09/27/23 0745          Sensory Assessment (Somatosensory)    Sensory Assessment (Somatosensory) bilateral LE  -MS (r) JS (t) MS (c)     Bilateral LE Sensory Assessment general sensation;light touch awareness;intact  -MS (r) JS (t) MS (c)               User Key  (r) = Recorded By, (t) = Taken By, (c) = Cosigned By      Initials Name Provider Type    Yelena Oreilly, PT, DPT, NCS Physical Therapist    Marcin Catalan, PT Student PT Student                   Goals/Plan       Row Name 09/27/23 0745          Bed Mobility Goal 1 (PT)    Activity/Assistive Device (Bed Mobility Goal 1, PT) rolling to left;rolling to right;sit to supine  -MS (r) JS (t) MS (c)     [place] Level/Cues Needed (Bed Mobility Goal 1, PT) contact guard required  -MS (r) JS (t) MS (c)     Time Frame (Bed Mobility Goal 1, PT) long term goal (LTG);by discharge   -MS (r) JS (t) MS (c)     Progress/Outcomes (Bed Mobility Goal 1, PT) new goal  -MS (r) JS (t) MS (c)       Row Name 09/27/23 0745          Transfer Goal 1 (PT)    Activity/Assistive Device (Transfer Goal 1, PT) sit-to-stand/stand-to-sit  -MS (r) JS (t) MS (c)     [place] Level/Cues Needed (Transfer Goal 1, PT) standby assist  -MS (r) JS (t) MS (c)     Time Frame (Transfer Goal 1, PT) long term goal (LTG);by discharge  -MS (r) JS (t) MS (c)     Progress/Outcome (Transfer Goal 1, PT) new goal  -MS (r) JS (t) MS (c)       Row Name 09/27/23 0745          Gait Training Goal 1 (PT)    Activity/Assistive Device (Gait Training Goal 1, PT) gait (walking locomotion);assistive device use;decrease fall risk;increase endurance/gait distance;improve balance and speed;walker, rolling  -MS (r) JS (t) MS (c)     [place] Level (Gait Training Goal 1, PT) standby assist  -MS (r) JS (t) MS (c)     Distance (Gait Training Goal 1, PT) 20  -MS (r) JS (t) MS (c)     Time Frame (Gait Training Goal 1, PT) long term goal (LTG);by discharge  -MS (r) JS (t) MS (c)     Progress/Outcome (Gait Training Goal 1, PT) new goal  -MS (r) JS (t) MS (c)       Row Name 09/27/23 0745          Patient Education Goal (PT)    Activity (Patient Education Goal, PT) Patient will demonstrate understanding of spinal precautions.  -MS (r) JS (t) MS (c)     [place]/Cues/Accuracy (Memory Goal 2, PT) demonstrates adequately;verbalizes understanding  -MS (r) JS (t) MS (c)     Time Frame (Patient Education Goal, PT) long term goal (LTG);by discharge  -MS (r) JS (t) MS (c)     Progress/Outcome (Patient Education Goal, PT) new goal  -MS (r) JS (t) MS (c)       Row Name 09/27/23 0745          Therapy Assessment/Plan (PT)    Planned Therapy Interventions (PT) balance training;bed mobility training;gait training;patient/family education;postural re-education;strengthening;transfer training  -MS (r) JS (t) MS (c)               User Key  (r) = Recorded By, (t) =  Taken By, (c) = Cosigned By      Initials Name Provider Type    Yelena Oreilly PRIMITIVO, PT, DPT, NCS Physical Therapist    Marcin Catalan, PT Student PT Student                   Clinical Impression       Row Name 09/27/23 0745          Pain    Posttreatment Pain Rating 1/10  -MS (r) JS (t) MS (c)     Pain Location - back  -MS (r) JS (t) MS (c)     Pain Intervention(s) Medication (See MAR);Ambulation/increased activity;Repositioned  -MS (r) JS (t) MS (c)       Row Name 09/27/23 0745          Plan of Care Review    Plan of Care Reviewed With patient;daughter  -MS (r) JS (t) MS (c)     Progress no change  -MS (r) JS (t) MS (c)     Outcome Evaluation PT eval complete. Pt is alert to person and place and complaints of back pain with movement. He required min A and cueing to roll to R while maintaining spinal precautions. He required assistance with LEs to transfer sit>supine. He was able to stand with CGA and support of FWW, and was able to perform single leg weight shifts while lifting the opposite extremity from the floor. He has generalized weakness. His pain level, poor balance, and weakness limit his ability to perform functional mobility and make him a high fall risk. PT services necessary to address these deficits. Anticipate discharge to SNF.  -MS (r) JS (t) MS (c)       Row Name 09/27/23 0745          Therapy Assessment/Plan (PT)    Patient/Family Therapy Goals Statement (PT) Decrease pain  -MS (r) JS (t) MS (c)     Rehab Potential (PT) good, to achieve stated therapy goals  -MS (r) JS (t) MS (c)     Criteria for Skilled Interventions Met (PT) yes;meets criteria;skilled treatment is necessary  -MS (r) JS (t) MS (c)     Therapy Frequency (PT) 2 times/day  -MS (r) JS (t) MS (c)     Predicted Duration of Therapy Intervention (PT) Until discharge.  -MS (r) JS (t) MS (c)       Row Name 09/27/23 0745          Vital Signs    O2 Delivery Pre Treatment room air  -MS (r) JS (t) MS (c)     O2 Delivery Intra Treatment room  air  -MS (r) JS (t) MS (c)     O2 Delivery Post Treatment room air  -MS (r) JS (t) MS (c)     Pre Patient Position Supine  -MS (r) JS (t) MS (c)     Intra Patient Position Standing  -MS (r) JS (t) MS (c)     Post Patient Position Supine  -MS (r) JS (t) MS (c)       Row Name 09/27/23 0745          Positioning and Restraints    Pre-Treatment Position in bed  -MS (r) JS (t) MS (c)     Post Treatment Position bed  -MS (r) JS (t) MS (c)     In Bed fowlers;with family/caregiver;exit alarm on;encouraged to call for assist;call light within reach;side rails up x2;SCD pump applied  -MS (r) JS (t) MS (c)               User Key  (r) = Recorded By, (t) = Taken By, (c) = Cosigned By      Initials Name Provider Type    Yelena Oreilly, PT, DPT, NCS Physical Therapist    Marcin Catalan, PT Student PT Student                   Outcome Measures       Row Name 09/27/23 0800 09/27/23 0745       How much help from another person do you currently need...    Turning from your back to your side while in flat bed without using bedrails? 3  -SC 3  -MS (r) JS (t) MS (c)    Moving from lying on back to sitting on the side of a flat bed without bedrails? 3  -SC 3  -MS (r) JS (t) MS (c)    Moving to and from a bed to a chair (including a wheelchair)? 3  -SC 3  -MS (r) JS (t) MS (c)    Standing up from a chair using your arms (e.g., wheelchair, bedside chair)? 3  -SC 3  -MS (r) JS (t) MS (c)    Climbing 3-5 steps with a railing? 2  -SC 2  -MS (r) JS (t) MS (c)    To walk in hospital room? 3  -SC 3  -MS (r) JS (t) MS (c)    AM-PAC 6 Clicks Score (PT) 17  -SC 17  -MS (r) JS (t)    Highest level of mobility 5 --> Static standing  -SC 5 --> Static standing  -MS (r) JS (t)      Row Name 09/27/23 0745 09/27/23 0735       Functional Assessment    Outcome Measure Options AM-PAC 6 Clicks Basic Mobility (PT)  -MS (r) JS (t) MS (c) AM-PAC 6 Clicks Daily Activity (OT)  -CS (r) MB (t) CS (c)              User Key  (r) = Recorded By, (t) = Taken By,  (c) = Cosigned By      Initials Name Provider Type    Yelena Oreilly R, PT, DPT, NCS Physical Therapist    Dennise Griffin S, OTR/L, CNT Occupational Therapist    Diamante Escalera, RN Registered Nurse    Marcin Catalan, PT Student PT Student    Virginia Gutierrez, OT Student OT Student                                 Physical Therapy Education       Title: PT OT SLP Therapies (Done)       Topic: Physical Therapy (Done)       Point: Mobility training (Done)       Learning Progress Summary             Patient Eager, E, VU,NR by OSWALDO at 9/27/2023 0745    Comment: Spinal precautions. Pressure injury prevention. Cueing for proper transfers with FWW.   Family Eager, E, VU,NR by OSWALDO at 9/27/2023 0745    Comment: Spinal precautions. Pressure injury prevention. Cueing for proper transfers with FWW.                         Point: Body mechanics (Done)       Learning Progress Summary             Patient Eager, E, VU,NR by OSWALDO at 9/27/2023 0745    Comment: Spinal precautions. Pressure injury prevention. Cueing for proper transfers with FWW.   Family Eager, E, VU,NR by OSWALDO at 9/27/2023 0745    Comment: Spinal precautions. Pressure injury prevention. Cueing for proper transfers with FWW.                         Point: Precautions (Done)       Learning Progress Summary             Patient Eager, E, VU,NR by OSWALDO at 9/27/2023 0745    Comment: Spinal precautions. Pressure injury prevention. Cueing for proper transfers with FWW.   Family Eager, E, VU,NR by OSWALDO at 9/27/2023 0745    Comment: Spinal precautions. Pressure injury prevention. Cueing for proper transfers with FWW.                                         User Key       Initials Effective Dates Name Provider Type Discipline    OSWALDO 07/13/23 -  Marcin Estrada, PT Student PT Student PT                  PT Recommendation and Plan  Planned Therapy Interventions (PT): balance training, bed mobility training, gait training, patient/family education, postural re-education,  strengthening, transfer training  Plan of Care Reviewed With: patient, daughter  Progress: no change  Outcome Evaluation: PT eval complete. Pt is alert to person and place and complaints of back pain with movement. He required min A and cueing to roll to R while maintaining spinal precautions. He required assistance with LEs to transfer sit>supine. He was able to stand with CGA and support of FWW, and was able to perform single leg weight shifts while lifting the opposite extremity from the floor. He has generalized weakness. His pain level, poor balance, and weakness limit his ability to perform functional mobility and make him a high fall risk. PT services necessary to address these deficits. Anticipate discharge to SNF.     Time Calculation:         PT Charges       Row Name 09/27/23 1433 09/27/23 0745          Time Calculation    Start Time 1401  - 0745  15 mins spent in chart review  -MS (r) JS (t) MS (c)     Stop Time 1430  - 0823  -MS (r) JS (t) MS (c)     Time Calculation (min) 29 min  - 38 min  -MS (r) JS (t)     PT Received On 09/27/23  -MF 09/27/23  -MS (r) JS (t) MS (c)     PT Goal Re-Cert Due Date -- 10/07/23  -MS (r) JS (t) MS (c)        Time Calculation- PT    Total Timed Code Minutes- PT 29 minute(s)  -MF --        Timed Charges    09659 - PT Therapeutic Exercise Minutes 10  -MF --     64936 - Gait Training Minutes  19  -MF --        Untimed Charges    PT Eval/Re-eval Minutes -- 53  -MS (r) JS (t) MS (c)        Total Minutes    Timed Charges Total Minutes 29  -MF --     Untimed Charges Total Minutes -- 53  -MS (r) JS (t)      Total Minutes 29  -MF 53  -MS (r) JS (t)               User Key  (r) = Recorded By, (t) = Taken By, (c) = Cosigned By      Initials Name Provider Type    Yelena Oreilly R, PT, DPT, NCS Physical Therapist    Carol Wu, PTA Physical Therapist Assistant    Marcin Catalan, PT Student PT Student                      PT G-Codes  Outcome Measure Options: AM-PAC  6 Clicks Basic Mobility (PT)  AM-PAC 6 Clicks Score (PT): 17  AM-PAC 6 Clicks Score (OT): 16  PT Discharge Summary  Anticipated Discharge Disposition (PT): skilled nursing facility    Marcin Estrada, PT Student  9/27/2023

## 2023-09-27 NOTE — PLAN OF CARE
Problem: Adult Inpatient Plan of Care  Goal: Plan of Care Review  Outcome: Ongoing, Progressing  Flowsheets (Taken 9/27/2023 5336)  Progress: no change  Plan of Care Reviewed With: patient  Outcome Evaluation: Pt alert, disoriented to place and situation at times but is easily reoriented. VSS. C/o pain to back, medicated x1 with good relief reported, tolerated PO pain medication well. Pt voiding, incontinent. Dressing to back CDI. Room air, CPOX. Safety maintained, will continue to monitor.

## 2023-09-27 NOTE — ANESTHESIA POSTPROCEDURE EVALUATION
Patient: Brennon Vance    Procedure Summary       Date: 09/26/23 Room / [facility]:  [facility]  [facility]    Anesthesia Start: 1616 Anesthesia Stop: 1712    Procedure: KYPHOPLASTY WITH BIOPSY T12 and L3 (Spine Lumbar) Diagnosis:       Compression fracture of T12 vertebra with delayed healing      Closed compression fracture of L3 lumbar vertebra with delayed healing, subsequent encounter      (Compression fracture of T12 vertebra with delayed healing [S22.080G])      (Closed compression fracture of L3 lumbar vertebra with delayed healing, subsequent encounter [S32.030G])    Surgeons: Jeremiah Brnatley MD Provider: Eze Clemons CRNA    Anesthesia Type: general ASA Status: 3            Anesthesia Type: general    Vitals  Vitals Value Taken Time   /76 09/26/23 1736   Temp 97 °F (36.1 °C) 09/26/23 1725   Pulse 86 09/26/23 1736   Resp 14 09/26/23 1725   SpO2 100 % 09/26/23 1736   Vitals shown include unvalidated device data.        Post Anesthesia Care and Evaluation      Comments: Discharged per PACU Criteria

## 2023-09-27 NOTE — PLAN OF CARE
Goal Outcome Evaluation:  Plan of Care Reviewed With: patient, daughter        Progress: no change  Outcome Evaluation: PT eval complete. Pt is alert to person and place and complaints of back pain with movement. He required min A and cueing to roll to R while maintaining spinal precautions. He required assistance with LEs to transfer sit>supine. He was able to stand with CGA and support of FWW, and was able to perform single leg weight shifts while lifting the opposite extremity from the floor. He has generalized weakness. His pain level, poor balance, and weakness limit his ability to perform functional mobility and make him a high fall risk. PT services necessary to address these deficits. Anticipate discharge to SNF.      Anticipated Discharge Disposition (PT): skilled nursing facility

## 2023-09-27 NOTE — CASE MANAGEMENT/SOCIAL WORK
Continued Stay Note   Srinivas     Patient Name: Brennon Vance  MRN: [id number]  Today's Date: 9/27/2023    Admit Date: 9/24/2023        Discharge Plan       Row Name 09/27/23 1402       Plan    Plan Comments SW met with pt 2 daughter's regarding dc plans and rehab. Daughters have decided on [facility] as 1st choice and [facility] as 2nd choice. Referrals have been made. Once bed offered and accepted, precert will be started.                   Discharge Codes    No documentation.                 Expected Discharge Date and Time       Expected Discharge Date Expected Discharge Time    Sep 29, 2023               FAISAL Nava

## 2023-09-27 NOTE — THERAPY TREATMENT NOTE
Acute Care - Physical Therapy Treatment Note  [facility]     Patient Name: Brennon Vance  : 1935  MRN: [id number]  Today's Date: 2023      Visit Dx:     ICD-10-CM ICD-9-CM   1. Acute cystitis without hematuria  N30.00 595.0   2. Acute bilateral low back pain with sciatica, sciatica laterality unspecified  M54.40 724.2     724.3   3. Shortness of breath  R06.02 786.05   4. Compression fracture of T12 vertebra with delayed healing  S22.080G V54.27   5. Closed compression fracture of L3 lumbar vertebra with delayed healing, subsequent encounter  S32.030G V54.17   6. Impaired mobility [Z74.09 (ICD-10-CM)]  Z74.09 799.89     Patient Active Problem List   Diagnosis    Chest pain    NSTEMI, initial episode of care    Coronary artery disease involving native coronary artery of native heart without angina pectoris    Paroxysmal atrial fibrillation    Other emphysema    Essential hypertension    Precordial pain    Hyperlipidemia LDL goal <70    Tobacco abuse    Cardiomyopathy    NSTEMI (non-ST elevated myocardial infarction)    UTI (urinary tract infection)    Aspiration pneumonia    Intractable low back pain    Stage 3b chronic kidney disease    Compression fracture of T12 vertebra with delayed healing    Closed compression fracture of third lumbar vertebra     Past Medical History:   Diagnosis Date    CAD (coronary artery disease)     COPD (chronic obstructive pulmonary disease)     Hiatal hernia     Hyperlipidemia     Hypertension     Stroke      Past Surgical History:   Procedure Laterality Date    ABDOMINAL AORTIC ANEURYSM REPAIR      CARDIAC CATHETERIZATION N/A 2021    Procedure: Left Heart Cath;  Surgeon: Harrison Alcantara MD;  Location: [facility] INVASIVE LOCATION;  Service: Cardiology;  Laterality: N/A;    CORONARY ANGIOPLASTY WITH STENT PLACEMENT      FEMORAL ARTERY STENT      KYPHOPLASTY WITH BIOPSY N/A 2023    Procedure: KYPHOPLASTY WITH BIOPSY T12 and L3;  Surgeon: Jeremiah Brantley,  MD;  Location:  [facility] OR;  Service: Neurosurgery;  Laterality: N/A;    LEG THROMBECTOMY/EMBOLECTOMY Right 5/20/2021    Procedure: RT GROIN EXPLORATION;  Surgeon: Geoff Remy DO;  Location:  [facility] HYBRID OR 12;  Service: Vascular;  Laterality: Right;    TRIGEMINAL NERVE DECOMPRESSION       PT Assessment (last 12 hours)       PT Evaluation and Treatment       Row Name 09/27/23 1401 09/27/23 1327       Physical Therapy Time and Intention    Subjective Information complains of;pain  - --    Document Type therapy note (daily note)  - --    Mode of Treatment physical therapy  - --  -KJ    Comment slightly, intermittently confused. Daugther present  - --      Row Name 09/27/23 1401          General Information    Existing Precautions/Restrictions fall;spinal  -       Row Name 09/27/23 1401          Pain    Pretreatment Pain Rating 0/10 - no pain  -     Posttreatment Pain Rating 2/10  -     Pain Location - back  -     Pain Intervention(s) Repositioned;Ambulation/increased activity  -       Row Name 09/27/23 1401          Bed Mobility    Supine-Sit [place] (Bed Mobility) verbal cues;contact guard  -     Sit-Supine [place] (Bed Mobility) verbal cues;contact guard  -     Assistive Device (Bed Mobility) head of bed elevated;bed rails  -     Comment, (Bed Mobility) cues for spinal precautions  -       Row Name 09/27/23 1401          Sit-Stand Transfer    Sit-Stand [place] (Transfers) verbal cues;contact guard  -       Row Name 09/27/23 1401          Stand-Sit Transfer    Stand-Sit [place] (Transfers) verbal cues;contact guard  -       Row Name 09/27/23 1401          Toilet Transfer    Type (Toilet Transfer) sit-stand;stand-sit  -     [place] Level (Toilet Transfer) verbal cues;contact guard  -     Assistive Device (Toilet Transfer) commode;grab bars/safety frame  -     Comment, (Toilet Transfer) assisted with changing brief.  -       Row Name 09/27/23 1401           Gait/Stairs (Locomotion)    [place] Level (Gait) verbal cues;contact guard  -MF     Assistive Device (Gait) walker, front-wheeled  -MF     Distance in Feet (Gait) 30  -MF     Deviations/Abnormal Patterns (Gait) [place] decreased;stride length decreased  -MF     Bilateral Gait Deviations forward flexed posture  -MF     Comment, (Gait/Stairs) cues for posture  -MF       Row Name 09/27/23 1401          Motor Skills    Comments, Therapeutic Exercise AROM x 10 reps in sitting, B LE  -MF       Row Name             Wound 09/26/23 1640 lumbar spine Puncture    Wound - Properties Group Placement Date: 09/26/23  -DT Placement Time: 1640  -DT Present on Hospital Admission: N  -DT Location: lumbar spine  -DT Primary Wound Type: Puncture  -DT    Retired Wound - Properties Group Placement Date: 09/26/23  -DT Placement Time: 1640  -DT Present on Hospital Admission: N  -DT Location: lumbar spine  -DT Primary Wound Type: Puncture  -DT    Retired Wound - Properties Group Date first assessed: 09/26/23  -DT Time first assessed: 1640  -DT Present on Hospital Admission: N  -DT Location: lumbar spine  -DT Primary Wound Type: Puncture  -DT      Row Name             Wound 09/26/23 1640 thoracic spine Puncture    Wound - Properties Group Placement Date: 09/26/23  -DT Placement Time: 1640  -DT Present on Hospital Admission: N  -DT Location: thoracic spine  -DT Primary Wound Type: Puncture  -DT    Retired Wound - Properties Group Placement Date: 09/26/23  -DT Placement Time: 1640  -DT Present on Hospital Admission: N  -DT Location: thoracic spine  -DT Primary Wound Type: Puncture  -DT    Retired Wound - Properties Group Date first assessed: 09/26/23  -DT Time first assessed: 1640  -DT Present on Hospital Admission: N  -DT Location: thoracic spine  -DT Primary Wound Type: Puncture  -DT      Row Name 09/27/23 1401          Vital Signs    Post SpO2 (%) 92  -MF     O2 Delivery Post Treatment room air  -       Row Name 09/27/23 1401           Positioning and Restraints    Pre-Treatment Position in bed  -MF     Post Treatment Position bed  -MF     In Bed fowlers;call light within reach;encouraged to call for assist;exit alarm on;with family/caregiver;side rails up x2  -MF               User Key  (r) = Recorded By, (t) = Taken By, (c) = Cosigned By      Initials Name Provider Type    Lindsey Sierra, PTA Physical Therapist Assistant    Carol Wu, PTA Physical Therapist Assistant    Pedro Phoenix, RN Registered Nurse                    Physical Therapy Education       Title: PT OT SLP Therapies (Done)       Topic: Physical Therapy (Done)       Point: Mobility training (Done)       Learning Progress Summary             Patient Eager, E, VU,NR by  at 9/27/2023 0745    Comment: Spinal precautions. Pressure injury prevention. Cueing for proper transfers with FWW.   Family Eager, E, VU,NR by  at 9/27/2023 0745    Comment: Spinal precautions. Pressure injury prevention. Cueing for proper transfers with FWW.                         Point: Body mechanics (Done)       Learning Progress Summary             Patient Eager, E, VU,NR by  at 9/27/2023 0745    Comment: Spinal precautions. Pressure injury prevention. Cueing for proper transfers with FWW.   Family Eager, E, VU,NR by  at 9/27/2023 0745    Comment: Spinal precautions. Pressure injury prevention. Cueing for proper transfers with FWW.                         Point: Precautions (Done)       Learning Progress Summary             Patient Eager, E, VU,NR by  at 9/27/2023 0745    Comment: Spinal precautions. Pressure injury prevention. Cueing for proper transfers with FWW.   Family Eager, E, VU,NR by  at 9/27/2023 0745    Comment: Spinal precautions. Pressure injury prevention. Cueing for proper transfers with FWW.                                         User Key       Initials Effective Dates Name Provider Type Discipline     07/13/23 -  Marcin Estrada, PT Student PT  Student PT                  PT Recommendation and Plan             Time Calculation:    PT Charges       Row Name 09/27/23 1433 09/27/23 0745          Time Calculation    Start Time 1401  - 0745  15 mins spent in chart review  -MS (r) JS (t) MS (c)     Stop Time 1430  - 0823  -MS (r) JS (t) MS (c)     Time Calculation (min) 29 min  -MF 38 min  -MS (r) JS (t)     PT Received On 09/27/23  -MF 09/27/23  -MS (r) JS (t) MS (c)     PT Goal Re-Cert Due Date -- 10/07/23  -MS (r) JS (t) MS (c)        Time Calculation- PT    Total Timed Code Minutes- PT 29 minute(s)  -MF --        Timed Charges    21889 - PT Therapeutic Exercise Minutes 10  -MF --     91582 - Gait Training Minutes  19  -MF --        Untimed Charges    PT Eval/Re-eval Minutes -- 53  -MS (r) JS (t) MS (c)        Total Minutes    Timed Charges Total Minutes 29  -MF --     Untimed Charges Total Minutes -- 53  -MS (r) JS (t)      Total Minutes 29  -MF 53  -MS (r) JS (t)               User Key  (r) = Recorded By, (t) = Taken By, (c) = Cosigned By      Initials Name Provider Type    Yelena Oreilly R, PT, DPT, NCS Physical Therapist     Carol Oneil, PTA Physical Therapist Assistant    Marcin Catalan, PT Student PT Student                  Therapy Charges for Today       Code Description Service Date Service Provider Modifiers Qty    33478811992 HC PT THER PROC EA 15 MIN 9/27/2023 Carol Oneil PTA GP 1    63769967786 HC GAIT TRAINING EA 15 MIN 9/27/2023 Carol Oneil PTA GP 1            PT G-Codes  Outcome Measure Options: AM-PAC 6 Clicks Basic Mobility (PT)  AM-PAC 6 Clicks Score (PT): 17  AM-PAC 6 Clicks Score (OT): 16    Carol Oneil PTA  9/27/2023

## 2023-09-27 NOTE — PROGRESS NOTES
[facility] Medicine Services  INPATIENT PROGRESS NOTE    Patient Name: Brennon Vance  Date of Admission: 9/24/2023  Today's Date: 09/27/23  Length of Stay: 0  Primary Care Physician: Javid Grajeda MD    Subjective   Chief Complaint: Low back pain  HPI  Mr. Vance is an 87-year-old male who presented to [facility] on 9/24 with worsening lower back pain with radiation to the left leg.  He has had ongoing back pain for about 2 months after he lifted something.  The pain had been improving but he lifted something again a week prior to admission started having recurrent pain in his back.  He has had difficulty with mobilizing.  His wife has been taking care of him however, she was recently hospitalized.  Patient's main complaint is his lower back.  Denies any numbness or tingling in his legs.  Denies any bowel or bladder incontinence.  Daughters would like for patient to go to rehab at time of discharge.  In the ED, he was found to have a urinary tract infection and CTA of the chest interpreted by radiology showed possible aspiration pneumonia and moderate emphysematous changes. He had no witnessed aspiration event. CT of the lumbar spine showed chronic appearing T12 and L3 compression fractures with no signs of recent lumbar spine surgery or significant stenosis. Patient was started on ceftriaxone and given morphine and Dilaudid in the ED.     Sitting up in bed.  No family at bedside.  Patient indicates that he is feeling well today.  Afebrile.  No shortness of breath, cough.  He is on room air.  States he ate about half of his breakfast.  Patient reports pain is controlled.    He was able to stand with therapy today.  Therapy recommends discharge to skilled nursing facility.  Family would like a list of [place] facilities.    Review of Systems   All pertinent negatives and positives are as above. All other systems have been reviewed and are negative unless  otherwise stated.     Objective    Temp:  [97 °F (36.1 °C)-99.1 °F (37.3 °C)] 98.3 °F (36.8 °C)  Heart Rate:  [] 98  Resp:  [14-18] 18  BP: (111-166)/(55-77) 129/56  Physical Exam  Vitals reviewed.   Constitutional:       General: He is not in acute distress.     Appearance: He is not toxic-appearing.      Comments: Lying in bed.  No acute distress.  On room air.  No family at bedside.  Discussed with his nurse Diamante.   HENT:      Head: Normocephalic and atraumatic.      Mouth/Throat:      Mouth: Mucous membranes are moist.      Pharynx: Oropharynx is clear.   Eyes:      Extraocular Movements: Extraocular movements intact.      Conjunctiva/sclera: Conjunctivae normal.      Pupils: Pupils are equal, round, and reactive to light.   Cardiovascular:      Rate and Rhythm: Normal rate and regular rhythm.      Pulses: Normal pulses.   Pulmonary:      Effort: Pulmonary effort is normal. No respiratory distress.      Breath sounds: Normal breath sounds. No wheezing or rhonchi.   Abdominal:      General: Bowel sounds are normal. There is no distension.      Palpations: Abdomen is soft.      Tenderness: There is no abdominal tenderness.   Musculoskeletal:         General: No swelling or tenderness. Normal range of motion.      Cervical back: Normal range of motion and neck supple. No muscular tenderness.   Skin:     General: Skin is warm and dry.      Findings: No erythema or rash.   Neurological:      General: No focal deficit present.      Mental Status: He is alert and oriented to person, place, and time. Mental status is at baseline.      Cranial Nerves: No cranial nerve deficit.      Motor: No weakness.   Psychiatric:         Mood and Affect: Mood normal.         Behavior: Behavior normal.     Results Review:  I have reviewed the labs, radiology results, and diagnostic studies.    Laboratory Data:   Results from last 7 days   Lab Units 09/27/23  0416 09/26/23  0522 09/25/23  0646   WBC 10*3/mm3 6.94 7.66 8.22    HEMOGLOBIN g/dL 9.7* 10.3* 10.5*   HEMATOCRIT % 30.4* 32.6* 33.2*   PLATELETS 10*3/mm3 253 257 268          Results from last 7 days   Lab Units 09/27/23  0416 09/26/23  0522 09/25/23  0646   SODIUM mmol/L 141 140 136   POTASSIUM mmol/L 4.0 4.2 5.0   CHLORIDE mmol/L 107 106 102   CO2 mmol/L 18.0* 17.0* 15.0*   BUN mg/dL 29* 40* 40*   CREATININE mg/dL 1.59* 1.95* 1.94*   CALCIUM mg/dL 8.4* 9.0 9.1   BILIRUBIN mg/dL 0.2 0.2 0.2   ALK PHOS U/L 98 90 94   ALT (SGPT) U/L 8 8 6   AST (SGOT) U/L 27 21 15   GLUCOSE mg/dL 89 85 103*         Culture Data:   Microbiology Results (last 10 days)       Procedure Component Value - Date/Time    Urine Culture - Urine, Straight Cath [[id number]]  (Abnormal) Collected: 09/24/23 1247    Lab Status: Preliminary result Specimen: Urine from Straight Cath Updated: 09/26/23 0712     Urine Culture 25,000 CFU/mL Pseudomonas aeruginosa    Narrative:      Colonization of the urinary tract without infection is common. Treatment is discouraged unless the patient is symptomatic, pregnant, or undergoing an invasive urologic procedure.          Radiology Data:   Imaging Results (Last 24 Hours)       Procedure Component Value Units Date/Time    XR Spine Lumbar AP & Lateral [[id number]] Collected: 09/26/23 1724     Updated: 09/26/23 1730    Narrative:      EXAMINATION:  XR SPINE LUMBAR AP AND LATERAL-  9/26/2023 4:21 PM CDT     HISTORY: Kyphoplasty. N30.00-Acute cystitis without hematuria;  M54.40-Lumbago with sciatica, unspecified side; R06.02-Shortness of  breath; S22.080G-Wedge compression fracture of t11-T12 vertebra,  subsequent encounter for fracture with delayed healing; S32.030G-Wedge  compression fracture of third lumbar vertebra, subsequent encounter for  fracture with delayed healing.     COMPARISON: No comparison study.     NUMBER OF IMAGES: 2     FLUOROSCOPY TIME: 47 seconds.     FLUOROSCOPIC DOSE: 13.8 mGy.     FINDINGS: Bone detail is limited. There has been kyphoplasty at T12  and  L3.          Impression:      Operative images, as described.     This report was signed and finalized on 9/26/2023 5:27 PM CDT by Dr. Juan Carlos Arevalo MD.       FL C Arm During Surgery [[id number]] Resulted: 09/26/23 1721     Updated: 09/26/23 1721    Narrative:      This procedure was auto-finalized with no dictation required.            I have reviewed the patient's current medications.     Assessment/Plan   Assessment  Active Hospital Problems    Diagnosis     **Intractable low back pain     UTI (urinary tract infection)     Aspiration pneumonia     Stage 3b chronic kidney disease     Compression fracture of T12 vertebra with delayed healing     Closed compression fracture of third lumbar vertebra     Paroxysmal atrial fibrillation     Other emphysema     Essential hypertension     Coronary artery disease involving native coronary artery of native heart without angina pectoris        Treatment Plan  Neurosurgery, Dr. Brantley consulted.  CT of the lumbar spine showed chronic appearing T12 and L3 compression fractures with no signs of recent lumbar spine surgery or significant stenosis. MRI lumbar spine ordered by neurosurgery showed recent superior endplate compression deformities of T12 and L3 with marrow edema.  Discussed with CONCEPCION Garcia -he went for kyphoplasty with biopsy T12 and L3 with Dr. Brantley on 9/26. Consult PT/OT.    CTA showed aspiration pneumonia.  He passed bedside swallow.  No prior swallowing difficulties per patient.  On room air.  No fever.  No sputum production.  Normal WBC. Zosyn de-escalated to Augmentin.      Urinalysis showed trace blood, positive nitrate, moderate leukocyte, TNTC WBC and trace bacteria.  He denies any urinary symptoms.  Preliminary urine culture shows Pseudomonas aeruginosa.  Discussed with Dr. Prater and pharmacist Jonatan -Levaquin 750 mg every 48 x10 days.    He has a history of paroxysmal atrial fibrillation chronically anticoagulated on Eliquis.  Continue  Cardizem and Coreg.    [place] will serve as DVT prophylaxis.    He will need follow-up CT chest in 12 months to monitor 4 mm right upper lobe pulmonary nodule.    Palliative care consult per family request.    Medical Decision Making  Number and Complexity of problems: 3 acute problems in the form of intractable low back pain, aspiration pneumonia and urinary tract infection  Differential Diagnosis: None considered at present    Conditions and Status        Condition is unchanged.     [facility] Data  External documents reviewed: [facility] records  Cardiac tracing (EKG, telemetry) interpretation: Sinus rhythm  Radiology interpretation: Interpreted by radiology  Labs reviewed: As above  Any tests that were considered but not ordered: None considered at present     Decision rules/scores evaluated (example [id number], [place], etc): None considered at present     Discussed with: Patient and Dr. Prater     Care Planning  Shared decision making: Patient is agreeable to ongoing work-up and treatment  Code status and discussions: Full code with full interventions    Disposition  Social Determinants of Health that impact treatment or disposition: SNF -family is requesting placement  I expect the patient to be discharged to SNF in 2-3 days.     Electronically signed by CONCEPCION Da Silva, 09/27/23, 08:38 CDT.

## 2023-09-27 NOTE — PROGRESS NOTES
"Brennon Vance  87 y.o.      Chief complaint:   Compression fracture status post kyphoplasty    Subjective  No events overnight.  Patient does state that his back pain does seem to be improving.    Temp:  [97 °F (36.1 °C)-99.1 °F (37.3 °C)] 98.6 °F (37 °C)  Heart Rate:  [] 87  Resp:  [14-18] 16  BP: (111-166)/(55-77) 166/74      Objective:  General Appearance:  Comfortable, well-appearing, in no acute distress and in pain.    Vital signs: (most recent): Blood pressure 166/74, pulse 87, temperature 98.6 °F (37 °C), temperature source Oral, resp. rate 16, height 177.8 cm (70\"), weight 45.5 kg (100 lb 3.2 oz), SpO2 97 %.  Vital signs are normal.  No fever.    Output: Producing urine.    HEENT: Normal HEENT exam.    Lungs:  Normal effort and normal respiratory rate.  He is not in respiratory distress.    Heart: Normal rate.  Regular rhythm.    Chest: Symmetric chest wall expansion.   Extremities: Normal range of motion.    Skin:  Warm and dry.    Abdomen: Abdomen is soft and non-distended.  Bowel sounds are normal.   There is no abdominal tenderness.     Pulses: Distal pulses are intact.      Neurologic Exam     Mental Status   Oriented to person, place, and time.   Attention: normal. Concentration: normal.   Speech: speech is normal   Level of consciousness: alert  Normal comprehension.     Cranial Nerves     CN II   Visual fields full to confrontation.     CN III, IV, VI   Pupils are equal, round, and reactive to light.  Extraocular motions are normal.     CN V   Facial sensation intact.     CN VII   Facial expression full, symmetric.     CN VIII   CN VIII normal.     CN IX, X   CN IX normal.   CN X normal.     CN XI   CN XI normal.     CN XII   CN XII normal.     Motor Exam   Muscle bulk: normal    Strength   Strength 5/5 throughout.     Sensory Exam   Light touch normal.     Gait, Coordination, and Reflexes     Gait  Gait: normal    Reflexes   Reflexes 2+ except as noted.     Lab Results (last 24 hours)       " Procedure Component Value Units Date/Time    Comprehensive Metabolic Panel [[id number]]  (Abnormal) Collected: 09/27/23 0416    Specimen: Blood Updated: 09/27/23 0517     Glucose 89 mg/dL      BUN 29 mg/dL      Creatinine 1.59 mg/dL      Sodium 141 mmol/L      Potassium 4.0 mmol/L      Chloride 107 mmol/L      CO2 18.0 mmol/L      Calcium 8.4 mg/dL      Total Protein 5.8 g/dL      Albumin 3.0 g/dL      ALT (SGPT) 8 U/L      AST (SGOT) 27 U/L      Alkaline Phosphatase 98 U/L      Total Bilirubin 0.2 mg/dL      Globulin 2.8 gm/dL      A/G Ratio 1.1 g/dL      BUN/Creatinine Ratio 18.2     Anion Gap 16.0 mmol/L      eGFR 42.3 mL/min/1.73     Narrative:      GFR Normal >60  Chronic Kidney Disease <60  Kidney Failure <15    The GFR formula is only valid for adults with stable renal function between ages 18 and 70.    CBC & Differential [[id number]]  (Abnormal) Collected: 09/27/23 0416    Specimen: Blood Updated: 09/27/23 0459    Narrative:      The following orders were created for panel order CBC & Differential.  Procedure                               Abnormality         Status                     ---------                               -----------         ------                     CBC Auto Differential[[id number]]        Abnormal            Final result                 Please view results for these tests on the individual orders.    CBC Auto Differential [[id number]]  (Abnormal) Collected: 09/27/23 0416    Specimen: Blood Updated: 09/27/23 0459     WBC 6.94 10*3/mm3      RBC 3.15 10*6/mm3      Hemoglobin 9.7 g/dL      Hematocrit 30.4 %      MCV 96.5 fL      MCH 30.8 pg      MCHC 31.9 g/dL      RDW 13.7 %      RDW-SD 47.9 fl      MPV 9.5 fL      Platelets 253 10*3/mm3      Neutrophil % 72.2 %      Lymphocyte % 16.4 %      Monocyte % 9.4 %      Eosinophil % 0.6 %      Basophil % 0.7 %      Immature Grans % 0.7 %      Neutrophils, Absolute 5.01 10*3/mm3      Lymphocytes, Absolute 1.14 10*3/mm3      Monocytes, Absolute 0.65  10*3/mm3      Eosinophils, Absolute 0.04 10*3/mm3      Basophils, Absolute 0.05 10*3/mm3      Immature Grans, Absolute 0.05 10*3/mm3      nRBC 0.0 /100 WBC     Urine Culture - Urine, Straight Cath [[id number]]  (Abnormal) Collected: 09/24/23 1247    Specimen: Urine from Straight Cath Updated: 09/26/23 0712     Urine Culture 25,000 CFU/mL Pseudomonas aeruginosa    Narrative:      Colonization of the urinary tract without infection is common. Treatment is discouraged unless the patient is symptomatic, pregnant, or undergoing an invasive urologic procedure.                Plan:   Patient is doing well.  Patient is to work with physical and occupational therapy today.  Okay to start discharge planning.  Continue with antibiotics per hospitalist for UTI.      Intractable low back pain    Coronary artery disease involving native coronary artery of native heart without angina pectoris    Paroxysmal atrial fibrillation    Other emphysema    Essential hypertension    UTI (urinary tract infection)    Aspiration pneumonia    Stage 3b chronic kidney disease    Compression fracture of T12 vertebra with delayed healing    Closed compression fracture of third lumbar vertebra        Rohan Arizmendi, APRN

## 2023-09-27 NOTE — CASE MANAGEMENT/SOCIAL WORK
Continued Stay Note  VIVIAN Espino     Patient Name: Brennon Vance  MRN: [id number]  Today's Date: 9/27/2023    Admit Date: 9/24/2023        Discharge Plan       Row Name 09/27/23 1015       Plan    Plan Comments Dtr had requested a list of Snf's in [place], [place]. SW took list to pt room but no family present. Left list on window for family to review. Will follow up with dtr later today.                   Discharge Codes    No documentation.                 Expected Discharge Date and Time       Expected Discharge Date Expected Discharge Time    Sep 29, 2023               FAISAL Nava

## 2023-09-27 NOTE — PLAN OF CARE
"Goal Outcome Evaluation:  Plan of Care Reviewed With: patient, daughter        Progress: improving  Outcome Evaluation: OT eval completed. Pt is alert and oriented to person and place. Both daughters present at bedside to assist with pt history. Educated on spinal precautions and log roll technique. PLOF - pt Max A/DEP for ADLs/IADLs. Per daughter, spouse is primary caregiver for him and \"rolls him around in an office chair for household mobility.\" Pt CGA/Min A for bed mobilty. CGA for sit <> stand t/f utilizing FWW to help maintain balance. Reports 1/10 pain after sit <> stand t/f. Pt back in bed at end of session. Skilled OT services deemed necessary to address fxl mobility, endurance, balance, and strength. Recommend d/c to SNF.         "

## 2023-09-27 NOTE — THERAPY EVALUATION
Patient Name: Brennon Vance  : 1935    MRN: [id number]                              Today's Date: 2023       Admit Date: 2023    Visit Dx:     ICD-10-CM ICD-9-CM   1. Acute cystitis without hematuria  N30.00 595.0   2. Acute bilateral low back pain with sciatica, sciatica laterality unspecified  M54.40 724.2     724.3   3. Shortness of breath  R06.02 786.05   4. Compression fracture of T12 vertebra with delayed healing  S22.080G V54.27   5. Closed compression fracture of L3 lumbar vertebra with delayed healing, subsequent encounter  S32.030G V54.17   6. Impaired mobility [Z74.09 (ICD-10-CM)]  Z74.09 799.89     Patient Active Problem List   Diagnosis    Chest pain    NSTEMI, initial episode of care    Coronary artery disease involving native coronary artery of native heart without angina pectoris    Paroxysmal atrial fibrillation    Other emphysema    Essential hypertension    Precordial pain    Hyperlipidemia LDL goal <70    Tobacco abuse    Cardiomyopathy    NSTEMI (non-ST elevated myocardial infarction)    UTI (urinary tract infection)    Aspiration pneumonia    Intractable low back pain    Stage 3b chronic kidney disease    Compression fracture of T12 vertebra with delayed healing    Closed compression fracture of third lumbar vertebra     Past Medical History:   Diagnosis Date    CAD (coronary artery disease)     COPD (chronic obstructive pulmonary disease)     Hiatal hernia     Hyperlipidemia     Hypertension     Stroke      Past Surgical History:   Procedure Laterality Date    ABDOMINAL AORTIC ANEURYSM REPAIR      CARDIAC CATHETERIZATION N/A 2021    Procedure: Left Heart Cath;  Surgeon: Harrison Alcantara MD;  Location: [facility] CATH INVASIVE LOCATION;  Service: Cardiology;  Laterality: N/A;    CORONARY ANGIOPLASTY WITH STENT PLACEMENT      FEMORAL ARTERY STENT      KYPHOPLASTY WITH BIOPSY N/A 2023    Procedure: KYPHOPLASTY WITH BIOPSY T12 and L3;  Surgeon: Jeremiah Brantley MD;   Location:  [facility] OR;  Service: Neurosurgery;  Laterality: N/A;    LEG THROMBECTOMY/EMBOLECTOMY Right 5/20/2021    Procedure: RT GROIN EXPLORATION;  Surgeon: Geoff Remy DO;  Location:  [facility] HYBRID OR 12;  Service: Vascular;  Laterality: Right;    TRIGEMINAL NERVE DECOMPRESSION        General Information       Row Name 09/27/23 0735          OT Time and Intention    Document Type evaluation  C/o back pain, B lep pain Dx: compression fracture of T12 and L3, s/p kyphoplasty with biopsy T12 and L3  -CS (r) MB (t) CS (c)     Mode of Treatment occupational therapy;co-treatment  -CS (r) MB (t) CS (c)       Row Name 09/27/23 0735          General Information    Patient Profile Reviewed yes  -CS (r) MB (t) CS (c)     Prior Level of Function max assist:;ADL's;dependent:;cooking;home management;cleaning;driving;shopping  -CS (r) MB (t) CS (c)     Existing Precautions/Restrictions fall;spinal  -CS (r) MB (t) CS (c)     Barriers to Rehab medically complex  -CS (r) MB (t) CS (c)       Row Name 09/27/23 35          Occupational Profile    Environmental Supports and Barriers (Occupational Profile) cane, rollater, grab bars, walk-in shower  -CS (r) MB (t) CS (c)       Row Name 09/27/23 0735          Living Environment    People in Home spouse  -CS (r) MB (t) CS (c)       Row Name 09/27/23 35          Home Main Entrance    Number of Stairs, Main Entrance two  -CS (r) MB (t) CS (c)     Stair Railings, Main Entrance none  -CS (r) MB (t) CS (c)       Row Name 09/27/23 0735          Stairs Within Home, Primary    Number of Stairs, Within Home, Primary none  -CS (r) MB (t) CS (c)       Row Name 09/27/23 0735          Cognition    Orientation Status (Cognition) oriented to;person;place  -CS (r) MB (t) CS (c)       Row Name 09/27/23 0735          Safety Issues, Functional Mobility    Safety Issues Affecting Function (Mobility) insight into deficits/self-awareness;safety precaution awareness  -CS (r) MB (t) CS (c)      Impairments Affecting Function (Mobility) balance;endurance/activity tolerance;pain;range of motion (ROM);strength;cognition  -CS (r) MB (t) CS (c)     Cognitive Impairments, Mobility Safety/Performance insight into deficits/self-awareness;safety precaution awareness;safety precaution follow-through  -CS (r) MB (t) CS (c)               User Key  (r) = Recorded By, (t) = Taken By, (c) = Cosigned By      Initials Name Provider Type    CS Dennise Whittington, OTR/L, CNT Occupational Therapist    Virginia Gutierrez, OT Student OT Student                     Mobility/ADL's       Row Name 09/27/23 0735          Bed Mobility    Bed Mobility scooting/bridging;supine-sit;sit-supine;sidelying-sit;rolling right  -CS (r) MB (t) CS (c)     Rolling Right [place] (Bed Mobility) minimum assist (75% patient effort);verbal cues  -CS (r) MB (t) CS (c)     Scooting/Bridging [place] (Bed Mobility) independent  -CS (r) MB (t) CS (c)     Supine-Sit [place] (Bed Mobility) contact guard;verbal cues  -CS (r) MB (t) CS (c)     Sit-Supine [place] (Bed Mobility) minimum assist (75% patient effort);verbal cues  -CS (r) MB (t) CS (c)     Sidelying-Sit [place] (Bed Mobility) minimum assist (75% patient effort)  -CS (r) MB (t) CS (c)     Assistive Device (Bed Mobility) bed rails;draw sheet;head of bed elevated  -CS (r) MB (t) CS (c)       Row Name 09/27/23 0735          Transfers    Transfers sit-stand transfer;stand-sit transfer  -CS (r) MB (t) CS (c)       Row Name 09/27/23 0735          Sit-Stand Transfer    Sit-Stand [place] (Transfers) contact guard;verbal cues  -CS (r) MB (t) CS (c)     Assistive Device (Sit-Stand Transfers) walker, front-wheeled  -CS (r) MB (t) CS (c)       Row Name 09/27/23 0735          Stand-Sit Transfer    Stand-Sit [place] (Transfers) contact guard;verbal cues  -CS (r) MB (t) CS (c)     Assistive Device (Stand-Sit Transfers) walker, front-wheeled  -CS (r) MB (t) CS (c)       Row Name  09/27/23 [facility]          Activities of Daily Living    BADL Assessment/Intervention lower body dressing  -CS (r) MB (t) CS (c)       Row Name 09/27/23 35          Lower Body Dressing Assessment/Training    [place] Level (Lower Body Dressing) don;doff;socks;dependent (less than 25% patient effort)  -CS (r) MB (t) CS (c)     Position (Lower Body Dressing) supine  -CS (r) MB (t) CS (c)               User Key  (r) = Recorded By, (t) = Taken By, (c) = Cosigned By      Initials Name Provider Type    CS Dennise Whittington, OTR/L, CNT Occupational Therapist    Virginia Gutierrez, OT Student OT Student                   Obj/Interventions       Row Name 09/27/23 35          Sensory Assessment (Somatosensory)    Sensory Assessment (Somatosensory) UE sensation intact  -CS (r) MB (t) CS (c)       Row Name 09/27/23 35          Range of Motion Comprehensive    General Range of Motion bilateral upper extremity ROM WFL  -CS (r) MB (t) CS (c)       Row Name 09/27/23 35          Strength Comprehensive (MMT)    Comment, General Manual Muscle Testing (MMT) Assessment BUE 4-/5  -CS (r) MB (t) CS (c)       Row Name 09/27/23 35          Balance    Balance Assessment sitting static balance;sitting dynamic balance;standing static balance;standing dynamic balance  -CS (r) MB (t) CS (c)     Static Sitting Balance standby assist  -CS (r) MB (t) CS (c)     Dynamic Sitting Balance contact guard  -CS (r) MB (t) CS (c)     Position, Sitting Balance unsupported;sitting edge of bed  -CS (r) MB (t) CS (c)     Static Standing Balance contact guard  -CS (r) MB (t) CS (c)     Dynamic Standing Balance contact guard  -CS (r) MB (t) CS (c)     Position/Device Used, Standing Balance walker, rolling  -CS (r) MB (t) CS (c)               User Key  (r) = Recorded By, (t) = Taken By, (c) = Cosigned By      Initials Name Provider Type    CS Dennise Whittington, OTR/L, CNT Occupational Therapist    Virginia Gutierrez, OT Student OT Student                    Goals/Plan       Row Name 09/27/23 35          Transfer Goal 1 (OT)    Activity/Assistive Device (Transfer Goal 1, OT) toilet  -CS (r) MB (t) CS (c)     Clubb Level/Cues Needed (Transfer Goal 1, OT) supervision required  -CS (r) MB (t) CS (c)     Time Frame (Transfer Goal 1, OT) long term goal (LTG)  -CS (r) MB (t) CS (c)     Progress/Outcome (Transfer Goal 1, OT) new goal  -CS (r) MB (t) CS (c)       Row Name 09/27/23 [facility]          Bathing Goal 1 (OT)    Activity/Device (Bathing Goal 1, OT) lower body bathing  -CS (r) MB (t) CS (c)     Clubb Level/Cues Needed (Bathing Goal 1, OT) minimum assist (75% or more patient effort)  -CS (r) MB (t) CS (c)     Time Frame (Bathing Goal 1, OT) long term goal (LTG)  -CS (r) MB (t) CS (c)     Strategies/Barriers (Bathing Goal 1, OT) AE PRN  -CS (r) MB (t) CS (c)     Progress/Outcomes (Bathing Goal 1, OT) new goal  -CS (r) MB (t) CS (c)       Row Name 09/27/23 07          Grooming Goal 1 (OT)    Activity/Device (Grooming Goal 1, OT) grooming skills, all  sink side  -CS (r) MB (t) CS (c)     Clubb (Grooming Goal 1, OT) supervision required  -CS (r) MB (t) CS (c)     Time Frame (Grooming Goal 1, OT) long term goal (LTG)  -CS (r) MB (t) CS (c)     Progress/Outcome (Grooming Goal 1, OT) new goal  -CS (r) MB (t) CS (c)       Row Name 09/27/23 0735          Therapy Assessment/Plan (OT)    Planned Therapy Interventions (OT) activity tolerance training;adaptive equipment training;BADL retraining;functional balance retraining;IADL retraining;occupation/activity based interventions;patient/caregiver education/training;ROM/therapeutic exercise;strengthening exercise;transfer/mobility retraining  -CS (r) MB (t) CS (c)               User Key  (r) = Recorded By, (t) = Taken By, (c) = Cosigned By      Initials Name Provider Type    CS Dennise Whittington, OTR/L, CNT Occupational Therapist    Virginia Gutierrez, OT Student OT Student                   Clinical  "Impression       Row Name 09/27/23 0735          Pain Assessment    Pretreatment Pain Rating 0/10 - no pain  -CS (r) MB (t) CS (c)     Posttreatment Pain Rating 1/10  -CS (r) MB (t) CS (c)     Pain Location - back  -CS (r) MB (t) CS (c)     Pain Intervention(s) Medication (See MAR);Repositioned  -CS (r) MB (t) CS (c)       Row Name 09/27/23 0735          Plan of Care Review    Plan of Care Reviewed With patient;daughter  -CS (r) MB (t) CS (c)     Progress improving  -CS (r) MB (t) CS (c)     Outcome Evaluation OT eval completed. Pt is alert and oriented to person and place. Both daughters present at bedside to assist with pt history. Educated on spinal precautions and log roll technique. PLOF - pt Max A/DEP for ADLs/IADLs. Per daughter, spouse is primary caregiver for him and \"rolls him around in an office chair for household mobility.\" Pt CGA/Min A for bed mobilty. CGA for sit <> stand t/f utilizing FWW to help maintain balance. Reports 1/10 pain after sit <> stand t/f. Pt back in bed at end of session. Skilled OT services deemed necessary to address fxl mobility, endurance, balance, and strength. Recommend d/c to SNF.  -CS (r) MB (t) CS (c)       Row Name 09/27/23 0735          Therapy Assessment/Plan (OT)    Rehab Potential (OT) good, to achieve stated therapy goals  -CS (r) MB (t) CS (c)     Criteria for Skilled Therapeutic Interventions Met (OT) meets criteria;skilled treatment is necessary  -CS (r) MB (t) CS (c)     Therapy Frequency (OT) 5 times/wk  -CS (r) MB (t) CS (c)       Row Name 09/27/23 0735          Positioning and Restraints    Pre-Treatment Position in bed  -CS (r) MB (t) CS (c)     Post Treatment Position bed  -CS (r) MB (t) CS (c)     In Bed supine;call light within reach;encouraged to call for assist;side rails up x2;with family/caregiver;exit alarm on;SCD pump applied  -CS (r) MB (t) CS (c)               User Key  (r) = Recorded By, (t) = Taken By, (c) = Cosigned By      Initials Name Provider " Type    CS Dennise Whittington, OTR/L, CNT Occupational Therapist    Virginia Gutierrez, OT Student OT Student                   Outcome Measures       Row Name 09/27/23 0735          How much help from another is currently needed...    Putting on and taking off regular lower body clothing? 1  -CS (r) MB (t) CS (c)     Bathing (including washing, rinsing, and drying) 2  -CS (r) MB (t) CS (c)     Toileting (which includes using toilet bed pan or urinal) 3  -CS (r) MB (t) CS (c)     Putting on and taking off regular upper body clothing 3  -CS (r) MB (t) CS (c)     Taking care of personal grooming (such as brushing teeth) 3  -CS (r) MB (t) CS (c)     Eating meals 4  -CS (r) MB (t) CS (c)     AM-PAC 6 Clicks Score (OT) 16  -CS (r) MB (t)       Row Name 09/27/23 0800 09/27/23 0745       How much help from another person do you currently need...    Turning from your back to your side while in flat bed without using bedrails? 3  -SC 3 (P)   -JS    Moving from lying on back to sitting on the side of a flat bed without bedrails? 3  -SC 3 (P)   -JS    Moving to and from a bed to a chair (including a wheelchair)? 3  -SC 3 (P)   -JS    Standing up from a chair using your arms (e.g., wheelchair, bedside chair)? 3  -SC 3 (P)   -JS    Climbing 3-5 steps with a railing? 2  -SC 2 (P)   -JS    To walk in hospital room? 3  -SC 3 (P)   -JS    AM-PAC 6 Clicks Score (PT) 17  -SC 17 (P)   -JS    Highest level of mobility 5 --> Static standing  -SC 5 --> Static standing (P)   -JS      Row Name 09/27/23 0745 09/27/23 0735       Functional Assessment    Outcome Measure Options AM-PAC 6 Clicks Basic Mobility (PT) (P)   -JS AM-PAC 6 Clicks Daily Activity (OT)  -CS (r) MB (t) CS (c)              User Key  (r) = Recorded By, (t) = Taken By, (c) = Cosigned By      Initials Name Provider Type    CS Dennise Whittington S, OTR/L, CNT Occupational Therapist    Diamante Escalera, RN Registered Nurse    Marcin Catalan, PT Student PT Student    MB  Virginia Cunningham, OT Student OT Student                    Occupational Therapy Education       Title: PT OT SLP Therapies (Done)       Topic: Occupational Therapy (Done)       Point: ADL training (Done)       Description:   Instruct learner(s) on proper safety adaptation and remediation techniques during self care or transfers.   Instruct in proper use of assistive devices.                  Learning Progress Summary             Patient Acceptance, E, VU by MB at 9/27/2023 0859   Family Acceptance, E, VU by MB at 9/27/2023 0859                         Point: Home exercise program (Done)       Description:   Instruct learner(s) on appropriate technique for monitoring, assisting and/or progressing therapeutic exercises/activities.                  Learning Progress Summary             Patient Acceptance, E, VU by MB at 9/27/2023 0859   Family Acceptance, E, VU by MB at 9/27/2023 0859                         Point: Precautions (Done)       Description:   Instruct learner(s) on prescribed precautions during self-care and functional transfers.                  Learning Progress Summary             Patient Acceptance, E, VU by MB at 9/27/2023 0859   Family Acceptance, E, VU by MB at 9/27/2023 0859                         Point: Body mechanics (Done)       Description:   Instruct learner(s) on proper positioning and spine alignment during self-care, functional mobility activities and/or exercises.                  Learning Progress Summary             Patient Acceptance, E, VU by MB at 9/27/2023 0859   Family Acceptance, E, VU by MB at 9/27/2023 0859                                         User Key       Initials Effective Dates Name Provider Type Discipline    MB 08/04/23 -  Virginia Cunningham OT Student OT Student OT                  OT Recommendation and Plan  Planned Therapy Interventions (OT): activity tolerance training, adaptive equipment training, BADL retraining, functional balance retraining, IADL  "retraining, occupation/activity based interventions, patient/caregiver education/training, ROM/therapeutic exercise, strengthening exercise, transfer/mobility retraining  Therapy Frequency (OT): 5 times/wk  Plan of Care Review  Plan of Care Reviewed With: patient, daughter  Progress: improving  Outcome Evaluation: OT eval completed. Pt is alert and oriented to person and place. Both daughters present at bedside to assist with pt history. Educated on spinal precautions and log roll technique. PLOF - pt Max A/DEP for ADLs/IADLs. Per daughter, spouse is primary caregiver for him and \"rolls him around in an office chair for household mobility.\" Pt CGA/Min A for bed mobilty. CGA for sit <> stand t/f utilizing FWW to help maintain balance. Reports 1/10 pain after sit <> stand t/f. Pt back in bed at end of session. Skilled OT services deemed necessary to address fxl mobility, endurance, balance, and strength. Recommend d/c to SNF.     Time Calculation:         Time Calculation- OT       Row Name 09/27/23 0735             Time Calculation- OT    OT Start Time 0752  +10 min chart review  -CS (r) MB (t) CS (c)      OT Stop Time 0822  -CS (r) MB (t) CS (c)      OT Time Calculation (min) 30 min  -CS (r) MB (t)      OT Received On 09/27/23  -CS (r) MB (t) CS (c)      OT Goal Re-Cert Due Date 10/07/23  -CS (r) MB (t) CS (c)         Untimed Charges    OT Eval/Re-eval Minutes 40  -CS (r) MB (t) CS (c)         Total Minutes    Untimed Charges Total Minutes 40  -CS (r) MB (t)       Total Minutes 40  -CS (r) MB (t)                User Key  (r) = Recorded By, (t) = Taken By, (c) = Cosigned By      Initials Name Provider Type    CS Dennise Whittington, OTR/L, CNT Occupational Therapist    Virginia Gutierrez, OT Student OT Student                           Virginia Cunningham, OT Student  9/27/2023  "

## 2023-09-28 LAB
ALBUMIN SERPL-MCNC: 3 G/DL (ref 3.5–5.2)
ALBUMIN/GLOB SERPL: 1.2 G/DL
ALP SERPL-CCNC: 90 U/L (ref 39–117)
ALT SERPL W P-5'-P-CCNC: 5 U/L (ref 1–41)
ANION GAP SERPL CALCULATED.3IONS-SCNC: 9 MMOL/L (ref 5–15)
AST SERPL-CCNC: 18 U/L (ref 1–40)
BASOPHILS # BLD AUTO: 0.04 10*3/MM3 (ref 0–0.2)
BASOPHILS NFR BLD AUTO: 0.7 % (ref 0–1.5)
BILIRUB SERPL-MCNC: 0.3 MG/DL (ref 0–1.2)
BUN SERPL-MCNC: 25 MG/DL (ref 8–23)
BUN/CREAT SERPL: 17 (ref 7–25)
CALCIUM SPEC-SCNC: 8.7 MG/DL (ref 8.6–10.5)
CHLORIDE SERPL-SCNC: 110 MMOL/L (ref 98–107)
CO2 SERPL-SCNC: 21 MMOL/L (ref 22–29)
CREAT SERPL-MCNC: 1.47 MG/DL (ref 0.76–1.27)
DEPRECATED RDW RBC AUTO: 47 FL (ref 37–54)
EGFRCR SERPLBLD CKD-EPI 2021: 46.5 ML/MIN/1.73
EOSINOPHIL # BLD AUTO: 0.14 10*3/MM3 (ref 0–0.4)
EOSINOPHIL NFR BLD AUTO: 2.4 % (ref 0.3–6.2)
ERYTHROCYTE [DISTWIDTH] IN BLOOD BY AUTOMATED COUNT: 13.8 % (ref 12.3–15.4)
GLOBULIN UR ELPH-MCNC: 2.6 GM/DL
GLUCOSE SERPL-MCNC: 92 MG/DL (ref 65–99)
HCT VFR BLD AUTO: 27.5 % (ref 37.5–51)
HGB BLD-MCNC: 8.9 G/DL (ref 13–17.7)
IMM GRANULOCYTES # BLD AUTO: 0.04 10*3/MM3 (ref 0–0.05)
IMM GRANULOCYTES NFR BLD AUTO: 0.7 % (ref 0–0.5)
LYMPHOCYTES # BLD AUTO: 1.34 10*3/MM3 (ref 0.7–3.1)
LYMPHOCYTES NFR BLD AUTO: 22.9 % (ref 19.6–45.3)
MCH RBC QN AUTO: 30.6 PG (ref 26.6–33)
MCHC RBC AUTO-ENTMCNC: 32.4 G/DL (ref 31.5–35.7)
MCV RBC AUTO: 94.5 FL (ref 79–97)
MONOCYTES # BLD AUTO: 0.62 10*3/MM3 (ref 0.1–0.9)
MONOCYTES NFR BLD AUTO: 10.6 % (ref 5–12)
NEUTROPHILS NFR BLD AUTO: 3.66 10*3/MM3 (ref 1.7–7)
NEUTROPHILS NFR BLD AUTO: 62.7 % (ref 42.7–76)
NRBC BLD AUTO-RTO: 0 /100 WBC (ref 0–0.2)
PLATELET # BLD AUTO: 229 10*3/MM3 (ref 140–450)
PMV BLD AUTO: 9.5 FL (ref 6–12)
POTASSIUM SERPL-SCNC: 3.5 MMOL/L (ref 3.5–5.2)
PROT SERPL-MCNC: 5.6 G/DL (ref 6–8.5)
QT INTERVAL: 414 MS
QTC INTERVAL: 465 MS
RBC # BLD AUTO: 2.91 10*6/MM3 (ref 4.14–5.8)
SODIUM SERPL-SCNC: 140 MMOL/L (ref 136–145)
WBC NRBC COR # BLD: 5.84 10*3/MM3 (ref 3.4–10.8)

## 2023-09-28 PROCEDURE — 99024 POSTOP FOLLOW-UP VISIT: CPT | Performed by: NURSE PRACTITIONER

## 2023-09-28 PROCEDURE — 63710000001 SODIUM BICARBONATE 650 MG TABLET: Performed by: NURSE PRACTITIONER

## 2023-09-28 PROCEDURE — A9270 NON-COVERED ITEM OR SERVICE: HCPCS | Performed by: NURSE PRACTITIONER

## 2023-09-28 PROCEDURE — 63710000001 APIXABAN 2.5 MG TABLET: Performed by: NURSE PRACTITIONER

## 2023-09-28 PROCEDURE — 63710000001 POTASSIUM CHLORIDE 20 MEQ TABLET CONTROLLED-RELEASE: Performed by: NURSE PRACTITIONER

## 2023-09-28 PROCEDURE — 36415 COLL VENOUS BLD VENIPUNCTURE: CPT | Performed by: NURSE PRACTITIONER

## 2023-09-28 PROCEDURE — 63710000001 CIPROFLOXACIN 500 MG TABLET: Performed by: NURSE PRACTITIONER

## 2023-09-28 PROCEDURE — 94799 UNLISTED PULMONARY SVC/PX: CPT

## 2023-09-28 PROCEDURE — G0378 HOSPITAL OBSERVATION PER HR: HCPCS

## 2023-09-28 PROCEDURE — 63710000001 MELATONIN 5 MG TABLET: Performed by: NURSE PRACTITIONER

## 2023-09-28 PROCEDURE — 63710000001 MIRTAZAPINE 15 MG TABLET: Performed by: NURSE PRACTITIONER

## 2023-09-28 PROCEDURE — 85025 COMPLETE CBC W/AUTO DIFF WBC: CPT | Performed by: NURSE PRACTITIONER

## 2023-09-28 PROCEDURE — 80053 COMPREHEN METABOLIC PANEL: CPT | Performed by: NURSE PRACTITIONER

## 2023-09-28 PROCEDURE — 94664 DEMO&/EVAL PT USE INHALER: CPT

## 2023-09-28 PROCEDURE — 63710000001 CARVEDILOL 6.25 MG TABLET: Performed by: NURSE PRACTITIONER

## 2023-09-28 PROCEDURE — 63710000001 CHOLECALCIFEROL 25 MCG (1000 UT) TABLET: Performed by: NURSE PRACTITIONER

## 2023-09-28 PROCEDURE — 63710000001 SENNOSIDES-DOCUSATE 8.6-50 MG TABLET: Performed by: NURSE PRACTITIONER

## 2023-09-28 PROCEDURE — 63710000001 ISOSORBIDE MONONITRATE 60 MG TABLET SUSTAINED-RELEASE 24 HOUR: Performed by: NURSE PRACTITIONER

## 2023-09-28 PROCEDURE — 63710000001 GUAIFENESIN 600 MG TABLET SUSTAINED-RELEASE 12 HOUR: Performed by: NURSE PRACTITIONER

## 2023-09-28 PROCEDURE — 63710000001 HYDROCODONE-ACETAMINOPHEN 5-325 MG TABLET: Performed by: NURSE PRACTITIONER

## 2023-09-28 PROCEDURE — 63710000001 LOPERAMIDE 2 MG CAPSULE: Performed by: NURSE PRACTITIONER

## 2023-09-28 PROCEDURE — 63710000001 FINASTERIDE 5 MG TABLET: Performed by: NURSE PRACTITIONER

## 2023-09-28 PROCEDURE — 63710000001 ROSUVASTATIN 20 MG TABLET: Performed by: NURSE PRACTITIONER

## 2023-09-28 PROCEDURE — 63710000001 DILTIAZEM CD 180 MG CAPSULE SUSTAINED-RELEASE 24 HR: Performed by: NURSE PRACTITIONER

## 2023-09-28 PROCEDURE — 63710000001 NICOTINE 21 MG/24HR PATCH 24 HOUR: Performed by: NURSE PRACTITIONER

## 2023-09-28 PROCEDURE — 97116 GAIT TRAINING THERAPY: CPT

## 2023-09-28 PROCEDURE — 63710000001 HYDROXYZINE 25 MG TABLET: Performed by: NURSE PRACTITIONER

## 2023-09-28 PROCEDURE — 63710000001 PROBIOTIC 250 MG CAPSULE: Performed by: NURSE PRACTITIONER

## 2023-09-28 PROCEDURE — 63710000001 LEVOTHYROXINE 50 MCG TABLET: Performed by: NURSE PRACTITIONER

## 2023-09-28 PROCEDURE — 63710000001 AMOXICILLIN-CLAVULANATE 500-125 MG TABLET: Performed by: NURSE PRACTITIONER

## 2023-09-28 PROCEDURE — 63710000001 TAMSULOSIN 0.4 MG CAPSULE: Performed by: NURSE PRACTITIONER

## 2023-09-28 PROCEDURE — 63710000001 CARBAMAZEPINE 200 MG TABLET: Performed by: NURSE PRACTITIONER

## 2023-09-28 PROCEDURE — 63710000001 PANTOPRAZOLE 40 MG TABLET DELAYED-RELEASE: Performed by: NURSE PRACTITIONER

## 2023-09-28 RX ORDER — CIPROFLOXACIN 500 MG/1
500 TABLET, FILM COATED ORAL
Status: DISCONTINUED | OUTPATIENT
Start: 2023-09-28 | End: 2023-10-05 | Stop reason: HOSPADM

## 2023-09-28 RX ORDER — SACCHAROMYCES BOULARDII 250 MG
250 CAPSULE ORAL 2 TIMES DAILY
Status: DISCONTINUED | OUTPATIENT
Start: 2023-09-28 | End: 2023-10-05 | Stop reason: HOSPADM

## 2023-09-28 RX ORDER — LOPERAMIDE HYDROCHLORIDE 2 MG/1
2 CAPSULE ORAL 4 TIMES DAILY PRN
Status: DISCONTINUED | OUTPATIENT
Start: 2023-09-28 | End: 2023-10-05 | Stop reason: HOSPADM

## 2023-09-28 RX ADMIN — PANTOPRAZOLE SODIUM 40 MG: 40 TABLET, DELAYED RELEASE ORAL at 08:48

## 2023-09-28 RX ADMIN — HYDROCODONE BITARTRATE AND ACETAMINOPHEN 1 TABLET: 5; 325 TABLET ORAL at 01:14

## 2023-09-28 RX ADMIN — GUAIFENESIN 600 MG: 600 TABLET, EXTENDED RELEASE ORAL at 21:44

## 2023-09-28 RX ADMIN — AMOXICILLIN AND CLAVULANATE POTASSIUM 500 MG: 500; 125 TABLET, FILM COATED ORAL at 21:43

## 2023-09-28 RX ADMIN — NICOTINE 1 PATCH: 21 PATCH, EXTENDED RELEASE TRANSDERMAL at 08:55

## 2023-09-28 RX ADMIN — TAMSULOSIN HYDROCHLORIDE 0.4 MG: 0.4 CAPSULE ORAL at 08:49

## 2023-09-28 RX ADMIN — CARVEDILOL 12.5 MG: 6.25 TABLET, FILM COATED ORAL at 08:49

## 2023-09-28 RX ADMIN — Medication 10 ML: at 08:51

## 2023-09-28 RX ADMIN — DILTIAZEM HYDROCHLORIDE 180 MG: 180 CAPSULE, EXTENDED RELEASE ORAL at 08:48

## 2023-09-28 RX ADMIN — BUDESONIDE AND FORMOTEROL FUMARATE DIHYDRATE 2 PUFF: 160; 4.5 AEROSOL RESPIRATORY (INHALATION) at 06:46

## 2023-09-28 RX ADMIN — CARBAMAZEPINE 200 MG: 200 TABLET ORAL at 08:49

## 2023-09-28 RX ADMIN — CARVEDILOL 12.5 MG: 6.25 TABLET, FILM COATED ORAL at 17:16

## 2023-09-28 RX ADMIN — POTASSIUM CHLORIDE 20 MEQ: 1500 TABLET, EXTENDED RELEASE ORAL at 08:49

## 2023-09-28 RX ADMIN — APIXABAN 2.5 MG: 2.5 TABLET, FILM COATED ORAL at 21:43

## 2023-09-28 RX ADMIN — HYDROXYZINE HYDROCHLORIDE 50 MG: 25 TABLET, FILM COATED ORAL at 23:58

## 2023-09-28 RX ADMIN — FINASTERIDE 5 MG: 5 TABLET, FILM COATED ORAL at 08:48

## 2023-09-28 RX ADMIN — ROSUVASTATIN CALCIUM 20 MG: 20 TABLET, FILM COATED ORAL at 21:45

## 2023-09-28 RX ADMIN — Medication 250 MG: at 21:52

## 2023-09-28 RX ADMIN — DOCUSATE SODIUM 50 MG AND SENNOSIDES 8.6 MG 2 TABLET: 8.6; 5 TABLET, FILM COATED ORAL at 08:49

## 2023-09-28 RX ADMIN — GUAIFENESIN 600 MG: 600 TABLET, EXTENDED RELEASE ORAL at 08:49

## 2023-09-28 RX ADMIN — Medication 10 ML: at 21:45

## 2023-09-28 RX ADMIN — LEVOTHYROXINE SODIUM 50 MCG: 50 TABLET ORAL at 06:16

## 2023-09-28 RX ADMIN — Medication 250 MG: at 12:57

## 2023-09-28 RX ADMIN — APIXABAN 2.5 MG: 2.5 TABLET, FILM COATED ORAL at 08:48

## 2023-09-28 RX ADMIN — LOPERAMIDE HYDROCHLORIDE 2 MG: 2 CAPSULE ORAL at 16:31

## 2023-09-28 RX ADMIN — BUDESONIDE AND FORMOTEROL FUMARATE DIHYDRATE 2 PUFF: 160; 4.5 AEROSOL RESPIRATORY (INHALATION) at 19:51

## 2023-09-28 RX ADMIN — MIRTAZAPINE 45 MG: 15 TABLET, FILM COATED ORAL at 21:43

## 2023-09-28 RX ADMIN — Medication 1000 UNITS: at 08:49

## 2023-09-28 RX ADMIN — SODIUM BICARBONATE 650 MG: 650 TABLET ORAL at 21:44

## 2023-09-28 RX ADMIN — HYDROXYZINE HYDROCHLORIDE 50 MG: 25 TABLET, FILM COATED ORAL at 10:35

## 2023-09-28 RX ADMIN — SODIUM BICARBONATE 650 MG: 650 TABLET ORAL at 08:49

## 2023-09-28 RX ADMIN — Medication 5 MG: at 21:42

## 2023-09-28 RX ADMIN — CIPROFLOXACIN 500 MG: 500 TABLET, FILM COATED ORAL at 12:57

## 2023-09-28 RX ADMIN — ISOSORBIDE MONONITRATE 60 MG: 60 TABLET, EXTENDED RELEASE ORAL at 08:49

## 2023-09-28 RX ADMIN — AMOXICILLIN AND CLAVULANATE POTASSIUM 500 MG: 500; 125 TABLET, FILM COATED ORAL at 08:49

## 2023-09-28 NOTE — THERAPY TREATMENT NOTE
Acute Care - Physical Therapy Treatment Note  [facility]     Patient Name: Brennon Vance  : 1935  MRN: [id number]  Today's Date: 2023      Visit Dx:     ICD-10-CM ICD-9-CM   1. Acute cystitis without hematuria  N30.00 595.0   2. Acute bilateral low back pain with sciatica, sciatica laterality unspecified  M54.40 724.2     724.3   3. Shortness of breath  R06.02 786.05   4. Compression fracture of T12 vertebra with delayed healing  S22.080G V54.27   5. Closed compression fracture of L3 lumbar vertebra with delayed healing, subsequent encounter  S32.030G V54.17   6. Impaired mobility [Z74.09 (ICD-10-CM)]  Z74.09 799.89     Patient Active Problem List   Diagnosis    Chest pain    NSTEMI, initial episode of care    Coronary artery disease involving native coronary artery of native heart without angina pectoris    Paroxysmal atrial fibrillation    Other emphysema    Essential hypertension    Precordial pain    Hyperlipidemia LDL goal <70    Tobacco abuse    Cardiomyopathy    NSTEMI (non-ST elevated myocardial infarction)    UTI (urinary tract infection)    Aspiration pneumonia    Intractable low back pain    Stage 3b chronic kidney disease    Compression fracture of T12 vertebra with delayed healing    Closed compression fracture of third lumbar vertebra     Past Medical History:   Diagnosis Date    CAD (coronary artery disease)     COPD (chronic obstructive pulmonary disease)     Hiatal hernia     Hyperlipidemia     Hypertension     Stroke      Past Surgical History:   Procedure Laterality Date    ABDOMINAL AORTIC ANEURYSM REPAIR      CARDIAC CATHETERIZATION N/A 2021    Procedure: Left Heart Cath;  Surgeon: Harrison Alcantara MD;  Location: [facility] INVASIVE LOCATION;  Service: Cardiology;  Laterality: N/A;    CORONARY ANGIOPLASTY WITH STENT PLACEMENT      FEMORAL ARTERY STENT      KYPHOPLASTY WITH BIOPSY N/A 2023    Procedure: KYPHOPLASTY WITH BIOPSY T12 and L3;  Surgeon: Jeremiah Brantley,  MD;  Location:  [facility] OR;  Service: Neurosurgery;  Laterality: N/A;    LEG THROMBECTOMY/EMBOLECTOMY Right 5/20/2021    Procedure: RT GROIN EXPLORATION;  Surgeon: Geoff Remy DO;  Location:  [facility] HYBRID OR 12;  Service: Vascular;  Laterality: Right;    TRIGEMINAL NERVE DECOMPRESSION       PT Assessment (last 12 hours)       PT Evaluation and Treatment       Row Name 09/28/23 1330          Physical Therapy Time and Intention    Subjective Information complains of;pain  -KJ     Document Type therapy note (daily note)  -KJ     Mode of Treatment physical therapy  -KJ       Row Name 09/28/23 1330          Pain    Pretreatment Pain Rating 4/10  -KJ     Posttreatment Pain Rating 4/10  -KJ     Pain Location lower  -KJ     Pain Location - back  -KJ       Row Name 09/28/23 1330          Bed Mobility    Bed Mobility rolling right;supine-sit  -KJ     Rolling Right [place] (Bed Mobility) minimum assist (75% patient effort)  -KJ     Sidelying-Sit [place] (Bed Mobility) minimum assist (75% patient effort)  -KJ     Sit-Sidelying [place] (Bed Mobility) contact guard  -KJ       Row Name 09/28/23 1330          Sit-Stand Transfer    Sit-Stand [place]christ (Transfers) minimum assist (75% patient effort)  -KJ     Assistive Device (Sit-Stand Transfers) walker, front-wheeled  -KJ       Row Name 09/28/23 1330          Toilet Transfer    Type (Toilet Transfer) sit-stand;stand-sit  -KJ     Gilchrist Level (Toilet Transfer) moderate assist (50% patient effort)  -KJ     Assistive Device (Toilet Transfer) walker, front-wheeled  -KJ       Row Name 09/28/23 1330          Gait/Stairs (Locomotion)    Gilchrist Level (Gait) minimum assist (75% patient effort);verbal cues  -KJ     Assistive Device (Gait) walker, front-wheeled  -KJ     Distance in Feet (Gait) 40 x 2  -KJ     Deviations/Abnormal Patterns (Gait) stride length decreased  -KJ     Bilateral Gait Deviations forward flexed posture  -KJ     Comment, (Gait/Stairs)  cues for postural corrections  -KJ       Row Name             Wound 09/26/23 1640 lumbar spine Puncture    Wound - Properties Group Placement Date: 09/26/23  -DT Placement Time: 1640  -DT Present on Hospital Admission: N  -DT Location: lumbar spine  -DT Primary Wound Type: Puncture  -DT    Retired Wound - Properties Group Placement Date: 09/26/23  -DT Placement Time: 1640  -DT Present on Hospital Admission: N  -DT Location: lumbar spine  -DT Primary Wound Type: Puncture  -DT    Retired Wound - Properties Group Date first assessed: 09/26/23  -DT Time first assessed: 1640  -DT Present on Hospital Admission: N  -DT Location: lumbar spine  -DT Primary Wound Type: Puncture  -DT      Row Name             Wound 09/26/23 1640 thoracic spine Puncture    Wound - Properties Group Placement Date: 09/26/23  -DT Placement Time: 1640  -DT Present on Hospital Admission: N  -DT Location: thoracic spine  -DT Primary Wound Type: Puncture  -DT    Retired Wound - Properties Group Placement Date: 09/26/23  -DT Placement Time: 1640  -DT Present on Hospital Admission: N  -DT Location: thoracic spine  -DT Primary Wound Type: Puncture  -DT    Retired Wound - Properties Group Date first assessed: 09/26/23  -DT Time first assessed: 1640  -DT Present on Hospital Admission: N  -DT Location: thoracic spine  -DT Primary Wound Type: Puncture  -DT      Row Name 09/28/23 1330          Positioning and Restraints    Pre-Treatment Position in bed  -KJ     Post Treatment Position bed  -KJ     In Bed exit alarm on;call light within reach;encouraged to call for assist;supine  -KJ               User Key  (r) = Recorded By, (t) = Taken By, (c) = Cosigned By      Initials Name Provider Type    Lindsey Sierra PTA Physical Therapist Assistant    Pedro Phoenix RN Registered Nurse                    Physical Therapy Education       Title: PT OT SLP Therapies (Done)       Topic: Physical Therapy (Done)       Point: Mobility training (Done)        Learning Progress Summary             Patient Eager, E, VU,NR by  at 9/27/2023 0745    Comment: Spinal precautions. Pressure injury prevention. Cueing for proper transfers with FWW.   Family Eager, E, VU,NR by  at 9/27/2023 0745    Comment: Spinal precautions. Pressure injury prevention. Cueing for proper transfers with FWW.                         Point: Body mechanics (Done)       Learning Progress Summary             Patient Eager, E, VU,NR by  at 9/27/2023 0745    Comment: Spinal precautions. Pressure injury prevention. Cueing for proper transfers with FWW.   Family Eager, E, VU,NR by  at 9/27/2023 0745    Comment: Spinal precautions. Pressure injury prevention. Cueing for proper transfers with FWW.                         Point: Precautions (Done)       Learning Progress Summary             Patient Eager, E, VU,NR by  at 9/27/2023 0745    Comment: Spinal precautions. Pressure injury prevention. Cueing for proper transfers with FWW.   Family Eager, E, VU,NR by  at 9/27/2023 0745    Comment: Spinal precautions. Pressure injury prevention. Cueing for proper transfers with FWW.                                         User Key       Initials Effective Dates Name Provider Type Discipline     07/13/23 -  Marcin Estrada, PT Student PT Student PT                  PT Recommendation and Plan     Plan of Care Reviewed With: patient  Progress: improving  Outcome Evaluation: Pt c/o pain 4/10 in low back. Min A bed mobility. Min A sit <> stand from bed, increased assistance sit <> stand from toilet with mod A for hygiene. Amb short distance with fww, decreased step length and flexed posture. Cues for safety and postural corrections. Decreased safety awareness. Recommend SNF for cont PT for strengethening and gait training.   Outcome Measures       Row Name 09/28/23 1400             How much help from another person do you currently need...    Turning from your back to your side while in flat bed without using  bedrails? 3  -KJ      Moving from lying on back to sitting on the side of a flat bed without bedrails? 3  -KJ      Moving to and from a bed to a chair (including a wheelchair)? 3  -KJ      Standing up from a chair using your arms (e.g., wheelchair, bedside chair)? 2  -KJ      Climbing 3-5 steps with a railing? 2  -KJ      To walk in hospital room? 3  -KJ      AM-PAC 6 Clicks Score (PT) 16  -KJ         Functional Assessment    Outcome Measure Options AM-PAC 6 Clicks Basic Mobility (PT)  -KJ                User Key  (r) = Recorded By, (t) = Taken By, (c) = Cosigned By      Initials Name Provider Type    Lindsey Sierra PTA Physical Therapist Assistant                     Time Calculation:    PT Charges       Row Name 09/28/23 1335             Time Calculation    Start Time 1315  -KJ      Stop Time 1330  -KJ      Time Calculation (min) 15 min  -KJ      PT Received On 09/28/23  -KJ      PT Goal Re-Cert Due Date 10/07/23  -KJ         Time Calculation- PT    Total Timed Code Minutes- PT 15 minute(s)  -KJ                User Key  (r) = Recorded By, (t) = Taken By, (c) = Cosigned By      Initials Name Provider Type    Lindsey Sierra PTA Physical Therapist Assistant                  Therapy Charges for Today       Code Description Service Date Service Provider Modifiers Qty    [id number] HC GAIT TRAINING EA 15 MIN 9/28/2023 Lindsey Vitale PTA GP 1            PT G-Codes  Outcome Measure Options: AM-PAC 6 Clicks Basic Mobility (PT)  AM-PAC 6 Clicks Score (PT): 16  AM-PAC 6 Clicks Score (OT): 16    Lindsey Vitale PTA  9/28/2023     ambulatory

## 2023-09-28 NOTE — PLAN OF CARE
Goal Outcome Evaluation:  Plan of Care Reviewed With: patient, family        Progress: improving  Outcome Evaluation: A&Ox3. Co of pain. Prn given x1, with good relief noted. Drsg on back c/d/i. No c/o n/t. No neuro changes. Daughter at bs. Incont w/brief. Call light within reach. Safety maintained.

## 2023-09-28 NOTE — CASE MANAGEMENT/SOCIAL WORK
Continued Stay Note  VIVIAN Espino     Patient Name: Brennon Vance  MRN: [id number]  Today's Date: 9/28/2023    Admit Date: 9/24/2023        Discharge Plan       Row Name 09/28/23 1614       Plan    Plan Comments Pt daughter's have decided on [facility] and precert has been started. Await insurance decision.    Final Discharge Disposition Code 03 - skilled nursing facility (SNF)                   Discharge Codes    No documentation.                 Expected Discharge Date and Time       Expected Discharge Date Expected Discharge Time    Sep 29, 2023               FAISAL Nava

## 2023-09-28 NOTE — PLAN OF CARE
Problem: Palliative Care  Goal: Enhanced Quality of Life  Outcome: Ongoing, Progressing  Intervention: Optimize Psychosocial Wellbeing  Flowsheets (Taken 9/28/2023 1422)  Supportive Measures: active listening utilized  Family/Support System Care: support provided    Patient ambulated to spouse's room. Spoke with both daughters in spouse's room. They feel he is ready to discharge to SNF with spouse. They are awaiting Dr. Brantley as they have requested to speak with him prior to discharge.    Will continue to follow patient.     ELOISE Oh  9/28/2023

## 2023-09-28 NOTE — PLAN OF CARE
Goal Outcome Evaluation:  Plan of Care Reviewed With: patient        Progress: improving  Outcome Evaluation: NTN follow-up. Pt had kyphoplasty w/ biopsy T12 and L3 on 9/26. Visited pt at bedside. Pt reports of poor appetite. Unable to determine  3-day intake average due to insufficient data doc. Pt stated he consumed 0% of breakfast/lunch and 0% of ONS (Boost Plus/Magic Cup).  Strongly encouraged PO intake to pt. Pt verbalized acceptance and understanding. No reports of nausea/vomiting. Last BM 9/28, per pt. Will continue to follow per protocol.

## 2023-09-28 NOTE — PLAN OF CARE
Goal Outcome Evaluation:  Plan of Care Reviewed With: patient, daughter        Progress: no change  Outcome Evaluation: Pt A&Ox3. Forgetful. Confused at times. Up x1 ast to [facility]. Dsg to back changed. CDI. PPP. Denies n/t. No c/o of pain. Anxiety w/ PRN meds given. Several BMs today. Daughter at BS. Call light in reach.          Reason For Visit  EULALIO SPEAR is here today for a nurse visit for TB reading 0mm.      Current Meds  Fluticasone Propionate 50 MCG/ACT Nasal Suspension; INHALE 2 SPRAYS IN EACH  NOSTRIL EVERY DAY;  Therapy: 19Jun2018 to (Evaluate:17Oct2018)  Requested for: 19Jun2018; Last  Rx:19Jun2018 Ordered  Myorisan 30 MG Oral Capsule; TAKE 2 PILLS DAILY;  Therapy: 14Aug2018 to (Last Rx:[id number]) Ordered    Allergies  No Known Drug Allergies    Plan  Immunizations    1. PPD, tuberculin skin test; purified protein derivative solution, intradermal    Signatures   Electronically signed by : Huyen Breaux R.N.; Aug 16 2018  3:06PM CST

## 2023-09-28 NOTE — PROGRESS NOTES
"Brennon Vance  87 y.o.      Chief complaint:   Back pain status post kyphoplasty procedure for compression fracture    Subjective  No events overnight    Temp:  [97.4 °F (36.3 °C)-98.9 °F (37.2 °C)] 98.9 °F (37.2 °C)  Heart Rate:  [79-88] 88  Resp:  [16-18] 16  BP: (111-181)/(51-77) 181/77      Objective:  General Appearance:  Comfortable, well-appearing, in no acute distress and in pain.    Vital signs: (most recent): Blood pressure (!) 181/77, pulse 88, temperature 98.9 °F (37.2 °C), temperature source Oral, resp. rate 16, height 177.8 cm (70\"), weight 45.5 kg (100 lb 3.2 oz), SpO2 94 %.  Vital signs are normal.  No fever.    Output: Producing urine.    HEENT: Normal HEENT exam.    Lungs:  Normal effort and normal respiratory rate.  He is not in respiratory distress.    Heart: Normal rate.  Regular rhythm.    Chest: Symmetric chest wall expansion.   Extremities: Normal range of motion.    Skin:  Warm and dry.    Abdomen: Abdomen is soft and non-distended.  Bowel sounds are normal.   There is no abdominal tenderness.     Pulses: Distal pulses are intact.      Neurologic Exam     Mental Status   Oriented to person, place, and time.   Attention: normal. Concentration: normal.   Speech: speech is normal   Level of consciousness: alert  Normal comprehension.     Cranial Nerves     CN II   Visual fields full to confrontation.     CN III, IV, VI   Pupils are equal, round, and reactive to light.  Extraocular motions are normal.     CN V   Facial sensation intact.     CN VII   Facial expression full, symmetric.     CN VIII   CN VIII normal.     CN IX, X   CN IX normal.   CN X normal.     CN XI   CN XI normal.     CN XII   CN XII normal.     Motor Exam   Muscle bulk: normal    Strength   Strength 5/5 throughout.     Sensory Exam   Light touch normal.     Gait, Coordination, and Reflexes     Reflexes   Reflexes 2+ except as noted.     Lab Results (last 24 hours)       Procedure Component Value Units Date/Time    Comprehensive " Metabolic Panel [[id number]]  (Abnormal) Collected: 09/28/23 0355    Specimen: Blood Updated: 09/28/23 0500     Glucose 92 mg/dL      BUN 25 mg/dL      Creatinine 1.47 mg/dL      Sodium 140 mmol/L      Potassium 3.5 mmol/L      Chloride 110 mmol/L      CO2 21.0 mmol/L      Calcium 8.7 mg/dL      Total Protein 5.6 g/dL      Albumin 3.0 g/dL      ALT (SGPT) 5 U/L      AST (SGOT) 18 U/L      Alkaline Phosphatase 90 U/L      Total Bilirubin 0.3 mg/dL      Globulin 2.6 gm/dL      A/G Ratio 1.2 g/dL      BUN/Creatinine Ratio 17.0     Anion Gap 9.0 mmol/L      eGFR 46.5 mL/min/1.73     Narrative:      GFR Normal >60  Chronic Kidney Disease <60  Kidney Failure <15    The GFR formula is only valid for adults with stable renal function between ages 18 and 70.    CBC & Differential [[id number]]  (Abnormal) Collected: 09/28/23 0355    Specimen: Blood Updated: 09/28/23 0439    Narrative:      The following orders were created for panel order CBC & Differential.  Procedure                               Abnormality         Status                     ---------                               -----------         ------                     CBC Auto Differential[[id number]]        Abnormal            Final result                 Please view results for these tests on the individual orders.    CBC Auto Differential [[id number]]  (Abnormal) Collected: 09/28/23 0355    Specimen: Blood Updated: 09/28/23 0439     WBC 5.84 10*3/mm3      RBC 2.91 10*6/mm3      Hemoglobin 8.9 g/dL      Hematocrit 27.5 %      MCV 94.5 fL      MCH 30.6 pg      MCHC 32.4 g/dL      RDW 13.8 %      RDW-SD 47.0 fl      MPV 9.5 fL      Platelets 229 10*3/mm3      Neutrophil % 62.7 %      Lymphocyte % 22.9 %      Monocyte % 10.6 %      Eosinophil % 2.4 %      Basophil % 0.7 %      Immature Grans % 0.7 %      Neutrophils, Absolute 3.66 10*3/mm3      Lymphocytes, Absolute 1.34 10*3/mm3      Monocytes, Absolute 0.62 10*3/mm3      Eosinophils, Absolute 0.14 10*3/mm3       Basophils, Absolute 0.04 10*3/mm3      Immature Grans, Absolute 0.04 10*3/mm3      nRBC 0.0 /100 WBC     Urine Culture - Urine, Straight Cath [[id number]]  (Abnormal)  (Susceptibility) Collected: 09/24/23 1247    Specimen: Urine from Straight Cath Updated: 09/27/23 0925     Urine Culture 25,000 CFU/mL Pseudomonas aeruginosa    Narrative:      Colonization of the urinary tract without infection is common. Treatment is discouraged unless the patient is symptomatic, pregnant, or undergoing an invasive urologic procedure.    Susceptibility        Pseudomonas aeruginosa      JUAN JOSÉ      Cefepime Susceptible      Ceftazidime Susceptible      Ciprofloxacin Susceptible      Gentamicin Susceptible      Levofloxacin Intermediate      Piperacillin + Tazobactam Susceptible                                       Plan:   Patient does like his back pain is improving.  Patient is to work with physical and occupational therapy today.  Waiting for rehab placement.      Intractable low back pain    Coronary artery disease involving native coronary artery of native heart without angina pectoris    Paroxysmal atrial fibrillation    Other emphysema    Essential hypertension    UTI (urinary tract infection)    Aspiration pneumonia    Stage 3b chronic kidney disease    Compression fracture of T12 vertebra with delayed healing    Closed compression fracture of third lumbar vertebra        Rohan Arizmendi, APRN

## 2023-09-28 NOTE — PLAN OF CARE
Goal Outcome Evaluation:  Plan of Care Reviewed With: patient        Progress: improving  Outcome Evaluation: Pt c/o pain 4/10 in low back. Min A bed mobility. Min A sit <> stand from bed, increased assistance sit <> stand from toilet with mod A for hygiene. Amb short distance with fww, decreased step length and flexed posture. Cues for safety and postural corrections. Decreased safety awareness. Recommend SNF for cont PT for strengethening and gait training.

## 2023-09-28 NOTE — PROGRESS NOTES
"    [facility] Medicine Services  INPATIENT PROGRESS NOTE    Patient Name: Brennon Vance  Date of Admission: 9/24/2023  Today's Date: 09/28/23  Length of Stay: 0  Primary Care Physician: Javid Grajeda MD    Subjective   Chief Complaint: Low back pain  HPI  Mr. Vance is an 87-year-old male who presented to [facility] on 9/24 with worsening lower back pain with radiation to the left leg.  He has had ongoing back pain for about 2 months after he lifted something.  The pain had been improving but he lifted something again a week prior to admission started having recurrent pain in his back.  He has had difficulty with mobilizing.  His wife has been taking care of him however, she was recently hospitalized.  Patient's main complaint is his lower back.  Denies any numbness or tingling in his legs.  Denies any bowel or bladder incontinence.  Daughters would like for patient to go to rehab at time of discharge.  In the ED, he was found to have a urinary tract infection and CTA of the chest interpreted by radiology showed possible aspiration pneumonia and moderate emphysematous changes. He had no witnessed aspiration event. CT of the lumbar spine showed chronic appearing T12 and L3 compression fractures with no signs of recent lumbar spine surgery or significant stenosis. Patient was started on ceftriaxone and given morphine and Dilaudid in the ED.     Sitting up in bed with daughter Homa at bedside.  He ate about 75% of his breakfast.  Afebrile.  No shortness of breath.  On room air.  Patient states he had some back pain last night and took a pain pill that \"knocked me out.\"    Yesterday afternoon he was able to walk to walk to the door in his room and back.  Family is looking into SNF in [facility] or [place]    Review of Systems   All pertinent negatives and positives are as above. All other systems have been reviewed and are negative unless otherwise stated. "     Objective    Temp:  [97.4 °F (36.3 °C)-98.9 °F (37.2 °C)] 98.9 °F (37.2 °C)  Heart Rate:  [79-88] 79  Resp:  [16-18] 18  BP: (111-181)/(51-77) 119/61  Physical Exam  Vitals reviewed.   Constitutional:       General: He is not in acute distress.     Appearance: He is not toxic-appearing.      Comments: Sitting up in bed.  No acute distress.  On room air.  Daughter piper at bedside.   HENT:      Head: Normocephalic and atraumatic.      Mouth/Throat:      Mouth: Mucous membranes are moist.      Pharynx: Oropharynx is clear.   Eyes:      Extraocular Movements: Extraocular movements intact.      Conjunctiva/sclera: Conjunctivae normal.      Pupils: Pupils are equal, round, and reactive to light.   Cardiovascular:      Rate and Rhythm: Normal rate and regular rhythm.      Pulses: Normal pulses.   Pulmonary:      Effort: Pulmonary effort is normal. No respiratory distress.      Breath sounds: Normal breath sounds. No wheezing or rhonchi.   Abdominal:      General: Bowel sounds are normal. There is no distension.      Palpations: Abdomen is soft.      Tenderness: There is no abdominal tenderness.   Musculoskeletal:         General: No swelling or tenderness. Normal range of motion.      Cervical back: Normal range of motion and neck supple. No muscular tenderness.   Skin:     General: Skin is warm and dry.      Findings: No erythema or rash.   Neurological:      General: No focal deficit present.      Mental Status: He is alert and oriented to person, place, and time. Mental status is at baseline.      Cranial Nerves: No cranial nerve deficit.      Motor: No weakness.   Psychiatric:         Mood and Affect: Mood normal.         Behavior: Behavior normal.     Results Review:  I have reviewed the labs, radiology results, and diagnostic studies.    Laboratory Data:   Results from last 7 days   Lab Units 09/28/23  0355 09/27/23  0416 09/26/23  0522   WBC 10*3/mm3 5.84 6.94 7.66   HEMOGLOBIN g/dL 8.9* 9.7* 10.3*   HEMATOCRIT  % 27.5* 30.4* 32.6*   PLATELETS 10*3/mm3 229 253 257          Results from last 7 days   Lab Units 09/28/23  0355 09/27/23  0416 09/26/23  0522   SODIUM mmol/L 140 141 140   POTASSIUM mmol/L 3.5 4.0 4.2   CHLORIDE mmol/L 110* 107 106   CO2 mmol/L 21.0* 18.0* 17.0*   BUN mg/dL 25* 29* 40*   CREATININE mg/dL 1.47* 1.59* 1.95*   CALCIUM mg/dL 8.7 8.4* 9.0   BILIRUBIN mg/dL 0.3 0.2 0.2   ALK PHOS U/L 90 98 90   ALT (SGPT) U/L 5 8 8   AST (SGOT) U/L 18 27 21   GLUCOSE mg/dL 92 89 85         Culture Data:   Microbiology Results (last 10 days)       Procedure Component Value - Date/Time    Urine Culture - Urine, Straight Cath [[id number]]  (Abnormal)  (Susceptibility) Collected: 09/24/23 1247    Lab Status: Final result Specimen: Urine from Straight Cath Updated: 09/27/23 0925     Urine Culture 25,000 CFU/mL Pseudomonas aeruginosa    Narrative:      Colonization of the urinary tract without infection is common. Treatment is discouraged unless the patient is symptomatic, pregnant, or undergoing an invasive urologic procedure.    Susceptibility        Pseudomonas aeruginosa      JUAN JOSÉ      Cefepime Susceptible      Ceftazidime Susceptible      Ciprofloxacin Susceptible      Gentamicin Susceptible      Levofloxacin Intermediate      Piperacillin + Tazobactam Susceptible                                 Radiology Data:   Imaging Results (Last 24 Hours)       ** No results found for the last 24 hours. **            I have reviewed the patient's current medications.     Assessment/Plan   Assessment  Active Hospital Problems    Diagnosis     **Intractable low back pain     UTI (urinary tract infection)     Aspiration pneumonia     Stage 3b chronic kidney disease     Compression fracture of T12 vertebra with delayed healing     Closed compression fracture of third lumbar vertebra     Paroxysmal atrial fibrillation     Other emphysema     Essential hypertension     Coronary artery disease involving native coronary artery of native  heart without angina pectoris        Treatment Plan  Neurosurgery, Dr. Brantley consulted.  CT of the lumbar spine showed chronic appearing T12 and L3 compression fractures with no signs of recent lumbar spine surgery or significant stenosis. MRI lumbar spine ordered by neurosurgery showed recent superior endplate compression deformities of T12 and L3 with marrow edema.  Discussed with CONCEPCION Garcia -he went for kyphoplasty with biopsy T12 and L3 with Dr. Brantley on 9/26. Continue PT/OT.    CTA showed aspiration pneumonia.  He passed bedside swallow.  No prior swallowing difficulties per patient.  On room air.  No fever.  No sputum production.  Normal WBC. Zosyn de-escalated to Augmentin.      Urinalysis showed trace blood, positive nitrate, moderate leukocyte, TNTC WBC and trace bacteria.  He denies any urinary symptoms.  Urine culture shows Pseudomonas aeruginosa susceptible to ciprofloxacin.  Levofloxacin intermediate.  Discussed with Dr. Prater -continue levofloxacin in favor of ciprofloxacin. Ciprofloxacin 500 mg every daily x10 days.    He has a history of paroxysmal atrial fibrillation chronically anticoagulated on Eliquis.  Continue Cardizem and Coreg.    Eliquis will serve as DVT prophylaxis.    He will need follow-up CT chest in 12 months to monitor 4 mm right upper lobe pulmonary nodule.    Palliative care consult per family request.    Medical Decision Making  Number and Complexity of problems: 3 acute problems in the form of intractable low back pain, aspiration pneumonia and urinary tract infection  Differential Diagnosis: None considered at present    Conditions and Status        Condition is unchanged.     [facility] Data  External documents reviewed: Prior [facility] records  Cardiac tracing (EKG, telemetry) interpretation: Sinus rhythm  Radiology interpretation: Interpreted by radiology  Labs reviewed: As above  Any tests that were considered but not ordered: None considered at present     Decision  rules/scores evaluated (example [id number], Wells, etc): None considered at present     Discussed with: Patient and Dr. Prater     Care Planning  Shared decision making: Patient is agreeable to ongoing work-up and treatment  Code status and discussions: Full code with full interventions    Disposition  Social Determinants of Health that impact treatment or disposition: SNF -family is requesting placement. [facility] or [place].  Will need pre-CERT once bed offered.  I expect the patient to be discharged to SNF in 1-2 days.     Electronically signed by CONCEPCION Da Silva, 09/28/23, 10:45 CDT.

## 2023-09-29 LAB
ALBUMIN SERPL-MCNC: 3 G/DL (ref 3.5–5.2)
ALBUMIN/GLOB SERPL: 1.1 G/DL
ALP SERPL-CCNC: 85 U/L (ref 39–117)
ALT SERPL W P-5'-P-CCNC: <5 U/L (ref 1–41)
ANION GAP SERPL CALCULATED.3IONS-SCNC: 11 MMOL/L (ref 5–15)
AST SERPL-CCNC: 18 U/L (ref 1–40)
BASOPHILS # BLD AUTO: 0.05 10*3/MM3 (ref 0–0.2)
BASOPHILS NFR BLD AUTO: 0.8 % (ref 0–1.5)
BILIRUB SERPL-MCNC: 0.3 MG/DL (ref 0–1.2)
BUN SERPL-MCNC: 24 MG/DL (ref 8–23)
BUN/CREAT SERPL: 15.5 (ref 7–25)
CALCIUM SPEC-SCNC: 9 MG/DL (ref 8.6–10.5)
CHLORIDE SERPL-SCNC: 109 MMOL/L (ref 98–107)
CO2 SERPL-SCNC: 21 MMOL/L (ref 22–29)
CREAT SERPL-MCNC: 1.55 MG/DL (ref 0.76–1.27)
DEPRECATED RDW RBC AUTO: 49.2 FL (ref 37–54)
EGFRCR SERPLBLD CKD-EPI 2021: 43.6 ML/MIN/1.73
EOSINOPHIL # BLD AUTO: 0.2 10*3/MM3 (ref 0–0.4)
EOSINOPHIL NFR BLD AUTO: 3.1 % (ref 0.3–6.2)
ERYTHROCYTE [DISTWIDTH] IN BLOOD BY AUTOMATED COUNT: 14.1 % (ref 12.3–15.4)
GLOBULIN UR ELPH-MCNC: 2.8 GM/DL
GLUCOSE SERPL-MCNC: 95 MG/DL (ref 65–99)
HCT VFR BLD AUTO: 30.4 % (ref 37.5–51)
HGB BLD-MCNC: 9.6 G/DL (ref 13–17.7)
IMM GRANULOCYTES # BLD AUTO: 0.06 10*3/MM3 (ref 0–0.05)
IMM GRANULOCYTES NFR BLD AUTO: 0.9 % (ref 0–0.5)
LYMPHOCYTES # BLD AUTO: 1.42 10*3/MM3 (ref 0.7–3.1)
LYMPHOCYTES NFR BLD AUTO: 22.1 % (ref 19.6–45.3)
MCH RBC QN AUTO: 30.6 PG (ref 26.6–33)
MCHC RBC AUTO-ENTMCNC: 31.6 G/DL (ref 31.5–35.7)
MCV RBC AUTO: 96.8 FL (ref 79–97)
MONOCYTES # BLD AUTO: 0.67 10*3/MM3 (ref 0.1–0.9)
MONOCYTES NFR BLD AUTO: 10.4 % (ref 5–12)
NEUTROPHILS NFR BLD AUTO: 4.02 10*3/MM3 (ref 1.7–7)
NEUTROPHILS NFR BLD AUTO: 62.7 % (ref 42.7–76)
NRBC BLD AUTO-RTO: 0 /100 WBC (ref 0–0.2)
PLATELET # BLD AUTO: 252 10*3/MM3 (ref 140–450)
PMV BLD AUTO: 9.5 FL (ref 6–12)
POTASSIUM SERPL-SCNC: 3.7 MMOL/L (ref 3.5–5.2)
PROT SERPL-MCNC: 5.8 G/DL (ref 6–8.5)
RBC # BLD AUTO: 3.14 10*6/MM3 (ref 4.14–5.8)
SODIUM SERPL-SCNC: 141 MMOL/L (ref 136–145)
WBC NRBC COR # BLD: 6.42 10*3/MM3 (ref 3.4–10.8)

## 2023-09-29 PROCEDURE — G0378 HOSPITAL OBSERVATION PER HR: HCPCS

## 2023-09-29 PROCEDURE — 63710000001 CARBAMAZEPINE 200 MG TABLET: Performed by: NURSE PRACTITIONER

## 2023-09-29 PROCEDURE — A9270 NON-COVERED ITEM OR SERVICE: HCPCS | Performed by: NURSE PRACTITIONER

## 2023-09-29 PROCEDURE — 63710000001 GUAIFENESIN 600 MG TABLET SUSTAINED-RELEASE 12 HOUR: Performed by: NURSE PRACTITIONER

## 2023-09-29 PROCEDURE — 85025 COMPLETE CBC W/AUTO DIFF WBC: CPT | Performed by: NURSE PRACTITIONER

## 2023-09-29 PROCEDURE — 97110 THERAPEUTIC EXERCISES: CPT

## 2023-09-29 PROCEDURE — 63710000001 NICOTINE 21 MG/24HR PATCH 24 HOUR: Performed by: NURSE PRACTITIONER

## 2023-09-29 PROCEDURE — 63710000001 POTASSIUM CHLORIDE 20 MEQ TABLET CONTROLLED-RELEASE: Performed by: NURSE PRACTITIONER

## 2023-09-29 PROCEDURE — 63710000001 DILTIAZEM CD 180 MG CAPSULE SUSTAINED-RELEASE 24 HR: Performed by: NURSE PRACTITIONER

## 2023-09-29 PROCEDURE — 99214 OFFICE O/P EST MOD 30 MIN: CPT | Performed by: CLINICAL NURSE SPECIALIST

## 2023-09-29 PROCEDURE — 63710000001 CIPROFLOXACIN 500 MG TABLET: Performed by: NURSE PRACTITIONER

## 2023-09-29 PROCEDURE — 63710000001 CARVEDILOL 6.25 MG TABLET: Performed by: NURSE PRACTITIONER

## 2023-09-29 PROCEDURE — 63710000001 ROSUVASTATIN 20 MG TABLET: Performed by: NURSE PRACTITIONER

## 2023-09-29 PROCEDURE — 63710000001 HYDROXYZINE 25 MG TABLET: Performed by: NURSE PRACTITIONER

## 2023-09-29 PROCEDURE — 63710000001 TAMSULOSIN 0.4 MG CAPSULE: Performed by: NURSE PRACTITIONER

## 2023-09-29 PROCEDURE — 63710000001 LEVOTHYROXINE 50 MCG TABLET: Performed by: NURSE PRACTITIONER

## 2023-09-29 PROCEDURE — 63710000001 PROBIOTIC 250 MG CAPSULE: Performed by: NURSE PRACTITIONER

## 2023-09-29 PROCEDURE — 97535 SELF CARE MNGMENT TRAINING: CPT

## 2023-09-29 PROCEDURE — 63710000001 MIRTAZAPINE 15 MG TABLET: Performed by: NURSE PRACTITIONER

## 2023-09-29 PROCEDURE — 63710000001 CHOLECALCIFEROL 25 MCG (1000 UT) TABLET: Performed by: NURSE PRACTITIONER

## 2023-09-29 PROCEDURE — 99024 POSTOP FOLLOW-UP VISIT: CPT | Performed by: NURSE PRACTITIONER

## 2023-09-29 PROCEDURE — 63710000001 FINASTERIDE 5 MG TABLET: Performed by: NURSE PRACTITIONER

## 2023-09-29 PROCEDURE — 63710000001 APIXABAN 2.5 MG TABLET: Performed by: NURSE PRACTITIONER

## 2023-09-29 PROCEDURE — 94799 UNLISTED PULMONARY SVC/PX: CPT

## 2023-09-29 PROCEDURE — 99497 ADVNCD CARE PLAN 30 MIN: CPT | Performed by: CLINICAL NURSE SPECIALIST

## 2023-09-29 PROCEDURE — 36415 COLL VENOUS BLD VENIPUNCTURE: CPT | Performed by: NURSE PRACTITIONER

## 2023-09-29 PROCEDURE — 63710000001 PANTOPRAZOLE 40 MG TABLET DELAYED-RELEASE: Performed by: NURSE PRACTITIONER

## 2023-09-29 PROCEDURE — 63710000001 ISOSORBIDE MONONITRATE 60 MG TABLET SUSTAINED-RELEASE 24 HOUR: Performed by: NURSE PRACTITIONER

## 2023-09-29 PROCEDURE — 63710000001 SODIUM BICARBONATE 650 MG TABLET: Performed by: NURSE PRACTITIONER

## 2023-09-29 PROCEDURE — 97116 GAIT TRAINING THERAPY: CPT

## 2023-09-29 PROCEDURE — 80053 COMPREHEN METABOLIC PANEL: CPT | Performed by: NURSE PRACTITIONER

## 2023-09-29 PROCEDURE — 63710000001 AMOXICILLIN-CLAVULANATE 500-125 MG TABLET: Performed by: NURSE PRACTITIONER

## 2023-09-29 RX ORDER — NICOTINE 21 MG/24HR
1 PATCH, TRANSDERMAL 24 HOURS TRANSDERMAL
Start: 2023-09-30

## 2023-09-29 RX ORDER — TAMSULOSIN HYDROCHLORIDE 0.4 MG/1
0.4 CAPSULE ORAL DAILY
Qty: 30 CAPSULE
Start: 2023-09-30

## 2023-09-29 RX ORDER — HYDROXYZINE 50 MG/1
50 TABLET, FILM COATED ORAL 2 TIMES DAILY PRN
Start: 2023-09-29

## 2023-09-29 RX ORDER — SACCHAROMYCES BOULARDII 250 MG
250 CAPSULE ORAL 2 TIMES DAILY
Qty: 16 CAPSULE | Refills: 0
Start: 2023-09-29 | End: 2023-10-07

## 2023-09-29 RX ORDER — HYDROCODONE BITARTRATE AND ACETAMINOPHEN 5; 325 MG/1; MG/1
1 TABLET ORAL EVERY 8 HOURS PRN
Qty: 4 TABLET | Refills: 0 | Status: SHIPPED | OUTPATIENT
Start: 2023-09-29 | End: 2023-10-05 | Stop reason: HOSPADM

## 2023-09-29 RX ORDER — FINASTERIDE 5 MG/1
5 TABLET, FILM COATED ORAL DAILY
Start: 2023-09-30

## 2023-09-29 RX ORDER — CHOLECALCIFEROL (VITAMIN D3) 125 MCG
5 CAPSULE ORAL NIGHTLY PRN
Start: 2023-09-29

## 2023-09-29 RX ORDER — CIPROFLOXACIN 500 MG/1
500 TABLET, FILM COATED ORAL
Qty: 9 TABLET | Refills: 0
Start: 2023-09-29 | End: 2023-10-05 | Stop reason: HOSPADM

## 2023-09-29 RX ADMIN — Medication 10 ML: at 09:14

## 2023-09-29 RX ADMIN — MIRTAZAPINE 45 MG: 15 TABLET, FILM COATED ORAL at 20:24

## 2023-09-29 RX ADMIN — AMOXICILLIN AND CLAVULANATE POTASSIUM 500 MG: 500; 125 TABLET, FILM COATED ORAL at 09:14

## 2023-09-29 RX ADMIN — ROSUVASTATIN CALCIUM 20 MG: 20 TABLET, FILM COATED ORAL at 20:24

## 2023-09-29 RX ADMIN — BUDESONIDE AND FORMOTEROL FUMARATE DIHYDRATE 2 PUFF: 160; 4.5 AEROSOL RESPIRATORY (INHALATION) at 19:33

## 2023-09-29 RX ADMIN — APIXABAN 2.5 MG: 2.5 TABLET, FILM COATED ORAL at 09:13

## 2023-09-29 RX ADMIN — NICOTINE 1 PATCH: 21 PATCH, EXTENDED RELEASE TRANSDERMAL at 09:17

## 2023-09-29 RX ADMIN — CARVEDILOL 12.5 MG: 6.25 TABLET, FILM COATED ORAL at 09:12

## 2023-09-29 RX ADMIN — Medication 3 ML: at 09:14

## 2023-09-29 RX ADMIN — ISOSORBIDE MONONITRATE 60 MG: 60 TABLET, EXTENDED RELEASE ORAL at 09:12

## 2023-09-29 RX ADMIN — BUDESONIDE AND FORMOTEROL FUMARATE DIHYDRATE 2 PUFF: 160; 4.5 AEROSOL RESPIRATORY (INHALATION) at 06:46

## 2023-09-29 RX ADMIN — PANTOPRAZOLE SODIUM 40 MG: 40 TABLET, DELAYED RELEASE ORAL at 09:12

## 2023-09-29 RX ADMIN — SODIUM BICARBONATE 650 MG: 650 TABLET ORAL at 20:25

## 2023-09-29 RX ADMIN — Medication 250 MG: at 09:12

## 2023-09-29 RX ADMIN — POTASSIUM CHLORIDE 20 MEQ: 1500 TABLET, EXTENDED RELEASE ORAL at 09:14

## 2023-09-29 RX ADMIN — GUAIFENESIN 600 MG: 600 TABLET, EXTENDED RELEASE ORAL at 09:12

## 2023-09-29 RX ADMIN — SODIUM BICARBONATE 650 MG: 650 TABLET ORAL at 09:13

## 2023-09-29 RX ADMIN — Medication 10 ML: at 20:25

## 2023-09-29 RX ADMIN — DILTIAZEM HYDROCHLORIDE 180 MG: 180 CAPSULE, EXTENDED RELEASE ORAL at 09:13

## 2023-09-29 RX ADMIN — FINASTERIDE 5 MG: 5 TABLET, FILM COATED ORAL at 09:15

## 2023-09-29 RX ADMIN — Medication 1000 UNITS: at 09:13

## 2023-09-29 RX ADMIN — HYDROXYZINE HYDROCHLORIDE 50 MG: 25 TABLET, FILM COATED ORAL at 13:23

## 2023-09-29 RX ADMIN — CARVEDILOL 12.5 MG: 6.25 TABLET, FILM COATED ORAL at 18:04

## 2023-09-29 RX ADMIN — APIXABAN 2.5 MG: 2.5 TABLET, FILM COATED ORAL at 20:24

## 2023-09-29 RX ADMIN — LEVOTHYROXINE SODIUM 50 MCG: 50 TABLET ORAL at 06:43

## 2023-09-29 RX ADMIN — Medication 250 MG: at 20:24

## 2023-09-29 RX ADMIN — TAMSULOSIN HYDROCHLORIDE 0.4 MG: 0.4 CAPSULE ORAL at 09:12

## 2023-09-29 RX ADMIN — CARBAMAZEPINE 200 MG: 200 TABLET ORAL at 09:13

## 2023-09-29 RX ADMIN — GUAIFENESIN 600 MG: 600 TABLET, EXTENDED RELEASE ORAL at 20:25

## 2023-09-29 RX ADMIN — CIPROFLOXACIN 500 MG: 500 TABLET, FILM COATED ORAL at 13:23

## 2023-09-29 NOTE — PROGRESS NOTES
"            [facility] Palliative Care Services    Palliative Care Daily Progress Note   Chief complaint-follow up goals of care/advanced care planning and support for patient/family    Code Status:   Code Status and Medical Interventions:   Ordered at: 09/26/23 1750     Level Of Support Discussed With:    Patient     Code Status (Patient has no pulse and is not breathing):    CPR (Attempt to Resuscitate)     Medical Interventions (Patient has pulse or is breathing):    Full      Advanced Directives: Advance Directive Status: Patient does not have advance directive   Goals of Care: Ongoing.     S: Medical record reviewed. Events noted.  Underwent kyphoplasty of T12 and L3 by Dr. Brantley on 9/26. CODE STATUS images implemented on 9/26 by this provider, rescinded during surgery but not changed back to previous elections as discussed post surgical procedure.  Follow-up note reviewed from palliative  on 9/28. No opiates received over the last 24 hours.  Plans for SNF placement at [facility] today.  Laying in bed without apparent distress. 0/10 pain.  No family at bedside.  Due to the Palliative Care Topics Discussed: goals of care, care options, resuscitation status, and discharge options we will establish an advance care plan.   Advance Care Planning   Advance Care Planning Discussion: Care conference with patient in room later followed by daughterGaby outside of room.  Explored conversation with regard to discharge plans and rehab, states that he is considering [facility] home after further discussion with Dr. Brantley. Reports his wife's cancer diagnosis was a \"big blow\". States \"I was always sicker than her with my heart, I have 6 stents, we were not expecting that, but we will figure it out\".  Further discussion with regard to medical priorities and CODE STATUS as previously delineated as no CPR/limited support interventions, no intubation.  A MOST document has been completed.  Anticipating " discharge to SNF soon.     Review of Systems   Respiratory:  Negative for shortness of breath.    Hematologic/Lymphatic: Negative for bleeding problem.   Skin:  Negative for rash.   Genitourinary:  Negative for dysuria.   Psychiatric/Behavioral:  The patient is not nervous/anxious.      Pain Assessment  Preferred Pain Scale: number (Numeric Rating Pain Scale)  CPOT Facial Expression: 0-->relaxed, neutral  CPOT Body Movements: 0-->absence of movements  CPOT Muscle Tension: 0-->relaxed  Ventilator Compliance/Vocalization: 0-->talking in normal tone or no sound  CPOT Score: 0  PAINAD Breathin-->normal  PAINAD Negative Vocalization: 0-->none  PAINAD Facial Expression: 0-->smiling or inexpressive  PAINAD Body Language: 0-->relaxed  PAINAD Consolability: 0-->no need to console  PAINAD Score: 0  Pain Location: back  Pain Description: constant, aching    O:   No intake or output data in the 24 hours ending 23 1312    Diagnostics and current medications: Reviewed.      Current Facility-Administered Medications:     acetaminophen (TYLENOL) tablet 650 mg, 650 mg, Oral, Q4H PRN **OR** acetaminophen (TYLENOL) 160 MG/5ML oral solution 650 mg, 650 mg, Oral, Q4H PRN **OR** acetaminophen (TYLENOL) suppository 650 mg, 650 mg, Rectal, Q4H PRN, Ugo, Rohan P, APRN    albuterol (PROVENTIL) nebulizer solution 0.083% 2.5 mg/3mL, 2.5 mg, Nebulization, Q4H PRN, Ugo, Rohan P, APRN    apixaban (ELIQUIS) tablet 2.5 mg, 2.5 mg, Oral, Q12H, Ugo, Rohan P, APRN, 2.5 mg at 23 0913    sennosides-docusate (PERICOLACE) 8.6-50 MG per tablet 2 tablet, 2 tablet, Oral, BID, 2 tablet at 23 0849 **AND** polyethylene glycol (MIRALAX) packet 17 g, 17 g, Oral, Daily PRN **AND** bisacodyl (DULCOLAX) EC tablet 5 mg, 5 mg, Oral, Daily PRN **AND** bisacodyl (DULCOLAX) suppository 10 mg, 10 mg, Rectal, Daily PRN, Rohan Arizmendi, CONCEPCION    budesonide-formoterol (SYMBICORT) 160-4.5 MCG/ACT inhaler 2 puff, 2 puff, Inhalation,  BID - RT, Ugo, Rohan P, APRN, 2 puff at 09/29/23 0646    carBAMazepine (TEGretol) tablet 200 mg, 200 mg, Oral, Daily, Ugo, Rohan P, APRN, 200 mg at 09/29/23 0913    carvedilol (COREG) tablet 12.5 mg, 12.5 mg, Oral, BID With Meals, Ugo, Rohan P, APRN, 12.5 mg at 09/29/23 0912    cholecalciferol (VITAMIN D3) tablet 1,000 Units, 1,000 Units, Oral, Daily, Ugo, Rohan P, APRN, 1,000 Units at 09/29/23 0913    ciprofloxacin (CIPRO) tablet 500 mg, 500 mg, Oral, Q24H, AaronRadha, APRN, 500 mg at 09/28/23 1257    dilTIAZem CD (CARDIZEM CD) 24 hr capsule 180 mg, 180 mg, Oral, Daily, Ugo, Rohan P, APRN, 180 mg at 09/29/23 0913    finasteride (PROSCAR) tablet 5 mg, 5 mg, Oral, Daily, Ugo, Rohan P, APRN, 5 mg at 09/29/23 0915    guaiFENesin (MUCINEX) 12 hr tablet 600 mg, 600 mg, Oral, Q12H, Ugo, Rohan P, APRN, 600 mg at 09/29/23 0912    HYDROcodone-acetaminophen (NORCO) 5-325 MG per tablet 1 tablet, 1 tablet, Oral, Q4H PRN, Ugo, Rohan P, APRN, 1 tablet at 09/28/23 0114    HYDROcodone-acetaminophen (NORCO) 7.5-325 MG per tablet 2 tablet, 2 tablet, Oral, Q4H PRN, Ugo, Rohan P, APRN, 2 tablet at 09/25/23 0548    hydrOXYzine (ATARAX) tablet 50 mg, 50 mg, Oral, BID PRN, Ugo, Rohan P, APRN, 50 mg at 09/28/23 2358    isosorbide mononitrate (IMDUR) 24 hr tablet 60 mg, 60 mg, Oral, Daily, Ugo, Rohan P, APRN, 60 mg at 09/29/23 0912    levothyroxine (SYNTHROID, LEVOTHROID) tablet 50 mcg, 50 mcg, Oral, Q AM, Rohan Arizmendi P, APRN, 50 mcg at 09/29/23 0643    loperamide (IMODIUM) capsule 2 mg, 2 mg, Oral, 4x Daily PRN, Radha Aaron, APRN, 2 mg at 09/28/23 1631    melatonin tablet 5 mg, 5 mg, Oral, Nightly PRN, Rohan Arizmendi P, APRN, 5 mg at 09/28/23 2142    mirtazapine (REMERON) tablet 45 mg, 45 mg, Oral, Nightly, UgoRohan P, APRN, 45 mg at 09/28/23 2143    [DISCONTINUED] Morphine sulfate (PF) injection 1 mg, 1 mg, Intravenous, Q4H PRN **AND** naloxone (NARCAN)  injection 0.4 mg, 0.4 mg, Intravenous, Q5 Min PRN, Ugo, Rohan P, APRN    nicotine (NICODERM CQ) 21 MG/24HR patch 1 patch, 1 patch, Transdermal, Q24H, Aaron, Radha, APRN, 1 patch at 09/29/23 0917    ondansetron (ZOFRAN) injection 4 mg, 4 mg, Intravenous, Q6H PRN, Ugo, Rohan P, APRN    pantoprazole (PROTONIX) EC tablet 40 mg, 40 mg, Oral, Daily, Ugo, Rohan P, APRN, 40 mg at 09/29/23 0912    potassium chloride (K-DUR,KLOR-CON) CR tablet 20 mEq, 20 mEq, Oral, Daily, Ugo, Rohan P, APRN, 20 mEq at 09/29/23 0914    rosuvastatin (CRESTOR) tablet 20 mg, 20 mg, Oral, Nightly, Ugo, Rohan P, APRN, 20 mg at 09/28/23 2145    saccharomyces boulardii (FLORASTOR) capsule 250 mg, 250 mg, Oral, BID, Aaron, Radha, APRN, 250 mg at 09/29/23 0912    sodium bicarbonate tablet 650 mg, 650 mg, Oral, BID, Ugo, Rohan P, APRN, 650 mg at 09/29/23 0913    sodium chloride 0.9 % flush 10 mL, 10 mL, Intravenous, Q12H, Ugo, Rohan P, APRN, 10 mL at 09/29/23 0914    sodium chloride 0.9 % flush 10 mL, 10 mL, Intravenous, PRN, Ugo, Rohan P, APRN    sodium chloride 0.9 % flush 10 mL, 10 mL, Intravenous, PRN, Ugo, Rohan P, APRN    sodium chloride 0.9 % flush 3 mL, 3 mL, Intravenous, Q12H, Ugo, Rohan P, APRN, 3 mL at 09/29/23 0914    sodium chloride 0.9 % infusion 40 mL, 40 mL, Intravenous, PRN, Ugo, Rohan P, APRN, 40 mL at 09/25/23 0808    sodium chloride 0.9 % infusion 40 mL, 40 mL, Intravenous, PRN, Ugo, Rohan P, APRN    tamsulosin (FLOMAX) 24 hr capsule 0.4 mg, 0.4 mg, Oral, Daily, Rohan Arizmendi APRN, 0.4 mg at 09/29/23 0912    Allergies   Allergen Reactions    Lyrica [Pregabalin] Other (See Comments)     Passing out/syncope     I have utilized all available immediate resources to obtain, update, or review the patient's current medications (including all prescriptions, over-the-counter products, herbals, cannabis/cannabidiol products, and vitamin/mineral/dietary (nutritional)  "supplements) for name, route of administration, type, dose and frequency.    A:    /48 (BP Location: Right arm, Patient Position: Sitting)   Pulse 95   Temp 98.2 °F (36.8 °C) (Oral)   Resp 16   Ht 177.8 cm (70\")   Wt 45.5 kg (100 lb 3.2 oz)   SpO2 94%   BMI 14.38 kg/m²     Vitals and nursing note reviewed.   Constitutional:       Appearance: Not in distress. Cachectic and frail.   Eyes:      General: Lids are normal.      Pupils: Pupils are equal, round, and reactive to light.   HENT:      Head: Normocephalic.   Pulmonary:      Effort: Pulmonary effort is normal.   Cardiovascular:      Normal rate.   Edema:     Peripheral edema absent.   Abdominal:      Palpations: Abdomen is soft.   Musculoskeletal: Normal range of motion.      Cervical back: Neck supple.   Skin:     General: Skin is warm.   Genitourinary:     Comments: No reported dysuria  Neurological:      Mental Status: Alert  Psychiatric:         Mood and Affect: Mood normal.         Cognition and Memory: Cognition normal.      Patient status: Disease state: Controlled with current treatments.  Current Functional status: Palliative Performance Scale Score: Performance 60% based on the following measures: Ambulation: Reduced, Activity and Evidence of Disease: Unable to do hobby or some work, significant evidence of disease, Self-Care: Occasional assist necessary,  Intake: Normal or reduced, LOC: Full or confusion   Baseline Functional status: Palliative Performance Scale Score: Performance 60% based on the following measures: Ambulation: Reduced, Activity and Evidence of Disease: Unable to do hobby or some work, significant evidence of disease, Self-Care: Occasional assist necessary,  Intake: Normal or reduced, LOC: Full or confusion   Nutritional status: Albumin 3.4 Body mass index is 14.38 kg/m².      Hospital Problem List      Intractable low back pain    Coronary artery disease involving native coronary artery of native heart without angina " pectoris    Paroxysmal atrial fibrillation    Other emphysema    Essential hypertension    UTI (urinary tract infection)    Aspiration pneumonia    Stage 3b chronic kidney disease     Impression/Problem List:     Intractable low back pain     Compression deformities of T12 and L3, recent superior endplate  Multilevel degenerative disc disease  Coronary artery disease  Chronic obstructive pulmonary disease, emphysema  Paroxysmal atrial fibrillation  Pulmonary nodule, right upper lobe 4 mm previously 3 mm in 2018  Subclavian artery stenosis, severe left  Anemia  Hypertension  Urinary tract infection, Pseudomonas aeruginosa?  Colonization  Aspiration pneumonia  Chronic kidney disease, stage III  Hyperlipidemia  History of stroke  Advanced age        Recommendations/Plan:  1. plan: Goals of care include CODE STATUS No CPR/limited support interventions per attending.     Family support: The patient receives support from his wife and children..  Advance Directives: Advance Directive Status: Patient does not have advance directive   POA/Healthcare [id number] next of kin.     2.  Palliative care encounter  - Prognosis is good long-term secondary to compression deformity, degenerative disc disease, multiple comorbidities, and advanced age.  -Patient appears to have fair prognostic awareness.      -Treated with antibiotics and started on opiates for pain management.   -Neurosurgery consult placed.    9/25-Apparently patient required Ativan to tolerate MRI developed lethargy and apneic breathing in addition to hypotension.  Patient was treated with CPAP and fluid bolus.    9/29-CODE STATUS clarified.  A MOST document completed.  -Anticipating SNF at discharge    3.  Intractable back pain   -controlled.  -Tegretol as a home medication.   -Lyrica intolerance documented  -Followed by neurosurgery, s/p kyphoplasty 9/26.  -Opiates as needed.  Minimal usage noted.  Would continue at this juncture.      Thank you for  allowing us to participate in patient's plan of care. Palliative Care Team will continue to follow patient.     18 minutes spent on advance care planning-discussing with patient and/or family, answering questions, providing some guidance about a plan and documentation of care, and coordinating care face to face.    Staci Phelps, APRN  9/29/2023  13:12 CDT

## 2023-09-29 NOTE — PROGRESS NOTES
[facility] Medicine Services  INPATIENT PROGRESS NOTE    Patient Name: Brennon Vance  Date of Admission: 9/24/2023  Today's Date: 09/29/23  Length of Stay: 0  Primary Care Physician: Javid Grajeda MD    Subjective   Chief Complaint: Low back pain  HPI  Mr. Vance is an 87-year-old male who presented to [facility] on 9/24 with worsening lower back pain with radiation to the left leg.  He has had ongoing back pain for about 2 months after he lifted something.  The pain had been improving but he lifted something again a week prior to admission started having recurrent pain in his back.  He has had difficulty with mobilizing.  His wife has been taking care of him however, she was recently hospitalized.  Patient's main complaint is his lower back.  Denies any numbness or tingling in his legs.  Denies any bowel or bladder incontinence.  Daughters would like for patient to go to rehab at time of discharge.  In the ED, he was found to have a urinary tract infection and CTA of the chest interpreted by radiology showed possible aspiration pneumonia and moderate emphysematous changes. He had no witnessed aspiration event. CT of the lumbar spine showed chronic appearing T12 and L3 compression fractures with no signs of recent lumbar spine surgery or significant stenosis. Patient was started on ceftriaxone and given morphine and Dilaudid in the ED.     Lying in bed.  He was asleep upon arrival and awoke easily to his name.  No family at bedside.  States back pain is controlled with use of pain medicine.  He denies any acute complaints.    [facility] is able to accept patient today.  His wife is also admitted under the care of Dr. Brantley.  Reportedly family is also looking into placement at [facility] for Mrs. Vance.  Daughter requests that  and Mrs. Vance be discharged to [facility] on the same day.    Review of Systems   All pertinent negatives and positives are as above. All  other systems have been reviewed and are negative unless otherwise stated.     Objective    Temp:  [98.2 °F (36.8 °C)-98.9 °F (37.2 °C)] 98.2 °F (36.8 °C)  Heart Rate:  [79-95] 95  Resp:  [14-16] 16  BP: (104-162)/(45-78) 104/48  Physical Exam  Vitals reviewed.   Constitutional:       General: He is not in acute distress.     Appearance: He is not toxic-appearing.      Comments: Sitting up in bed.  No acute distress.  On room air.  No family at bedside.   HENT:      Head: Normocephalic and atraumatic.      Mouth/Throat:      Mouth: Mucous membranes are moist.      Pharynx: Oropharynx is clear.   Eyes:      Extraocular Movements: Extraocular movements intact.      Conjunctiva/sclera: Conjunctivae normal.      Pupils: Pupils are equal, round, and reactive to light.   Cardiovascular:      Rate and Rhythm: Normal rate and regular rhythm.      Pulses: Normal pulses.   Pulmonary:      Effort: Pulmonary effort is normal. No respiratory distress.      Breath sounds: Normal breath sounds. No wheezing or rhonchi.   Abdominal:      General: Bowel sounds are normal. There is no distension.      Palpations: Abdomen is soft.      Tenderness: There is no abdominal tenderness.   Musculoskeletal:         General: No swelling or tenderness. Normal range of motion.      Cervical back: Normal range of motion and neck supple. No muscular tenderness.   Skin:     General: Skin is warm and dry.      Findings: No erythema or rash.   Neurological:      General: No focal deficit present.      Mental Status: He is alert and oriented to person, place, and time. Mental status is at baseline.      Cranial Nerves: No cranial nerve deficit.      Motor: No weakness.   Psychiatric:         Mood and Affect: Mood normal.         Behavior: Behavior normal.     Results Review:  I have reviewed the labs, radiology results, and diagnostic studies.    Laboratory Data:   Results from last 7 days   Lab Units 09/29/23  0442 09/28/23  0355 09/27/23  0416   WBC  10*3/mm3 6.42 5.84 6.94   HEMOGLOBIN g/dL 9.6* 8.9* 9.7*   HEMATOCRIT % 30.4* 27.5* 30.4*   PLATELETS 10*3/mm3 252 229 253          Results from last 7 days   Lab Units 09/29/23  0442 09/28/23  0355 09/27/23  0416   SODIUM mmol/L 141 140 141   POTASSIUM mmol/L 3.7 3.5 4.0   CHLORIDE mmol/L 109* 110* 107   CO2 mmol/L 21.0* 21.0* 18.0*   BUN mg/dL 24* 25* 29*   CREATININE mg/dL 1.55* 1.47* 1.59*   CALCIUM mg/dL 9.0 8.7 8.4*   BILIRUBIN mg/dL 0.3 0.3 0.2   ALK PHOS U/L 85 90 98   ALT (SGPT) U/L <5 5 8   AST (SGOT) U/L 18 18 27   GLUCOSE mg/dL 95 92 89         Culture Data:   Microbiology Results (last 10 days)       Procedure Component Value - Date/Time    Urine Culture - Urine, Straight Cath [[id number]]  (Abnormal)  (Susceptibility) Collected: 09/24/23 1247    Lab Status: Final result Specimen: Urine from Straight Cath Updated: 09/27/23 0925     Urine Culture 25,000 CFU/mL Pseudomonas aeruginosa    Narrative:      Colonization of the urinary tract without infection is common. Treatment is discouraged unless the patient is symptomatic, pregnant, or undergoing an invasive urologic procedure.    Susceptibility        Pseudomonas aeruginosa      JUAN JOSÉ      Cefepime Susceptible      Ceftazidime Susceptible      Ciprofloxacin Susceptible      Gentamicin Susceptible      Levofloxacin Intermediate      Piperacillin + Tazobactam Susceptible                                 Radiology Data:   Imaging Results (Last 24 Hours)       ** No results found for the last 24 hours. **            I have reviewed the patient's current medications.     Assessment/Plan   Assessment  Active Hospital Problems    Diagnosis     **Intractable low back pain     UTI (urinary tract infection)     Aspiration pneumonia     Stage 3b chronic kidney disease     Compression fracture of T12 vertebra with delayed healing     Closed compression fracture of third lumbar vertebra     Paroxysmal atrial fibrillation     Other emphysema     Essential hypertension      Coronary artery disease involving native coronary artery of native heart without angina pectoris        Treatment Plan  Neurosurgery, Dr. Brantley consulted.  CT of the lumbar spine showed chronic appearing T12 and L3 compression fractures with no signs of recent lumbar spine surgery or significant stenosis. MRI lumbar spine ordered by neurosurgery showed recent superior endplate compression deformities of T12 and L3 with marrow edema.  Discussed with CONCEPCION Garcia -he went for kyphoplasty with biopsy T12 and L3 with Dr. Brantley on 9/26. Continue PT/OT.  He is okay for discharge from neurosurgery standpoint.    CTA showed aspiration pneumonia.  He passed bedside swallow.  No prior swallowing difficulties per patient.  On room air.  No fever.  No sputum production.  Normal WBC.  He completed 5-day course of Zosyn on 9/29.    Urinalysis showed trace blood, positive nitrate, moderate leukocyte, TNTC WBC and trace bacteria.  He denies any urinary symptoms.  Urine culture shows Pseudomonas aeruginosa susceptible to ciprofloxacin.  Levofloxacin intermediate.  Discussed with Dr. Prater -continue levofloxacin in favor of ciprofloxacin. Ciprofloxacin 500 mg every daily x10 days.    He has a history of paroxysmal atrial fibrillation chronically anticoagulated on Eliquis.  Continue Cardizem and Coreg.    Eliquis will serve as DVT prophylaxis.    He will need follow-up CT chest in 12 months to monitor 4 mm right upper lobe pulmonary nodule.    Palliative care consult per family request.    Medical Decision Making  Number and Complexity of problems: 3 acute problems in the form of intractable low back pain, aspiration pneumonia and urinary tract infection  Differential Diagnosis: None considered at present    Conditions and Status        Condition is unchanged.     [facility] Data  External documents reviewed: Prior [facility] records  Cardiac tracing (EKG, telemetry) interpretation: Sinus rhythm  Radiology interpretation: Interpreted  by radiology  Labs reviewed: As above  Any tests that were considered but not ordered: None considered at present     Decision rules/scores evaluated (example [id number], Wells, etc): None considered at present     Discussed with: Patient and Dr. Prater     Care Planning  Shared decision making: Patient is agreeable to ongoing work-up and treatment  Code status and discussions: Full code with full interventions    Disposition  Social Determinants of Health that impact treatment or disposition: [facility] is able to accept patient today.  His wife is also admitted under the care of Dr. Brantley.  Reportedly family is also looking into placement at [facility] for Mrs. Vance.  Daughter requests that  and Mrs. Vance be discharged to [facility] on the same day.  I expect the patient to be discharged to SNF in 1-2 days.     Electronically signed by CONCEPCION Da Silva, 09/29/23, 13:28 CDT.

## 2023-09-29 NOTE — THERAPY TREATMENT NOTE
Acute Care - Occupational Therapy Treatment Note  [facility]     Patient Name: Brennon Vance  : 1935  MRN: [id number]  Today's Date: 2023             Admit Date: 2023       ICD-10-CM ICD-9-CM   1. Acute cystitis without hematuria  N30.00 595.0   2. Acute bilateral low back pain with sciatica, sciatica laterality unspecified  M54.40 724.2     724.3   3. Shortness of breath  R06.02 786.05   4. Compression fracture of T12 vertebra with delayed healing  S22.080G V54.27   5. Closed compression fracture of L3 lumbar vertebra with delayed healing, subsequent encounter  S32.030G V54.17   6. Impaired mobility [Z74.09 (ICD-10-CM)]  Z74.09 799.89   7. Decreased activities of daily living (ADL) [Z78.9 (ICD-10-CM)]  Z78.9 V49.89     Patient Active Problem List   Diagnosis    Chest pain    NSTEMI, initial episode of care    Coronary artery disease involving native coronary artery of native heart without angina pectoris    Paroxysmal atrial fibrillation    Other emphysema    Essential hypertension    Precordial pain    Hyperlipidemia LDL goal <70    Tobacco abuse    Cardiomyopathy    NSTEMI (non-ST elevated myocardial infarction)    UTI (urinary tract infection)    Aspiration pneumonia    Intractable low back pain    Stage 3b chronic kidney disease    Compression fracture of T12 vertebra with delayed healing    Closed compression fracture of third lumbar vertebra     Past Medical History:   Diagnosis Date    CAD (coronary artery disease)     COPD (chronic obstructive pulmonary disease)     Hiatal hernia     Hyperlipidemia     Hypertension     Stroke      Past Surgical History:   Procedure Laterality Date    ABDOMINAL AORTIC ANEURYSM REPAIR      CARDIAC CATHETERIZATION N/A 2021    Procedure: Left Heart Cath;  Surgeon: Harrison Alcantara MD;  Location: [facility] CATH INVASIVE LOCATION;  Service: Cardiology;  Laterality: N/A;    CORONARY ANGIOPLASTY WITH STENT PLACEMENT      FEMORAL ARTERY STENT       KYPHOPLASTY WITH BIOPSY N/A 9/26/2023    Procedure: KYPHOPLASTY WITH BIOPSY T12 and L3;  Surgeon: Jeremiah Brantley MD;  Location:  [facility] OR;  Service: Neurosurgery;  Laterality: N/A;    LEG THROMBECTOMY/EMBOLECTOMY Right 5/20/2021    Procedure: RT GROIN EXPLORATION;  Surgeon: Geoff Remy DO;  Location:  [facility] HYBRID OR 12;  Service: Vascular;  Laterality: Right;    TRIGEMINAL NERVE DECOMPRESSION           OT ASSESSMENT FLOWSHEET (last 12 hours)       OT Evaluation and Treatment       Row Name 09/29/23 0850                   OT Time and Intention    Subjective Information no complaints  -TS        Document Type therapy note (daily note)  -TS        Mode of Treatment occupational therapy  -TS        Patient Effort good  -TS           General Information    Existing Precautions/Restrictions fall;spinal  -TS           Pain Assessment    Pretreatment Pain Rating 0/10 - no pain  -TS        Posttreatment Pain Rating 0/10 - no pain  -TS           Activities of Daily Living    BADL Assessment/Intervention lower body dressing  -TS           Lower Body Dressing Assessment/Training    Collin Level (Lower Body Dressing) socks;minimum assist (75% patient effort)  -TS        Position (Lower Body Dressing) edge of bed sitting  -TS           Bed Mobility    Supine-Sit Collin (Bed Mobility) standby assist  -TS        Assistive Device (Bed Mobility) head of bed elevated;bed rails  -TS           Functional Mobility    Functional Mobility- Ind. Level contact guard assist;standby assist  -TS        Functional Mobility- Device walker, front-wheeled  -TS           Wound 09/26/23 1640 lumbar spine Puncture    Wound - Properties Group Placement Date: 09/26/23  -DT Placement Time: 1640  -DT Present on Hospital Admission: N  -DT Location: lumbar spine  -DT Primary Wound Type: Puncture  -DT    Retired Wound - Properties Group Placement Date: 09/26/23  -DT Placement Time: 1640  -DT Present on Hospital Admission: N  -DT  Location: lumbar spine  -DT Primary Wound Type: Puncture  -DT    Retired Wound - Properties Group Date first assessed: 09/26/23  -DT Time first assessed: 1640  -DT Present on Hospital Admission: N  -DT Location: lumbar spine  -DT Primary Wound Type: Puncture  -DT       Wound 09/26/23 1640 thoracic spine Puncture    Wound - Properties Group Placement Date: 09/26/23  -DT Placement Time: 1640  -DT Present on Hospital Admission: N  -DT Location: thoracic spine  -DT Primary Wound Type: Puncture  -DT    Retired Wound - Properties Group Placement Date: 09/26/23  -DT Placement Time: 1640  -DT Present on Hospital Admission: N  -DT Location: thoracic spine  -DT Primary Wound Type: Puncture  -DT    Retired Wound - Properties Group Date first assessed: 09/26/23  -DT Time first assessed: 1640  -DT Present on Hospital Admission: N  -DT Location: thoracic spine  -DT Primary Wound Type: Puncture  -DT       Plan of Care Review    Plan of Care Reviewed With patient  -TS        Progress improving  -TS           Positioning and Restraints    Pre-Treatment Position in bed  -TS        Post Treatment Position chair  -TS        In Chair sitting;call light within reach;encouraged to call for assist;with family/caregiver;notified nsg  -TS                  User Key  (r) = Recorded By, (t) = Taken By, (c) = Cosigned By      Initials Name Effective Dates    TS Shreya Sanderson, NICK 02/03/23 -     DT Pedro Montoya RN 02/17/22 -                      Occupational Therapy Education       Title: PT OT SLP Therapies (Done)       Topic: Occupational Therapy (Done)       Point: ADL training (Done)       Description:   Instruct learner(s) on proper safety adaptation and remediation techniques during self care or transfers.   Instruct in proper use of assistive devices.                  Learning Progress Summary             Patient Acceptance, E, VU by MB at 9/27/2023 0859   Family Acceptance, E, VU by MB at 9/27/2023 0859                          Point: Home exercise program (Done)       Description:   Instruct learner(s) on appropriate technique for monitoring, assisting and/or progressing therapeutic exercises/activities.                  Learning Progress Summary             Patient Acceptance, E, VU by MB at 9/27/2023 0859   Family Acceptance, E, VU by MB at 9/27/2023 0859                         Point: Precautions (Done)       Description:   Instruct learner(s) on prescribed precautions during self-care and functional transfers.                  Learning Progress Summary             Patient Acceptance, E, VU by MB at 9/27/2023 0859   Family Acceptance, E, VU by MB at 9/27/2023 0859                         Point: Body mechanics (Done)       Description:   Instruct learner(s) on proper positioning and spine alignment during self-care, functional mobility activities and/or exercises.                  Learning Progress Summary             Patient Acceptance, E, VU by MB at 9/27/2023 0859   Family Acceptance, E, VU by MB at 9/27/2023 0859                                         User Key       Initials Effective Dates Name Provider Type Discipline    MB 08/04/23 -  Virginia Cunningham OT Student OT Student OT                      OT Recommendation and Plan     Plan of Care Review  Plan of Care Reviewed With: patient  Progress: improving  Plan of Care Reviewed With: patient     Outcome Measures       Row Name 09/29/23 1300 09/28/23 1400          How much help from another person do you currently need...    Turning from your back to your side while in flat bed without using bedrails? -- 3  -KJ     Moving from lying on back to sitting on the side of a flat bed without bedrails? -- 3  -KJ     Moving to and from a bed to a chair (including a wheelchair)? -- 3  -KJ     Standing up from a chair using your arms (e.g., wheelchair, bedside chair)? -- 2  -KJ     Climbing 3-5 steps with a railing? -- 2  -KJ     To walk in hospital room? -- 3  -KJ     AM-PAC 6  Clicks Score (PT) -- 16  -KJ        How much help from another is currently needed...    Putting on and taking off regular lower body clothing? 3  -TS --     Bathing (including washing, rinsing, and drying) 3  -TS --     Toileting (which includes using toilet bed pan or urinal) 3  -TS --     Putting on and taking off regular upper body clothing 3  -TS --     Taking care of personal grooming (such as brushing teeth) 3  -TS --     Eating meals 4  -TS --     AM-PAC 6 Clicks Score (OT) 19  -TS --        Functional Assessment    Outcome Measure Options -- AM-PAC 6 Clicks Basic Mobility (PT)  -KJ               User Key  (r) = Recorded By, (t) = Taken By, (c) = Cosigned By      Initials Name Provider Type    KJ Lindsey Vitale, PTA Physical Therapist Assistant    Shreya Sarmiento COTA Occupational Therapist Assistant                    Time Calculation:    Time Calculation- OT       Row Name 09/29/23 1314             Time Calculation- OT    OT Start Time 0850  -TS      OT Stop Time 0920  -TS      OT Time Calculation (min) 30 min  -TS      Total Timed Code Minutes- OT 30 minute(s)  -TS      OT Received On 09/29/23  -TS         Timed Charges    46027 - OT Self Care/Mgmt Minutes 30  -TS         Total Minutes    Timed Charges Total Minutes 30  -TS       Total Minutes 30  -TS                User Key  (r) = Recorded By, (t) = Taken By, (c) = Cosigned By      Initials Name Provider Type    TS Shreya Sanderson COTA Occupational Therapist Assistant                  Therapy Charges for Today       Code Description Service Date Service Provider Modifiers Qty    [id number]  OT SELF CARE/MGMT/TRAIN EA 15 MIN 9/29/2023 Shreya Sanderson COTA GO 2                 Shreya MEEHAN. NICK Sanderson  9/29/2023

## 2023-09-29 NOTE — PLAN OF CARE
Goal Outcome Evaluation:  Fall prevention: bed at lowest level, call light within reach; side rails up x 2; bed alarms; instructed to call when getting up  UTI: on augmentin, ciprofloxacin; urine culture showed pseudomonas auruginosa  L3 and T12 compression fx: neurosurgery following; kyphoplasty on 9/26; PT/OT following and recommending SNF; [facility] precert done and waiting for insurance

## 2023-09-29 NOTE — PROGRESS NOTES
"Brennon Vance  87 y.o.      Chief complaint:   Compression fracture status post kyphoplasty    Subjective  No events overnight.  Patient states that his back pain is doing better    Temp:  [98.3 °F (36.8 °C)-98.9 °F (37.2 °C)] 98.4 °F (36.9 °C)  Heart Rate:  [79-93] 90  Resp:  [14-18] 16  BP: (104-181)/(45-78) 162/78      Objective:  General Appearance:  Comfortable, well-appearing, in no acute distress and in pain.    Vital signs: (most recent): Blood pressure 162/78, pulse 90, temperature 98.4 °F (36.9 °C), temperature source Oral, resp. rate 16, height 177.8 cm (70\"), weight 45.5 kg (100 lb 3.2 oz), SpO2 93 %.  Vital signs are normal.  No fever.    Output: Producing urine.    HEENT: Normal HEENT exam.    Lungs:  Normal effort and normal respiratory rate.  He is not in respiratory distress.    Heart: Normal rate.  Regular rhythm.    Chest: Symmetric chest wall expansion.   Extremities: Normal range of motion.    Skin:  Warm and dry.    Abdomen: Abdomen is soft and non-distended.  Bowel sounds are normal.   There is no abdominal tenderness.     Pulses: Distal pulses are intact.      Neurologic Exam     Mental Status   Oriented to person, place, and time.   Attention: normal. Concentration: normal.   Speech: speech is normal   Level of consciousness: alert  Normal comprehension.     Cranial Nerves     CN II   Visual fields full to confrontation.     CN III, IV, VI   Pupils are equal, round, and reactive to light.  Extraocular motions are normal.     CN V   Facial sensation intact.     CN VII   Facial expression full, symmetric.     CN VIII   CN VIII normal.     CN IX, X   CN IX normal.   CN X normal.     CN XI   CN XI normal.     CN XII   CN XII normal.     Motor Exam   Muscle bulk: normal    Strength   Strength 5/5 throughout.     Sensory Exam   Light touch normal.     Gait, Coordination, and Reflexes     Gait  Gait: normal    Reflexes   Reflexes 2+ except as noted.     Lab Results (last 24 hours)       Procedure " Component Value Units Date/Time    Comprehensive Metabolic Panel [[id number]]  (Abnormal) Collected: 09/29/23 0442    Specimen: Blood Updated: 09/29/23 0539     Glucose 95 mg/dL      BUN 24 mg/dL      Creatinine 1.55 mg/dL      Sodium 141 mmol/L      Potassium 3.7 mmol/L      Chloride 109 mmol/L      CO2 21.0 mmol/L      Calcium 9.0 mg/dL      Total Protein 5.8 g/dL      Albumin 3.0 g/dL      ALT (SGPT) <5 U/L      AST (SGOT) 18 U/L      Alkaline Phosphatase 85 U/L      Total Bilirubin 0.3 mg/dL      Globulin 2.8 gm/dL      A/G Ratio 1.1 g/dL      BUN/Creatinine Ratio 15.5     Anion Gap 11.0 mmol/L      eGFR 43.6 mL/min/1.73     Narrative:      GFR Normal >60  Chronic Kidney Disease <60  Kidney Failure <15    The GFR formula is only valid for adults with stable renal function between ages 18 and 70.    CBC & Differential [[id number]]  (Abnormal) Collected: 09/29/23 0442    Specimen: Blood Updated: 09/29/23 0517    Narrative:      The following orders were created for panel order CBC & Differential.  Procedure                               Abnormality         Status                     ---------                               -----------         ------                     CBC Auto Differential[[id number]]        Abnormal            Final result                 Please view results for these tests on the individual orders.    CBC Auto Differential [[id number]]  (Abnormal) Collected: 09/29/23 0442    Specimen: Blood Updated: 09/29/23 0517     WBC 6.42 10*3/mm3      RBC 3.14 10*6/mm3      Hemoglobin 9.6 g/dL      Hematocrit 30.4 %      MCV 96.8 fL      MCH 30.6 pg      MCHC 31.6 g/dL      RDW 14.1 %      RDW-SD 49.2 fl      MPV 9.5 fL      Platelets 252 10*3/mm3      Neutrophil % 62.7 %      Lymphocyte % 22.1 %      Monocyte % 10.4 %      Eosinophil % 3.1 %      Basophil % 0.8 %      Immature Grans % 0.9 %      Neutrophils, Absolute 4.02 10*3/mm3      Lymphocytes, Absolute 1.42 10*3/mm3      Monocytes, Absolute 0.67 10*3/mm3       Eosinophils, Absolute 0.20 10*3/mm3      Basophils, Absolute 0.05 10*3/mm3      Immature Grans, Absolute 0.06 10*3/mm3      nRBC 0.0 /100 WBC                 Plan:   Patient is doing well.  Continue with physical and Occupational Therapy.  Waiting for rehab placement.      Intractable low back pain    Coronary artery disease involving native coronary artery of native heart without angina pectoris    Paroxysmal atrial fibrillation    Other emphysema    Essential hypertension    UTI (urinary tract infection)    Aspiration pneumonia    Stage 3b chronic kidney disease    Compression fracture of T12 vertebra with delayed healing    Closed compression fracture of third lumbar vertebra        Rohan Arizmendi, APRN

## 2023-09-29 NOTE — CASE MANAGEMENT/SOCIAL WORK
Continued Stay Note   Srinivas     Patient Name: Brennon Vance  MRN: [id number]  Today's Date: 9/29/2023    Admit Date: 9/24/2023        Discharge Plan       Row Name 09/29/23 1508       Plan    Plan Comments DC has been cancelled for today. Pt daughter's are requesting for pt and his spouse (room [address]) to be dc'd on same day. Pt wife precert for [facility] is still pending.                   Discharge Codes    No documentation.                 Expected Discharge Date and Time       Expected Discharge Date Expected Discharge Time    Sep 29, 2023               FAISAL Nava

## 2023-09-29 NOTE — THERAPY TREATMENT NOTE
Acute Care - Physical Therapy Treatment Note  [facility]     Patient Name: Brennon Vance  : 1935  MRN: [id number]  Today's Date: 2023      Visit Dx:     ICD-10-CM ICD-9-CM   1. Acute cystitis without hematuria  N30.00 595.0   2. Acute bilateral low back pain with sciatica, sciatica laterality unspecified  M54.40 724.2     724.3   3. Shortness of breath  R06.02 786.05   4. Compression fracture of T12 vertebra with delayed healing  S22.080G V54.27   5. Closed compression fracture of L3 lumbar vertebra with delayed healing, subsequent encounter  S32.030G V54.17   6. Impaired mobility [Z74.09 (ICD-10-CM)]  Z74.09 799.89   7. Decreased activities of daily living (ADL) [Z78.9 (ICD-10-CM)]  Z78.9 V49.89     Patient Active Problem List   Diagnosis    Chest pain    NSTEMI, initial episode of care    Coronary artery disease involving native coronary artery of native heart without angina pectoris    Paroxysmal atrial fibrillation    Other emphysema    Essential hypertension    Precordial pain    Hyperlipidemia LDL goal <70    Tobacco abuse    Cardiomyopathy    NSTEMI (non-ST elevated myocardial infarction)    UTI (urinary tract infection)    Aspiration pneumonia    Intractable low back pain    Stage 3b chronic kidney disease    Compression fracture of T12 vertebra with delayed healing    Closed compression fracture of third lumbar vertebra     Past Medical History:   Diagnosis Date    CAD (coronary artery disease)     COPD (chronic obstructive pulmonary disease)     Hiatal hernia     Hyperlipidemia     Hypertension     Stroke      Past Surgical History:   Procedure Laterality Date    ABDOMINAL AORTIC ANEURYSM REPAIR      CARDIAC CATHETERIZATION N/A 2021    Procedure: Left Heart Cath;  Surgeon: Harrison Alcantara MD;  Location: [facility] INVASIVE LOCATION;  Service: Cardiology;  Laterality: N/A;    CORONARY ANGIOPLASTY WITH STENT PLACEMENT      FEMORAL ARTERY STENT      KYPHOPLASTY WITH BIOPSY N/A  9/26/2023    Procedure: KYPHOPLASTY WITH BIOPSY T12 and L3;  Surgeon: Jeremiah Brantley MD;  Location:  [facility] OR;  Service: Neurosurgery;  Laterality: N/A;    LEG THROMBECTOMY/EMBOLECTOMY Right 5/20/2021    Procedure: RT GROIN EXPLORATION;  Surgeon: Geoff Remy DO;  Location:  [facility] HYBRID OR 12;  Service: Vascular;  Laterality: Right;    TRIGEMINAL NERVE DECOMPRESSION       PT Assessment (last 12 hours)       PT Evaluation and Treatment       Row Name 09/29/23 1355 09/29/23 1045       Physical Therapy Time and Intention    Subjective Information complains of;pain  -JAMEEL --    Document Type therapy note (daily note)  -JAMEEL therapy note (daily note)  -JAMEEL    Mode of Treatment physical therapy  -JAMEEL physical therapy  -JAMEEL    Comment, Session Not Performed -- pt was amb in the hallway with nsg, will check back later  -JAMEEL      Row Name 09/29/23 1355          General Information    Existing Precautions/Restrictions fall;spinal  -JAMEEL       Row Name 09/29/23 1355          Pain    Pretreatment Pain Rating 3/10  -JAMEEL     Posttreatment Pain Rating 3/10  -JAMEEL     Pain Location - Side/Orientation Left  -JAMEEL     Pain Location lower  -JAMEEL     Pain Location - back;extremity  -JAMEEL     Pain Intervention(s) Repositioned  -JAMEEL       Row Name 09/29/23 4185          Bed Mobility    Supine-Sit [place] (Bed Mobility) verbal cues;contact guard  -JAMEEL     Sit-Supine [place] (Bed Mobility) verbal cues;contact guard  -JAMELE       Row Name 09/29/23 7452          Sit-Stand Transfer    Sit-Stand [place] (Transfers) verbal cues;minimum assist (75% patient effort)  -JAMEEL     Assistive Device (Sit-Stand Transfers) walker, front-wheeled  -JAMEEL       Row Name 09/29/23 1222          Stand-Sit Transfer    Stand-Sit [place] (Transfers) verbal cues;contact guard  -JAMEEL     Assistive Device (Stand-Sit Transfers) walker, front-wheeled  -JAMEEL       Row Name 09/29/23 135          Gait/Stairs (Locomotion)    [place] Level (Gait) verbal cues;minimum  assist (75% patient effort)  -JAMEEL     Assistive Device (Gait) walker, front-wheeled  -JAMEEL     Distance in Feet (Gait) 40' x 2  -JAMEEL     Deviations/Abnormal Patterns (Gait) stride length decreased  -JAMEEL     Bilateral Gait Deviations forward flexed posture  -JAMEEL       Row Name 09/29/23 1355          Motor Skills    Comments, Therapeutic Exercise in fowlers, heel slides, quad sets,  hip abd/add, SLR, SAQ x 10  -JAMEEL       Row Name             Wound 09/26/23 1640 lumbar spine Puncture    Wound - Properties Group Placement Date: 09/26/23  -DT Placement Time: 1640  -DT Present on Hospital Admission: N  -DT Location: lumbar spine  -DT Primary Wound Type: Puncture  -DT    Retired Wound - Properties Group Placement Date: 09/26/23  -DT Placement Time: 1640  -DT Present on Hospital Admission: N  -DT Location: lumbar spine  -DT Primary Wound Type: Puncture  -DT    Retired Wound - Properties Group Date first assessed: 09/26/23  -DT Time first assessed: 1640  -DT Present on Hospital Admission: N  -DT Location: lumbar spine  -DT Primary Wound Type: Puncture  -DT      Row Name             Wound 09/26/23 1640 thoracic spine Puncture    Wound - Properties Group Placement Date: 09/26/23  -DT Placement Time: 1640  -DT Present on Hospital Admission: N  -DT Location: thoracic spine  -DT Primary Wound Type: Puncture  -DT    Retired Wound - Properties Group Placement Date: 09/26/23  -DT Placement Time: 1640  -DT Present on Hospital Admission: N  -DT Location: thoracic spine  -DT Primary Wound Type: Puncture  -DT    Retired Wound - Properties Group Date first assessed: 09/26/23  -DT Time first assessed: 1640  -DT Present on Hospital Admission: N  -DT Location: thoracic spine  -DT Primary Wound Type: Puncture  -DT      Row Name 09/29/23 1355          Positioning and Restraints    Pre-Treatment Position in bed  -JAMEEL     Post Treatment Position bed  -JAMEEL     In Bed fowlers;exit alarm on;side rails up x2  -JAMEEL               User Key  (r) = Recorded By,  (t) = Taken By, (c) = Cosigned By      Initials Name Provider Type    JAMEEL Lico Celaya, PTA Physical Therapist Assistant    Pedro Phoenix, RN Registered Nurse                    Physical Therapy Education       Title: PT OT SLP Therapies (Done)       Topic: Physical Therapy (Done)       Point: Mobility training (Done)       Learning Progress Summary             Patient Eager, E, VU,NR by  at 9/27/2023 0745    Comment: Spinal precautions. Pressure injury prevention. Cueing for proper transfers with FWW.   Family Eager, E, VU,NR by  at 9/27/2023 0745    Comment: Spinal precautions. Pressure injury prevention. Cueing for proper transfers with FWW.                         Point: Body mechanics (Done)       Learning Progress Summary             Patient Eager, E, VU,NR by  at 9/27/2023 0745    Comment: Spinal precautions. Pressure injury prevention. Cueing for proper transfers with FWW.   Family Eager, E, VU,NR by  at 9/27/2023 0745    Comment: Spinal precautions. Pressure injury prevention. Cueing for proper transfers with FWW.                         Point: Precautions (Done)       Learning Progress Summary             Patient Eager, E, VU,NR by  at 9/27/2023 0745    Comment: Spinal precautions. Pressure injury prevention. Cueing for proper transfers with FWW.   Family Eager, E, VU,NR by  at 9/27/2023 0745    Comment: Spinal precautions. Pressure injury prevention. Cueing for proper transfers with FWW.                                         User Key       Initials Effective Dates Name Provider Type Discipline     07/13/23 -  Marcin Estrada, PT Student PT Student PT                  PT Recommendation and Plan         Outcome Measures       Row Name 09/29/23 1355 09/29/23 1300 09/28/23 1400       How much help from another person do you currently need...    Turning from your back to your side while in flat bed without using bedrails? 3  -JAMEEL -- 3  -KJ    Moving from lying on back to sitting on the  side of a flat bed without bedrails? 3  -JAMEEL -- 3  -KJ    Moving to and from a bed to a chair (including a wheelchair)? 3  -JAMEEL -- 3  -KJ    Standing up from a chair using your arms (e.g., wheelchair, bedside chair)? 3  -JAMEEL -- 2  -KJ    Climbing 3-5 steps with a railing? 2  -JAMEEL -- 2  -KJ    To walk in hospital room? 3  -JAMEEL -- 3  -KJ    AM-PAC 6 Clicks Score (PT) 17  -JAMEEL -- 16  -KJ       How much help from another is currently needed...    Putting on and taking off regular lower body clothing? -- 3  -TS --    Bathing (including washing, rinsing, and drying) -- 3  -TS --    Toileting (which includes using toilet bed pan or urinal) -- 3  -TS --    Putting on and taking off regular upper body clothing -- 3  -TS --    Taking care of personal grooming (such as brushing teeth) -- 3  -TS --    Eating meals -- 4  -TS --    AM-PAC 6 Clicks Score (OT) -- 19  -TS --       Functional Assessment    Outcome Measure Options AM-PAC 6 Clicks Basic Mobility (PT)  -JAMEEL -- AM-PAC 6 Clicks Basic Mobility (PT)  -KJ              User Key  (r) = Recorded By, (t) = Taken By, (c) = Cosigned By      Initials Name Provider Type    Lindsey Sierra, EMY Physical Therapist Assistant    Shreya Sarmiento COTA Occupational Therapist Assistant    Lico Cross, EMY Physical Therapist Assistant                     Time Calculation:    PT Charges       Row Name 09/29/23 1355             Time Calculation    Start Time 1355  -JAMEEL      Stop Time 1420  -JAMEEL      Time Calculation (min) 25 min  -JAMEEL      PT Received On 09/29/23  -JAMEEL         Time Calculation- PT    Total Timed Code Minutes- PT 25 minute(s)  -JAMEEL         Timed Charges    09490 - PT Therapeutic Exercise Minutes 10  -JAMEEL      52158 - Gait Training Minutes  15  -JAMEEL         Total Minutes    Timed Charges Total Minutes 25  -JAMEEL       Total Minutes 25  -JAMEEL                User Key  (r) = Recorded By, (t) = Taken By, (c) = Cosigned By      Initials Name Provider Type    Lico Cross,  PTA Physical Therapist Assistant                  Therapy Charges for Today       Code Description Service Date Service Provider Modifiers Qty    71170689309 HC GAIT TRAINING EA 15 MIN 9/29/2023 Lico Celaya, PTA GP 1    94736769562 HC PT THER PROC EA 15 MIN 9/29/2023 Lico Celaya, EMY GP 1            PT G-Codes  Outcome Measure Options: AM-PAC 6 Clicks Basic Mobility (PT)  AM-PAC 6 Clicks Score (PT): 17  AM-PAC 6 Clicks Score (OT): 19    Lico Celaya PTA  9/29/2023

## 2023-09-29 NOTE — PLAN OF CARE
Goal Outcome Evaluation:         No acute event during the day. No changes, pt has been afebrile and calm. He is A&Ox3, and confused to situation. IV is IID

## 2023-09-29 NOTE — CASE MANAGEMENT/SOCIAL WORK
Continued Stay Note   [facility]     Patient Name: Brennon Vance  MRN: [id number]  Today's Date: 9/29/2023    Admit Date: 9/24/2023        Discharge Plan       Row Name 09/29/23 1201       Plan    Final Discharge Disposition Code 03 - skilled nursing facility (SNF)    Final Note Insurance has approved for pt to go to [place] today. Updated pt dtr on plan. Pharmacy updated in Spring. Call report number is [phone]. Fax number is [phone].                   Discharge Codes    No documentation.                 Expected Discharge Date and Time       Expected Discharge Date Expected Discharge Time    Sep 29, 2023               FAISAL Nava

## 2023-09-30 PROCEDURE — 63710000001 POTASSIUM CHLORIDE 20 MEQ TABLET CONTROLLED-RELEASE: Performed by: NURSE PRACTITIONER

## 2023-09-30 PROCEDURE — 94799 UNLISTED PULMONARY SVC/PX: CPT

## 2023-09-30 PROCEDURE — 63710000001 LEVOTHYROXINE 50 MCG TABLET: Performed by: NURSE PRACTITIONER

## 2023-09-30 PROCEDURE — A9270 NON-COVERED ITEM OR SERVICE: HCPCS | Performed by: NURSE PRACTITIONER

## 2023-09-30 PROCEDURE — 97116 GAIT TRAINING THERAPY: CPT

## 2023-09-30 PROCEDURE — 63710000001 DILTIAZEM CD 180 MG CAPSULE SUSTAINED-RELEASE 24 HR: Performed by: NURSE PRACTITIONER

## 2023-09-30 PROCEDURE — 63710000001 PROBIOTIC 250 MG CAPSULE: Performed by: NURSE PRACTITIONER

## 2023-09-30 PROCEDURE — 63710000001 PANTOPRAZOLE 40 MG TABLET DELAYED-RELEASE: Performed by: NURSE PRACTITIONER

## 2023-09-30 PROCEDURE — 63710000001 MIRTAZAPINE 15 MG TABLET: Performed by: NURSE PRACTITIONER

## 2023-09-30 PROCEDURE — 63710000001 MELATONIN 5 MG TABLET: Performed by: NURSE PRACTITIONER

## 2023-09-30 PROCEDURE — 99024 POSTOP FOLLOW-UP VISIT: CPT | Performed by: NEUROLOGICAL SURGERY

## 2023-09-30 PROCEDURE — 97535 SELF CARE MNGMENT TRAINING: CPT

## 2023-09-30 PROCEDURE — G0378 HOSPITAL OBSERVATION PER HR: HCPCS

## 2023-09-30 PROCEDURE — 63710000001 SODIUM BICARBONATE 650 MG TABLET: Performed by: NURSE PRACTITIONER

## 2023-09-30 PROCEDURE — 97530 THERAPEUTIC ACTIVITIES: CPT

## 2023-09-30 PROCEDURE — 63710000001 ISOSORBIDE MONONITRATE 60 MG TABLET SUSTAINED-RELEASE 24 HOUR: Performed by: NURSE PRACTITIONER

## 2023-09-30 PROCEDURE — 97110 THERAPEUTIC EXERCISES: CPT

## 2023-09-30 PROCEDURE — 63710000001 CHOLECALCIFEROL 25 MCG (1000 UT) TABLET: Performed by: NURSE PRACTITIONER

## 2023-09-30 PROCEDURE — 63710000001 FINASTERIDE 5 MG TABLET: Performed by: NURSE PRACTITIONER

## 2023-09-30 PROCEDURE — 63710000001 APIXABAN 2.5 MG TABLET: Performed by: NURSE PRACTITIONER

## 2023-09-30 PROCEDURE — 63710000001 CIPROFLOXACIN 500 MG TABLET: Performed by: NURSE PRACTITIONER

## 2023-09-30 PROCEDURE — 63710000001 CARBAMAZEPINE 200 MG TABLET: Performed by: NURSE PRACTITIONER

## 2023-09-30 PROCEDURE — 63710000001 TAMSULOSIN 0.4 MG CAPSULE: Performed by: NURSE PRACTITIONER

## 2023-09-30 PROCEDURE — 63710000001 NICOTINE 21 MG/24HR PATCH 24 HOUR: Performed by: NURSE PRACTITIONER

## 2023-09-30 PROCEDURE — 63710000001 ROSUVASTATIN 20 MG TABLET: Performed by: NURSE PRACTITIONER

## 2023-09-30 PROCEDURE — 63710000001 GUAIFENESIN 600 MG TABLET SUSTAINED-RELEASE 12 HOUR: Performed by: NURSE PRACTITIONER

## 2023-09-30 PROCEDURE — 94664 DEMO&/EVAL PT USE INHALER: CPT

## 2023-09-30 PROCEDURE — 63710000001 HYDROXYZINE 25 MG TABLET: Performed by: NURSE PRACTITIONER

## 2023-09-30 PROCEDURE — 63710000001 CARVEDILOL 6.25 MG TABLET: Performed by: NURSE PRACTITIONER

## 2023-09-30 RX ADMIN — Medication 10 ML: at 20:06

## 2023-09-30 RX ADMIN — Medication 3 ML: at 08:50

## 2023-09-30 RX ADMIN — NICOTINE 1 PATCH: 21 PATCH, EXTENDED RELEASE TRANSDERMAL at 08:51

## 2023-09-30 RX ADMIN — BUDESONIDE AND FORMOTEROL FUMARATE DIHYDRATE 2 PUFF: 160; 4.5 AEROSOL RESPIRATORY (INHALATION) at 19:16

## 2023-09-30 RX ADMIN — LEVOTHYROXINE SODIUM 50 MCG: 50 TABLET ORAL at 06:09

## 2023-09-30 RX ADMIN — Medication 10 ML: at 08:50

## 2023-09-30 RX ADMIN — BUDESONIDE AND FORMOTEROL FUMARATE DIHYDRATE 2 PUFF: 160; 4.5 AEROSOL RESPIRATORY (INHALATION) at 06:34

## 2023-09-30 RX ADMIN — SODIUM BICARBONATE 650 MG: 650 TABLET ORAL at 08:49

## 2023-09-30 RX ADMIN — Medication 250 MG: at 08:49

## 2023-09-30 RX ADMIN — POTASSIUM CHLORIDE 20 MEQ: 1500 TABLET, EXTENDED RELEASE ORAL at 08:50

## 2023-09-30 RX ADMIN — FINASTERIDE 5 MG: 5 TABLET, FILM COATED ORAL at 08:49

## 2023-09-30 RX ADMIN — CARBAMAZEPINE 200 MG: 200 TABLET ORAL at 08:49

## 2023-09-30 RX ADMIN — SODIUM BICARBONATE 650 MG: 650 TABLET ORAL at 20:05

## 2023-09-30 RX ADMIN — Medication 5 MG: at 00:20

## 2023-09-30 RX ADMIN — Medication 250 MG: at 20:06

## 2023-09-30 RX ADMIN — APIXABAN 2.5 MG: 2.5 TABLET, FILM COATED ORAL at 08:49

## 2023-09-30 RX ADMIN — APIXABAN 2.5 MG: 2.5 TABLET, FILM COATED ORAL at 20:05

## 2023-09-30 RX ADMIN — TAMSULOSIN HYDROCHLORIDE 0.4 MG: 0.4 CAPSULE ORAL at 08:49

## 2023-09-30 RX ADMIN — ROSUVASTATIN CALCIUM 20 MG: 20 TABLET, FILM COATED ORAL at 20:05

## 2023-09-30 RX ADMIN — MIRTAZAPINE 45 MG: 15 TABLET, FILM COATED ORAL at 20:06

## 2023-09-30 RX ADMIN — ISOSORBIDE MONONITRATE 60 MG: 60 TABLET, EXTENDED RELEASE ORAL at 08:49

## 2023-09-30 RX ADMIN — CIPROFLOXACIN 500 MG: 500 TABLET, FILM COATED ORAL at 12:53

## 2023-09-30 RX ADMIN — GUAIFENESIN 600 MG: 600 TABLET, EXTENDED RELEASE ORAL at 20:05

## 2023-09-30 RX ADMIN — DILTIAZEM HYDROCHLORIDE 180 MG: 180 CAPSULE, EXTENDED RELEASE ORAL at 08:49

## 2023-09-30 RX ADMIN — GUAIFENESIN 600 MG: 600 TABLET, EXTENDED RELEASE ORAL at 08:49

## 2023-09-30 RX ADMIN — CARVEDILOL 12.5 MG: 6.25 TABLET, FILM COATED ORAL at 08:49

## 2023-09-30 RX ADMIN — PANTOPRAZOLE SODIUM 40 MG: 40 TABLET, DELAYED RELEASE ORAL at 08:50

## 2023-09-30 RX ADMIN — Medication 1000 UNITS: at 08:49

## 2023-09-30 RX ADMIN — HYDROXYZINE HYDROCHLORIDE 50 MG: 25 TABLET, FILM COATED ORAL at 21:35

## 2023-09-30 NOTE — PLAN OF CARE
Goal Outcome Evaluation:  Plan of Care Reviewed With: patient, daughter        Progress: improving  Outcome Evaluation: pt trans to EOB cga min, sit-stand cga-min, pt amb 40 feet cga rwx, trans to EOB, performed BLE AROM 10 x 2 reps, pt would benefit from SNF

## 2023-09-30 NOTE — THERAPY TREATMENT NOTE
Acute Care - Occupational Therapy Treatment Note  [facility]     Patient Name: Brennon Vance  : 1935  MRN: [id number]  Today's Date: 2023             Admit Date: 2023       ICD-10-CM ICD-9-CM   1. Acute cystitis without hematuria  N30.00 595.0   2. Acute bilateral low back pain with sciatica, sciatica laterality unspecified  M54.40 724.2     724.3   3. Shortness of breath  R06.02 786.05   4. Compression fracture of T12 vertebra with delayed healing  S22.080G V54.27   5. Closed compression fracture of L3 lumbar vertebra with delayed healing, subsequent encounter  S32.030G V54.17   6. Impaired mobility [Z74.09 (ICD-10-CM)]  Z74.09 799.89   7. Decreased activities of daily living (ADL) [Z78.9 (ICD-10-CM)]  Z78.9 V49.89     Patient Active Problem List   Diagnosis    Chest pain    NSTEMI, initial episode of care    Coronary artery disease involving native coronary artery of native heart without angina pectoris    Paroxysmal atrial fibrillation    Other emphysema    Essential hypertension    Precordial pain    Hyperlipidemia LDL goal <70    Tobacco abuse    Cardiomyopathy    NSTEMI (non-ST elevated myocardial infarction)    UTI (urinary tract infection)    Aspiration pneumonia    Intractable low back pain    Stage 3b chronic kidney disease    Compression fracture of T12 vertebra with delayed healing    Closed compression fracture of third lumbar vertebra     Past Medical History:   Diagnosis Date    CAD (coronary artery disease)     COPD (chronic obstructive pulmonary disease)     Hiatal hernia     Hyperlipidemia     Hypertension     Stroke      Past Surgical History:   Procedure Laterality Date    ABDOMINAL AORTIC ANEURYSM REPAIR      CARDIAC CATHETERIZATION N/A 2021    Procedure: Left Heart Cath;  Surgeon: Harrison Alcantara MD;  Location: [facility] CATH INVASIVE LOCATION;  Service: Cardiology;  Laterality: N/A;    CORONARY ANGIOPLASTY WITH STENT PLACEMENT      FEMORAL ARTERY STENT       KYPHOPLASTY WITH BIOPSY N/A 9/26/2023    Procedure: KYPHOPLASTY WITH BIOPSY T12 and L3;  Surgeon: Jeremiah Brantley MD;  Location:  [facility] OR;  Service: Neurosurgery;  Laterality: N/A;    LEG THROMBECTOMY/EMBOLECTOMY Right 5/20/2021    Procedure: RT GROIN EXPLORATION;  Surgeon: Geoff Remy DO;  Location:  [facility] HYBRID OR 12;  Service: Vascular;  Laterality: Right;    TRIGEMINAL NERVE DECOMPRESSION           OT ASSESSMENT FLOWSHEET (last 12 hours)       OT Evaluation and Treatment       Row Name 09/30/23 1113                   OT Time and Intention    Subjective Information complains of;pain  -LS        Document Type therapy note (daily note)  -LS        Mode of Treatment occupational therapy  -LS        Patient Effort good  -LS           General Information    Patient Profile Reviewed yes  -LS        Existing Precautions/Restrictions fall;spinal  -LS        Barriers to Rehab medically complex  -LS           Pain Assessment    Pretreatment Pain Rating 0/10 - no pain  -LS        Posttreatment Pain Rating 2/10  -LS        Pain Location - Side/Orientation Left  -LS        Pain Location lower  -LS        Pain Location - back  -LS           Cognition    Orientation Status (Cognition) oriented x 4  -LS           Wound 09/26/23 1640 lumbar spine Puncture    Wound - Properties Group Placement Date: 09/26/23  -DT Placement Time: 1640  -DT Present on Hospital Admission: N  -DT Location: lumbar spine  -DT Primary Wound Type: Puncture  -DT    Retired Wound - Properties Group Placement Date: 09/26/23  -DT Placement Time: 1640  -DT Present on Hospital Admission: N  -DT Location: lumbar spine  -DT Primary Wound Type: Puncture  -DT    Retired Wound - Properties Group Date first assessed: 09/26/23  -DT Time first assessed: 1640  -DT Present on Hospital Admission: N  -DT Location: lumbar spine  -DT Primary Wound Type: Puncture  -DT       Wound 09/26/23 1640 thoracic spine Puncture    Wound - Properties Group Placement Date:  09/26/23  -DT Placement Time: 1640  -DT Present on Hospital Admission: N  -DT Location: thoracic spine  -DT Primary Wound Type: Puncture  -DT    Retired Wound - Properties Group Placement Date: 09/26/23  -DT Placement Time: 1640  -DT Present on Hospital Admission: N  -DT Location: thoracic spine  -DT Primary Wound Type: Puncture  -DT    Retired Wound - Properties Group Date first assessed: 09/26/23  -DT Time first assessed: 1640  -DT Present on Hospital Admission: N  -DT Location: thoracic spine  -DT Primary Wound Type: Puncture  -DT       Plan of Care Review    Plan of Care Reviewed With patient;daughter  -LS        Progress improving  -        Outcome Evaluation OT tx completed on this date. Pt completed bed mobility fowlers <> EOB requiring SBA. Pt completed functional mobility with RW in room from EOB <> toilet in bathroom and sit to stand from toilet with grab bar requiring CGA and min verbal cuing for safety. Pt completed grooming tasks of washing hands at ambulatory level at sink with RW req SBA/CGA. Pt completed sit to stands and static standing for 2-3 minutes to increase stability/endurance with ambulatory level ADLs. OT POC to continue.  -LS           Positioning and Restraints    Pre-Treatment Position in bed  -LS        Post Treatment Position bed  -LS        In Bed fowlers;call light within reach;exit alarm on;encouraged to call for assist  -LS           Therapy Plan Review/Discharge Plan (OT)    Anticipated Discharge Disposition (OT) inpatient rehabilitation facility  -                  User Key  (r) = Recorded By, (t) = Taken By, (c) = Cosigned By      Initials Name Effective Dates    DT Pedro Montoya RN 02/17/22 -     Karime Garcia COTA 09/22/22 -                      Occupational Therapy Education       Title: PT OT SLP Therapies (Done)       Topic: Occupational Therapy (Done)       Point: ADL training (Done)       Description:   Instruct learner(s) on proper safety adaptation and  remediation techniques during self care or transfers.   Instruct in proper use of assistive devices.                  Learning Progress Summary             Patient Acceptance, E, VU by ALFREDO at 9/30/2023 1157    Comment: Pt educated on safety with RW during functional mobility.    Acceptance, E, VU by MB at 9/27/2023 0859   Family Acceptance, E, VU by LS at 9/30/2023 1157    Comment: Pt educated on safety with RW during functional mobility.    Acceptance, E, VU by MB at 9/27/2023 0859                         Point: Home exercise program (Done)       Description:   Instruct learner(s) on appropriate technique for monitoring, assisting and/or progressing therapeutic exercises/activities.                  Learning Progress Summary             Patient Acceptance, E, VU by MB at 9/27/2023 0859   Family Acceptance, E, VU by MB at 9/27/2023 0859                         Point: Precautions (Done)       Description:   Instruct learner(s) on prescribed precautions during self-care and functional transfers.                  Learning Progress Summary             Patient Acceptance, E, VU by MB at 9/27/2023 0859   Family Acceptance, E, VU by MB at 9/27/2023 0859                         Point: Body mechanics (Done)       Description:   Instruct learner(s) on proper positioning and spine alignment during self-care, functional mobility activities and/or exercises.                  Learning Progress Summary             Patient Acceptance, E, VU by MB at 9/27/2023 0859   Family Acceptance, E, VU by MB at 9/27/2023 0859                                         User Key       Initials Effective Dates Name Provider Type Discipline     09/22/22 -  Karime Ennis COTA Occupational Therapist Assistant THERAPIES    MB 08/04/23 -  Virginia Cunningham OT Student OT Student OT                      OT Recommendation and Plan     Plan of Care Review  Plan of Care Reviewed With: patient, daughter  Progress: improving  Outcome Evaluation: OT tx  completed on this date. Pt completed bed mobility fowlers <> EOB requiring SBA. Pt completed functional mobility with RW in room from EOB <> toilet in bathroom and sit to stand from toilet with grab bar requiring CGA and min verbal cuing for safety. Pt completed grooming tasks of washing hands at ambulatory level at sink with RW req SBA/CGA. Pt completed sit to stands and static standing for 2-3 minutes to increase stability/endurance with ambulatory level ADLs. OT POC to continue.  Plan of Care Reviewed With: patient, daughter  Outcome Evaluation: OT tx completed on this date. Pt completed bed mobility fowlers <> EOB requiring SBA. Pt completed functional mobility with RW in room from EOB <> toilet in bathroom and sit to stand from toilet with grab bar requiring CGA and min verbal cuing for safety. Pt completed grooming tasks of washing hands at ambulatory level at sink with RW req SBA/CGA. Pt completed sit to stands and static standing for 2-3 minutes to increase stability/endurance with ambulatory level ADLs. OT POC to continue.     Outcome Measures       Row Name 09/30/23 1100 09/30/23 0812 09/29/23 9319       How much help from another person do you currently need...    Turning from your back to your side while in flat bed without using bedrails? -- 3  -AH 3  -JAMEEL    Moving from lying on back to sitting on the side of a flat bed without bedrails? -- 3  -AH 3  -JAMEEL    Moving to and from a bed to a chair (including a wheelchair)? -- 3  -AH 3  -JAMEEL    Standing up from a chair using your arms (e.g., wheelchair, bedside chair)? -- 3  -AH 3  -JAMEEL    Climbing 3-5 steps with a railing? -- 2  -AH 2  -JAMEEL    To walk in hospital room? -- 3  -AH 3  -JAMEEL    AM-PAC 6 Clicks Score (PT) -- 17  -AH 17  -JAMEEL       How much help from another is currently needed...    Putting on and taking off regular lower body clothing? 3  -LS -- --    Bathing (including washing, rinsing, and drying) 3  -LS -- --    Toileting (which includes using  toilet bed pan or urinal) 3  -LS -- --    Putting on and taking off regular upper body clothing 3  -LS -- --    Taking care of personal grooming (such as brushing teeth) 3  -LS -- --    Eating meals 4  -LS -- --    AM-PAC 6 Clicks Score (OT) 19  -LS -- --       Functional Assessment    Outcome Measure Options AM-PAC 6 Clicks Basic Mobility (PT);AM-PAC 6 Clicks Daily Activity (OT)  - AM-PAC 6 Clicks Basic Mobility (PT)  - AM-PAC 6 Clicks Basic Mobility (PT)  -JAMEEL      Row Name 09/29/23 1300 09/28/23 1400          How much help from another person do you currently need...    Turning from your back to your side while in flat bed without using bedrails? -- 3  -KJ     Moving from lying on back to sitting on the side of a flat bed without bedrails? -- 3  -KJ     Moving to and from a bed to a chair (including a wheelchair)? -- 3  -KJ     Standing up from a chair using your arms (e.g., wheelchair, bedside chair)? -- 2  -KJ     Climbing 3-5 steps with a railing? -- 2  -KJ     To walk in hospital room? -- 3  -KJ     AM-PAC 6 Clicks Score (PT) -- 16  -KJ        How much help from another is currently needed...    Putting on and taking off regular lower body clothing? 3  -TS --     Bathing (including washing, rinsing, and drying) 3  -TS --     Toileting (which includes using toilet bed pan or urinal) 3  -TS --     Putting on and taking off regular upper body clothing 3  -TS --     Taking care of personal grooming (such as brushing teeth) 3  -TS --     Eating meals 4  -TS --     AM-PAC 6 Clicks Score (OT) 19  -TS --        Functional Assessment    Outcome Measure Options -- AM-PAC 6 Clicks Basic Mobility (PT)  -KJ               User Key  (r) = Recorded By, (t) = Taken By, (c) = Cosigned By      Initials Name Provider Type     Lissette Flood, EMY Physical Therapist Assistant    Lindsey Sierra, EMY Physical Therapist Assistant    Shreya Sarmiento COTA Occupational Therapist Assistant    Lico Cross, EMY  Physical Therapist Assistant    Karime Garcia COTA Occupational Therapist Assistant                    Time Calculation:    Time Calculation- OT       Row Name 09/30/23 1159 09/30/23 0812          Time Calculation- OT    OT Start Time 1113  -LS --     OT Stop Time 1151  -LS --     OT Time Calculation (min) 38 min  -LS --     Total Timed Code Minutes- OT 38 minute(s)  - --     OT Received On 09/30/23  - --        Timed Charges    64611 - Gait Training Minutes  -- 15  -AH        Total Minutes    Timed Charges Total Minutes -- 15  -AH      Total Minutes -- 15  -AH               User Key  (r) = Recorded By, (t) = Taken By, (c) = Cosigned By      Initials Name Provider Type     Lissette Flood, PTA Physical Therapist Assistant    Karime Garcia COTA Occupational Therapist Assistant                  Therapy Charges for Today       Code Description Service Date Service Provider Modifiers Qty    41205308309 HC OT SELF CARE/MGMT/TRAIN EA 15 MIN 9/30/2023 Karime Ennis COTA GO 2    77710209275 HC OT THERAPEUTIC ACT EA 15 MIN 9/30/2023 Karime Ennis COTA GO 1                 NICK Carreon  9/30/2023

## 2023-09-30 NOTE — PLAN OF CARE
Goal Outcome Evaluation:  Plan of Care Reviewed With: patient        Progress: improving  Outcome Evaluation: Pt had no c/o pain this shift. Safety maintained. A/o x4. Up with asst x1 with walker. Voiding per BRP/incont at times, brief in place. IV IID. Dressing to lumbar CDI. N/v check WNL. PPP. Plan to dc to [facility] soon. Cont to monitor.

## 2023-09-30 NOTE — PROGRESS NOTES
"Brennon Vance  87 y.o.      Chief complaint:   Compression fracture    Subjective  Doing well.  Back pain controlled.  Working with physical therapy.    Temp:  [98.2 °F (36.8 °C)-99.6 °F (37.6 °C)] 99.5 °F (37.5 °C)  Heart Rate:  [71-95] 90  Resp:  [16-19] 17  BP: (104-180)/(48-86) 180/82      Objective:  Vital signs: (most recent): Blood pressure 180/82, pulse 90, temperature 99.5 °F (37.5 °C), temperature source Oral, resp. rate 17, height 177.8 cm (70\"), weight 45.5 kg (100 lb 3.2 oz), SpO2 95 %.      Neurologic Exam     Mental Status   Oriented to person, place, and time.   Attention: normal.   Speech: speech is normal   Level of consciousness: alert  Knowledge: good.     Cranial Nerves     CN II   Visual fields full to confrontation.     CN III, IV, VI   Pupils are equal, round, and reactive to light.  Extraocular motions are normal.     CN V   Facial sensation intact.     CN VII   Facial expression full, symmetric.     CN VIII   CN VIII normal.     CN IX, X   CN IX normal.   CN X normal.     CN XI   CN XI normal.     CN XII   CN XII normal.     Motor Exam   Muscle bulk: normal  Overall muscle tone: normal  Right arm pronator drift: absent  Left arm pronator drift: absent    Strength   Strength 5/5 throughout.     Sensory Exam   Light touch normal.   Pinprick normal.     Gait, Coordination, and Reflexes     Gait  Gait: normal    Coordination   Finger to nose coordination: normal    Tremor   Resting tremor: absent  Intention tremor: absent  Action tremor: absent    Reflexes   Reflexes 2+ except as noted.     Lab Results (last 24 hours)       ** No results found for the last 24 hours. **                Plan:   Compression fracture status post kyphoplasty.  Doing well.  Placement with his spouse pending.      Intractable low back pain    Coronary artery disease involving native coronary artery of native heart without angina pectoris    Paroxysmal atrial fibrillation    Other emphysema    Essential hypertension    UTI " (urinary tract infection)    Aspiration pneumonia    Stage 3b chronic kidney disease    Compression fracture of T12 vertebra with delayed healing    Closed compression fracture of third lumbar vertebra        Jeremiah Brantley MD

## 2023-09-30 NOTE — THERAPY TREATMENT NOTE
Acute Care - Physical Therapy Treatment Note  [facility]     Patient Name: Brennon Vance  : 1935  MRN: [id number]  Today's Date: 2023      Visit Dx:     ICD-10-CM ICD-9-CM   1. Acute cystitis without hematuria  N30.00 595.0   2. Acute bilateral low back pain with sciatica, sciatica laterality unspecified  M54.40 724.2     724.3   3. Shortness of breath  R06.02 786.05   4. Compression fracture of T12 vertebra with delayed healing  S22.080G V54.27   5. Closed compression fracture of L3 lumbar vertebra with delayed healing, subsequent encounter  S32.030G V54.17   6. Impaired mobility [Z74.09 (ICD-10-CM)]  Z74.09 799.89   7. Decreased activities of daily living (ADL) [Z78.9 (ICD-10-CM)]  Z78.9 V49.89     Patient Active Problem List   Diagnosis    Chest pain    NSTEMI, initial episode of care    Coronary artery disease involving native coronary artery of native heart without angina pectoris    Paroxysmal atrial fibrillation    Other emphysema    Essential hypertension    Precordial pain    Hyperlipidemia LDL goal <70    Tobacco abuse    Cardiomyopathy    NSTEMI (non-ST elevated myocardial infarction)    UTI (urinary tract infection)    Aspiration pneumonia    Intractable low back pain    Stage 3b chronic kidney disease    Compression fracture of T12 vertebra with delayed healing    Closed compression fracture of third lumbar vertebra     Past Medical History:   Diagnosis Date    CAD (coronary artery disease)     COPD (chronic obstructive pulmonary disease)     Hiatal hernia     Hyperlipidemia     Hypertension     Stroke      Past Surgical History:   Procedure Laterality Date    ABDOMINAL AORTIC ANEURYSM REPAIR      CARDIAC CATHETERIZATION N/A 2021    Procedure: Left Heart Cath;  Surgeon: Harrison Alcantara MD;  Location: [facility] INVASIVE LOCATION;  Service: Cardiology;  Laterality: N/A;    CORONARY ANGIOPLASTY WITH STENT PLACEMENT      FEMORAL ARTERY STENT      KYPHOPLASTY WITH BIOPSY N/A  9/26/2023    Procedure: KYPHOPLASTY WITH BIOPSY T12 and L3;  Surgeon: Jeremiah Brantley MD;  Location:  [facility] OR;  Service: Neurosurgery;  Laterality: N/A;    LEG THROMBECTOMY/EMBOLECTOMY Right 5/20/2021    Procedure: RT GROIN EXPLORATION;  Surgeon: Geoff Remy DO;  Location:  [facility] HYBRID OR 12;  Service: Vascular;  Laterality: Right;    TRIGEMINAL NERVE DECOMPRESSION       PT Assessment (last 12 hours)       PT Evaluation and Treatment       [facility] Name 09/30/23 [facility] 09/30/23 0747       Physical Therapy Time and Intention    Subjective Information complains of;weakness;fatigue;pain  - no complaints  -    Document Type therapy note (daily note)  - therapy note (daily note)  -    Mode of Treatment physical therapy  - physical therapy  -[facility] Name 09/30/23 143 09/30/23 0747       General Information    Existing Precautions/Restrictions fall;spinal  - fall;spinal  -[facility] Name 09/30/23 143 09/30/23 0747       Pain    Pretreatment Pain Rating -- 0/10 - no pain  -    Posttreatment Pain Rating -- 0/10 - no pain  -    Pain Intervention(s) Repositioned;Ambulation/increased activity  - --    Additional Documentation Pain Scale: Word Pre/Post-Treatment (Group)  - --      [facility] Name 09/30/23 1437          Pain Scale: Word Pre/Post-Treatment    Pain: Word Scale, Pretreatment 2 - mild pain  -     Posttreatment Pain Rating 2 - mild pain  -     Pain Location lower  -     Pain Location - back  -[facility] Name 09/30/23 143 09/30/23 0747       Bed Mobility    Supine-Sit [place] (Bed Mobility) contact guard;verbal cues  - --  sitting EOB  -    Sit-Supine [place] (Bed Mobility) --  EOB  - --  chair  -[facility] Name 09/30/23 143 09/30/23 0747       Sit-Stand Transfer    Sit-Stand [place] (Transfers) verbal cues;minimum assist (75% patient effort);contact guard  - verbal cues;minimum assist (75% patient effort)  -    Assistive Device (Sit-Stand Transfers) walker,  front-wheeled  -AH walker, front-wheeled  -AH      Row Name 09/30/23 1437 09/30/23 0747       Stand-Sit Transfer    Stand-Sit [place] (Transfers) verbal cues;contact guard  -AH verbal cues;contact guard  -AH    Assistive Device (Stand-Sit Transfers) walker, front-wheeled  -AH walker, front-wheeled  -AH      Row Name 09/30/23 1437 09/30/23 0747       Gait/Stairs (Locomotion)    [place] Level (Gait) verbal cues;contact guard  -AH verbal cues;contact guard  -AH    Assistive Device (Gait) walker, front-wheeled  -AH walker, front-wheeled  -AH    Distance in Feet (Gait) 40  -AH 40  -AH    Deviations/Abnormal Patterns (Gait) stride length decreased  -AH stride length decreased  -AH    Bilateral Gait Deviations forward flexed posture  -AH forward flexed posture  -AH      Row Name 09/30/23 1437          Motor Skills    Comments, Therapeutic Exercise BLE AROM 10 x 2 reps  -AH       Row Name             Wound 09/26/23 1640 lumbar spine Puncture    Wound - Properties Group Placement Date: 09/26/23  -DT Placement Time: 1640  -DT Present on Hospital Admission: N  -DT Location: lumbar spine  -DT Primary Wound Type: Puncture  -DT    Retired Wound - Properties Group Placement Date: 09/26/23  -DT Placement Time: 1640  -DT Present on Hospital Admission: N  -DT Location: lumbar spine  -DT Primary Wound Type: Puncture  -DT    Retired Wound - Properties Group Date first assessed: 09/26/23  -DT Time first assessed: 1640  -DT Present on Hospital Admission: N  -DT Location: lumbar spine  -DT Primary Wound Type: Puncture  -DT      Row Name             Wound 09/26/23 1640 thoracic spine Puncture    Wound - Properties Group Placement Date: 09/26/23  -DT Placement Time: 1640  -DT Present on Hospital Admission: N  -DT Location: thoracic spine  -DT Primary Wound Type: Puncture  -DT    Retired Wound - Properties Group Placement Date: 09/26/23  -DT Placement Time: 1640  -DT Present on Hospital Admission: N  -DT Location: thoracic spine   -DT Primary Wound Type: Puncture  -DT    Retired Wound - Properties Group Date first assessed: 09/26/23  -DT Time first assessed: 1640  -DT Present on Hospital Admission: N  -DT Location: thoracic spine  -DT Primary Wound Type: Puncture  -DT      Row Name 09/30/23 1437          Plan of Care Review    Plan of Care Reviewed With patient;daughter  -     Progress improving  -     Outcome Evaluation pt trans to EOB cga min, sit-stand cga-min, pt amb 40 feet cga rwx, trans to EOB, performed BLE AROM 10 x 2 reps, pt would benefit from [facility]  -       Row Name 09/30/23 1437 09/30/23 0747       Positioning and Restraints    Pre-Treatment Position in bed  - in bed  -    Post Treatment Position bed  - chair  -    In Bed sitting EOB;call light within reach;encouraged to call for assist;exit alarm on;with family/caregiver;notified [facility] --    In Chair -- sitting;call light within reach;encouraged to call for assist;with family/caregiver;notified [facility]              User Key  (r) = Recorded By, (t) = Taken By, (c) = Cosigned By      Initials Name Provider Type     Lissette Flood, PTA Physical Therapist Assistant    Pedro Phoenix, RN Registered Nurse                    Physical Therapy Education       Title: PT OT SLP Therapies (Done)       Topic: Physical Therapy (Done)       Point: Mobility training (Done)       Learning Progress Summary             Patient Eager, E, VU,NR by OSWALDO at 9/27/2023 0745    Comment: Spinal precautions. Pressure injury prevention. Cueing for proper transfers with FWW.   Family Eager, E, VU,NR by JS at 9/27/2023 0745    Comment: Spinal precautions. Pressure injury prevention. Cueing for proper transfers with FWW.                         Point: Body mechanics (Done)       Learning Progress Summary             Patient Eager, E, VU,NR by JS at 9/27/2023 0745    Comment: Spinal precautions. Pressure injury prevention. Cueing for proper transfers with FWW.   Family Eager, E, VU,NR by  OSWALDO at 9/27/2023 0745    Comment: Spinal precautions. Pressure injury prevention. Cueing for proper transfers with FWW.                         Point: Precautions (Done)       Learning Progress Summary             Patient Katiana, E, VU,NR by OSWALDO at 9/27/2023 0745    Comment: Spinal precautions. Pressure injury prevention. Cueing for proper transfers with FWW.   Family Justiner, E, VU,NR by OSWALDO at 9/27/2023 0745    Comment: Spinal precautions. Pressure injury prevention. Cueing for proper transfers with FWW.                                         User Key       Initials Effective Dates Name Provider Type Discipline    OSWALDO 07/13/23 -  Marcin Estrada, PT Student PT Student PT                  PT Recommendation and Plan     Plan of Care Reviewed With: patient, daughter  Progress: improving  Outcome Evaluation: pt trans to EOB cga min, sit-stand cga-min, pt amb 40 feet cga rwx, trans to EOB, performed BLE AROM 10 x 2 reps, pt would benefit from SNF   Outcome Measures       Row Name 09/30/23 1100 09/30/23 0812 09/29/23 2685       How much help from another person do you currently need...    Turning from your back to your side while in flat bed without using bedrails? -- 3  -AH 3  -JAMEEL    Moving from lying on back to sitting on the side of a flat bed without bedrails? -- 3  - 3  -JAMEEL    Moving to and from a bed to a chair (including a wheelchair)? -- 3  - 3  -JAMEEL    Standing up from a chair using your arms (e.g., wheelchair, bedside chair)? -- 3  - 3  -JAMEEL    Climbing 3-5 steps with a railing? -- 2  -AH 2  -JAMEEL    To walk in hospital room? -- 3  -AH 3  -JAMEEL    AM-PAC 6 Clicks Score (PT) -- 17  -AH 17  -JAMEEL       How much help from another is currently needed...    Putting on and taking off regular lower body clothing? 3  -LS -- --    Bathing (including washing, rinsing, and drying) 3  -LS -- --    Toileting (which includes using toilet bed pan or urinal) 3  -LS -- --    Putting on and taking off regular upper body clothing 3  -LS  -- --    Taking care of personal grooming (such as brushing teeth) 3  -LS -- --    Eating meals 4  -LS -- --    AM-PAC 6 Clicks Score (OT) 19  -LS -- --       Functional Assessment    Outcome Measure Options AM-PAC 6 Clicks Basic Mobility (PT);AM-PAC 6 Clicks Daily Activity (OT)  - AM-PAC 6 Clicks Basic Mobility (PT)  - AM-PAC 6 Clicks Basic Mobility (PT)  -JAMEEL      Row Name 09/29/23 1300 09/28/23 1400          How much help from another person do you currently need...    Turning from your back to your side while in flat bed without using bedrails? -- 3  -KJ     Moving from lying on back to sitting on the side of a flat bed without bedrails? -- 3  -KJ     Moving to and from a bed to a chair (including a wheelchair)? -- 3  -KJ     Standing up from a chair using your arms (e.g., wheelchair, bedside chair)? -- 2  -KJ     Climbing 3-5 steps with a railing? -- 2  -KJ     To walk in hospital room? -- 3  -KJ     AM-PAC 6 Clicks Score (PT) -- 16  -KJ        How much help from another is currently needed...    Putting on and taking off regular lower body clothing? 3  -TS --     Bathing (including washing, rinsing, and drying) 3  -TS --     Toileting (which includes using toilet bed pan or urinal) 3  -TS --     Putting on and taking off regular upper body clothing 3  -TS --     Taking care of personal grooming (such as brushing teeth) 3  -TS --     Eating meals 4  -TS --     AM-PAC 6 Clicks Score (OT) 19  -TS --        Functional Assessment    Outcome Measure Options -- AM-PAC 6 Clicks Basic Mobility (PT)  -KJ               User Key  (r) = Recorded By, (t) = Taken By, (c) = Cosigned By      Initials Name Provider Type     Lissette Flood, PTA Physical Therapist Assistant    Lindsey Sierra, PTA Physical Therapist Assistant    Shreya Sarmiento, NICK Occupational Therapist Assistant    Lico Cross, PTA Physical Therapist Assistant    Karime Garcia COTA Occupational Therapist Assistant                      Time Calculation:    PT Charges       Row Name 09/30/23 1506 09/30/23 0812          Time Calculation    Start Time 1437  - 0747  -     Stop Time 1500  - 0802  -     Time Calculation (min) 23 min  -AH 15 min  -     PT Received On -- 09/30/23  -        Time Calculation- PT    Total Timed Code Minutes- PT 23 minute(s)  -AH 15 minute(s)  -AH        Timed Charges    89494 - PT Therapeutic Exercise Minutes 10  -AH --     23138 - Gait Training Minutes  13  -AH 15  -AH        Total Minutes    Timed Charges Total Minutes 23  -AH 15  -AH      Total Minutes 23  -AH 15  -AH               User Key  (r) = Recorded By, (t) = Taken By, (c) = Cosigned By      Initials Name Provider Type    Lissette Hyde PTA Physical Therapist Assistant                  Therapy Charges for Today       Code Description Service Date Service Provider Modifiers Qty    83005430271 HC GAIT TRAINING EA 15 MIN 9/30/2023 Lissette Flood, PTA GP 1    07363679345 HC PT THER PROC EA 15 MIN 9/30/2023 Lissette Flood, PTA GP 1    92592077559 HC GAIT TRAINING EA 15 MIN 9/30/2023 Lissette Flood, PTA GP 1            PT G-Codes  Outcome Measure Options: AM-PAC 6 Clicks Basic Mobility (PT), AM-PAC 6 Clicks Daily Activity (OT)  AM-PAC 6 Clicks Score (PT): 17  AM-PAC 6 Clicks Score (OT): 19    Lissette Flood PTA  9/30/2023

## 2023-09-30 NOTE — PLAN OF CARE
Goal Outcome Evaluation:  Plan of Care Reviewed With: patient, daughter        Progress: improving  Outcome Evaluation: OT tx completed on this date. Pt completed bed mobility fowlers <> EOB requiring SBA. Pt completed functional mobility with RW in room from EOB <> toilet in bathroom and sit to stand from toilet with grab bar requiring CGA and min verbal cuing for safety. Pt completed grooming tasks of washing hands at ambulatory level at sink with RW req SBA/CGA. Pt completed sit to stands and static standing for 2-3 minutes to increase stability/endurance with ambulatory level ADLs. OT POC to continue.      Anticipated Discharge Disposition (OT): inpatient rehabilitation facility

## 2023-09-30 NOTE — THERAPY TREATMENT NOTE
Acute Care - Physical Therapy Treatment Note  [facility]     Patient Name: Brennon Vance  : 1935  MRN: [id number]  Today's Date: 2023      Visit Dx:     ICD-10-CM ICD-9-CM   1. Acute cystitis without hematuria  N30.00 595.0   2. Acute bilateral low back pain with sciatica, sciatica laterality unspecified  M54.40 724.2     724.3   3. Shortness of breath  R06.02 786.05   4. Compression fracture of T12 vertebra with delayed healing  S22.080G V54.27   5. Closed compression fracture of L3 lumbar vertebra with delayed healing, subsequent encounter  S32.030G V54.17   6. Impaired mobility [Z74.09 (ICD-10-CM)]  Z74.09 799.89   7. Decreased activities of daily living (ADL) [Z78.9 (ICD-10-CM)]  Z78.9 V49.89     Patient Active Problem List   Diagnosis    Chest pain    NSTEMI, initial episode of care    Coronary artery disease involving native coronary artery of native heart without angina pectoris    Paroxysmal atrial fibrillation    Other emphysema    Essential hypertension    Precordial pain    Hyperlipidemia LDL goal <70    Tobacco abuse    Cardiomyopathy    NSTEMI (non-ST elevated myocardial infarction)    UTI (urinary tract infection)    Aspiration pneumonia    Intractable low back pain    Stage 3b chronic kidney disease    Compression fracture of T12 vertebra with delayed healing    Closed compression fracture of third lumbar vertebra     Past Medical History:   Diagnosis Date    CAD (coronary artery disease)     COPD (chronic obstructive pulmonary disease)     Hiatal hernia     Hyperlipidemia     Hypertension     Stroke      Past Surgical History:   Procedure Laterality Date    ABDOMINAL AORTIC ANEURYSM REPAIR      CARDIAC CATHETERIZATION N/A 2021    Procedure: Left Heart Cath;  Surgeon: Harrison Alcantara MD;  Location: [facility] INVASIVE LOCATION;  Service: Cardiology;  Laterality: N/A;    CORONARY ANGIOPLASTY WITH STENT PLACEMENT      FEMORAL ARTERY STENT      KYPHOPLASTY WITH BIOPSY N/A  9/26/2023    Procedure: KYPHOPLASTY WITH BIOPSY T12 and L3;  Surgeon: Jeremiah Brantley MD;  Location:  [facility] OR;  Service: Neurosurgery;  Laterality: N/A;    LEG THROMBECTOMY/EMBOLECTOMY Right 5/20/2021    Procedure: RT GROIN EXPLORATION;  Surgeon: Geoff Remy DO;  Location:  [facility] HYBRID OR 12;  Service: Vascular;  Laterality: Right;    TRIGEMINAL NERVE DECOMPRESSION       PT Assessment (last 12 hours)       PT Evaluation and Treatment       [facility] Name 09/30/23 0747          Physical Therapy Time and Intention    Subjective Information no complaints  -     Document Type therapy note (daily note)  -     Mode of Treatment physical therapy  -[facility] Name 09/30/23 0747          General Information    Existing Precautions/Restrictions fall;spinal  -[facility] Name 09/30/23 0747          Pain    Pretreatment Pain Rating 0/10 - no pain  -     Posttreatment Pain Rating 0/10 - no pain  -[facility] Name 09/30/23 0747          Bed Mobility    Supine-Sit [place] (Bed Mobility) --  sitting EOB  -     Sit-Supine [place] (Bed Mobility) --  chair  -[facility] Name 09/30/23 [facility]          Sit-Stand Transfer    Sit-Stand [place] (Transfers) verbal cues;minimum assist (75% patient effort)  -     Assistive Device (Sit-Stand Transfers) walker, front-wheeled  -       Row Name 09/30/23 9222          Stand-Sit Transfer    Stand-Sit [place] (Transfers) verbal cues;contact guard  -     Assistive Device (Stand-Sit Transfers) walker, front-wheeled  -       Row Name 09/30/23 0747          Gait/Stairs (Locomotion)    [place] Level (Gait) verbal cues;contact guard  -     Assistive Device (Gait) walker, front-wheeled  -     Distance in Feet (Gait) 40  -     Deviations/Abnormal Patterns (Gait) stride length decreased  -     Bilateral Gait Deviations forward flexed posture  -[facility] Name             Wound 09/26/23 1640 lumbar spine Puncture    Wound - Properties Group Placement  Date: 09/26/23  -DT Placement Time: 1640  -DT Present on Hospital Admission: N  -DT Location: lumbar spine  -DT Primary Wound Type: Puncture  -DT    Retired Wound - Properties Group Placement Date: 09/26/23  -DT Placement Time: 1640  -DT Present on Hospital Admission: N  -DT Location: lumbar spine  -DT Primary Wound Type: Puncture  -DT    Retired Wound - Properties Group Date first assessed: 09/26/23  -DT Time first assessed: 1640  -DT Present on Hospital Admission: N  -DT Location: lumbar spine  -DT Primary Wound Type: Puncture  -DT      Row Name             Wound 09/26/23 1640 thoracic spine Puncture    Wound - Properties Group Placement Date: 09/26/23  -DT Placement Time: 1640  -DT Present on Hospital Admission: N  -DT Location: thoracic spine  -DT Primary Wound Type: Puncture  -DT    Retired Wound - Properties Group Placement Date: 09/26/23  -DT Placement Time: 1640  -DT Present on Hospital Admission: N  -DT Location: thoracic spine  -DT Primary Wound Type: Puncture  -DT    Retired Wound - Properties Group Date first assessed: 09/26/23  -DT Time first assessed: 1640  -DT Present on Hospital Admission: N  -DT Location: thoracic spine  -DT Primary Wound Type: Puncture  -DT      Row Name 09/30/23 0747          Positioning and Restraints    Pre-Treatment Position in bed  -     Post Treatment Position chair  -     In Chair sitting;call light within reach;encouraged to call for assist;with family/caregiver;notified [facility]               User Key  (r) = Recorded By, (t) = Taken By, (c) = Cosigned By      Initials Name Provider Type     Lissette Flood PTA Physical Therapist Assistant    Pedro Phoenix, RN Registered Nurse                    Physical Therapy Education       Title: PT OT SLP Therapies (Done)       Topic: Physical Therapy (Done)       Point: Mobility training (Done)       Learning Progress Summary             Patient Katiana, E, VU,NR by OSWALDO at 9/27/2023 0758    Comment: Spinal precautions.  Pressure injury prevention. Cueing for proper transfers with FWW.   Family Eager, E, VU,NR by  at 9/27/2023 0745    Comment: Spinal precautions. Pressure injury prevention. Cueing for proper transfers with FWW.                         Point: Body mechanics (Done)       Learning Progress Summary             Patient Eager, E, VU,NR by  at 9/27/2023 0745    Comment: Spinal precautions. Pressure injury prevention. Cueing for proper transfers with FWW.   Family Eager, E, VU,NR by  at 9/27/2023 0745    Comment: Spinal precautions. Pressure injury prevention. Cueing for proper transfers with FWW.                         Point: Precautions (Done)       Learning Progress Summary             Patient Eager, E, VU,NR by  at 9/27/2023 0745    Comment: Spinal precautions. Pressure injury prevention. Cueing for proper transfers with FWW.   Family Eager, E, VU,NR by  at 9/27/2023 0745    Comment: Spinal precautions. Pressure injury prevention. Cueing for proper transfers with FWW.                                         User Key       Initials Effective Dates Name Provider Type Discipline     07/13/23 -  Marcin Estrada, PT Student PT Student PT                  PT Recommendation and Plan         Outcome Measures       Row Name 09/30/23 0812 09/29/23 1355 09/29/23 1300       How much help from another person do you currently need...    Turning from your back to your side while in flat bed without using bedrails? 3  - 3  -JAMEEL --    Moving from lying on back to sitting on the side of a flat bed without bedrails? 3  - 3  -JAMEEL --    Moving to and from a bed to a chair (including a wheelchair)? 3  - 3  -JAMEEL --    Standing up from a chair using your arms (e.g., wheelchair, bedside chair)? 3  - 3  -JAMEEL --    Climbing 3-5 steps with a railing? 2  - 2  -JAMEEL --    To walk in hospital room? 3  - 3  -JAMEEL --    AM-PAC 6 Clicks Score (PT) 17  - 17  -JAMEEL --       How much help from another is currently needed...    Putting on  and taking off regular lower body clothing? -- -- 3  -TS    Bathing (including washing, rinsing, and drying) -- -- 3  -TS    Toileting (which includes using toilet bed pan or urinal) -- -- 3  -TS    Putting on and taking off regular upper body clothing -- -- 3  -TS    Taking care of personal grooming (such as brushing teeth) -- -- 3  -TS    Eating meals -- -- 4  -TS    AM-[facility] 6 Clicks Score (OT) -- -- 19  -TS       Functional Assessment    Outcome Measure Options -[facility] 6 Clicks Basic Mobility (PT)  -[facility] 6 Clicks Basic Mobility (PT)  -JAMEEL --      Row Name 09/28/23 1400             How much help from another person do you currently need...    Turning from your back to your side while in flat bed without using bedrails? 3  -KJ      Moving from lying on back to sitting on the side of a flat bed without bedrails? 3  -KJ      Moving to and from a bed to a chair (including a wheelchair)? 3  -KJ      Standing up from a chair using your arms (e.g., wheelchair, bedside chair)? 2  -KJ      Climbing 3-5 steps with a railing? 2  -KJ      To walk in hospital room? 3  -KJ      AM-[facility] 6 Clicks Score (PT) 16  -KJ         Functional Assessment    Outcome Measure Options -[facility] 6 Clicks Basic Mobility (PT)  -KJ                User Key  (r) = Recorded By, (t) = Taken By, (c) = Cosigned By      Initials Name Provider Type     Lissette Flood, PTA Physical Therapist Assistant    Lindsey Sierra, PTA Physical Therapist Assistant    TS Shreya Sanderson COTA Occupational Therapist Assistant    Lico Cross, PTA Physical Therapist Assistant                     Time Calculation:    PT Charges       Row Name 09/30/23 0812             Time Calculation    Start Time 0747  -      Stop Time 0802  -      Time Calculation (min) 15 min  -      PT Received On 09/30/23  -         Time Calculation- PT    Total Timed Code Minutes- PT 15 minute(s)  -         Timed Charges    70670 - Gait Training Minutes  15  -          Total Minutes    Timed Charges Total Minutes 15  -AH       Total Minutes 15  -AH                User Key  (r) = Recorded By, (t) = Taken By, (c) = Cosigned By      Initials Name Provider Type     Lissette Flood PTA Physical Therapist Assistant                  Therapy Charges for Today       Code Description Service Date Service Provider Modifiers Qty    13879411079 HC GAIT TRAINING EA 15 MIN 9/30/2023 Lissette Flood PTA GP 1            PT G-Codes  Outcome Measure Options: AM-PAC 6 Clicks Basic Mobility (PT)  AM-PAC 6 Clicks Score (PT): 17  AM-PAC 6 Clicks Score (OT): 19    Lissette Flood PTA  9/30/2023

## 2023-09-30 NOTE — PLAN OF CARE
Goal Outcome Evaluation:  Plan of Care Reviewed With: patient        Progress: improving  Outcome Evaluation: A/O x 4. Slightly forgetful at times. No complaints of pain, however difficulty falling asleep; melatonin given. Safety maintained.

## 2023-09-30 NOTE — PROGRESS NOTES
[facility] Medicine Services  INPATIENT PROGRESS NOTE    Patient Name: Brennon Vance  Date of Admission: 9/24/2023  Today's Date: 09/30/23  Length of Stay: 0  Primary Care Physician: Javid Grajeda MD    Subjective   Chief Complaint: Low back pain  HPI  Mr. Vance is an 87-year-old male who presented to [facility] on 9/24 with worsening lower back pain with radiation to the left leg.  He has had ongoing back pain for about 2 months after he lifted something.  The pain had been improving but he lifted something again a week prior to admission started having recurrent pain in his back.  He has had difficulty with mobilizing.  His wife has been taking care of him however, she was recently hospitalized.  Patient's main complaint is his lower back.  Denies any numbness or tingling in his legs.  Denies any bowel or bladder incontinence.  Daughters would like for patient to go to rehab at time of discharge.  In the ED, he was found to have a urinary tract infection and CTA of the chest interpreted by radiology showed possible aspiration pneumonia and moderate emphysematous changes. He had no witnessed aspiration event. CT of the lumbar spine showed chronic appearing T12 and L3 compression fractures with no signs of recent lumbar spine surgery or significant stenosis. Patient was started on ceftriaxone and given morphine and Dilaudid in the ED.     Today:  Patient lying in bed on room air in no acute distress.  Remains medically stable.  Continues to work with physical therapy.  Awaiting discharge to skilled nursing facility for further rehab along with his spouse.    Review of Systems   All pertinent negatives and positives are as above. All other systems have been reviewed and are negative unless otherwise stated.     Objective    Temp:  [98.2 °F (36.8 °C)-99.6 °F (37.6 °C)] 98.7 °F (37.1 °C)  Heart Rate:  [71-90] 79  Resp:  [16-19] 16  BP: (110-180)/(57-86)  132/67  Physical Exam  Vitals reviewed.   Constitutional:       General: He is not in acute distress.     Appearance: He is not toxic-appearing.      Comments: Sitting up in bed.  No acute distress.  On room air.  No family at bedside.   HENT:      Head: Normocephalic and atraumatic.      Mouth/Throat:      Mouth: Mucous membranes are moist.      Pharynx: Oropharynx is clear.   Eyes:      Extraocular Movements: Extraocular movements intact.      Conjunctiva/sclera: Conjunctivae normal.      Pupils: Pupils are equal, round, and reactive to light.   Cardiovascular:      Rate and Rhythm: Normal rate and regular rhythm.      Pulses: Normal pulses.   Pulmonary:      Effort: Pulmonary effort is normal. No respiratory distress.      Breath sounds: Normal breath sounds. No wheezing or rhonchi.   Abdominal:      General: Bowel sounds are normal. There is no distension.      Palpations: Abdomen is soft.      Tenderness: There is no abdominal tenderness.   Musculoskeletal:         General: No swelling or tenderness. Normal range of motion.      Cervical back: Normal range of motion and neck supple. No muscular tenderness.   Skin:     General: Skin is warm and dry.      Findings: No erythema or rash.   Neurological:      General: No focal deficit present.      Mental Status: He is alert and oriented to person, place, and time. Mental status is at baseline.      Cranial Nerves: No cranial nerve deficit.      Motor: No weakness.   Psychiatric:         Mood and Affect: Mood normal.         Behavior: Behavior normal.     Results Review:  I have reviewed the labs, radiology results, and diagnostic studies.    Laboratory Data:   Results from last 7 days   Lab Units 09/29/23  0442 09/28/23  0355 09/27/23  0416   WBC 10*3/mm3 6.42 5.84 6.94   HEMOGLOBIN g/dL 9.6* 8.9* 9.7*   HEMATOCRIT % 30.4* 27.5* 30.4*   PLATELETS 10*3/mm3 252 229 253          Results from last 7 days   Lab Units 09/29/23  0442 09/28/23  0355 09/27/23  0416   SODIUM  mmol/L 141 140 141   POTASSIUM mmol/L 3.7 3.5 4.0   CHLORIDE mmol/L 109* 110* 107   CO2 mmol/L 21.0* 21.0* 18.0*   BUN mg/dL 24* 25* 29*   CREATININE mg/dL 1.55* 1.47* 1.59*   CALCIUM mg/dL 9.0 8.7 8.4*   BILIRUBIN mg/dL 0.3 0.3 0.2   ALK PHOS U/L 85 90 98   ALT (SGPT) U/L <5 5 8   AST (SGOT) U/L 18 18 27   GLUCOSE mg/dL 95 92 89         Culture Data:   Microbiology Results (last 10 days)       Procedure Component Value - Date/Time    Urine Culture - Urine, Straight Cath [[id number]]  (Abnormal)  (Susceptibility) Collected: 09/24/23 1247    Lab Status: Final result Specimen: Urine from Straight Cath Updated: 09/27/23 0925     Urine Culture 25,000 CFU/mL Pseudomonas aeruginosa    Narrative:      Colonization of the urinary tract without infection is common. Treatment is discouraged unless the patient is symptomatic, pregnant, or undergoing an invasive urologic procedure.    Susceptibility        Pseudomonas aeruginosa      JUAN JOSÉ      Cefepime Susceptible      Ceftazidime Susceptible      Ciprofloxacin Susceptible      Gentamicin Susceptible      Levofloxacin Intermediate      Piperacillin + Tazobactam Susceptible                                 I have reviewed the patient's current medications.     Assessment/Plan   Assessment  Active Hospital Problems    Diagnosis     **Intractable low back pain     UTI (urinary tract infection)     Aspiration pneumonia     Stage 3b chronic kidney disease     Compression fracture of T12 vertebra with delayed healing     Closed compression fracture of third lumbar vertebra     Paroxysmal atrial fibrillation     Other emphysema     Essential hypertension     Coronary artery disease involving native coronary artery of native heart without angina pectoris        Treatment Plan  Neurosurgery, Dr. Brantley consulted.  CT of the lumbar spine showed chronic appearing T12 and L3 compression fractures with no signs of recent lumbar spine surgery or significant stenosis. MRI lumbar spine ordered by  neurosurgery showed recent superior endplate compression deformities of T12 and L3 with marrow edema.  Discussed with CONCEPCION Garcia -he went for kyphoplasty with biopsy T12 and L3 with Dr. Brantley on 9/26. Continue PT/OT.  He is okay for discharge from neurosurgery standpoint.    CTA showed aspiration pneumonia.  He passed bedside swallow.  No prior swallowing difficulties per patient.  On room air.  No fever.  No sputum production.  Normal WBC.  He completed 5-day course of Zosyn on 9/29.    Urinalysis showed trace blood, positive nitrate, moderate leukocyte, TNTC WBC and trace bacteria.  He denies any urinary symptoms.  Urine culture shows Pseudomonas aeruginosa susceptible to ciprofloxacin.  Levofloxacin intermediate.  Discussed with Dr. Prater -continue levofloxacin in favor of ciprofloxacin. Ciprofloxacin 500 mg every daily x10 days.    He has a history of paroxysmal atrial fibrillation chronically anticoagulated on Eliquis.  Continue Cardizem and Coreg.    Eliquis will serve as DVT prophylaxis.    He will need follow-up CT chest in 12 months to monitor 4 mm right upper lobe pulmonary nodule.    Palliative care consult per family request.    Medical Decision Making  Number and Complexity of problems: 3 acute problems in the form of intractable low back pain, aspiration pneumonia and urinary tract infection  Differential Diagnosis: None considered at present    Conditions and Status        Condition is unchanged.     [facility] Data  External documents reviewed: [facility] records  Cardiac tracing (EKG, telemetry) interpretation: Sinus rhythm  Radiology interpretation: Interpreted by radiology  Labs reviewed: As above  Any tests that were considered but not ordered: None considered at present     Decision rules/scores evaluated (example [id number], [place], etc): None considered at present     Discussed with: Patient and Dr. Prater     Care Planning  Shared decision making: Patient is agreeable to ongoing  work-up and treatment  Code status and discussions: Full code with full interventions    Disposition  Social Determinants of Health that impact treatment or disposition: [facility] is able to accept patient today.  His wife is also admitted under the care of Dr. Brantley.  Reportedly family is also looking into placement at [facility] for Mrs. Vance.  Daughter requests that  and Mrs. Vance be discharged to [facility] on the same day.  I expect the patient to be discharged to SNF in 1-2 days.     Electronically signed by CONCEPCION Go, 09/30/23, 14:22 CDT.

## 2023-10-01 PROCEDURE — G0378 HOSPITAL OBSERVATION PER HR: HCPCS

## 2023-10-01 PROCEDURE — 97116 GAIT TRAINING THERAPY: CPT

## 2023-10-01 PROCEDURE — 63710000001 CHOLECALCIFEROL 25 MCG (1000 UT) TABLET: Performed by: NURSE PRACTITIONER

## 2023-10-01 PROCEDURE — A9270 NON-COVERED ITEM OR SERVICE: HCPCS | Performed by: NURSE PRACTITIONER

## 2023-10-01 PROCEDURE — 63710000001 DILTIAZEM CD 180 MG CAPSULE SUSTAINED-RELEASE 24 HR: Performed by: NURSE PRACTITIONER

## 2023-10-01 PROCEDURE — A9270 NON-COVERED ITEM OR SERVICE: HCPCS

## 2023-10-01 PROCEDURE — 63710000001 ISOSORBIDE MONONITRATE 60 MG TABLET SUSTAINED-RELEASE 24 HOUR: Performed by: NURSE PRACTITIONER

## 2023-10-01 PROCEDURE — 63710000001 GUAIFENESIN 600 MG TABLET SUSTAINED-RELEASE 12 HOUR: Performed by: NURSE PRACTITIONER

## 2023-10-01 PROCEDURE — 63710000001 TAMSULOSIN 0.4 MG CAPSULE: Performed by: NURSE PRACTITIONER

## 2023-10-01 PROCEDURE — 63710000001 CARVEDILOL 6.25 MG TABLET: Performed by: NURSE PRACTITIONER

## 2023-10-01 PROCEDURE — 63710000001 CIPROFLOXACIN 500 MG TABLET: Performed by: NURSE PRACTITIONER

## 2023-10-01 PROCEDURE — 63710000001 LEVOTHYROXINE 50 MCG TABLET: Performed by: NURSE PRACTITIONER

## 2023-10-01 PROCEDURE — 94760 N-INVAS EAR/PLS OXIMETRY 1: CPT

## 2023-10-01 PROCEDURE — 63710000001 MELATONIN 5 MG TABLET: Performed by: NURSE PRACTITIONER

## 2023-10-01 PROCEDURE — 63710000001 NICOTINE 21 MG/24HR PATCH 24 HOUR: Performed by: NURSE PRACTITIONER

## 2023-10-01 PROCEDURE — 63710000001 SODIUM BICARBONATE 650 MG TABLET: Performed by: NURSE PRACTITIONER

## 2023-10-01 PROCEDURE — 63710000001 ROSUVASTATIN 20 MG TABLET: Performed by: NURSE PRACTITIONER

## 2023-10-01 PROCEDURE — 63710000001 PROBIOTIC 250 MG CAPSULE: Performed by: NURSE PRACTITIONER

## 2023-10-01 PROCEDURE — 63710000001 APIXABAN 2.5 MG TABLET: Performed by: NURSE PRACTITIONER

## 2023-10-01 PROCEDURE — 63710000001 PANTOPRAZOLE 40 MG TABLET DELAYED-RELEASE: Performed by: NURSE PRACTITIONER

## 2023-10-01 PROCEDURE — 99024 POSTOP FOLLOW-UP VISIT: CPT | Performed by: NEUROLOGICAL SURGERY

## 2023-10-01 PROCEDURE — 94799 UNLISTED PULMONARY SVC/PX: CPT

## 2023-10-01 PROCEDURE — 63710000001 SENNOSIDES-DOCUSATE 8.6-50 MG TABLET: Performed by: NURSE PRACTITIONER

## 2023-10-01 PROCEDURE — 63710000001 CARBAMAZEPINE 200 MG TABLET: Performed by: NURSE PRACTITIONER

## 2023-10-01 PROCEDURE — 63710000001 POTASSIUM CHLORIDE 20 MEQ TABLET CONTROLLED-RELEASE: Performed by: NURSE PRACTITIONER

## 2023-10-01 PROCEDURE — 63710000001 MIRTAZAPINE 15 MG TABLET: Performed by: NURSE PRACTITIONER

## 2023-10-01 PROCEDURE — 63710000001 HYDROXYZINE 25 MG TABLET: Performed by: NURSE PRACTITIONER

## 2023-10-01 PROCEDURE — 63710000001 FINASTERIDE 5 MG TABLET: Performed by: NURSE PRACTITIONER

## 2023-10-01 PROCEDURE — 63710000001 HYDROXYZINE 25 MG TABLET

## 2023-10-01 RX ORDER — HYDROXYZINE HYDROCHLORIDE 25 MG/1
50 TABLET, FILM COATED ORAL EVERY 6 HOURS PRN
Status: DISCONTINUED | OUTPATIENT
Start: 2023-10-01 | End: 2023-10-05 | Stop reason: HOSPADM

## 2023-10-01 RX ADMIN — Medication 10 ML: at 08:07

## 2023-10-01 RX ADMIN — FINASTERIDE 5 MG: 5 TABLET, FILM COATED ORAL at 08:05

## 2023-10-01 RX ADMIN — Medication 250 MG: at 20:07

## 2023-10-01 RX ADMIN — Medication 10 ML: at 20:06

## 2023-10-01 RX ADMIN — CARBAMAZEPINE 200 MG: 200 TABLET ORAL at 08:05

## 2023-10-01 RX ADMIN — LEVOTHYROXINE SODIUM 50 MCG: 50 TABLET ORAL at 06:35

## 2023-10-01 RX ADMIN — Medication 1000 UNITS: at 08:05

## 2023-10-01 RX ADMIN — MIRTAZAPINE 45 MG: 15 TABLET, FILM COATED ORAL at 20:05

## 2023-10-01 RX ADMIN — Medication 250 MG: at 08:05

## 2023-10-01 RX ADMIN — POTASSIUM CHLORIDE 20 MEQ: 1500 TABLET, EXTENDED RELEASE ORAL at 08:04

## 2023-10-01 RX ADMIN — Medication 5 MG: at 00:01

## 2023-10-01 RX ADMIN — HYDROXYZINE HYDROCHLORIDE 50 MG: 25 TABLET, FILM COATED ORAL at 20:06

## 2023-10-01 RX ADMIN — SODIUM BICARBONATE 650 MG: 650 TABLET ORAL at 08:04

## 2023-10-01 RX ADMIN — ROSUVASTATIN CALCIUM 20 MG: 20 TABLET, FILM COATED ORAL at 20:06

## 2023-10-01 RX ADMIN — Medication 3 ML: at 08:06

## 2023-10-01 RX ADMIN — GUAIFENESIN 600 MG: 600 TABLET, EXTENDED RELEASE ORAL at 08:05

## 2023-10-01 RX ADMIN — GUAIFENESIN 600 MG: 600 TABLET, EXTENDED RELEASE ORAL at 20:06

## 2023-10-01 RX ADMIN — CARVEDILOL 12.5 MG: 6.25 TABLET, FILM COATED ORAL at 08:05

## 2023-10-01 RX ADMIN — CARVEDILOL 12.5 MG: 6.25 TABLET, FILM COATED ORAL at 18:46

## 2023-10-01 RX ADMIN — NICOTINE 1 PATCH: 21 PATCH, EXTENDED RELEASE TRANSDERMAL at 08:07

## 2023-10-01 RX ADMIN — PANTOPRAZOLE SODIUM 40 MG: 40 TABLET, DELAYED RELEASE ORAL at 08:04

## 2023-10-01 RX ADMIN — CIPROFLOXACIN 500 MG: 500 TABLET, FILM COATED ORAL at 14:59

## 2023-10-01 RX ADMIN — BUDESONIDE AND FORMOTEROL FUMARATE DIHYDRATE 2 PUFF: 160; 4.5 AEROSOL RESPIRATORY (INHALATION) at 10:22

## 2023-10-01 RX ADMIN — ISOSORBIDE MONONITRATE 60 MG: 60 TABLET, EXTENDED RELEASE ORAL at 08:05

## 2023-10-01 RX ADMIN — TAMSULOSIN HYDROCHLORIDE 0.4 MG: 0.4 CAPSULE ORAL at 08:05

## 2023-10-01 RX ADMIN — BUDESONIDE AND FORMOTEROL FUMARATE DIHYDRATE 2 PUFF: 160; 4.5 AEROSOL RESPIRATORY (INHALATION) at 19:18

## 2023-10-01 RX ADMIN — Medication 3 ML: at 20:06

## 2023-10-01 RX ADMIN — SODIUM BICARBONATE 650 MG: 650 TABLET ORAL at 20:06

## 2023-10-01 RX ADMIN — HYDROXYZINE HYDROCHLORIDE 50 MG: 25 TABLET, FILM COATED ORAL at 09:48

## 2023-10-01 RX ADMIN — DILTIAZEM HYDROCHLORIDE 180 MG: 180 CAPSULE, EXTENDED RELEASE ORAL at 08:06

## 2023-10-01 RX ADMIN — APIXABAN 2.5 MG: 2.5 TABLET, FILM COATED ORAL at 20:05

## 2023-10-01 RX ADMIN — Medication 5 MG: at 20:05

## 2023-10-01 RX ADMIN — DOCUSATE SODIUM 50 MG AND SENNOSIDES 8.6 MG 2 TABLET: 8.6; 5 TABLET, FILM COATED ORAL at 08:04

## 2023-10-01 RX ADMIN — APIXABAN 2.5 MG: 2.5 TABLET, FILM COATED ORAL at 08:04

## 2023-10-01 NOTE — THERAPY TREATMENT NOTE
Acute Care - Physical Therapy Treatment Note  [facility]     Patient Name: Brennon Vance  : 1935  MRN: [id number]  Today's Date: 10/1/2023      Visit Dx:     ICD-10-CM ICD-9-CM   1. Acute cystitis without hematuria  N30.00 595.0   2. Acute bilateral low back pain with sciatica, sciatica laterality unspecified  M54.40 724.2     724.3   3. Shortness of breath  R06.02 786.05   4. Compression fracture of T12 vertebra with delayed healing  S22.080G V54.27   5. Closed compression fracture of L3 lumbar vertebra with delayed healing, subsequent encounter  S32.030G V54.17   6. Impaired mobility [Z74.09 (ICD-10-CM)]  Z74.09 799.89   7. Decreased activities of daily living (ADL) [Z78.9 (ICD-10-CM)]  Z78.9 V49.89     Patient Active Problem List   Diagnosis    Chest pain    NSTEMI, initial episode of care    Coronary artery disease involving native coronary artery of native heart without angina pectoris    Paroxysmal atrial fibrillation    Other emphysema    Essential hypertension    Precordial pain    Hyperlipidemia LDL goal <70    Tobacco abuse    Cardiomyopathy    NSTEMI (non-ST elevated myocardial infarction)    UTI (urinary tract infection)    Aspiration pneumonia    Intractable low back pain    Stage 3b chronic kidney disease    Compression fracture of T12 vertebra with delayed healing    Closed compression fracture of third lumbar vertebra     Past Medical History:   Diagnosis Date    CAD (coronary artery disease)     COPD (chronic obstructive pulmonary disease)     Hiatal hernia     Hyperlipidemia     Hypertension     Stroke      Past Surgical History:   Procedure Laterality Date    ABDOMINAL AORTIC ANEURYSM REPAIR      CARDIAC CATHETERIZATION N/A 2021    Procedure: Left Heart Cath;  Surgeon: Harrison Alcantara MD;  Location: [facility] INVASIVE LOCATION;  Service: Cardiology;  Laterality: N/A;    CORONARY ANGIOPLASTY WITH STENT PLACEMENT      FEMORAL ARTERY STENT      KYPHOPLASTY WITH BIOPSY N/A  9/26/2023    Procedure: KYPHOPLASTY WITH BIOPSY T12 and L3;  Surgeon: Jeremiah Brantley MD;  Location:  [facility] OR;  Service: Neurosurgery;  Laterality: N/A;    LEG THROMBECTOMY/EMBOLECTOMY Right 5/20/2021    Procedure: RT GROIN EXPLORATION;  Surgeon: Geoff Remy DO;  Location:  [facility] HYBRID OR 12;  Service: Vascular;  Laterality: Right;    TRIGEMINAL NERVE DECOMPRESSION       PT Assessment (last 12 hours)       PT Evaluation and Treatment       Row Name 10/01/23 1038 10/01/23 0745       Physical Therapy Time and Intention    Subjective Information complains of;weakness;fatigue  - --  -    Document Type therapy note (daily note)  - --    Mode of Treatment physical therapy  - --      Row Name 10/01/23 1038          General Information    Existing Precautions/Restrictions fall;spinal  -[facility] Name 10/01/23 1038          Pain Scale: Word Pre/Post-Treatment    Pain: Word Scale, Pretreatment 2 - mild pain  -     Posttreatment Pain Rating 2 - mild pain  -     Pain Location lower  -     Pain Location - back  -[facility] Name 10/01/23 1038          Bed Mobility    Supine-Sit [place] (Bed Mobility) verbal cues;standby assist  -     Sit-Supine [place]ott (Bed Mobility) standby assist;verbal cues  -[facility] Name 10/01/23 1038          Sit-Stand Transfer    Sit-Stand [place] (Transfers) verbal cues;contact guard  -     Assistive Device (Sit-Stand Transfers) walker, front-wheeled  -[facility] Name 10/01/23 1038          Stand-Sit Transfer    Stand-Sit We[place] (Transfers) verbal cues;contact guard  -     Assistive Device (Stand-Sit Transfers) walker, front-wheeled  -[facility] Name 10/01/23 1038          Gait/Stairs (Locomotion)    Weott Level (Gait) verbal cues;contact guard  -     Assistive Device (Gait) walker, front-wheeled  -     Distance in Feet (Gait) 40  -     Deviations/Abnormal Patterns (Gait) stride length decreased  -     Bilateral Gait  Deviations forward flexed posture  -       Row Name 10/01/23 1038          Motor Skills    Comments, Therapeutic Exercise BLE AROM sitting EOB, standing hip flex/ext, abd/add X 10 reps  -     Additional Documentation Advanced Stepping/Walking Interventions (Group)  -       Row Name 10/01/23 1038          Advanced Stepping/Walking Interventions    Stepping/Walking Interventions backward walking;side stepping  -     Backward Walking (Stepping/Walking Interventions) 20 x 2 cga-min  -     Side Stepping (Stepping/Walking Interventions) 20 x 2 cga-min  -       Row Name             Wound 09/26/23 1640 lumbar spine Puncture    Wound - Properties Group Placement Date: 09/26/23  -DT Placement Time: 1640  -DT Present on Hospital Admission: N  -DT Location: lumbar spine  -DT Primary Wound Type: Puncture  -DT    Retired Wound - Properties Group Placement Date: 09/26/23  -DT Placement Time: 1640  -DT Present on Hospital Admission: N  -DT Location: lumbar spine  -DT Primary Wound Type: Puncture  -DT    Retired Wound - Properties Group Date first assessed: 09/26/23  -DT Time first assessed: 1640  -DT Present on Hospital Admission: N  -DT Location: lumbar spine  -DT Primary Wound Type: Puncture  -DT      Row Name             Wound 09/26/23 1640 thoracic spine Puncture    Wound - Properties Group Placement Date: 09/26/23  -DT Placement Time: 1640  -DT Present on Hospital Admission: N  -DT Location: thoracic spine  -DT Primary Wound Type: Puncture  -DT    Retired Wound - Properties Group Placement Date: 09/26/23  -DT Placement Time: 1640  -DT Present on Hospital Admission: N  -DT Location: thoracic spine  -DT Primary Wound Type: Puncture  -DT    Retired Wound - Properties Group Date first assessed: 09/26/23  -DT Time first assessed: 1640  -DT Present on Hospital Admission: N  -DT Location: thoracic spine  -DT Primary Wound Type: Puncture  -DT      Row Name 10/01/23 1038          Plan of Care Review    Plan of Care Reviewed  With patient  -     Progress no change  -     Outcome Evaluation pt trans to EOB cga, performed BLE AROM, sit-stand cga, pt amb 40 feet rwx cga, pt fatigues quickly, worked on standing BLE AROM, balance activities with gait, walking backwards and sidestepping cga-min, trans back to bed cga, pt would benefit from SNF for balance, strengthening and endurance  [facility]       Row Name 10/01/23 1038          Positioning and Restraints    Pre-Treatment Position in bed  -     Post Treatment Position bed  -     In Bed fowlers;call light within reach;encouraged to call for assist;exit alarm on;with family/caregiver  -               User Key  (r) = Recorded By, (t) = Taken By, (c) = Cosigned By      Initials Name Provider Type     Lissette Flood, PTA Physical Therapist Assistant    Pedro Phoenix, RN Registered Nurse                    Physical Therapy Education       Title: PT OT SLP Therapies (Done)       Topic: Physical Therapy (Done)       Point: Mobility training (Done)       Learning Progress Summary             Patient Eager, E, VU,NR by JS at 9/27/2023 0745    Comment: Spinal precautions. Pressure injury prevention. Cueing for proper transfers with FWW.   Family Eager, E, VU,NR by JS at 9/27/2023 0745    Comment: Spinal precautions. Pressure injury prevention. Cueing for proper transfers with FWW.                         Point: Body mechanics (Done)       Learning Progress Summary             Patient Eager, E, VU,NR by JS at 9/27/2023 0745    Comment: Spinal precautions. Pressure injury prevention. Cueing for proper transfers with FWW.   Family Eager, E, VU,NR by JS at 9/27/2023 0745    Comment: Spinal precautions. Pressure injury prevention. Cueing for proper transfers with FWW.                         Point: Precautions (Done)       Learning Progress Summary             Patient Eager, E, VU,NR by JS at 9/27/2023 0745    Comment: Spinal precautions. Pressure injury prevention. Cueing for proper  transfers with FWW.   Family Katiana, E, VU,NR by OSWALDO at 9/27/2023 6279    Comment: Spinal precautions. Pressure injury prevention. Cueing for proper transfers with FWW.                                         User Key       Initials Effective Dates Name Provider Type Discipline    OSWALDO 07/13/23 -  Marcin Estrada, PT Student PT Student PT                  PT Recommendation and Plan     Plan of Care Reviewed With: patient  Progress: no change  Outcome Evaluation: pt trans to EOB cga, performed BLE AROM, sit-stand cga, pt amb 40 feet rwx cga, pt fatigues quickly, worked on standing BLE AROM, balance activities with gait, walking backwards and sidestepping cga-min, trans back to bed cga, pt would benefit from SNF for balance, strengthening and endurance   Outcome Measures       Row Name 10/01/23 1100 09/30/23 1100 09/30/23 0812       How much help from another person do you currently need...    Turning from your back to your side while in flat bed without using bedrails? 3  -AH -- 3  -AH    Moving from lying on back to sitting on the side of a flat bed without bedrails? 3  -AH -- 3  -AH    Moving to and from a bed to a chair (including a wheelchair)? 3  -AH -- 3  -AH    Standing up from a chair using your arms (e.g., wheelchair, bedside chair)? 3  -AH -- 3  -AH    Climbing 3-5 steps with a railing? 3  -AH -- 2  -AH    To walk in hospital room? 3  -AH -- 3  -AH    AM-PAC 6 Clicks Score (PT) 18  -AH -- 17  -AH       How much help from another is currently needed...    Putting on and taking off regular lower body clothing? -- 3  -LS --    Bathing (including washing, rinsing, and drying) -- 3  -LS --    Toileting (which includes using toilet bed pan or urinal) -- 3  -LS --    Putting on and taking off regular upper body clothing -- 3  -LS --    Taking care of personal grooming (such as brushing teeth) -- 3  -LS --    Eating meals -- 4  -LS --    AM-PAC 6 Clicks Score (OT) -- 19  -LS --       Functional Assessment    Outcome  Measure Options AM-PAC 6 Clicks Basic Mobility (PT)  - AM-PAC 6 Clicks Basic Mobility (PT);AM-PAC 6 Clicks Daily Activity (OT)  -LS AM-PAC 6 Clicks Basic Mobility (PT)  -      Row Name 09/29/23 1355 09/29/23 1300 09/28/23 1400       How much help from another person do you currently need...    Turning from your back to your side while in flat bed without using bedrails? 3  -JAMEEL -- 3  -KJ    Moving from lying on back to sitting on the side of a flat bed without bedrails? 3  -JAMEEL -- 3  -KJ    Moving to and from a bed to a chair (including a wheelchair)? 3  -JAMEEL -- 3  -KJ    Standing up from a chair using your arms (e.g., wheelchair, bedside chair)? 3  -JAMEEL -- 2  -KJ    Climbing 3-5 steps with a railing? 2  -JAMEEL -- 2  -KJ    To walk in hospital room? 3  -JAMEEL -- 3  -KJ    AM-PAC 6 Clicks Score (PT) 17  -JAMEEL -- 16  -KJ       How much help from another is currently needed...    Putting on and taking off regular lower body clothing? -- 3  -TS --    Bathing (including washing, rinsing, and drying) -- 3  -TS --    Toileting (which includes using toilet bed pan or urinal) -- 3  -TS --    Putting on and taking off regular upper body clothing -- 3  -TS --    Taking care of personal grooming (such as brushing teeth) -- 3  -TS --    Eating meals -- 4  -TS --    AM-PAC 6 Clicks Score (OT) -- 19  -TS --       Functional Assessment    Outcome Measure Options AM-PAC 6 Clicks Basic Mobility (PT)  -JAMEEL -- AM-PAC 6 Clicks Basic Mobility (PT)  -KJ              User Key  (r) = Recorded By, (t) = Taken By, (c) = Cosigned By      Initials Name Provider Type     Lissette Flood, PTA Physical Therapist Assistant    Lindsey Sierra, PTA Physical Therapist Assistant    TS Shreya Sanderson, NICK Occupational Therapist Assistant    Lico Cross, PTA Physical Therapist Assistant    Karime Garcia, NICK Occupational Therapist Assistant                     Time Calculation:    PT Charges       Row Name 10/01/23 1143             Time  Calculation    Start Time 1038  -      Stop Time 1101  -      Time Calculation (min) 23 min  -      PT Received On 10/01/23  -         Time Calculation- PT    Total Timed Code Minutes- PT 23 minute(s)  -         Timed Charges    71348 - Gait Training Minutes  23  -AH         Total Minutes    Timed Charges Total Minutes 23  -AH       Total Minutes 23  -AH                User Key  (r) = Recorded By, (t) = Taken By, (c) = Cosigned By      Initials Name Provider Type     Lissette Flood PTA Physical Therapist Assistant                  Therapy Charges for Today       Code Description Service Date Service Provider Modifiers Qty    88681661546 HC GAIT TRAINING EA 15 MIN 9/30/2023 Lissette Flood, PTA GP 1    21381995275 HC PT THER PROC EA 15 MIN 9/30/2023 Lissette Flood, PTA GP 1    30772118912 HC GAIT TRAINING EA 15 MIN 9/30/2023 Lissette Flood, PTA GP 1    20617748437 HC GAIT TRAINING EA 15 MIN 10/1/2023 Lissette Flood, PTA GP 2            PT G-Codes  Outcome Measure Options: AM-PAC 6 Clicks Basic Mobility (PT)  AM-PAC 6 Clicks Score (PT): 18  AM-PAC 6 Clicks Score (OT): 19    Lissette Flood PTA  10/1/2023

## 2023-10-01 NOTE — PLAN OF CARE
Goal Outcome Evaluation:   DC planning: daughter requested pt and spouse be dc same day; pending [facility]  UTI: cipro x 10 days

## 2023-10-01 NOTE — PROGRESS NOTES
[facility] Medicine Services  INPATIENT PROGRESS NOTE    Patient Name: Brennon Vance  Date of Admission: 9/24/2023  Today's Date: 10/01/23  Length of Stay: 0  Primary Care Physician: Javid Grajeda MD    Subjective   Chief Complaint: Low back pain  HPI  Mr. Vance is an 87-year-old male who presented to [facility] on 9/24 with worsening lower back pain with radiation to the left leg.  He has had ongoing back pain for about 2 months after he lifted something.  The pain had been improving but he lifted something again a week prior to admission started having recurrent pain in his back.  He has had difficulty with mobilizing.  His wife has been taking care of him however, she was recently hospitalized.  Patient's main complaint is his lower back.  Denies any numbness or tingling in his legs.  Denies any bowel or bladder incontinence.  Daughters would like for patient to go to rehab at time of discharge.  In the ED, he was found to have a urinary tract infection and CTA of the chest interpreted by radiology showed possible aspiration pneumonia and moderate emphysematous changes. He had no witnessed aspiration event. CT of the lumbar spine showed chronic appearing T12 and L3 compression fractures with no signs of recent lumbar spine surgery or significant stenosis. Patient was started on ceftriaxone and given morphine and Dilaudid in the ED.     Today:  Lying in bed with no acute complaints.  Appears well.  On room air.  Continues to work with therapy.  Pain under good control.  Plan to transition for further rehab with spouse when arranged.    Review of Systems   All pertinent negatives and positives are as above. All other systems have been reviewed and are negative unless otherwise stated.     Objective    Temp:  [97.9 °F (36.6 °C)-98.7 °F (37.1 °C)] 97.9 °F (36.6 °C)  Heart Rate:  [78-87] 78  Resp:  [16-17] 16  BP: (117-140)/(54-69) 133/66  Physical Exam  Vitals  reviewed.   Constitutional:       General: He is not in acute distress.     Appearance: He is not toxic-appearing.      Comments: Sitting up in bed.  No acute distress.  On room air.  No family at bedside.   HENT:      Head: Normocephalic and atraumatic.      Mouth/Throat:      Mouth: Mucous membranes are moist.      Pharynx: Oropharynx is clear.   Eyes:      Extraocular Movements: Extraocular movements intact.      Conjunctiva/sclera: Conjunctivae normal.      Pupils: Pupils are equal, round, and reactive to light.   Cardiovascular:      Rate and Rhythm: Normal rate and regular rhythm.      Pulses: Normal pulses.   Pulmonary:      Effort: Pulmonary effort is normal. No respiratory distress.      Breath sounds: Normal breath sounds. No wheezing or rhonchi.   Abdominal:      General: Bowel sounds are normal. There is no distension.      Palpations: Abdomen is soft.      Tenderness: There is no abdominal tenderness.   Musculoskeletal:         General: No swelling or tenderness. Normal range of motion.      Cervical back: Normal range of motion and neck supple. No muscular tenderness.   Skin:     General: Skin is warm and dry.      Findings: No erythema or rash.   Neurological:      General: No focal deficit present.      Mental Status: He is alert and oriented to person, place, and time. Mental status is at baseline.      Cranial Nerves: No cranial nerve deficit.      Motor: No weakness.   Psychiatric:         Mood and Affect: Mood normal.         Behavior: Behavior normal.     Results Review:  I have reviewed the labs, radiology results, and diagnostic studies.    Laboratory Data:   Results from last 7 days   Lab Units 09/29/23  0442 09/28/23  0355 09/27/23 0416   WBC 10*3/mm3 6.42 5.84 6.94   HEMOGLOBIN g/dL 9.6* 8.9* 9.7*   HEMATOCRIT % 30.4* 27.5* 30.4*   PLATELETS 10*3/mm3 252 229 253          Results from last 7 days   Lab Units 09/29/23  0442 09/28/23  0355 09/27/23  0416   SODIUM mmol/L 141 140 141    POTASSIUM mmol/L 3.7 3.5 4.0   CHLORIDE mmol/L 109* 110* 107   CO2 mmol/L 21.0* 21.0* 18.0*   BUN mg/dL 24* 25* 29*   CREATININE mg/dL 1.55* 1.47* 1.59*   CALCIUM mg/dL 9.0 8.7 8.4*   BILIRUBIN mg/dL 0.3 0.3 0.2   ALK PHOS U/L 85 90 98   ALT (SGPT) U/L <5 5 8   AST (SGOT) U/L 18 18 27   GLUCOSE mg/dL 95 92 89         Culture Data:   Microbiology Results (last 10 days)       Procedure Component Value - Date/Time    Urine Culture - Urine, Straight Cath [[id number]]  (Abnormal)  (Susceptibility) Collected: 09/24/23 1247    Lab Status: Final result Specimen: Urine from Straight Cath Updated: 09/27/23 0925     Urine Culture 25,000 CFU/mL Pseudomonas aeruginosa    Narrative:      Colonization of the urinary tract without infection is common. Treatment is discouraged unless the patient is symptomatic, pregnant, or undergoing an invasive urologic procedure.    Susceptibility        Pseudomonas aeruginosa      JUAN JOSÉ      Cefepime Susceptible      Ceftazidime Susceptible      Ciprofloxacin Susceptible      Gentamicin Susceptible      Levofloxacin Intermediate      Piperacillin + Tazobactam Susceptible                                 I have reviewed the patient's current medications.     Assessment/Plan   Assessment  Active Hospital Problems    Diagnosis     **Intractable low back pain     UTI (urinary tract infection)     Aspiration pneumonia     Stage 3b chronic kidney disease     Compression fracture of T12 vertebra with delayed healing     Closed compression fracture of third lumbar vertebra     Paroxysmal atrial fibrillation     Other emphysema     Essential hypertension     Coronary artery disease involving native coronary artery of native heart without angina pectoris        Treatment Plan  Neurosurgery, Dr. Brantley consulted.  CT of the lumbar spine showed chronic appearing T12 and L3 compression fractures with no signs of recent lumbar spine surgery or significant stenosis. MRI lumbar spine ordered by neurosurgery  showed recent superior endplate compression deformities of T12 and L3 with marrow edema.  Discussed with CONCEPCION Garcia -he went for kyphoplasty with biopsy T12 and L3 with Dr. Brantley on 9/26. Continue PT/OT.  He is okay for discharge from neurosurgery standpoint.    CTA showed aspiration pneumonia.  He passed bedside swallow.  No prior swallowing difficulties per patient.  On room air.  No fever.  No sputum production.  Normal WBC.  He completed 5-day course of Zosyn on 9/29.    Urinalysis showed trace blood, positive nitrate, moderate leukocyte, TNTC WBC and trace bacteria.  He denies any urinary symptoms.  Urine culture shows Pseudomonas aeruginosa susceptible to ciprofloxacin.  Levofloxacin intermediate.  Discussed with Dr. Prater -continue levofloxacin in favor of ciprofloxacin. Ciprofloxacin 500 mg every daily x10 days.    He has a history of paroxysmal atrial fibrillation chronically anticoagulated on Eliquis.  Continue Cardizem and Coreg.    Eliquis will serve as DVT prophylaxis.    He will need follow-up CT chest in 12 months to monitor 4 mm right upper lobe pulmonary nodule.    Palliative care consult per family request.    Medical Decision Making  Number and Complexity of problems: 3 acute problems in the form of intractable low back pain, aspiration pneumonia and urinary tract infection  Differential Diagnosis: None considered at present    Conditions and Status        Condition is unchanged.     [facility] Data  External documents reviewed: Prior [facility] records  Cardiac tracing (EKG, telemetry) interpretation: Sinus rhythm  Radiology interpretation: Interpreted by radiology  Labs reviewed: As above  Any tests that were considered but not ordered: None considered at present     Decision rules/scores evaluated (example [id number], Wells, etc): None considered at present     Discussed with: Patient and Dr. Prater     Care Planning  Shared decision making: Patient is agreeable to ongoing work-up and  treatment  Code status and discussions: Full code with full interventions    Disposition  Social Determinants of Health that impact treatment or disposition: [facility] is able to accept patient today.  His wife is also admitted under the care of Dr. Brantley.  Reportedly family is also looking into placement at [facility] for Mrs. Vance.  Daughter requests that  and Mrs. Vance be discharged to [facility] on the same day.  I expect the patient to be discharged to SNF in 1-2 days.     Electronically signed by CONCEPCION Go, 10/01/23, 12:09 CDT.

## 2023-10-01 NOTE — DISCHARGE PLACEMENT REQUEST
": Allie [phone]    Haley Plummer (88 y.o. Male)       Date of Birth   1935    Social Security Number       Address   [address] [place] [address]    Home Phone   [phone]    MRN   [id number]       [facility]    Marital Status                               Admission Date   9/24/23    Admission Type   Emergency    Admitting Provider   Chaitanya Prater MD    Attending Provider   Chaitanya Prater MD    Department, Room/Bed   [facility] 3A, 331/1       Discharge Date       Discharge Disposition       Discharge Destination                                 Attending Provider: Chaitanya Prater MD    Allergies: Lyrica [Pregabalin]    Isolation: None   Infection: None   Code Status: No CPR    Ht: 177.8 cm (70\")   Wt: 45.5 kg (100 lb 3.2 oz)    Admission Cmt: None   Principal Problem: Intractable low back pain [M54.59]                   Active Insurance as of 9/24/2023       Primary Coverage       Payor Plan Insurance Group Employer/Plan Group    ANTHEM MEDICARE REPLACEMENT ANTHEM MEDICARE ADVANTAGE [id number]       Payor Plan Address Payor Plan Phone Number Payor Plan Fax Number Effective Dates    [address] [phone]  1/1/2018 - None Entered    [facility] [address]         Subscriber Name Subscriber Birth Date Member ID       HALEY PLUMMER 1935 [id number]                     Emergency Contacts        (Rel.) Home Phone Work Phone Mobile Phone    Darwin PlummerAnita (Spouse) [phone] -- --    Tierra Gorman (Relative) [phone] -- --    Gaby Rankin (Daughter) -- -- [phone]    Homa Patrick (Daughter) [phone] -- --            "

## 2023-10-01 NOTE — PLAN OF CARE
Goal Outcome Evaluation:  Plan of Care Reviewed With: patient        Progress: no change  Outcome Evaluation: pt trans to EOB cga, performed BLE AROM, sit-stand cga, pt amb 40 feet rwx cga, pt fatigues quickly, worked on standing BLE AROM, balance activities with gait, walking backwards and sidestepping cga-min, trans back to bed cga, pt would benefit from SNF for balance, strengthening and endurance

## 2023-10-01 NOTE — PROGRESS NOTES
"Brennon Vance  88 y.o.      Chief complaint:   Compression fracture    Subjective  Back pain well controlled.  Working with physical therapy.  Tolerating p.o.    Temp:  [98.1 °F (36.7 °C)-98.7 °F (37.1 °C)] 98.4 °F (36.9 °C)  Heart Rate:  [79-87] 87  Resp:  [16-17] 16  BP: (117-140)/(54-69) 122/69      Objective:  Vital signs: (most recent): Blood pressure 122/69, pulse 87, temperature 98.4 °F (36.9 °C), temperature source Oral, resp. rate 16, height 177.8 cm (70\"), weight 45.5 kg (100 lb 3.2 oz), SpO2 95 %.      Neurologic Exam  Mental Status   Oriented to person, place, and time.   Attention: normal.   Speech: speech is normal   Level of consciousness: alert  Knowledge: good.      Cranial Nerves      CN II   Visual fields full to confrontation.      CN III, IV, VI   Pupils are equal, round, and reactive to light.  Extraocular motions are normal.      CN V   Facial sensation intact.      CN VII   Facial expression full, symmetric.      CN VIII   CN VIII normal.      CN IX, X   CN IX normal.   CN X normal.      CN XI   CN XI normal.      CN XII   CN XII normal.      Motor Exam   Muscle bulk: normal  Overall muscle tone: normal  Right arm pronator drift: absent  Left arm pronator drift: absent     Strength   Strength 5/5 throughout.      Sensory Exam   Light touch normal.   Pinprick normal.      Gait, Coordination, and Reflexes      Gait  Gait: normal     Coordination   Finger to nose coordination: normal     Tremor   Resting tremor: absent  Intention tremor: absent  Action tremor: absent     Reflexes   Reflexes 2+ except as noted.   Lab Results (last 24 hours)       ** No results found for the last 24 hours. **                Plan:   Compression fracture status post kyphoplasty.  Doing well.  Awaiting placement.      Intractable low back pain    Coronary artery disease involving native coronary artery of native heart without angina pectoris    Paroxysmal atrial fibrillation    Other emphysema    Essential " hypertension    UTI (urinary tract infection)    Aspiration pneumonia    Stage 3b chronic kidney disease    Compression fracture of T12 vertebra with delayed healing    Closed compression fracture of third lumbar vertebra        Jeremiah Brantley MD

## 2023-10-01 NOTE — PLAN OF CARE
Goal Outcome Evaluation:     Pt Aox4. VSS on RA. PPP. No c/o back pain this shift. Pt did state his left knee feels weak and achy upon standing. Daughter states he has a femoral stent on that side and has had weakness there in the past. Upx1 with walker. Pt transported by wc to wife's room today to visit. Pt also worked with PT. Tolerated activity well. Voiding. Incont with brief. Brief monitored and changed as needed. Barrier cream applied to redness on perineum/left lateral buttocks. Dressing on back CDI. Nicotine patch changed. Pt had one episode of heightened anxiety this morning, prn atarax given and anxiety relieved. Family wants DC to [place] for pt and spouse. SW notified by daughter. Pt's daughters had questions about medical care up to this point. CONCEPCION Amato, notified and present in room providing support and education to family. Safety maintained.

## 2023-10-01 NOTE — CASE MANAGEMENT/SOCIAL WORK
Continued Stay Note   Srinivas     Patient Name: Brennon Vance  MRN: [id number]  Today's Date: 10/1/2023    Admit Date: 9/24/2023    Plan: SNF Referrals   Discharge Plan       Row Name 10/01/23 1436       Plan    Plan SNF Referrals    Patient/Family in Agreement with Plan yes    Plan Comments Pt has been approved for [facility]. Rec'd a call from pt's daughter, Homa [phone], and she says they now refuse for pt or wife to go to [facility]. She is wanting another referral sent to [facility]. Homa says that she was told [facility] was out of network with pt's insurance but she says that is not the case. She says pt has [employer] and not [employer] but it has been verified as [employer]. [employer] is a subsidiary of [employer]. However, daughter says this is not what pt has. She also states they will pay privately if they have to. [facility] so will go ahead and send a referral to them as well.                   Discharge Codes    No documentation.                 Expected Discharge Date and Time       Expected Discharge Date Expected Discharge Time    Sep 29, 2023               DIAZ Altman

## 2023-10-02 PROCEDURE — 63710000001 GUAIFENESIN 600 MG TABLET SUSTAINED-RELEASE 12 HOUR: Performed by: NURSE PRACTITIONER

## 2023-10-02 PROCEDURE — A9270 NON-COVERED ITEM OR SERVICE: HCPCS | Performed by: NURSE PRACTITIONER

## 2023-10-02 PROCEDURE — 63710000001 TAMSULOSIN 0.4 MG CAPSULE: Performed by: NURSE PRACTITIONER

## 2023-10-02 PROCEDURE — 63710000001 LEVOTHYROXINE 50 MCG TABLET: Performed by: NURSE PRACTITIONER

## 2023-10-02 PROCEDURE — 94799 UNLISTED PULMONARY SVC/PX: CPT

## 2023-10-02 PROCEDURE — 97110 THERAPEUTIC EXERCISES: CPT

## 2023-10-02 PROCEDURE — 63710000001 SODIUM BICARBONATE 650 MG TABLET: Performed by: NURSE PRACTITIONER

## 2023-10-02 PROCEDURE — 63710000001 PROBIOTIC 250 MG CAPSULE: Performed by: NURSE PRACTITIONER

## 2023-10-02 PROCEDURE — 63710000001 CIPROFLOXACIN 500 MG TABLET: Performed by: NURSE PRACTITIONER

## 2023-10-02 PROCEDURE — 63710000001 MELATONIN 5 MG TABLET: Performed by: NURSE PRACTITIONER

## 2023-10-02 PROCEDURE — 63710000001 CARBAMAZEPINE 200 MG TABLET: Performed by: NURSE PRACTITIONER

## 2023-10-02 PROCEDURE — 97116 GAIT TRAINING THERAPY: CPT

## 2023-10-02 PROCEDURE — 63710000001 FINASTERIDE 5 MG TABLET: Performed by: NURSE PRACTITIONER

## 2023-10-02 PROCEDURE — 63710000001 DILTIAZEM CD 180 MG CAPSULE SUSTAINED-RELEASE 24 HR: Performed by: NURSE PRACTITIONER

## 2023-10-02 PROCEDURE — 63710000001 PANTOPRAZOLE 40 MG TABLET DELAYED-RELEASE: Performed by: NURSE PRACTITIONER

## 2023-10-02 PROCEDURE — G0378 HOSPITAL OBSERVATION PER HR: HCPCS

## 2023-10-02 PROCEDURE — A9270 NON-COVERED ITEM OR SERVICE: HCPCS

## 2023-10-02 PROCEDURE — 97535 SELF CARE MNGMENT TRAINING: CPT

## 2023-10-02 PROCEDURE — 63710000001 CARVEDILOL 6.25 MG TABLET: Performed by: NURSE PRACTITIONER

## 2023-10-02 PROCEDURE — 63710000001 NICOTINE 21 MG/24HR PATCH 24 HOUR: Performed by: NURSE PRACTITIONER

## 2023-10-02 PROCEDURE — 99024 POSTOP FOLLOW-UP VISIT: CPT | Performed by: NURSE PRACTITIONER

## 2023-10-02 PROCEDURE — 63710000001 MIRTAZAPINE 15 MG TABLET: Performed by: NURSE PRACTITIONER

## 2023-10-02 PROCEDURE — 63710000001 CHOLECALCIFEROL 25 MCG (1000 UT) TABLET: Performed by: NURSE PRACTITIONER

## 2023-10-02 PROCEDURE — 63710000001 NYSTATIN 100000 UNIT/GM POWDER 15 G BOTTLE

## 2023-10-02 PROCEDURE — 63710000001 POTASSIUM CHLORIDE 20 MEQ TABLET CONTROLLED-RELEASE: Performed by: NURSE PRACTITIONER

## 2023-10-02 PROCEDURE — 63710000001 ISOSORBIDE MONONITRATE 60 MG TABLET SUSTAINED-RELEASE 24 HOUR: Performed by: NURSE PRACTITIONER

## 2023-10-02 PROCEDURE — 63710000001 HYDROXYZINE 25 MG TABLET

## 2023-10-02 PROCEDURE — 63710000001 APIXABAN 2.5 MG TABLET: Performed by: NURSE PRACTITIONER

## 2023-10-02 PROCEDURE — 63710000001 ROSUVASTATIN 20 MG TABLET: Performed by: NURSE PRACTITIONER

## 2023-10-02 RX ORDER — NYSTATIN 100000 [USP'U]/G
POWDER TOPICAL EVERY 12 HOURS SCHEDULED
Status: DISCONTINUED | OUTPATIENT
Start: 2023-10-02 | End: 2023-10-05 | Stop reason: HOSPADM

## 2023-10-02 RX ADMIN — ROSUVASTATIN CALCIUM 20 MG: 20 TABLET, FILM COATED ORAL at 20:54

## 2023-10-02 RX ADMIN — CARVEDILOL 12.5 MG: 6.25 TABLET, FILM COATED ORAL at 17:55

## 2023-10-02 RX ADMIN — TAMSULOSIN HYDROCHLORIDE 0.4 MG: 0.4 CAPSULE ORAL at 08:17

## 2023-10-02 RX ADMIN — NYSTATIN: 100000 POWDER TOPICAL at 08:19

## 2023-10-02 RX ADMIN — CARVEDILOL 12.5 MG: 6.25 TABLET, FILM COATED ORAL at 08:17

## 2023-10-02 RX ADMIN — Medication 3 ML: at 21:01

## 2023-10-02 RX ADMIN — SODIUM BICARBONATE 650 MG: 650 TABLET ORAL at 20:53

## 2023-10-02 RX ADMIN — SODIUM BICARBONATE 650 MG: 650 TABLET ORAL at 08:16

## 2023-10-02 RX ADMIN — Medication 250 MG: at 20:53

## 2023-10-02 RX ADMIN — NICOTINE 1 PATCH: 21 PATCH, EXTENDED RELEASE TRANSDERMAL at 08:17

## 2023-10-02 RX ADMIN — HYDROXYZINE HYDROCHLORIDE 50 MG: 25 TABLET, FILM COATED ORAL at 19:31

## 2023-10-02 RX ADMIN — Medication 10 ML: at 08:17

## 2023-10-02 RX ADMIN — PANTOPRAZOLE SODIUM 40 MG: 40 TABLET, DELAYED RELEASE ORAL at 08:16

## 2023-10-02 RX ADMIN — APIXABAN 2.5 MG: 2.5 TABLET, FILM COATED ORAL at 20:53

## 2023-10-02 RX ADMIN — Medication 5 MG: at 20:53

## 2023-10-02 RX ADMIN — CIPROFLOXACIN 500 MG: 500 TABLET, FILM COATED ORAL at 13:35

## 2023-10-02 RX ADMIN — NYSTATIN: 100000 POWDER TOPICAL at 21:01

## 2023-10-02 RX ADMIN — BUDESONIDE AND FORMOTEROL FUMARATE DIHYDRATE 2 PUFF: 160; 4.5 AEROSOL RESPIRATORY (INHALATION) at 06:44

## 2023-10-02 RX ADMIN — POTASSIUM CHLORIDE 20 MEQ: 1500 TABLET, EXTENDED RELEASE ORAL at 08:16

## 2023-10-02 RX ADMIN — MIRTAZAPINE 45 MG: 15 TABLET, FILM COATED ORAL at 20:53

## 2023-10-02 RX ADMIN — FINASTERIDE 5 MG: 5 TABLET, FILM COATED ORAL at 08:17

## 2023-10-02 RX ADMIN — Medication 250 MG: at 08:16

## 2023-10-02 RX ADMIN — DILTIAZEM HYDROCHLORIDE 180 MG: 180 CAPSULE, EXTENDED RELEASE ORAL at 08:17

## 2023-10-02 RX ADMIN — HYDROXYZINE HYDROCHLORIDE 50 MG: 25 TABLET, FILM COATED ORAL at 05:56

## 2023-10-02 RX ADMIN — Medication 3 ML: at 08:17

## 2023-10-02 RX ADMIN — GUAIFENESIN 600 MG: 600 TABLET, EXTENDED RELEASE ORAL at 08:16

## 2023-10-02 RX ADMIN — Medication 10 ML: at 21:01

## 2023-10-02 RX ADMIN — Medication 1000 UNITS: at 08:16

## 2023-10-02 RX ADMIN — GUAIFENESIN 600 MG: 600 TABLET, EXTENDED RELEASE ORAL at 20:54

## 2023-10-02 RX ADMIN — BUDESONIDE AND FORMOTEROL FUMARATE DIHYDRATE 2 PUFF: 160; 4.5 AEROSOL RESPIRATORY (INHALATION) at 18:15

## 2023-10-02 RX ADMIN — APIXABAN 2.5 MG: 2.5 TABLET, FILM COATED ORAL at 08:16

## 2023-10-02 RX ADMIN — ISOSORBIDE MONONITRATE 60 MG: 60 TABLET, EXTENDED RELEASE ORAL at 08:16

## 2023-10-02 RX ADMIN — CARBAMAZEPINE 200 MG: 200 TABLET ORAL at 08:17

## 2023-10-02 RX ADMIN — LEVOTHYROXINE SODIUM 50 MCG: 50 TABLET ORAL at 05:56

## 2023-10-02 NOTE — PLAN OF CARE
Goal Outcome Evaluation:      Pt Aox4. VSS on RA. PPP. No c/o pain this shift. Upx1 with walker. Pt transported by wc to wife's room today to visit. Pt also worked with PT. Pt up to brm today with OT for shower/shave. Tolerated activity well. Voiding. Incont with brief. Brief monitored and changed as needed. Barrier cream applied to redness on perineum/left lateral buttocks. Nystatin applied as ordered. Dressing on back CDI. Nicotine patch changed. Safety maintained.

## 2023-10-02 NOTE — PLAN OF CARE
Goal Outcome Evaluation:  Plan of Care Reviewed With: patient, daughter        Progress: improving  Outcome Evaluation: pt trans to EOB cga, cues for spinal precautions, performed BLE AROM x 20 reps, sit-stand cga, pt amb 40 feet rwx cga, cues for upright posture, pt fatigues easily, requires rest, pt performed standing BLE AROM x 10 reps, pt trans back to bed cga, cues for spinal precautions, pt would benefit from SNF for balance,strengthening and endurance as well as reinforce spinal precautions

## 2023-10-02 NOTE — THERAPY TREATMENT NOTE
Patient Name: Brennon Vance  : 1935    MRN: [id number]                              Today's Date: 10/2/2023       Admit Date: 2023    Visit Dx: Therapist utilized gait belt, applied non-slipped socks, provided fall risk education/prevention, & facilitated muscle strengthening PRN to reduce patient falls risk during this session.      ICD-10-CM ICD-9-CM   1. Acute cystitis without hematuria  N30.00 595.0   2. Acute bilateral low back pain with sciatica, sciatica laterality unspecified  M54.40 724.2     724.3   3. Shortness of breath  R06.02 786.05   4. Compression fracture of T12 vertebra with delayed healing  S22.080G V54.27   5. Closed compression fracture of L3 lumbar vertebra with delayed healing, subsequent encounter  S32.030G V54.17   6. Impaired mobility [Z74.09 (ICD-10-CM)]  Z74.09 799.89   7. Decreased activities of daily living (ADL) [Z78.9 (ICD-10-CM)]  Z78.9 V49.89     Patient Active Problem List   Diagnosis    Chest pain    NSTEMI, initial episode of care    Coronary artery disease involving native coronary artery of native heart without angina pectoris    Paroxysmal atrial fibrillation    Other emphysema    Essential hypertension    Precordial pain    Hyperlipidemia LDL goal <70    Tobacco abuse    Cardiomyopathy    NSTEMI (non-ST elevated myocardial infarction)    UTI (urinary tract infection)    Aspiration pneumonia    Intractable low back pain    Stage 3b chronic kidney disease    Compression fracture of T12 vertebra with delayed healing    Closed compression fracture of third lumbar vertebra     Past Medical History:   Diagnosis Date    CAD (coronary artery disease)     COPD (chronic obstructive pulmonary disease)     Hiatal hernia     Hyperlipidemia     Hypertension     Stroke      Past Surgical History:   Procedure Laterality Date    ABDOMINAL AORTIC ANEURYSM REPAIR      CARDIAC CATHETERIZATION N/A 2021    Procedure: Left Heart Cath;  Surgeon: Harrison Alcantara MD;  Location:   [facility] CATH INVASIVE LOC[facility];  Service: Cardiology;  Laterality: N/A;    CORONARY ANGIOPLASTY WITH STENT PLACEMENT      FEMORAL ARTERY STENT      KYPHOPLASTY WITH BIOPSY N/A 9/26/2023    Procedure: KYPHOPLASTY WITH BIOPSY T12 and L3;  Surgeon: Jeremiah Brantley MD;  Location:  [facility] OR;  Service: Neurosurgery;  Laterality: N/A;    LEG THROMBECTOMY/EMBOLECTOMY Right 5/20/2021    Procedure: RT GROIN EXPLORATION;  Surgeon: Geoff Remy DO;  Location:  [facility] HYBRID OR 12;  Service: Vascular;  Laterality: Right;    TRIGEMINAL NERVE DECOMPRESSION        General Information       Row Name 10/02/23 1115          OT Time and Intention    Document Type therapy note (daily note)  -MT     Mode of Treatment occupational therapy  -MT       Row Name 10/02/23 1115          General Information    Patient Profile Reviewed yes  -MT     Existing Precautions/Restrictions fall;spinal  -MT       Row Name 10/02/23 1115          Cognition    Orientation Status (Cognition) oriented x 4  -MT       Row Name 10/02/23 1115          Safety Issues, Functional Mobility    Impairments Affecting Function (Mobility) balance;cognition;endurance/activity tolerance;pain;strength;postural/trunk control  -MT     Cognitive Impairments, Mobility Safety/Performance attention;awareness, need for assistance;impulsivity;insight into deficits/self-awareness;safety precaution awareness  -MT               User Key  (r) = Recorded By, (t) = Taken By, (c) = Cosigned By      Initials Name Provider Type    MT Florence Cast COTA Occupational Therapist Assistant                     Mobility/ADL's       Row Name 10/02/23 1115          Bed Mobility    Sidelying-Sit [place] (Bed Mobility) standby assist  -MT     Comment, (Bed Mobility) cues spinal precautions  -MT       Row Name 10/02/23 1115          Transfers    Transfers sit-stand transfer;stand-sit transfer  -MT       Row Name 10/02/23 1115          Sit-Stand Transfer    Sit-Stand [place]  (Transfers) verbal cues;contact guard  -MT     Assistive Device (Sit-Stand Transfers) walker, front-wheeled  -MT       Row Name 10/02/23 1115          Stand-Sit Transfer    Stand-Sit [place] (Transfers) verbal cues;contact guard  -MT     Assistive Device (Stand-Sit Transfers) walker, front-wheeled  -MT       Row Name 10/02/23 1115          Functional Mobility    Functional Mobility- Ind. Level contact guard assist  -MT     Functional Mobility- Device walker, front-wheeled  -MT     Functional Mobility- Comment in room > BR with RW needed Jett to correct 1 LOB d/t letting go of RW and attempting to furniture walk. Required VCs to keep RW with him for increased stability  -MT       Row Name 10/02/23 1115          Activities of Daily Living    BADL Assessment/Intervention bathing  -MT       Row Name 10/02/23 1115          Bathing Assessment/Intervention    Comment, (Bathing) s/u UB, Jett LB  -MT               User Key  (r) = Recorded By, (t) = Taken By, (c) = Cosigned By      Initials Name Provider Type    Florence Perez COTA Occupational Therapist Assistant                   Obj/Interventions       Row Name 10/02/23 1115          Motor Skills    Therapeutic Exercise --  Performed BUE ther ex x10 reps 3# wand RBs between  -MT               User Key  (r) = Recorded By, (t) = Taken By, (c) = Cosigned By      Initials Name Provider Type    Florence Perez COTA Occupational Therapist Assistant                   Goals/Plan    No documentation.                  Clinical Impression       Row Name 10/02/23 1115          Pain Assessment    Pretreatment Pain Rating 0/10 - no pain  -MT     Posttreatment Pain Rating 0/10 - no pain  -MT       Row Name 10/02/23 1115          Therapy Plan Review/Discharge Plan (OT)    Anticipated Discharge Disposition (OT) sub acute care setting;inpatient rehabilitation facility  -MT       Row Name 10/02/23 1115          Positioning and Restraints    Pre-Treatment Position in bed  -MT      Post Treatment Position other  with PCT on tub bench  -MT               User Key  (r) = Recorded By, (t) = Taken By, (c) = Cosigned By      Initials Name Provider Type    Florence Perez COTA Occupational Therapist Assistant                   Outcome Measures       Row Name 10/02/23 1115          How much help from another is currently needed...    Putting on and taking off regular lower body clothing? 3  -MT     Bathing (including washing, rinsing, and drying) 3  -MT     Toileting (which includes using toilet bed pan or urinal) 3  -MT     Putting on and taking off regular upper body clothing 3  -MT     Taking care of personal grooming (such as brushing teeth) 3  -MT     Eating meals 4  -MT     AM-PAC 6 Clicks Score (OT) 19  -MT       Row Name 10/02/23 1000 10/02/23 0816       How much help from another person do you currently need...    Turning from your back to your side while in flat bed without using bedrails? 3  -AH 4  -KH    Moving from lying on back to sitting on the side of a flat bed without bedrails? 3  -AH 3  -KH    Moving to and from a bed to a chair (including a wheelchair)? 3  -AH 3  -KH    Standing up from a chair using your arms (e.g., wheelchair, bedside chair)? 3  - 3  -KH    Climbing 3-5 steps with a railing? 3  -AH 3  -KH    To walk in hospital room? 3  -AH 3  -KH    AM-PAC 6 Clicks Score (PT) 18  -AH 19  -KH    Highest level of mobility 6 --> Walked 10 steps or more  - 6 --> Walked 10 steps or more  -      Row Name 10/02/23 1000          Functional Assessment    Outcome Measure Options AM-PAC 6 Clicks Basic Mobility (PT)  -               User Key  (r) = Recorded By, (t) = Taken By, (c) = Cosigned By      Initials Name Provider Type     Lissette Flood PTA Physical Therapist Assistant    Florence Perez COTA Occupational Therapist Assistant    Nikki Islas, RN Registered Nurse                    Occupational Therapy Education       Title: PT OT SLP Therapies (Done)        Topic: Occupational Therapy (Done)       Point: ADL training (Done)       Description:   Instruct learner(s) on proper safety adaptation and remediation techniques during self care or transfers.   Instruct in proper use of assistive devices.                  Learning Progress Summary             Patient Acceptance, E, VU by LS at 9/30/2023 1157    Comment: Pt educated on safety with RW during functional mobility.    Acceptance, E, VU by MB at 9/27/2023 0859   Family Acceptance, E, VU by  at 9/30/2023 1157    Comment: Pt educated on safety with RW during functional mobility.    Acceptance, E, VU by MB at 9/27/2023 0859                         Point: Home exercise program (Done)       Description:   Instruct learner(s) on appropriate technique for monitoring, assisting and/or progressing therapeutic exercises/activities.                  Learning Progress Summary             Patient Acceptance, E, VU by MB at 9/27/2023 0859   Family Acceptance, E, VU by MB at 9/27/2023 0859                         Point: Precautions (Done)       Description:   Instruct learner(s) on prescribed precautions during self-care and functional transfers.                  Learning Progress Summary             Patient Acceptance, E,TB, VU,DU,NR by  at 10/1/2023 1737    Acceptance, E, VU by MB at 9/27/2023 0859   Family Acceptance, E,TB, VU,DU,NR by  at 10/1/2023 1737    Acceptance, E, VU by MB at 9/27/2023 0859                         Point: Body mechanics (Done)       Description:   Instruct learner(s) on proper positioning and spine alignment during self-care, functional mobility activities and/or exercises.                  Learning Progress Summary             Patient Acceptance, E,TB, VU,DU,NR by  at 10/1/2023 1737    Acceptance, E, VU by MB at 9/27/2023 0859   Family Acceptance, E,TB, VU,DU,NR by  at 10/1/2023 1737    Acceptance, E, VU by MB at 9/27/2023 0859                                         User Key       Initials  Effective Dates Name Provider Type Discipline    LS 09/22/22 -  Karime Ennis COTA Occupational Therapist Assistant THERAPIES    KH 07/07/23 -  Nikki Kiran, RN Registered Nurse Nurse    MB 08/04/23 -  Virginia Cunningham OT Student OT Student OT                  OT Recommendation and Plan     Plan of Care Review  Plan of Care Reviewed With: patient, daughter  Progress: improving  Outcome Evaluation: Initially declined shower task agreed to in bed BUE ther ex x10 reps multiple planes/ranges RBs between. Pt daughter then encouraged shower task, pt agreed. Bed mobility SBA cues for sidelying>sit. Fxl mobility in room > BR with RW needed Jett to correct 1 LOB d/t letting go of RW and attempting to furniture walk. Required VCs to keep RW with him for increased stability. Pt sat on tub bench for bathing task s/u for UB, Jett LB and cues not to bend/twist d/t spinal precautions. Left with PCT who was assisting pt finish dressing routine. Continue OT POC recommend continued rehab     Time Calculation:         Time Calculation- OT       Row Name 10/02/23 1115 10/02/23 1002          Time Calculation- OT    OT Start Time 1115  -MT --     OT Stop Time 1210  -MT --     OT Time Calculation (min) 55 min  -MT --     Total Timed Code Minutes- OT 55 minute(s)  -MT --     OT Received On 10/02/23  -MT --        Timed Charges    08258 - OT Therapeutic Exercise Minutes 20  -MT --     36424 - Gait Training Minutes  -- 15  -AH     90589 - OT Self Care/Mgmt Minutes 35  -MT --        Total Minutes    Timed Charges Total Minutes 55  -MT 15  -AH      Total Minutes 55  -MT 15  -AH               User Key  (r) = Recorded By, (t) = Taken By, (c) = Cosigned By      Initials Name Provider Type     Lissette Flood PTA Physical Therapist Assistant    Florence Perez COTA Occupational Therapist Assistant                  Therapy Charges for Today       Code Description Service Date Service Provider Modifiers Qty    45278043154  OT THER PROC  EA 15 MIN 10/2/2023 Florence Cast COTA GO 2    26876530944 HC OT SELF CARE/MGMT/TRAIN EA 15 MIN 10/2/2023 Florence Cast COTA GO 2                 NICK Muñoz  10/2/2023

## 2023-10-02 NOTE — PLAN OF CARE
Goal Outcome Evaluation:  Plan of Care Reviewed With: patient, daughter        Progress: improving  Outcome Evaluation: Initially declined shower task agreed to in bed BUE ther ex x10 reps multiple planes/ranges RBs between. Pt daughter then encouraged shower task, pt agreed. Bed mobility SBA cues for sidelying>sit. Fxl mobility in room > BR with RW needed Jett to correct 1 LOB d/t letting go of RW and attempting to furniture walk. Required VCs to keep RW with him for increased stability. Pt sat on tub bench for bathing task s/u for UB, Jett LB and cues not to bend/twist d/t spinal precautions. Left with PCT who was assisting pt finish dressing routine. Continue OT POC recommend continued rehab

## 2023-10-02 NOTE — CASE MANAGEMENT/SOCIAL WORK
Continued Stay Note   Srinivas     Patient Name: Brennon Vance  MRN: [id number]  Today's Date: 10/2/2023    Admit Date: 9/24/2023    Plan: SNF Referrals   Discharge Plan       Row Name 10/02/23 0848       Plan    Plan SNF Referrals    Plan Comments Had a message from pt's daughter Homa [phone] and they do not want [facility] either. Referral canceled. Waiting for [facility] decision.                   Discharge Codes    No documentation.                 Expected Discharge Date and Time       Expected Discharge Date Expected Discharge Time    Sep 29, 2023               DIAZ Altman

## 2023-10-02 NOTE — PROGRESS NOTES
[facility] Medicine Services  INPATIENT PROGRESS NOTE    Patient Name: Brennon Vance  Date of Admission: 9/24/2023  Today's Date: 10/02/23  Length of Stay: 0  Primary Care Physician: Javid Grajeda MD    Subjective   Chief Complaint: Follow-up  HPI   Patient is status post kyphoplasty 9/26/2023 by Dr. Brantley.  Patient is doing well postsurgery.  Pain is well controlled on current therapy.  Patient and his daughters were by the bedside when seen this morning.  Plan for patient to go to [facility].        Review of Systems   All pertinent negatives and positives are as above. All other systems have been reviewed and are negative unless otherwise stated.     Objective    Temp:  [97.9 °F (36.6 °C)-98.5 °F (36.9 °C)] 98.1 °F (36.7 °C)  Heart Rate:  [78-86] 79  Resp:  [14-18] 14  BP: (133-153)/(65-75) 153/72  Physical Exam  Vitals and nursing note reviewed.   Constitutional:       General: He is not in acute distress.     Appearance: He is not diaphoretic.   HENT:      Head: Normocephalic and atraumatic.      Right Ear: External ear normal.      Left Ear: External ear normal.   Cardiovascular:      Rate and Rhythm: Normal rate and regular rhythm.      Heart sounds: Normal heart sounds.   Pulmonary:      Effort: Pulmonary effort is normal. No respiratory distress.      Breath sounds: Normal breath sounds.   Chest:      Chest wall: No tenderness.   Abdominal:      General: Bowel sounds are normal.      Palpations: Abdomen is soft.      Tenderness: There is no abdominal tenderness.   Musculoskeletal:         General: No swelling or tenderness.      Cervical back: Normal range of motion and neck supple.      Right lower leg: No edema.      Left lower leg: No edema.   Skin:     General: Skin is warm and dry.      Capillary Refill: Capillary refill takes less than 2 seconds.      Findings: No erythema or rash.   Neurological:      General: No focal deficit present.       Mental Status: He is alert.      Motor: No weakness.   Psychiatric:         Behavior: Behavior normal.           Results Review:  I have reviewed the labs, radiology results, and diagnostic studies.    Laboratory Data:   Results from last 7 days   Lab Units 09/29/23 0442 09/28/23 0355 09/27/23 0416   WBC 10*3/mm3 6.42 5.84 6.94   HEMOGLOBIN g/dL 9.6* 8.9* 9.7*   HEMATOCRIT % 30.4* 27.5* 30.4*   PLATELETS 10*3/mm3 252 229 253        Results from last 7 days   Lab Units 09/29/23 0442 09/28/23 0355 09/27/23  0416   SODIUM mmol/L 141 140 141   POTASSIUM mmol/L 3.7 3.5 4.0   CHLORIDE mmol/L 109* 110* 107   CO2 mmol/L 21.0* 21.0* 18.0*   BUN mg/dL 24* 25* 29*   CREATININE mg/dL 1.55* 1.47* 1.59*   CALCIUM mg/dL 9.0 8.7 8.4*   BILIRUBIN mg/dL 0.3 0.3 0.2   ALK PHOS U/L 85 90 98   ALT (SGPT) U/L <5 5 8   AST (SGOT) U/L 18 18 27   GLUCOSE mg/dL 95 92 89       Culture Data:   No results found for: BLOODCX, URINECX, WOUNDCX, MRSACX, RESPCX, STOOLCX    Radiology Data:   Imaging Results (Last 24 Hours)       ** No results found for the last 24 hours. **            I have reviewed the patient's current medications.     Assessment/Plan   Assessment  Active Hospital Problems    Diagnosis     **Intractable low back pain     UTI (urinary tract infection)     Aspiration pneumonia     Stage 3b chronic kidney disease     Compression fracture of T12 vertebra with delayed healing     Closed compression fracture of third lumbar vertebra     Paroxysmal atrial fibrillation     Other emphysema     Essential hypertension     Coronary artery disease involving native coronary artery of native heart without angina pectoris        Treatment Plan  10/2: Patient is status post kyphoplasty 9/26/2023 by Dr. Brantley.  Patient is doing well postsurgery.  Pain is well controlled on current therapy.  Patient and his daughters were by the bedside when seen this morning.  Plan for patient to go to [facility].    Continue pain  management    Continue PT OT    Continue chronic medication    Continue Eliquis for A-fib    Medical Decision Making  Number and Complexity of problems: 1 acute problem requiring specialist consultation and surgical management.  Differential Diagnosis: As above    Conditions and Status        Condition is improving.     [facility] Data  External documents reviewed: Epic records  Cardiac tracing (EKG, telemetry) interpretation: No new EKG  Radiology interpretation: Imaging reviewed  Labs reviewed: No new labs  Any tests that were considered but not ordered: None     Decision rules/scores evaluated (example [id number], Wells, etc): Currently on Eliquis     Discussed with: Patient and his daughters     Care Planning  Shared decision making: Patient apprised of current labs, vitals, imaging and treatment plan.  They are agreeable with proceeding with plans as discussed.    Code status and discussions:   Code Status and Medical Interventions:   Ordered at: 09/29/23 1424     Medical Intervention Limits:    NO intubation (DNI)     Level Of Support Discussed With:    Patient    Next of Kin (If No Surrogate)     Code Status (Patient has no pulse and is not breathing):    No CPR (Do Not Attempt to Resuscitate)     Medical Interventions (Patient has pulse or is breathing):    Limited Support         Disposition  Social Determinants of Health that impact treatment or disposition: Wife is sick with brain cancer.  I expect the patient to be discharged to SNF pending approval    Electronically signed by Micki Solano MD, 10/02/23, 08:49 CDT.

## 2023-10-02 NOTE — DISCHARGE PLACEMENT REQUEST
"Haley Plummer (88 y.o. Male)       Date of Birth   1935    Social Security Number       Address   [address] [place] [address]    Home Phone   [phone]    MRN   [id number]       [facility]   [facility]    Marital Status                               Admission Date   9/24/23    Admission Type   Emergency    Admitting Provider   Micki Solano MD    Attending Provider   Micki Solano MD    Department, Room/Bed   [facility] 3A, 331/1       Discharge Date       Discharge Disposition       Discharge Destination                                 Attending Provider: Micki Solano MD    Allergies: Lyrica [Pregabalin]    Isolation: None   Infection: None   Code Status: No CPR    Ht: 177.8 cm (70\")   Wt: 45.5 kg (100 lb 3.2 oz)    Admission Cmt: None   Principal Problem: Intractable low back pain [M54.59]                   Active Insurance as of 9/24/2023       Primary Coverage       Payor Plan Insurance Group Employer/Plan Group    ANTHEM MEDICARE REPLACEMENT ANTHEM MEDICARE ADVANTAGE [id number]       Payor Plan Address Payor Plan Phone Number Payor Plan Fax Number Effective Dates    [address] [phone]  1/1/2018 - None Entered    [facility] [id number]         Subscriber Name Subscriber Birth Date Member ID       HALEY PLUMMER 1935 [id number]                     Emergency Contacts        (Rel.) Home Phone Work Phone Mobile Phone    Anita Plummer (Spouse) [phone] -- --    Tierra Gorman (Relative) [phone] -- --    Gaby Rankin (Daughter) -- -- [phone]    DarnellHoma (Daughter) [phone] -- --                 Physician Progress Notes (last 24 hours)        Rohan Arizmendi, APRN at 10/02/23 0755          Haley Plummer  88 y.o.      Chief complaint:   Compression fracture status post kyphoplasty procedure.  Back pain is under better control    Subjective  No events overnight    Temp:  [97.9 °F (36.6 °C)-98.5 °F (36.9 °C)] 98.1 °F (36.7 " "°C)  Heart Rate:  [78-86] 79  Resp:  [14-18] 14  BP: (133-153)/(65-75) 153/72      Objective:  General Appearance:  Comfortable, well-appearing, in no acute distress and in pain.    Vital signs: (most recent): Blood pressure 153/72, pulse 79, temperature 98.1 °F (36.7 °C), temperature source Oral, resp. rate 14, height 177.8 cm (70\"), weight 45.5 kg (100 lb 3.2 oz), SpO2 96 %.  Vital signs are normal.  No fever.    Output: Producing urine.    HEENT: Normal HEENT exam.    Lungs:  Normal effort and normal respiratory rate.  He is not in respiratory distress.    Heart: Normal rate.  Regular rhythm.    Chest: Symmetric chest wall expansion.   Extremities: Normal range of motion.    Skin:  Warm and dry.    Abdomen: Abdomen is soft and non-distended.  Bowel sounds are normal.   There is no abdominal tenderness.     Pulses: Distal pulses are intact.      Neurologic Exam     Mental Status   Oriented to person, place, and time.   Attention: normal. Concentration: normal.   Speech: speech is normal   Level of consciousness: alert  Normal comprehension.     Cranial Nerves     CN II   Visual fields full to confrontation.     CN III, IV, VI   Pupils are equal, round, and reactive to light.  Extraocular motions are normal.     CN V   Facial sensation intact.     CN VII   Facial expression full, symmetric.     CN VIII   CN VIII normal.     CN IX, X   CN IX normal.   CN X normal.     CN XI   CN XI normal.     CN XII   CN XII normal.     Motor Exam   Muscle bulk: normal    Strength   Strength 5/5 throughout.     Sensory Exam   Light touch normal.     Gait, Coordination, and Reflexes     Reflexes   Reflexes 2+ except as noted.     Lab Results (last 24 hours)       ** No results found for the last 24 hours. **                Plan:   Patient is doing well.  Patient to continue working with physical and Occupational Therapy.  Waiting for rehab placement      Intractable low back pain    Coronary artery disease involving native coronary " artery of native heart without angina pectoris    Paroxysmal atrial fibrillation    Other emphysema    Essential hypertension    UTI (urinary tract infection)    Aspiration pneumonia    Stage 3b chronic kidney disease    Compression fracture of T12 vertebra with delayed healing    Closed compression fracture of third lumbar vertebra        CONCEPCION Drew      Electronically signed by Rohan Arizmendi APRN at 10/02/23 0756       Consult Notes (last 24 hours)  Notes from 10/01/23 1441 through 10/02/23 1441   No notes of this type exist for this encounter.          Physical Therapy Notes (last 24 hours)        Lissette Flood PTA at 10/02/23 1001  Version 1 of 1         Goal Outcome Evaluation:  Plan of Care Reviewed With: patient, daughter        Progress: improving  Outcome Evaluation: pt trans to EOB cga, cues for spinal precautions, performed BLE AROM x 20 reps, sit-stand cga, pt amb 40 feet rwx cga, cues for upright posture, pt fatigues easily, requires rest, pt performed standing BLE AROM x 10 reps, pt trans back to bed cga, cues for spinal precautions, pt would benefit from SNF for balance,strengthening and endurance as well as reinforce spinal precautions           Electronically signed by Lissette Flood PTA at 10/02/23 1001       Lissette Flood PTA at 10/02/23 1002  Version 1 of 1         Acute Care - Physical Therapy Treatment Note   [place]     Patient Name: Brennon Vance  : 1935  MRN: [id number]  Today's Date: 10/2/2023      Visit Dx:     ICD-10-CM ICD-9-CM   1. Acute cystitis without hematuria  N30.00 595.0   2. Acute bilateral low back pain with sciatica, sciatica laterality unspecified  M54.40 724.2     724.3   3. Shortness of breath  R06.02 786.05   4. Compression fracture of T12 vertebra with delayed healing  S22.080G V54.27   5. Closed compression fracture of L3 lumbar vertebra with delayed healing, subsequent encounter  S32.030G V54.17   6. Impaired mobility [Z74.09  (ICD-10-CM)]  Z74.09 799.89   7. Decreased activities of daily living (ADL) [Z78.9 (ICD-10-CM)]  Z78.9 V49.89     Patient Active Problem List   Diagnosis    Chest pain    NSTEMI, initial episode of care    Coronary artery disease involving native coronary artery of native heart without angina pectoris    Paroxysmal atrial fibrillation    Other emphysema    Essential hypertension    Precordial pain    Hyperlipidemia LDL goal <70    Tobacco abuse    Cardiomyopathy    NSTEMI (non-ST elevated myocardial infarction)    UTI (urinary tract infection)    Aspiration pneumonia    Intractable low back pain    Stage 3b chronic kidney disease    Compression fracture of T12 vertebra with delayed healing    Closed compression fracture of third lumbar vertebra     Past Medical History:   Diagnosis Date    CAD (coronary artery disease)     COPD (chronic obstructive pulmonary disease)     Hiatal hernia     Hyperlipidemia     Hypertension     Stroke      Past Surgical History:   Procedure Laterality Date    ABDOMINAL AORTIC ANEURYSM REPAIR      CARDIAC CATHETERIZATION N/A 5/20/2021    Procedure: Left Heart Cath;  Surgeon: Harrison Alcantara MD;  Location:  [facility] CATH INVASIVE LOC[facility];  Service: Cardiology;  Laterality: N/A;    CORONARY ANGIOPLASTY WITH STENT PLACEMENT      FEMORAL ARTERY STENT      KYPHOPLASTY WITH BIOPSY N/A 9/26/2023    Procedure: KYPHOPLASTY WITH BIOPSY T12 and L3;  Surgeon: Jeremiah Brantley MD;  Location:  [facility] OR;  Service: Neurosurgery;  Laterality: N/A;    LEG THROMBECTOMY/EMBOLECTOMY Right 5/20/2021    Procedure: RT GROIN EXPLORATION;  Surgeon: Geoff Remy DO;  Location:  [facility] HYBRID OR 12;  Service: Vascular;  Laterality: Right;    TRIGEMINAL NERVE DECOMPRESSION       PT Assessment (last 12 hours)       PT Evaluation and Treatment       Row Name 10/02/23 0979          Physical Therapy Time and Intention    Subjective Information complains of;weakness;fatigue  -AH     Document Type therapy  note (daily note)  -     Mode of Treatment physical therapy  -[facility] Name 10/02/23 0922          General Information    Existing Precautions/Restrictions fall;spinal  -[facility] Name 10/02/23 0922          Pain Scale: Word Pre/Post-Treatment    Pain: Word Scale, Pretreatment 0 - no pain  -     Posttreatment Pain Rating 0 - no pain  -[facility] Name 10/02/23 0922          Bed Mobility    Supine-Sit [place] (Bed Mobility) verbal cues;standby assist  -     Sit-Supine [place] (Bed Mobility) standby assist;verbal cues  -     Comment, (Bed Mobility) cues for spinal precautions  -[facility] Name 10/02/23 0922          Sit-Stand Transfer    Sit-Stand [place] (Transfers) verbal cues;contact guard  -     Assistive Device (Sit-Stand Transfers) walker, front-wheeled  -[facility] Name 10/02/23 0922          Stand-Sit Transfer    Stand-Sit [place] (Transfers) verbal cues;contact guard  -     Assistive Device (Stand-Sit Transfers) walker, front-wheeled  -[facility] Name 10/02/23 0922          Gait/Stairs (Locomotion)    [place] Level (Gait) verbal cues;contact guard  -     Assistive Device (Gait) walker, front-wheeled  -     Distance in Feet (Gait) 40  -     Deviations/Abnormal Patterns (Gait) stride length decreased  -     Bilateral Gait Deviations forward flexed posture  -     Comment, (Gait/Stairs) cues for upright posture, pt fatigues easily, requires rest  -[facility] Name 10/02/23 0922          Motor Skills    Comments, Therapeutic Exercise BLE AROM sitting EOB x 20 reps, standing BLE AROM x 10 reps  -[facility] Name             Wound 09/26/23 1640 lumbar spine Puncture    Wound - Properties Group Placement Date: 09/26/23  -DT Placement Time: 1640  -DT Present on Hospital Admission: N  -DT Location: lumbar spine  -DT Primary Wound Type: Puncture  -DT    Retired Wound - Properties Group Placement Date: 09/26/23  -DT Placement Time: 1640  -DT Present on  Hospital Admission: N  -DT Location: lumbar spine  -DT Primary Wound Type: Puncture  -DT    Retired Wound - Properties Group Date first assessed: 09/26/23  -DT Time first assessed: 1640  -DT Present on Hospital Admission: N  -DT Location: lumbar spine  -DT Primary Wound Type: Puncture  -DT      Row Name             Wound 09/26/23 1640 thoracic spine Puncture    Wound - Properties Group Placement Date: 09/26/23  -DT Placement Time: 1640  -DT Present on Hospital Admission: N  -DT Location: thoracic spine  -DT Primary Wound Type: Puncture  -DT    Retired Wound - Properties Group Placement Date: 09/26/23  -DT Placement Time: 1640  -DT Present on Hospital Admission: N  -DT Location: thoracic spine  -DT Primary Wound Type: Puncture  -DT    Retired Wound - Properties Group Date first assessed: 09/26/23  -DT Time first assessed: 1640  -DT Present on Hospital Admission: N  -DT Location: thoracic spine  -DT Primary Wound Type: Puncture  -DT      Row Name 10/02/23 0922          Plan of Care Review    Plan of Care Reviewed With patient;daughter  -     Progress improving  -     Outcome Evaluation pt trans to EOB cga, cues for spinal precautions, performed BLE AROM x 20 reps, sit-stand cga, pt amb 40 feet rwx cga, cues for upright posture, pt fatigues easily, requires rest, pt performed standing BLE AROM x 10 reps, pt trans back to bed cga, cues for spinal precautions, pt would benefit from SNF for balance,strengthening and endurance as well as reinforce spinal precautions  [facility]       Row Name 10/02/23 0922          Positioning and Restraints    Pre-Treatment Position in bed  -AH     Post Treatment Position bed  -AH     In Bed fowlers;encouraged to call for assist;exit alarm on;with family/caregiver;notified [facility]               User Key  (r) = Recorded By, (t) = Taken By, (c) = Cosigned By      Initials Name Provider Type     Lissette Flood PTA Physical Therapist Assistant    Pedro Phoenix, RN Registered Nurse                     Physical Therapy Education       Title: PT OT SLP Therapies (Done)       Topic: Physical Therapy (Done)       Point: Mobility training (Done)       Learning Progress Summary             Patient Acceptance, E,TB, VU,DU,NR by  at 10/1/2023 1737    Eager, E, VU,NR by  at 9/27/2023 0745    Comment: Spinal precautions. Pressure injury prevention. Cueing for proper transfers with FWW.   Family Acceptance, E,TB, VU,DU,NR by  at 10/1/2023 1737    Eager, E, VU,NR by  at 9/27/2023 0745    Comment: Spinal precautions. Pressure injury prevention. Cueing for proper transfers with FWW.                         Point: Body mechanics (Done)       Learning Progress Summary             Patient Acceptance, E,TB, VU,DU,NR by  at 10/1/2023 1737    Eager, E, VU,NR by  at 9/27/2023 0745    Comment: Spinal precautions. Pressure injury prevention. Cueing for proper transfers with FWW.   Family Acceptance, E,TB, VU,DU,NR by  at 10/1/2023 1737    Eager, E, VU,NR by  at 9/27/2023 0745    Comment: Spinal precautions. Pressure injury prevention. Cueing for proper transfers with FWW.                         Point: Precautions (Done)       Learning Progress Summary             Patient Eager, E, VU,NR by  at 9/27/2023 0745    Comment: Spinal precautions. Pressure injury prevention. Cueing for proper transfers with FWW.   Family Eager, E, VU,NR by  at 9/27/2023 0745    Comment: Spinal precautions. Pressure injury prevention. Cueing for proper transfers with FWW.                                         User Key       Initials Effective Dates Name Provider Type Discipline     07/07/23 -  Nikki Kiran, RN Registered Nurse Nurse     07/13/23 -  Marcin Estrada, PT Student PT Student PT                  PT Recommendation and Plan     Plan of Care Reviewed With: patient, daughter  Progress: improving  Outcome Evaluation: pt trans to EOB cga, cues for spinal precautions, performed BLE AROM x 20 reps, sit-stand cga,  pt amb 40 feet rwx cga, cues for upright posture, pt fatigues easily, requires rest, pt performed standing BLE AROM x 10 reps, pt trans back to bed cga, cues for spinal precautions, pt would benefit from SNF for balance,strengthening and endurance as well as reinforce spinal precautions   Outcome Measures       Row Name 10/02/23 1000 10/01/23 1100 09/30/23 1100       How much help from another person do you currently need...    Turning from your back to your side while in flat bed without using bedrails? 3  -AH 3  -AH --    Moving from lying on back to sitting on the side of a flat bed without bedrails? 3  -AH 3  -AH --    Moving to and from a bed to a chair (including a wheelchair)? 3  - 3  -AH --    Standing up from a chair using your arms (e.g., wheelchair, bedside chair)? 3  - 3  -AH --    Climbing 3-5 steps with a railing? 3  - 3  -AH --    To walk in hospital room? 3  - 3  -AH --    AM-PAC 6 Clicks Score (PT) 18  -AH 18  -AH --       How much help from another is currently needed...    Putting on and taking off regular lower body clothing? -- -- 3  -LS    Bathing (including washing, rinsing, and drying) -- -- 3  -LS    Toileting (which includes using toilet bed pan or urinal) -- -- 3  -LS    Putting on and taking off regular upper body clothing -- -- 3  -LS    Taking care of personal grooming (such as brushing teeth) -- -- 3  -LS    Eating meals -- -- 4  -LS    AM-PAC 6 Clicks Score (OT) -- -- 19  -LS       Functional Assessment    Outcome Measure Options AM-PAC 6 Clicks Basic Mobility (PT)  - AM-PAC 6 Clicks Basic Mobility (PT)  - AM-PAC 6 Clicks Basic Mobility (PT);AM-PAC 6 Clicks Daily Activity (OT)  -      Row Name 09/30/23 0812 09/29/23 1355 09/29/23 1300       How much help from another person do you currently need...    Turning from your back to your side while in flat bed without using bedrails? 3  - 3  -JAMEEL --    Moving from lying on back to sitting on the side of a flat bed without  bedrails? 3  - 3  -JAMEEL --    Moving to and from a bed to a chair (including a wheelchair)? 3  - 3  -JAMEEL --    Standing up from a chair using your arms (e.g., wheelchair, bedside chair)? 3  - 3  -JAMEEL --    Climbing 3-5 steps with a railing? 2  - 2  -JAMEEL --    To walk in hospital room? 3  - 3  -JAMEEL --    AM-PAC 6 Clicks Score (PT) 17  - 17  -JAMEEL --       How much help from another is currently needed...    Putting on and taking off regular lower body clothing? -- -- 3  -TS    Bathing (including washing, rinsing, and drying) -- -- 3  -TS    Toileting (which includes using toilet bed pan or urinal) -- -- 3  -TS    Putting on and taking off regular upper body clothing -- -- 3  -TS    Taking care of personal grooming (such as brushing teeth) -- -- 3  -TS    Eating meals -- -- 4  -TS    AM-PAC 6 Clicks Score (OT) -- -- 19  -TS       Functional Assessment    Outcome Measure Options AM-PAC 6 Clicks Basic Mobility (PT)  - AM-[facility] 6 Clicks Basic Mobility (PT)  -JAMEEL --              User Key  (r) = Recorded By, (t) = Taken By, (c) = Cosigned By      Initials Name Provider Type     Lissette Flood, EMY Physical Therapist Assistant    TS Shreya Sanderson COTA Occupational Therapist Assistant    Lico Cross PTA Physical Therapist Assistant    Karime Garcia COTA Occupational Therapist Assistant                     Time Calculation:    PT Charges       Row Name 10/02/23 1002             Time Calculation    Start Time 0922  -      Stop Time 0952  -      Time Calculation (min) 30 min  -      PT Received On 10/02/23  -         Time Calculation- PT    Total Timed Code Minutes- PT 30 minute(s)  -         Timed Charges    82155 - PT Therapeutic Exercise Minutes 15  -AH      87546 - Gait Training Minutes  15  -         Total Minutes    Timed Charges Total Minutes 30  -       Total Minutes 30  -AH                User Key  (r) = Recorded By, (t) = Taken By, (c) = Cosigned By      Initials Name Provider  Type     Lissette Flood PTA Physical Therapist Assistant                  Therapy Charges for Today       Code Description Service Date Service Provider Modifiers Qty    42870990995 HC GAIT TRAINING EA 15 MIN 10/1/2023 Lissette Flood, EMY GP 2    43442028889 HC PT THER PROC EA 15 MIN 10/2/2023 Lissette Flood, EMY GP 1    48078431338 HC GAIT TRAINING EA 15 MIN 10/2/2023 Lissette Flood, EMY GP 1            PT G-Codes  Outcome Measure Options: AM-PAC 6 Clicks Basic Mobility (PT)  AM-PAC 6 Clicks Score (PT): 18  AM-PAC 6 Clicks Score (OT): 19    Lissette Flood PTA  10/2/2023      Electronically signed by Lissette Flood PTA at 10/02/23 1003          Occupational Therapy Notes (last 24 hours)        Florence Cast COTA at 10/02/23 1115          Patient Name: Brennon Vance  : 1935    MRN: [id number]                              Today's Date: 10/2/2023       Admit Date: 2023    Visit Dx: Therapist utilized gait belt, applied non-slipped socks, provided fall risk education/prevention, & facilitated muscle strengthening PRN to reduce patient falls risk during this session.      ICD-10-CM ICD-9-CM   1. Acute cystitis without hematuria  N30.00 595.0   2. Acute bilateral low back pain with sciatica, sciatica laterality unspecified  M54.40 724.2     724.3   3. Shortness of breath  R06.02 786.05   4. Compression fracture of T12 vertebra with delayed healing  S22.080G V54.27   5. Closed compression fracture of L3 lumbar vertebra with delayed healing, subsequent encounter  S32.030G V54.17   6. Impaired mobility [Z74.09 (ICD-10-CM)]  Z74.09 799.89   7. Decreased activities of daily living (ADL) [Z78.9 (ICD-10-CM)]  Z78.9 V49.89     Patient Active Problem List   Diagnosis    Chest pain    NSTEMI, initial episode of care    Coronary artery disease involving native coronary artery of native heart without angina pectoris    Paroxysmal atrial fibrillation    Other emphysema    Essential hypertension    Precordial  pain    Hyperlipidemia LDL goal <70    Tobacco abuse    Cardiomyopathy    NSTEMI (non-ST elevated myocardial infarction)    UTI (urinary tract infection)    Aspiration pneumonia    Intractable low back pain    Stage 3b chronic kidney disease    Compression fracture of T12 vertebra with delayed healing    Closed compression fracture of third lumbar vertebra     Past Medical History:   Diagnosis Date    CAD (coronary artery disease)     COPD (chronic obstructive pulmonary disease)     Hiatal hernia     Hyperlipidemia     Hypertension     Stroke      Past Surgical History:   Procedure Laterality Date    ABDOMINAL AORTIC ANEURYSM REPAIR      CARDIAC CATHETERIZATION N/A 5/20/2021    Procedure: Left Heart Cath;  Surgeon: Harrison Alcantara MD;  Location:  [facility] CATH INVASIVE LOC[facility];  Service: Cardiology;  Laterality: N/A;    CORONARY ANGIOPLASTY WITH STENT PLACEMENT      FEMORAL ARTERY STENT      KYPHOPLASTY WITH BIOPSY N/A 9/26/2023    Procedure: KYPHOPLASTY WITH BIOPSY T12 and L3;  Surgeon: Jeremiah Brantley MD;  Location:  [facility] OR;  Service: Neurosurgery;  Laterality: N/A;    LEG THROMBECTOMY/EMBOLECTOMY Right 5/20/2021    Procedure: RT GROIN EXPLORATION;  Surgeon: Geoff Remy DO;  Location:  [facility] HYBRID OR 12;  Service: Vascular;  Laterality: Right;    TRIGEMINAL NERVE DECOMPRESSION        General Information       Row Name 10/02/23 1115          OT Time and Intention    Document Type therapy note (daily note)  -MT     Mode of Treatment occupational therapy  -MT       Row Name 10/02/23 1115          General Information    Patient Profile Reviewed yes  -MT     Existing Precautions/Restrictions fall;spinal  -MT       Row Name 10/02/23 1115          Cognition    Orientation Status (Cognition) oriented x 4  -MT       Row Name 10/02/23 1115          Safety Issues, Functional Mobility    Impairments Affecting Function (Mobility) balance;cognition;endurance/activity tolerance;pain;strength;postural/trunk  control  -MT     Cognitive Impairments, Mobility Safety/Performance attention;awareness, need for assistance;impulsivity;insight into deficits/self-awareness;safety precaution awareness  -MT               User Key  (r) = Recorded By, (t) = Taken By, (c) = Cosigned By      Initials Name Provider Type    Florence Perez COTA Occupational Therapist Assistant                     Mobility/ADL's       Row Name 10/02/23 1115          Bed Mobility    Sidelying-Sit [place] (Bed Mobility) standby assist  -MT     Comment, (Bed Mobility) cues spinal precautions  -MT       Row Name 10/02/23 1115          Transfers    Transfers sit-stand transfer;stand-sit transfer  -MT       Row Name 10/02/23 1115          Sit-Stand Transfer    Sit-Stand [place] (Transfers) verbal cues;contact guard  -MT     Assistive Device (Sit-Stand Transfers) walker, front-wheeled  -MT       Row Name 10/02/23 1115          Stand-Sit Transfer    Stand-Sit [place] (Transfers) verbal cues;contact guard  -MT     Assistive Device (Stand-Sit Transfers) walker, front-wheeled  -MT       Row Name 10/02/23 1115          Functional Mobility    Functional Mobility- Ind. Level contact guard assist  -MT     Functional Mobility- Device walker, front-wheeled  -MT     Functional Mobility- Comment in room > BR with RW needed Jett to correct 1 LOB d/t letting go of RW and attempting to furniture walk. Required VCs to keep RW with him for increased stability  -MT       Row Name 10/02/23 1115          Activities of Daily Living    BADL Assessment/Intervention bathing  -MT       Row Name 10/02/23 [facility]          Bathing Assessment/Intervention    Comment, (Bathing) s/u UB, Jett LB  -MT               User Key  (r) = Recorded By, (t) = Taken By, (c) = Cosigned By      Initials Name Provider Type    Florence Perez COTA Occupational Therapist Assistant                   Obj/Interventions       Row Name 10/02/23 1115          Motor Skills    Therapeutic Exercise  --  Performed BUE ther ex x10 reps 3# wand RBs between  -MT               User Key  (r) = Recorded By, (t) = Taken By, (c) = Cosigned By      Initials Name Provider Type    Florence Perez COTA Occupational Therapist Assistant                   Goals/Plan    No documentation.                  Clinical Impression       Row Name 10/02/23 1115          Pain Assessment    Pretreatment Pain Rating 0/10 - no pain  -MT     Posttreatment Pain Rating 0/10 - no pain  -MT       Row Name 10/02/23 1115          Therapy Plan Review/Discharge Plan (OT)    Anticipated Discharge Disposition (OT) sub acute care setting;inpatient rehabilitation facility  -MT       Row Name 10/02/23 1115          Positioning and Restraints    Pre-Treatment Position in bed  -MT     Post Treatment Position other  with PCT on tub bench  -MT               User Key  (r) = Recorded By, (t) = Taken By, (c) = Cosigned By      Initials Name Provider Type    Florence Perez COTA Occupational Therapist Assistant                   Outcome Measures       Row Name 10/02/23 1115          How much help from another is currently needed...    Putting on and taking off regular lower body clothing? 3  -MT     Bathing (including washing, rinsing, and drying) 3  -MT     Toileting (which includes using toilet bed pan or urinal) 3  -MT     Putting on and taking off regular upper body clothing 3  -MT     Taking care of personal grooming (such as brushing teeth) 3  -MT     Eating meals 4  -MT     AM-PAC 6 Clicks Score (OT) 19  -MT       Row Name 10/02/23 1000 10/02/23 0816       How much help from another person do you currently need...    Turning from your back to your side while in flat bed without using bedrails? 3  -AH 4  -KH    Moving from lying on back to sitting on the side of a flat bed without bedrails? 3  -AH 3  -KH    Moving to and from a bed to a chair (including a wheelchair)? 3  -AH 3  -KH    Standing up from a chair using your arms (e.g., wheelchair,  bedside chair)? 3  - 3  -KH    Climbing 3-5 steps with a railing? 3  -AH 3  -KH    To walk in hospital room? 3  -AH 3  -KH    AM-PAC 6 Clicks Score (PT) 18  -AH 19  -KH    Highest level of mobility 6 --> Walked 10 steps or more  -AH 6 --> Walked 10 steps or more  -KH      Row Name 10/02/23 1000          Functional Assessment    Outcome Measure Options AM-PAC 6 Clicks Basic Mobility (PT)  -               User Key  (r) = Recorded By, (t) = Taken By, (c) = Cosigned By      Initials Name Provider Type     Lissette Flood PTA Physical Therapist Assistant    Florence Perez COTA Occupational Therapist Assistant    Nikki Islas, RN Registered Nurse                    Occupational Therapy Education       Title: PT OT SLP Therapies (Done)       Topic: Occupational Therapy (Done)       Point: ADL training (Done)       Description:   Instruct learner(s) on proper safety adaptation and remediation techniques during self care or transfers.   Instruct in proper use of assistive devices.                  Learning Progress Summary             Patient Acceptance, E, VU by LS at 9/30/2023 1157    Comment: Pt educated on safety with RW during functional mobility.    Acceptance, E, VU by MB at 9/27/2023 0859   Family Acceptance, E, VU by ALFREDO at 9/30/2023 1157    Comment: Pt educated on safety with RW during functional mobility.    Acceptance, E, VU by MB at 9/27/2023 0859                         Point: Home exercise program (Done)       Description:   Instruct learner(s) on appropriate technique for monitoring, assisting and/or progressing therapeutic exercises/activities.                  Learning Progress Summary             Patient Acceptance, E, VU by MB at 9/27/2023 0859   Family Acceptance, E, VU by MB at 9/27/2023 0859                         Point: Precautions (Done)       Description:   Instruct learner(s) on prescribed precautions during self-care and functional transfers.                  Learning Progress  Summary             Patient Acceptance, E,TB, VU,DU,NR by  at 10/1/2023 1737    Acceptance, E, VU by MB at 9/27/2023 0859   Family Acceptance, E,TB, VU,DU,NR by  at 10/1/2023 1737    Acceptance, E, VU by MB at 9/27/2023 0859                         Point: Body mechanics (Done)       Description:   Instruct learner(s) on proper positioning and spine alignment during self-care, functional mobility activities and/or exercises.                  Learning Progress Summary             Patient Acceptance, E,TB, VU,DU,NR by  at 10/1/2023 1737    Acceptance, E, VU by MB at 9/27/2023 0859   Family Acceptance, E,TB, VU,DU,NR by  at 10/1/2023 1737    Acceptance, E, VU by MB at 9/27/2023 0859                                         User Key       Initials Effective Dates Name Provider Type Discipline     09/22/22 -  Karime Ennis COTA Occupational Therapist Assistant THERAPIES     07/07/23 -  Nikki Kiran, KATHY Registered Nurse Nurse    MB 08/04/23 -  Virginia Cunningham OT Student OT Student OT                  OT Recommendation and Plan     Plan of Care Review  Plan of Care Reviewed With: patient, daughter  Progress: improving  Outcome Evaluation: Initially declined shower task agreed to in bed BUE ther ex x10 reps multiple planes/ranges RBs between. Pt daughter then encouraged shower task, pt agreed. Bed mobility SBA cues for sidelying>sit. Fxl mobility in room > BR with RW needed Jett to correct 1 LOB d/t letting go of RW and attempting to furniture walk. Required VCs to keep RW with him for increased stability. Pt sat on tub bench for bathing task s/u for UB, Jett LB and cues not to bend/twist d/t spinal precautions. Left with PCT who was assisting pt finish dressing routine. Continue OT POC recommend continued rehab     Time Calculation:         Time Calculation- OT       Row Name 10/02/23 1115 10/02/23 1002          Time Calculation- OT    OT Start Time 1115  -MT --     OT Stop Time 1210  -MT --     OT Time  Calculation (min) 55 min  -MT --     Total Timed Code Minutes- OT 55 minute(s)  -MT --     OT Received On 10/02/23  -MT --        Timed Charges    24880 - OT Therapeutic Exercise Minutes 20  -MT --     01715 - Gait Training Minutes  -- 15  -AH     88449 - OT Self Care/Mgmt Minutes 35  -MT --        Total Minutes    Timed Charges Total Minutes 55  -MT 15  -AH      Total Minutes 55  -MT 15  -AH               User Key  (r) = Recorded By, (t) = Taken By, (c) = Cosigned By      Initials Name Provider Type    Lissette Hyde PTA Physical Therapist Assistant    Florence Perez COTA Occupational Therapist Assistant                  Therapy Charges for Today       Code Description Service Date Service Provider Modifiers Qty    01485129091 HC OT THER PROC EA 15 MIN 10/2/2023 Florence Cast COTA GO 2    67139615887 HC OT SELF CARE/MGMT/TRAIN EA 15 MIN 10/2/2023 Florence Cast COTA GO 2                 NICK Muñoz  10/2/2023    Electronically signed by Florence Cast COTA at 10/02/23 1306       Florence Cast COTA at 10/02/23 1115          Goal Outcome Evaluation:  Plan of Care Reviewed With: patient, daughter        Progress: improving  Outcome Evaluation: Initially declined shower task agreed to in bed BUE ther ex x10 reps multiple planes/ranges RBs between. Pt daughter then encouraged shower task, pt agreed. Bed mobility SBA cues for sidelying>sit. Fxl mobility in room > BR with RW needed Jett to correct 1 LOB d/t letting go of RW and attempting to furniture walk. Required VCs to keep RW with him for increased stability. Pt sat on tub bench for bathing task s/u for UB, Jett LB and cues not to bend/twist d/t spinal precautions. Left with PCT who was assisting pt finish dressing routine. Continue OT POC recommend continued rehab           Electronically signed by Florence Cast COTA at 10/02/23 130

## 2023-10-02 NOTE — PLAN OF CARE
Goal Outcome Evaluation:  Plan of Care Reviewed With: patient, caregiver        Progress: improving  Outcome Evaluation: Ntn follow up. Oral intake 67% of five meals. Pt request boost be d/c due to dislike. Pt would like vanilla magic cup. Pt likley to qualify for severe malnutrition. Encouraed oral intake. Con to follow for plan of care.

## 2023-10-02 NOTE — CASE MANAGEMENT/SOCIAL WORK
Continued Stay Note  VIVIAN Espino     Patient Name: Brennon Vance  MRN: [id number]  Today's Date: 10/2/2023    Admit Date: 9/24/2023    Plan: SNF Referrals   Discharge Plan       Row Name 10/02/23 1442       Plan    Plan Comments [facility] is starting precert today. Updates faxed.                   Discharge Codes    No documentation.                 Expected Discharge Date and Time       Expected Discharge Date Expected Discharge Time    Sep 29, 2023               FAISAL Nava

## 2023-10-02 NOTE — PLAN OF CARE
Problem: Palliative Care  Goal: Enhanced Quality of Life  Outcome: Ongoing, Progressing    Pt is sitting on side of bed. VSS. Room air. Therapy at bedside. No family at bedside at this time. Daughter in spouse's room. Pt is getting ready to shower. Awaiting discharge to SNF. MOST has been previously completed and code status established.     Will continue to follow until discharge.    ELOISE Oh  10/2/2023

## 2023-10-02 NOTE — PROGRESS NOTES
"Brennon Vance  88 y.o.      Chief complaint:   Compression fracture status post kyphoplasty procedure.  Back pain is under better control    Subjective  No events overnight    Temp:  [97.9 °F (36.6 °C)-98.5 °F (36.9 °C)] 98.1 °F (36.7 °C)  Heart Rate:  [78-86] 79  Resp:  [14-18] 14  BP: (133-153)/(65-75) 153/72      Objective:  General Appearance:  Comfortable, well-appearing, in no acute distress and in pain.    Vital signs: (most recent): Blood pressure 153/72, pulse 79, temperature 98.1 °F (36.7 °C), temperature source Oral, resp. rate 14, height 177.8 cm (70\"), weight 45.5 kg (100 lb 3.2 oz), SpO2 96 %.  Vital signs are normal.  No fever.    Output: Producing urine.    HEENT: Normal HEENT exam.    Lungs:  Normal effort and normal respiratory rate.  He is not in respiratory distress.    Heart: Normal rate.  Regular rhythm.    Chest: Symmetric chest wall expansion.   Extremities: Normal range of motion.    Skin:  Warm and dry.    Abdomen: Abdomen is soft and non-distended.  Bowel sounds are normal.   There is no abdominal tenderness.     Pulses: Distal pulses are intact.      Neurologic Exam     Mental Status   Oriented to person, place, and time.   Attention: normal. Concentration: normal.   Speech: speech is normal   Level of consciousness: alert  Normal comprehension.     Cranial Nerves     CN II   Visual fields full to confrontation.     CN III, IV, VI   Pupils are equal, round, and reactive to light.  Extraocular motions are normal.     CN V   Facial sensation intact.     CN VII   Facial expression full, symmetric.     CN VIII   CN VIII normal.     CN IX, X   CN IX normal.   CN X normal.     CN XI   CN XI normal.     CN XII   CN XII normal.     Motor Exam   Muscle bulk: normal    Strength   Strength 5/5 throughout.     Sensory Exam   Light touch normal.     Gait, Coordination, and Reflexes     Reflexes   Reflexes 2+ except as noted.     Lab Results (last 24 hours)       ** No results found for the last 24 hours. " **                Plan:   Patient is doing well.  Patient to continue working with physical and Occupational Therapy.  Waiting for rehab placement      Intractable low back pain    Coronary artery disease involving native coronary artery of native heart without angina pectoris    Paroxysmal atrial fibrillation    Other emphysema    Essential hypertension    UTI (urinary tract infection)    Aspiration pneumonia    Stage 3b chronic kidney disease    Compression fracture of T12 vertebra with delayed healing    Closed compression fracture of third lumbar vertebra        Rohan Arizmendi, APRN

## 2023-10-02 NOTE — PLAN OF CARE
Problem: Adult Inpatient Plan of Care  Goal: Plan of Care Review  Outcome: Ongoing, Progressing  Flowsheets (Taken 10/2/2023 0329)  Progress: no change  Plan of Care Reviewed With: patient  Outcome Evaluation: Pt A/Ox4, with bouts of forgetfulness. Pt denies having any pain thus far this shift. Pt's main c/o tonight is the inability to get to sleep. Clustered care provided. PRN Melatonin given along with Atarax. VSS. Incontinence care provided. Daughter remains at bedside. Safety maintained.

## 2023-10-02 NOTE — PROGRESS NOTES
Malnutrition Severity Assessment    Patient Name:  Brennon Vance  YOB: 1935  MRN: [id number]  Admit Date:  9/24/2023    Patient meets criteria for : Severe Malnutrition (Secondary signs: Generalized weakness,poor skin turgor)    Malnutrition Severity Assessment  Malnutrition Type: Chronic Disease - Related Malnutrition  Malnutrition Type (last 8 hours)       Malnutrition Severity Assessment       Row Name 10/02/23 1343       Malnutrition Severity Assessment    Malnutrition Type Chronic Disease - Related Malnutrition      Row Name 10/02/23 1343       Insufficient Energy Intake     Insufficient Energy Intake Findings Moderate    Insufficient Energy Intake  <75% of est. energy requirement for > or equal to 1 month      Row Name 10/02/23 1343       Unintentional Weight Loss     Unintentional Weight Loss  Weight loss greater than 20% in one year  wt loss of 36 lbs (26%) over one year      Row Name 10/02/23 1343       Muscle Loss    Loss of Muscle Mass Findings Severe    [place] Severe - deep hollowing/scooping, lack of muscle to touch, facial bones well defined    Dorsal Hand Region Severe - prominent depression    Patellar Region Severe - prominent bone, square looking, very little muscle definition    Anterior Thigh Region Severe - line/depression along thigh, obviously thin    Posterior Calf Region Severe - thin with very little definition/firmness      Row Name 10/02/23 1343       Fat Loss    Subcutaneous Fat Loss Findings Severe    Orbital Region  Severe - pronounced hollowness/depression, dark circles, loose saggy skin      Row Name 10/02/23 1343       Criteria Met (Must meet criteria for severity in at least 2 of these categories: M Wasting, Fat Loss, Fluid, Secondary Signs, Wt. Status, Intake)    Patient meets criteria for  Severe Malnutrition  Secondary signs: Generalized weakness,poor skin turgor                    Electronically signed by:  Lucia Tello MS,RDN,LD  10/02/23 13:49 CDT

## 2023-10-02 NOTE — THERAPY TREATMENT NOTE
Acute Care - Physical Therapy Treatment Note  [facility]     Patient Name: Brennon Vance  : 1935  MRN: [id number]  Today's Date: 10/2/2023      Visit Dx:     ICD-10-CM ICD-9-CM   1. Acute cystitis without hematuria  N30.00 595.0   2. Acute bilateral low back pain with sciatica, sciatica laterality unspecified  M54.40 724.2     724.3   3. Shortness of breath  R06.02 786.05   4. Compression fracture of T12 vertebra with delayed healing  S22.080G V54.27   5. Closed compression fracture of L3 lumbar vertebra with delayed healing, subsequent encounter  S32.030G V54.17   6. Impaired mobility [Z74.09 (ICD-10-CM)]  Z74.09 799.89   7. Decreased activities of daily living (ADL) [Z78.9 (ICD-10-CM)]  Z78.9 V49.89     Patient Active Problem List   Diagnosis    Chest pain    NSTEMI, initial episode of care    Coronary artery disease involving native coronary artery of native heart without angina pectoris    Paroxysmal atrial fibrillation    Other emphysema    Essential hypertension    Precordial pain    Hyperlipidemia LDL goal <70    Tobacco abuse    Cardiomyopathy    NSTEMI (non-ST elevated myocardial infarction)    UTI (urinary tract infection)    Aspiration pneumonia    Intractable low back pain    Stage 3b chronic kidney disease    Compression fracture of T12 vertebra with delayed healing    Closed compression fracture of third lumbar vertebra     Past Medical History:   Diagnosis Date    CAD (coronary artery disease)     COPD (chronic obstructive pulmonary disease)     Hiatal hernia     Hyperlipidemia     Hypertension     Stroke      Past Surgical History:   Procedure Laterality Date    ABDOMINAL AORTIC ANEURYSM REPAIR      CARDIAC CATHETERIZATION N/A 2021    Procedure: Left Heart Cath;  Surgeon: Harrison Alcantara MD;  Location: [facility] INVASIVE LOCATION;  Service: Cardiology;  Laterality: N/A;    CORONARY ANGIOPLASTY WITH STENT PLACEMENT      FEMORAL ARTERY STENT      KYPHOPLASTY WITH BIOPSY N/A  9/26/2023    Procedure: KYPHOPLASTY WITH BIOPSY T12 and L3;  Surgeon: Jeremiah Brantley MD;  Location:  [facility] OR;  Service: Neurosurgery;  Laterality: N/A;    LEG THROMBECTOMY/EMBOLECTOMY Right 5/20/2021    Procedure: RT GROIN EXPLORATION;  Surgeon: Geoff Remy DO;  Location:  [facility] HYBRID OR 12;  Service: Vascular;  Laterality: Right;    TRIGEMINAL NERVE DECOMPRESSION       PT Assessment (last 12 hours)       PT Evaluation and Treatment       Row Name 10/02/23 0922          Physical Therapy Time and Intention    Subjective Information complains of;weakness;fatigue  -     Document Type therapy note (daily note)  -     Mode of Treatment physical therapy  -       Row Name 10/02/23 0922          General Information    Existing Precautions/Restrictions fall;spinal  -       Row Name 10/02/23 0922          Pain Scale: Word Pre/Post-Treatment    Pain: Word Scale, Pretreatment 0 - no pain  -     Posttreatment Pain Rating 0 - no pain  -       Row Name 10/02/23 0922          Bed Mobility    Supine-Sit [place] (Bed Mobility) verbal cues;standby assist  -     Sit-Supine [place] (Bed Mobility) standby assist;verbal cues  -     Comment, (Bed Mobility) cues for spinal precautions  -       Row Name 10/02/23 0922          Sit-Stand Transfer    Sit-Stand [place] (Transfers) verbal cues;contact guard  -     Assistive Device (Sit-Stand Transfers) walker, front-wheeled  -       Row Name 10/02/23 0922          Stand-Sit Transfer    Stand-Sit [place] (Transfers) verbal cues;contact guard  -     Assistive Device (Stand-Sit Transfers) walker, front-wheeled  -       Row Name 10/02/23 0922          Gait/Stairs (Locomotion)    [place] Level (Gait) verbal cues;contact guard  -     Assistive Device (Gait) walker, front-wheeled  -     Distance in Feet (Gait) 40  -     Deviations/Abnormal Patterns (Gait) stride length decreased  -     Bilateral Gait Deviations forward flexed  posture  -     Comment, (Gait/Stairs) cues for upright posture, pt fatigues easily, requires rest  -       Row Name 10/02/23 0922          Motor Skills    Comments, Therapeutic Exercise BLE AROM sitting EOB x 20 reps, standing BLE AROM x 10 reps  -       Row Name             Wound 09/26/23 1640 lumbar spine Puncture    Wound - Properties Group Placement Date: 09/26/23  -DT Placement Time: 1640  -DT Present on Hospital Admission: N  -DT Location: lumbar spine  -DT Primary Wound Type: Puncture  -DT    Retired Wound - Properties Group Placement Date: 09/26/23  -DT Placement Time: 1640  -DT Present on Hospital Admission: N  -DT Location: lumbar spine  -DT Primary Wound Type: Puncture  -DT    Retired Wound - Properties Group Date first assessed: 09/26/23  -DT Time first assessed: 1640  -DT Present on Hospital Admission: N  -DT Location: lumbar spine  -DT Primary Wound Type: Puncture  -DT      Row Name             Wound 09/26/23 1640 thoracic spine Puncture    Wound - Properties Group Placement Date: 09/26/23  -DT Placement Time: 1640  -DT Present on Hospital Admission: N  -DT Location: thoracic spine  -DT Primary Wound Type: Puncture  -DT    Retired Wound - Properties Group Placement Date: 09/26/23  -DT Placement Time: 1640  -DT Present on Hospital Admission: N  -DT Location: thoracic spine  -DT Primary Wound Type: Puncture  -DT    Retired Wound - Properties Group Date first assessed: 09/26/23  -DT Time first assessed: 1640  -DT Present on Hospital Admission: N  -DT Location: thoracic spine  -DT Primary Wound Type: Puncture  -DT      Row Name 10/02/23 0922          Plan of Care Review    Plan of Care Reviewed With patient;daughter  -     Progress improving  -     Outcome Evaluation pt trans to EOB cga, cues for spinal precautions, performed BLE AROM x 20 reps, sit-stand cga, pt amb 40 feet rwx cga, cues for upright posture, pt fatigues easily, requires rest, pt performed standing BLE AROM x 10 reps, pt trans  back to bed cga, cues for spinal precautions, pt would benefit from SNF for balance,strengthening and endurance as well as reinforce spinal precautions  -AH       Row Name 10/02/23 0922          Positioning and Restraints    Pre-Treatment Position in bed  -AH     Post Treatment Position bed  -AH     In Bed fowlers;encouraged to call for assist;exit alarm on;with family/caregiver;notified [facility]  -               User Key  (r) = Recorded By, (t) = Taken By, (c) = Cosigned By      Initials Name Provider Type     Lissette Flood, PTA Physical Therapist Assistant    Pedro Phoenix, RN Registered Nurse                    Physical Therapy Education       Title: PT OT SLP Therapies (Done)       Topic: Physical Therapy (Done)       Point: Mobility training (Done)       Learning Progress Summary             Patient Acceptance, E,TB, VU,DU,NR by  at 10/1/2023 1737    Eager, E, VU,NR by JS at 9/27/2023 0745    Comment: Spinal precautions. Pressure injury prevention. Cueing for proper transfers with FWW.   Family Acceptance, E,TB, VU,DU,NR by  at 10/1/2023 1737    Eager, E, VU,NR by JS at 9/27/2023 0745    Comment: Spinal precautions. Pressure injury prevention. Cueing for proper transfers with FWW.                         Point: Body mechanics (Done)       Learning Progress Summary             Patient Acceptance, E,TB, VU,DU,NR by  at 10/1/2023 1737    Eager, E, VU,NR by JS at 9/27/2023 0745    Comment: Spinal precautions. Pressure injury prevention. Cueing for proper transfers with FWW.   Family Acceptance, E,TB, VU,DU,NR by  at 10/1/2023 1737    Eager, E, VU,NR by JS at 9/27/2023 0745    Comment: Spinal precautions. Pressure injury prevention. Cueing for proper transfers with FWW.                         Point: Precautions (Done)       Learning Progress Summary             Patient Eager, E, VU,NR by JS at 9/27/2023 0745    Comment: Spinal precautions. Pressure injury prevention. Cueing for proper transfers with  FWW.   Family Katiana, JESSICA, VU,NR by OSWALDO at 9/27/2023 8801    Comment: Spinal precautions. Pressure injury prevention. Cueing for proper transfers with FWW.                                         User Key       Initials Effective Dates Name Provider Type Discipline     07/07/23 -  Nikki Kiran, RN Registered Nurse Nurse    OSWALDO 07/13/23 -  Marcin Estrada, PT Student PT Student PT                  PT Recommendation and Plan     Plan of Care Reviewed With: patient, daughter  Progress: improving  Outcome Evaluation: pt trans to EOB cga, cues for spinal precautions, performed BLE AROM x 20 reps, sit-stand cga, pt amb 40 feet rwx cga, cues for upright posture, pt fatigues easily, requires rest, pt performed standing BLE AROM x 10 reps, pt trans back to bed cga, cues for spinal precautions, pt would benefit from SNF for balance,strengthening and endurance as well as reinforce spinal precautions   Outcome Measures       Row Name 10/02/23 1000 10/01/23 1100 09/30/23 1100       How much help from another person do you currently need...    Turning from your back to your side while in flat bed without using bedrails? 3  -AH 3  -AH --    Moving from lying on back to sitting on the side of a flat bed without bedrails? 3  -AH 3  -AH --    Moving to and from a bed to a chair (including a wheelchair)? 3  -AH 3  -AH --    Standing up from a chair using your arms (e.g., wheelchair, bedside chair)? 3  -AH 3  -AH --    Climbing 3-5 steps with a railing? 3  -AH 3  -AH --    To walk in hospital room? 3  -AH 3  -AH --    AM-PAC 6 Clicks Score (PT) 18  -AH 18  -AH --       How much help from another is currently needed...    Putting on and taking off regular lower body clothing? -- -- 3  -LS    Bathing (including washing, rinsing, and drying) -- -- 3  -LS    Toileting (which includes using toilet bed pan or urinal) -- -- 3  -LS    Putting on and taking off regular upper body clothing -- -- 3  -LS    Taking care of personal grooming (such as  brushing teeth) -- -- 3  -LS    Eating meals -- -- 4  -LS    AM-PAC 6 Clicks Score (OT) -- -- 19  -LS       Functional Assessment    Outcome Measure Options AM-PAC 6 Clicks Basic Mobility (PT)  - AM-PAC 6 Clicks Basic Mobility (PT)  - AM-PAC 6 Clicks Basic Mobility (PT);AM-PAC 6 Clicks Daily Activity (OT)  -      Row Name 09/30/23 0812 09/29/23 1355 09/29/23 1300       How much help from another person do you currently need...    Turning from your back to your side while in flat bed without using bedrails? 3  - 3  -JAMEEL --    Moving from lying on back to sitting on the side of a flat bed without bedrails? 3  - 3  -JAMEEL --    Moving to and from a bed to a chair (including a wheelchair)? 3  - 3  -JAMEEL --    Standing up from a chair using your arms (e.g., wheelchair, bedside chair)? 3  - 3  -JAMEEL --    Climbing 3-5 steps with a railing? 2  - 2  -JAMEEL --    To walk in hospital room? 3  - 3  -JAMEEL --    AM-PAC 6 Clicks Score (PT) 17  - 17  -JAMEEL --       How much help from another is currently needed...    Putting on and taking off regular lower body clothing? -- -- 3  -TS    Bathing (including washing, rinsing, and drying) -- -- 3  -TS    Toileting (which includes using toilet bed pan or urinal) -- -- 3  -TS    Putting on and taking off regular upper body clothing -- -- 3  -TS    Taking care of personal grooming (such as brushing teeth) -- -- 3  -TS    Eating meals -- -- 4  -TS    AM-PAC 6 Clicks Score (OT) -- -- 19  -TS       Functional Assessment    Outcome Measure Options AM-PAC 6 Clicks Basic Mobility (PT)  - AM-PAC 6 Clicks Basic Mobility (PT)  -JAMEEL --              User Key  (r) = Recorded By, (t) = Taken By, (c) = Cosigned By      Initials Name Provider Type     Lissette Flood, PTA Physical Therapist Assistant    Shreya Sarmiento COTA Occupational Therapist Assistant    Lico Cross, PTA Physical Therapist Assistant    Karime Garcia COTA Occupational Therapist Assistant                      Time Calculation:    PT Charges       Row Name 10/02/23 1002             Time Calculation    Start Time 0922  -      Stop Time 0952  -      Time Calculation (min) 30 min  -      PT Received On 10/02/23  -         Time Calculation- PT    Total Timed Code Minutes- PT 30 minute(s)  -         Timed Charges    56543 - PT Therapeutic Exercise Minutes 15  -AH      17054 - Gait Training Minutes  15  -AH         Total Minutes    Timed Charges Total Minutes 30  -AH       Total Minutes 30  -AH                User Key  (r) = Recorded By, (t) = Taken By, (c) = Cosigned By      Initials Name Provider Type     Lissette Flood PTA Physical Therapist Assistant                  Therapy Charges for Today       Code Description Service Date Service Provider Modifiers Qty    03988632125 HC GAIT TRAINING EA 15 MIN 10/1/2023 Lissette Flood, PTA GP 2    41744522399 HC PT THER PROC EA 15 MIN 10/2/2023 Lissette Flood, PTA GP 1    32108752796 HC GAIT TRAINING EA 15 MIN 10/2/2023 Lissette Flood, PTA GP 1            PT G-Codes  Outcome Measure Options: AM-PAC 6 Clicks Basic Mobility (PT)  AM-PAC 6 Clicks Score (PT): 18  AM-PAC 6 Clicks Score (OT): 19    Lissette Flood PTA  10/2/2023

## 2023-10-03 PROCEDURE — 99024 POSTOP FOLLOW-UP VISIT: CPT | Performed by: NURSE PRACTITIONER

## 2023-10-03 PROCEDURE — 94799 UNLISTED PULMONARY SVC/PX: CPT

## 2023-10-03 PROCEDURE — A9270 NON-COVERED ITEM OR SERVICE: HCPCS | Performed by: NURSE PRACTITIONER

## 2023-10-03 PROCEDURE — 63710000001 ACETAMINOPHEN 325 MG TABLET: Performed by: NURSE PRACTITIONER

## 2023-10-03 PROCEDURE — 63710000001 APIXABAN 2.5 MG TABLET: Performed by: NURSE PRACTITIONER

## 2023-10-03 PROCEDURE — G0378 HOSPITAL OBSERVATION PER HR: HCPCS

## 2023-10-03 PROCEDURE — 97116 GAIT TRAINING THERAPY: CPT

## 2023-10-03 PROCEDURE — 63710000001 PROBIOTIC 250 MG CAPSULE: Performed by: NURSE PRACTITIONER

## 2023-10-03 PROCEDURE — 94664 DEMO&/EVAL PT USE INHALER: CPT

## 2023-10-03 PROCEDURE — 63710000001 DILTIAZEM CD 180 MG CAPSULE SUSTAINED-RELEASE 24 HR: Performed by: NURSE PRACTITIONER

## 2023-10-03 PROCEDURE — 63710000001 TAMSULOSIN 0.4 MG CAPSULE: Performed by: NURSE PRACTITIONER

## 2023-10-03 PROCEDURE — 63710000001 SODIUM BICARBONATE 650 MG TABLET: Performed by: NURSE PRACTITIONER

## 2023-10-03 PROCEDURE — 63710000001 CIPROFLOXACIN 500 MG TABLET: Performed by: NURSE PRACTITIONER

## 2023-10-03 PROCEDURE — 63710000001 CARVEDILOL 6.25 MG TABLET: Performed by: NURSE PRACTITIONER

## 2023-10-03 PROCEDURE — 63710000001 LEVOTHYROXINE 50 MCG TABLET: Performed by: NURSE PRACTITIONER

## 2023-10-03 PROCEDURE — 63710000001 GUAIFENESIN 600 MG TABLET SUSTAINED-RELEASE 12 HOUR: Performed by: NURSE PRACTITIONER

## 2023-10-03 PROCEDURE — 63710000001 FINASTERIDE 5 MG TABLET: Performed by: NURSE PRACTITIONER

## 2023-10-03 PROCEDURE — 63710000001 MIRTAZAPINE 15 MG TABLET: Performed by: NURSE PRACTITIONER

## 2023-10-03 PROCEDURE — 97530 THERAPEUTIC ACTIVITIES: CPT | Performed by: OCCUPATIONAL THERAPIST

## 2023-10-03 PROCEDURE — 63710000001 ROSUVASTATIN 20 MG TABLET: Performed by: NURSE PRACTITIONER

## 2023-10-03 PROCEDURE — 63710000001 CHOLECALCIFEROL 25 MCG (1000 UT) TABLET: Performed by: NURSE PRACTITIONER

## 2023-10-03 PROCEDURE — 63710000001 HYDROXYZINE 25 MG TABLET

## 2023-10-03 PROCEDURE — 63710000001 ISOSORBIDE MONONITRATE 60 MG TABLET SUSTAINED-RELEASE 24 HOUR: Performed by: NURSE PRACTITIONER

## 2023-10-03 PROCEDURE — 63710000001 POTASSIUM CHLORIDE 20 MEQ TABLET CONTROLLED-RELEASE: Performed by: NURSE PRACTITIONER

## 2023-10-03 PROCEDURE — A9270 NON-COVERED ITEM OR SERVICE: HCPCS

## 2023-10-03 PROCEDURE — 63710000001 MELATONIN 5 MG TABLET: Performed by: NURSE PRACTITIONER

## 2023-10-03 PROCEDURE — 63710000001 NICOTINE 21 MG/24HR PATCH 24 HOUR: Performed by: NURSE PRACTITIONER

## 2023-10-03 PROCEDURE — 63710000001 CARBAMAZEPINE 200 MG TABLET: Performed by: NURSE PRACTITIONER

## 2023-10-03 PROCEDURE — 97535 SELF CARE MNGMENT TRAINING: CPT | Performed by: OCCUPATIONAL THERAPIST

## 2023-10-03 PROCEDURE — 63710000001 PANTOPRAZOLE 40 MG TABLET DELAYED-RELEASE: Performed by: NURSE PRACTITIONER

## 2023-10-03 RX ADMIN — CARBAMAZEPINE 200 MG: 200 TABLET ORAL at 09:42

## 2023-10-03 RX ADMIN — POTASSIUM CHLORIDE 20 MEQ: 1500 TABLET, EXTENDED RELEASE ORAL at 09:44

## 2023-10-03 RX ADMIN — GUAIFENESIN 600 MG: 600 TABLET, EXTENDED RELEASE ORAL at 20:33

## 2023-10-03 RX ADMIN — NYSTATIN: 100000 POWDER TOPICAL at 09:43

## 2023-10-03 RX ADMIN — APIXABAN 2.5 MG: 2.5 TABLET, FILM COATED ORAL at 20:32

## 2023-10-03 RX ADMIN — FINASTERIDE 5 MG: 5 TABLET, FILM COATED ORAL at 09:41

## 2023-10-03 RX ADMIN — ACETAMINOPHEN 650 MG: 325 TABLET, FILM COATED ORAL at 22:16

## 2023-10-03 RX ADMIN — GUAIFENESIN 600 MG: 600 TABLET, EXTENDED RELEASE ORAL at 09:41

## 2023-10-03 RX ADMIN — CARVEDILOL 12.5 MG: 6.25 TABLET, FILM COATED ORAL at 09:41

## 2023-10-03 RX ADMIN — BUDESONIDE AND FORMOTEROL FUMARATE DIHYDRATE 2 PUFF: 160; 4.5 AEROSOL RESPIRATORY (INHALATION) at 07:13

## 2023-10-03 RX ADMIN — Medication 3 ML: at 20:34

## 2023-10-03 RX ADMIN — Medication 5 MG: at 20:32

## 2023-10-03 RX ADMIN — MIRTAZAPINE 45 MG: 15 TABLET, FILM COATED ORAL at 20:32

## 2023-10-03 RX ADMIN — Medication 1000 UNITS: at 09:41

## 2023-10-03 RX ADMIN — CARVEDILOL 12.5 MG: 6.25 TABLET, FILM COATED ORAL at 17:26

## 2023-10-03 RX ADMIN — Medication 250 MG: at 20:32

## 2023-10-03 RX ADMIN — BUDESONIDE AND FORMOTEROL FUMARATE DIHYDRATE 2 PUFF: 160; 4.5 AEROSOL RESPIRATORY (INHALATION) at 19:13

## 2023-10-03 RX ADMIN — NYSTATIN 1 APPLICATION: 100000 POWDER TOPICAL at 20:33

## 2023-10-03 RX ADMIN — SODIUM BICARBONATE 650 MG: 650 TABLET ORAL at 09:40

## 2023-10-03 RX ADMIN — Medication 10 ML: at 09:43

## 2023-10-03 RX ADMIN — DILTIAZEM HYDROCHLORIDE 180 MG: 180 CAPSULE, EXTENDED RELEASE ORAL at 09:41

## 2023-10-03 RX ADMIN — PANTOPRAZOLE SODIUM 40 MG: 40 TABLET, DELAYED RELEASE ORAL at 09:41

## 2023-10-03 RX ADMIN — TAMSULOSIN HYDROCHLORIDE 0.4 MG: 0.4 CAPSULE ORAL at 09:41

## 2023-10-03 RX ADMIN — APIXABAN 2.5 MG: 2.5 TABLET, FILM COATED ORAL at 09:40

## 2023-10-03 RX ADMIN — Medication 3 ML: at 09:42

## 2023-10-03 RX ADMIN — HYDROXYZINE HYDROCHLORIDE 50 MG: 25 TABLET, FILM COATED ORAL at 05:18

## 2023-10-03 RX ADMIN — SODIUM BICARBONATE 650 MG: 650 TABLET ORAL at 20:32

## 2023-10-03 RX ADMIN — Medication 10 ML: at 20:34

## 2023-10-03 RX ADMIN — HYDROXYZINE HYDROCHLORIDE 50 MG: 25 TABLET, FILM COATED ORAL at 22:16

## 2023-10-03 RX ADMIN — LEVOTHYROXINE SODIUM 50 MCG: 50 TABLET ORAL at 05:18

## 2023-10-03 RX ADMIN — ISOSORBIDE MONONITRATE 60 MG: 60 TABLET, EXTENDED RELEASE ORAL at 09:41

## 2023-10-03 RX ADMIN — NICOTINE 1 PATCH: 21 PATCH, EXTENDED RELEASE TRANSDERMAL at 09:41

## 2023-10-03 RX ADMIN — ROSUVASTATIN CALCIUM 20 MG: 20 TABLET, FILM COATED ORAL at 20:33

## 2023-10-03 RX ADMIN — CIPROFLOXACIN 500 MG: 500 TABLET, FILM COATED ORAL at 12:53

## 2023-10-03 RX ADMIN — Medication 250 MG: at 09:40

## 2023-10-03 NOTE — PROGRESS NOTES
[facility] Medicine Services  INPATIENT PROGRESS NOTE    Patient Name: Brennon Vance  Date of Admission: 9/24/2023  Today's Date: 10/03/23  Length of Stay: 0  Primary Care Physician: Javid Grajeda MD    Subjective   Chief Complaint: Follow-up     Patient is status post kyphoplasty 9/26/2023 by Dr. Brantley.  Patient is doing well postsurgery.  Pain is well controlled on current therapy.  Patient and his daughters were by the bedside when seen this morning.  Plan for patient to go to [facility].    10/3: Pain is well controlled on current therapy.  Vital signs are stable.  Pt still awaiting placement.    Review of Systems   All pertinent negatives and positives are as above. All other systems have been reviewed and are negative unless otherwise stated.     Objective    Temp:  [97.6 °F (36.4 °C)-99.2 °F (37.3 °C)] 97.7 °F (36.5 °C)  Heart Rate:  [72-79] 72  Resp:  [16] 16  BP: (126-146)/(63-66) 143/63  Physical Exam  Vitals and nursing note reviewed.   Constitutional:       General: He is not in acute distress.     Appearance: He is not diaphoretic.   HENT:      Head: Normocephalic and atraumatic.      Right Ear: External ear normal.      Left Ear: External ear normal.   Cardiovascular:      Rate and Rhythm: Normal rate and regular rhythm.      Heart sounds: Normal heart sounds.   Pulmonary:      Effort: Pulmonary effort is normal. No respiratory distress.      Breath sounds: Normal breath sounds.   Chest:      Chest wall: No tenderness.   Abdominal:      General: Bowel sounds are normal.      Palpations: Abdomen is soft.      Tenderness: There is no abdominal tenderness.   Musculoskeletal:         General: No swelling or tenderness.      Cervical back: Normal range of motion and neck supple.      Right lower leg: No edema.      Left lower leg: No edema.   Skin:     General: Skin is warm and dry.      Capillary Refill: Capillary refill takes less than 2 seconds.       Findings: No erythema or rash.   Neurological:      General: No focal deficit present.      Mental Status: He is alert.      Motor: No weakness.   Psychiatric:         Behavior: Behavior normal.           Results Review:  I have reviewed the labs, radiology results, and diagnostic studies.    Laboratory Data:   Results from last 7 days   Lab Units 09/29/23 0442 09/28/23 0355 09/27/23  0416   WBC 10*3/mm3 6.42 5.84 6.94   HEMOGLOBIN g/dL 9.6* 8.9* 9.7*   HEMATOCRIT % 30.4* 27.5* 30.4*   PLATELETS 10*3/mm3 252 229 253          Results from last 7 days   Lab Units 09/29/23 0442 09/28/23  0355 09/27/23  0416   SODIUM mmol/L 141 140 141   POTASSIUM mmol/L 3.7 3.5 4.0   CHLORIDE mmol/L 109* 110* 107   CO2 mmol/L 21.0* 21.0* 18.0*   BUN mg/dL 24* 25* 29*   CREATININE mg/dL 1.55* 1.47* 1.59*   CALCIUM mg/dL 9.0 8.7 8.4*   BILIRUBIN mg/dL 0.3 0.3 0.2   ALK PHOS U/L 85 90 98   ALT (SGPT) U/L <5 5 8   AST (SGOT) U/L 18 18 27   GLUCOSE mg/dL 95 92 89         Culture Data:   No results found for: BLOODCX, URINECX, WOUNDCX, MRSACX, RESPCX, STOOLCX    Radiology Data:   Imaging Results (Last 24 Hours)       ** No results found for the last 24 hours. **            I have reviewed the patient's current medications.     Assessment/Plan   Assessment  Active Hospital Problems    Diagnosis     **Intractable low back pain     UTI (urinary tract infection)     Aspiration pneumonia     Stage 3b chronic kidney disease     Compression fracture of T12 vertebra with delayed healing     Closed compression fracture of third lumbar vertebra     Paroxysmal atrial fibrillation     Other emphysema     Essential hypertension     Coronary artery disease involving native coronary artery of native heart without angina pectoris        Treatment Plan  10/3: Pain is well controlled on current therapy. Pt still awaiting placement  Continue PT OT.  Continue pain management  Neurosurgery notes reviewed  Social work note reviewed.  Pre-CERT still pending for  [facility].      10/2: Patient is status post kyphoplasty 9/26/2023 by Dr. Brantley.  Patient is doing well postsurgery.  Pain is well controlled on current therapy.  Patient and his daughters were by the bedside when seen this morning.  Plan for patient to go to [facility].    Continue pain management    Continue PT OT    Continue chronic medication    Continue Eliquis for A-fib    Medical Decision Making  Number and Complexity of problems: 1 acute problem requiring specialist consultation and surgical management.  Differential Diagnosis: As above    Conditions and Status        Condition is improving.     [facility] Data  External documents reviewed: Epic records  Cardiac tracing (EKG, telemetry) interpretation: No new EKG  Radiology interpretation: Imaging reviewed  Labs reviewed: No new labs  Any tests that were considered but not ordered: None     Decision rules/scores evaluated (example [id number], [place], etc): Currently on Eliquis     Discussed with: Patient and his daughters     Care Planning  Shared decision making: Patient apprised of current labs, vitals, imaging and treatment plan.  They are agreeable with proceeding with plans as discussed.    Code status and discussions:   Code Status and Medical Interventions:   Ordered at: 09/29/23 1424     Medical Intervention Limits:    NO intubation (DNI)     Level Of Support Discussed With:    Patient    Next of Kin (If No Surrogate)     Code Status (Patient has no pulse and is not breathing):    No CPR (Do Not Attempt to Resuscitate)     Medical Interventions (Patient has pulse or is breathing):    Limited Support         Disposition  Social Determinants of Health that impact treatment or disposition: Wife is sick with brain cancer.  I expect the patient to be discharged to SNF pending approval    Electronically signed by Micki Solano MD, 10/03/23, 16:31 CDT.

## 2023-10-03 NOTE — THERAPY TREATMENT NOTE
Acute Care - Physical Therapy Treatment Note  [facility]     Patient Name: Brennon Vance  : 1935  MRN: [id number]  Today's Date: 10/3/2023      Visit Dx:     ICD-10-CM ICD-9-CM   1. Acute cystitis without hematuria  N30.00 595.0   2. Acute bilateral low back pain with sciatica, sciatica laterality unspecified  M54.40 724.2     724.3   3. Shortness of breath  R06.02 786.05   4. Compression fracture of T12 vertebra with delayed healing  S22.080G V54.27   5. Closed compression fracture of L3 lumbar vertebra with delayed healing, subsequent encounter  S32.030G V54.17   6. Impaired mobility [Z74.09 (ICD-10-CM)]  Z74.09 799.89   7. Decreased activities of daily living (ADL) [Z78.9 (ICD-10-CM)]  Z78.9 V49.89     Patient Active Problem List   Diagnosis    Chest pain    NSTEMI, initial episode of care    Coronary artery disease involving native coronary artery of native heart without angina pectoris    Paroxysmal atrial fibrillation    Other emphysema    Essential hypertension    Precordial pain    Hyperlipidemia LDL goal <70    Tobacco abuse    Cardiomyopathy    NSTEMI (non-ST elevated myocardial infarction)    UTI (urinary tract infection)    Aspiration pneumonia    Intractable low back pain    Stage 3b chronic kidney disease    Compression fracture of T12 vertebra with delayed healing    Closed compression fracture of third lumbar vertebra     Past Medical History:   Diagnosis Date    CAD (coronary artery disease)     COPD (chronic obstructive pulmonary disease)     Hiatal hernia     Hyperlipidemia     Hypertension     Stroke      Past Surgical History:   Procedure Laterality Date    ABDOMINAL AORTIC ANEURYSM REPAIR      CARDIAC CATHETERIZATION N/A 2021    Procedure: Left Heart Cath;  Surgeon: Harrison Alcantara MD;  Location: [facility] INVASIVE LOCATION;  Service: Cardiology;  Laterality: N/A;    CORONARY ANGIOPLASTY WITH STENT PLACEMENT      FEMORAL ARTERY STENT      KYPHOPLASTY WITH BIOPSY N/A  9/26/2023    Procedure: KYPHOPLASTY WITH BIOPSY T12 and L3;  Surgeon: Jeremiah Brantley MD;  Location:  [facility] OR;  Service: Neurosurgery;  Laterality: N/A;    LEG THROMBECTOMY/EMBOLECTOMY Right 5/20/2021    Procedure: RT GROIN EXPLORATION;  Surgeon: Geoff Remy DO;  Location:  [facility] HYBRID OR 12;  Service: Vascular;  Laterality: Right;    TRIGEMINAL NERVE DECOMPRESSION       PT Assessment (last 12 hours)       PT Evaluation and Treatment       Row Name 10/03/23 1100          Physical Therapy Time and Intention    Subjective Information no complaints  -AE     Document Type therapy note (daily note)  -AE     Mode of Treatment physical therapy  -AE       Row Name 10/03/23 1100          General Information    Existing Precautions/Restrictions fall;spinal  -AE       Row Name 10/03/23 1100          Pain    Pretreatment Pain Rating 0/10 - no pain  -AE     Posttreatment Pain Rating 0/10 - no pain  -AE     Pain Location - back  -AE     Pain Intervention(s) Repositioned  -AE       Row Name 10/03/23 1100          Bed Mobility    Comment, (Bed Mobility) SITTING EOB  -AE       Row Name 10/03/23 1100          Sit-Stand Transfer    Sit-Stand [place] (Transfers) contact guard;verbal cues  -AE       Row Name 10/03/23 1100          Stand-Sit Transfer    Stand-Sit [place] (Transfers) contact guard  -AE       Row Name 10/03/23 1100          Gait/Stairs (Locomotion)    [place] Level (Gait) contact guard  -AE     Assistive Device (Gait) walker, front-wheeled  -AE     Distance in Feet (Gait) 40  -AE     Deviations/Abnormal Patterns (Gait) gait speed decreased;stride length decreased  -AE     Bilateral Gait Deviations forward flexed posture  -AE       Row Name 10/03/23 1100          Motor Skills    Therapeutic Exercise --  STANDING HEEL RAISES 20 REPS  -AE       Row Name 10/03/23 1100          Knee (Therapeutic Exercise)    Knee (Therapeutic Exercise) AROM (active range of motion)  -AE     Knee AROM (Therapeutic  Exercise) LAQ (long arc quad)  -AE       Row Name 10/03/23 1100          Ankle (Therapeutic Exercise)    Ankle (Therapeutic Exercise) AROM (active range of motion)  -AE       Row Name             Wound 09/26/23 1640 lumbar spine Puncture    Wound - Properties Group Placement Date: 09/26/23  -DT Placement Time: 1640  -DT Present on Hospital Admission: N  -DT Location: lumbar spine  -DT Primary Wound Type: Puncture  -DT    Retired Wound - Properties Group Placement Date: 09/26/23  -DT Placement Time: 1640  -DT Present on Hospital Admission: N  -DT Location: lumbar spine  -DT Primary Wound Type: Puncture  -DT    Retired Wound - Properties Group Date first assessed: 09/26/23  -DT Time first assessed: 1640  -DT Present on Hospital Admission: N  -DT Location: lumbar spine  -DT Primary Wound Type: Puncture  -DT      Row Name             Wound 09/26/23 1640 thoracic spine Puncture    Wound - Properties Group Placement Date: 09/26/23  -DT Placement Time: 1640  -DT Present on Hospital Admission: N  -DT Location: thoracic spine  -DT Primary Wound Type: Puncture  -DT    Retired Wound - Properties Group Placement Date: 09/26/23  -DT Placement Time: 1640  -DT Present on Hospital Admission: N  -DT Location: thoracic spine  -DT Primary Wound Type: Puncture  -DT    Retired Wound - Properties Group Date first assessed: 09/26/23  -DT Time first assessed: 1640  -DT Present on Hospital Admission: N  -DT Location: thoracic spine  -DT Primary Wound Type: Puncture  -DT      Row Name 10/03/23 1100          Positioning and Restraints    Pre-Treatment Position in bed  -AE     Post Treatment Position bed  -AE     In Bed sitting EOB;call light within reach  -AE               User Key  (r) = Recorded By, (t) = Taken By, (c) = Cosigned By      Initials Name Provider Type    AE Jen Hutton, PTA Physical Therapist Assistant    DT Pedro Montoya RN Registered Nurse                    Physical Therapy Education       Title: PT OT SLP  Therapies (Done)       Topic: Physical Therapy (Done)       Point: Mobility training (Done)       Learning Progress Summary             Patient Acceptance, E,TB,D, DU,NR,VU by  at 10/2/2023 1722    Acceptance, E,TB, VU,DU,NR by  at 10/1/2023 1737    Eager, E, VU,NR by  at 9/27/2023 0745    Comment: Spinal precautions. Pressure injury prevention. Cueing for proper transfers with FWW.   Family Acceptance, E,TB, VU,DU,NR by  at 10/1/2023 1737    Eager, E, VU,NR by  at 9/27/2023 0745    Comment: Spinal precautions. Pressure injury prevention. Cueing for proper transfers with FWW.                         Point: Body mechanics (Done)       Learning Progress Summary             Patient Acceptance, E,TB,D, DU,NR,VU by  at 10/2/2023 1722    Acceptance, E,TB, VU,DU,NR by  at 10/1/2023 1737    Eager, E, VU,NR by  at 9/27/2023 0745    Comment: Spinal precautions. Pressure injury prevention. Cueing for proper transfers with FWW.   Family Acceptance, E,TB, VU,DU,NR by  at 10/1/2023 1737    Eager, E, VU,NR by  at 9/27/2023 0745    Comment: Spinal precautions. Pressure injury prevention. Cueing for proper transfers with FWW.                         Point: Precautions (Done)       Learning Progress Summary             Patient Acceptance, E,TB,D, DU,NR,VU by  at 10/2/2023 1722    Eager, E, VU,NR by  at 9/27/2023 0745    Comment: Spinal precautions. Pressure injury prevention. Cueing for proper transfers with FWW.   Family Eager, E, VU,NR by  at 9/27/2023 0745    Comment: Spinal precautions. Pressure injury prevention. Cueing for proper transfers with FWW.                                         User Key       Initials Effective Dates Name Provider Type Discipline     07/07/23 -  Nikki Kiran, RN Registered Nurse Nurse    SOWALDO 07/13/23 -  Marcin Estrada, PT Student PT Student PT                  PT Recommendation and Plan     Plan of Care Reviewed With: patient  Progress: improving  Outcome Evaluation: Pt  was seated EOB with no c/o pain. Pt was cga to stand and sba with RW to walk 40ft at one time.Pt completed 20 reps of standing ex's at rail. Pt would benefit from continued PT at SNF.   Outcome Measures       Row Name 10/02/23 1000 10/01/23 1100          How much help from another person do you currently need...    Turning from your back to your side while in flat bed without using bedrails? 3  -AH 3  -AH     Moving from lying on back to sitting on the side of a flat bed without bedrails? 3  -AH 3  -AH     Moving to and from a bed to a chair (including a wheelchair)? 3  -AH 3  -AH     Standing up from a chair using your arms (e.g., wheelchair, bedside chair)? 3  -AH 3  -AH     Climbing 3-5 steps with a railing? 3  -AH 3  -AH     To walk in hospital room? 3  -AH 3  -AH     AM-PAC 6 Clicks Score (PT) 18  -AH 18  -AH        Functional Assessment    Outcome Measure Options AM-PAC 6 Clicks Basic Mobility (PT)  -AH AM-PAC 6 Clicks Basic Mobility (PT)  -AH               User Key  (r) = Recorded By, (t) = Taken By, (c) = Cosigned By      Initials Name Provider Type     Lissette Flood, PTA Physical Therapist Assistant                     Time Calculation:    PT Charges       Row Name 10/03/23 1117             Time Calculation    Start Time 1100  -AE      Stop Time 1115  -AE      Time Calculation (min) 15 min  -AE      PT Received On 10/03/23  -AE      PT Goal Re-Cert Due Date 10/07/23  -AE         Time Calculation- PT    Total Timed Code Minutes- PT 15 minute(s)  -AE         Timed Charges    64535 - Gait Training Minutes  15  -AE         Total Minutes    Timed Charges Total Minutes 15  -AE       Total Minutes 15  -AE                User Key  (r) = Recorded By, (t) = Taken By, (c) = Cosigned By      Initials Name Provider Type    Jne Zuniga, PTA Physical Therapist Assistant                  Therapy Charges for Today       Code Description Service Date Service Provider Modifiers Qty    [id number] HC GAIT TRAINING  EA 15 MIN 10/3/2023 Jen Hutton, PTA GP 1            PT G-Codes  Outcome Measure Options: AM-PAC 6 Clicks Basic Mobility (PT)  AM-PAC 6 Clicks Score (PT): 18  AM-PAC 6 Clicks Score (OT): 19    Jen Hutton PTA  10/3/2023

## 2023-10-03 NOTE — PLAN OF CARE
Problem: Adult Inpatient Plan of Care  Goal: Plan of Care Review  Recent Flowsheet Documentation  Taken 10/3/2023 0937 by Gary Bartholomew, OTR/L  Progress: no change  Plan of Care Reviewed With:   patient   daughter  Outcome Evaluation: OT tx completed. Pt reports incisional back pain 1/10. Pt was SBA for bed mobility. Pt was CGA-supervision for sit to stand t/f. Pt was CGA with RW for functional mobility. Pt with min decreased safety awareness. Pt was supervision to CGA for grooming tasks at sink side. Verbal cues and set up required for ADL tasks. Continue OT POC.

## 2023-10-03 NOTE — PLAN OF CARE
Goal Outcome Evaluation:  Plan of Care Reviewed With: patient        Progress: improving  Outcome Evaluation: Pt was seated EOB with no c/o pain. Pt was cga to stand and sba with RW to walk 40ft at one time.Pt completed 20 reps of standing ex's at rail. Pt would benefit from continued PT at SNF.

## 2023-10-03 NOTE — PLAN OF CARE
Goal Outcome Evaluation:  Plan of Care Reviewed With: patient        Progress: no change   Pt alert but forgetful at times. Voiding-inct. Up ambulating with SBA and walker. Back incision CDI. Daughter at bedside assisting with care.

## 2023-10-03 NOTE — PROGRESS NOTES
"Brennon Vance  88 y.o.      Chief complaint:   Compression fracture status post kyphoplasty    Subjective  No events overnight    Temp:  [98.1 °F (36.7 °C)-99.2 °F (37.3 °C)] 98.5 °F (36.9 °C)  Heart Rate:  [71-79] 74  Resp:  [14-16] 16  BP: (125-153)/(57-72) 146/66      Objective:  General Appearance:  Comfortable, well-appearing, in no acute distress and in pain.    Vital signs: (most recent): Blood pressure 146/66, pulse 74, temperature 98.5 °F (36.9 °C), temperature source Oral, resp. rate 16, height 177.8 cm (70\"), weight 45.5 kg (100 lb 3.2 oz), SpO2 94 %.  Vital signs are normal.  No fever.    Output: Producing urine.    HEENT: Normal HEENT exam.    Lungs:  Normal effort and normal respiratory rate.  He is not in respiratory distress.    Heart: Normal rate.  Regular rhythm.    Chest: Symmetric chest wall expansion.   Extremities: Normal range of motion.    Skin:  Warm and dry.    Abdomen: Abdomen is soft and non-distended.  Bowel sounds are normal.   There is no abdominal tenderness.     Pulses: Distal pulses are intact.      Neurologic Exam     Mental Status   Oriented to person, place, and time.   Attention: normal. Concentration: normal.   Speech: speech is normal   Level of consciousness: alert  Normal comprehension.     Cranial Nerves     CN II   Visual fields full to confrontation.     CN III, IV, VI   Pupils are equal, round, and reactive to light.  Extraocular motions are normal.     CN V   Facial sensation intact.     CN VII   Facial expression full, symmetric.     CN VIII   CN VIII normal.     CN IX, X   CN IX normal.   CN X normal.     CN XI   CN XI normal.     CN XII   CN XII normal.     Motor Exam   Muscle bulk: normal    Strength   Strength 5/5 throughout.     Sensory Exam   Light touch normal.     Gait, Coordination, and Reflexes     Gait  Gait: normal    Reflexes   Reflexes 2+ except as noted.     Lab Results (last 24 hours)       ** No results found for the last 24 hours. **                Plan: "   Patient is doing well.  Continue with physical and Occupational Therapy.  Waiting for rehab placement      Intractable low back pain    Coronary artery disease involving native coronary artery of native heart without angina pectoris    Paroxysmal atrial fibrillation    Other emphysema    Essential hypertension    UTI (urinary tract infection)    Aspiration pneumonia    Stage 3b chronic kidney disease    Compression fracture of T12 vertebra with delayed healing    Closed compression fracture of third lumbar vertebra        Rohan Arizmendi, APRN

## 2023-10-03 NOTE — CASE MANAGEMENT/SOCIAL WORK
Continued Stay Note   [place]     Patient Name: Brennon Vance  MRN: [id number]  Today's Date: 10/3/2023    Admit Date: 9/24/2023    Plan: SNF Referrals   Discharge Plan       Row Name 10/03/23 1222       Plan    Plan Comments Precert still pending for [place]. Pt will need a new covid test prior to dc. Pharmacy updated in [facility] for [place].                   Discharge Codes    No documentation.                 Expected Discharge Date and Time       Expected Discharge Date Expected Discharge Time    Sep 29, 2023               FAISAL Nava

## 2023-10-03 NOTE — THERAPY TREATMENT NOTE
Patient Name: Brennon Vance  : 1935    MRN: [id number]                              Today's Date: 10/3/2023       Admit Date: 2023    Visit Dx:     ICD-10-CM ICD-9-CM   1. Acute cystitis without hematuria  N30.00 595.0   2. Acute bilateral low back pain with sciatica, sciatica laterality unspecified  M54.40 724.2     724.3   3. Shortness of breath  R06.02 786.05   4. Compression fracture of T12 vertebra with delayed healing  S22.080G V54.27   5. Closed compression fracture of L3 lumbar vertebra with delayed healing, subsequent encounter  S32.030G V54.17   6. Impaired mobility [Z74.09 (ICD-10-CM)]  Z74.09 799.89   7. Decreased activities of daily living (ADL) [Z78.9 (ICD-10-CM)]  Z78.9 V49.89     Patient Active Problem List   Diagnosis    Chest pain    NSTEMI, initial episode of care    Coronary artery disease involving native coronary artery of native heart without angina pectoris    Paroxysmal atrial fibrillation    Other emphysema    Essential hypertension    Precordial pain    Hyperlipidemia LDL goal <70    Tobacco abuse    Cardiomyopathy    NSTEMI (non-ST elevated myocardial infarction)    UTI (urinary tract infection)    Aspiration pneumonia    Intractable low back pain    Stage 3b chronic kidney disease    Compression fracture of T12 vertebra with delayed healing    Closed compression fracture of third lumbar vertebra     Past Medical History:   Diagnosis Date    CAD (coronary artery disease)     COPD (chronic obstructive pulmonary disease)     Hiatal hernia     Hyperlipidemia     Hypertension     Stroke      Past Surgical History:   Procedure Laterality Date    ABDOMINAL AORTIC ANEURYSM REPAIR      CARDIAC CATHETERIZATION N/A 2021    Procedure: Left Heart Cath;  Surgeon: Harrison Alcantara MD;  Location:  [facility];  Service: Cardiology;  Laterality: N/A;    CORONARY ANGIOPLASTY WITH STENT PLACEMENT      FEMORAL ARTERY STENT      KYPHOPLASTY WITH BIOPSY N/A 2023     Procedure: KYPHOPLASTY WITH BIOPSY T12 and L3;  Surgeon: Jeremiah Brantley MD;  Location:  [facility] OR;  Service: Neurosurgery;  Laterality: N/A;    LEG THROMBECTOMY/EMBOLECTOMY Right 5/20/2021    Procedure: RT GROIN EXPLORATION;  Surgeon: Geoff Remy DO;  Location:  [facility] HYBRID OR 12;  Service: Vascular;  Laterality: Right;    TRIGEMINAL NERVE DECOMPRESSION        General Information       Row Name 10/03/23 0937          OT Time and Intention    Document Type therapy note (daily note)  -MM     Mode of Treatment occupational therapy  -MM       Row Name 10/03/23 0937          General Information    Patient Profile Reviewed yes  -MM     Existing Precautions/Restrictions fall;spinal  -MM     Barriers to Rehab medically complex  -MM       Row Name 10/03/23 0937          Safety Issues, Functional Mobility    Safety Issues Affecting Function (Mobility) at risk behavior observed;insight into deficits/self-awareness;safety precaution awareness;safety precautions follow-through/compliance  -MM     Impairments Affecting Function (Mobility) balance;cognition;endurance/activity tolerance;pain;strength;postural/trunk control  -MM     Cognitive Impairments, Mobility Safety/Performance attention;awareness, need for assistance;insight into deficits/self-awareness;safety precaution awareness;safety precaution follow-through  -MM               User Key  (r) = Recorded By, (t) = Taken By, (c) = Cosigned By      Initials Name Provider Type    MM Gary Bartholomew, OTR/L Occupational Therapist                     Mobility/ADL's       Row Name 10/03/23 0937          Bed Mobility    Bed Mobility rolling right;sidelying-sit  -MM     Rolling Right [place] (Bed Mobility) standby assist  -MM     Sidelying-Sit [place] (Bed Mobility) standby assist  -MM     Assistive Device (Bed Mobility) bed rails  -MM       Row Name 10/03/23 0937          Transfers    Transfers sit-stand transfer;stand-sit transfer  -MM       Row Name 10/03/23  0937          Sit-Stand Transfer    Sit-Stand [place] (Transfers) contact guard;verbal cues  -MM     Assistive Device (Sit-Stand Transfers) walker, front-wheeled  -MM       Row Name 10/03/23 0937          Stand-Sit Transfer    Stand-Sit [place] (Transfers) contact guard;supervision;verbal cues  -MM     Assistive Device (Stand-Sit Transfers) walker, front-wheeled  -MM       Row Name 10/03/23 0937          Toilet Transfer    Type (Toilet Transfer) --  -MM     [place] Level (Toilet Transfer) --  -MM     Assistive Device (Toilet Transfer) --  -MM       Row Name 10/03/23 0937          Functional Mobility    Functional Mobility- Ind. Level contact guard assist  -MM     Functional Mobility- Device walker, front-wheeled  -MM     Functional Mobility- Comment bed to BR to bed to whitten to bed  -MM       Row Name 10/03/23 0937          Activities of Daily Living    BADL Assessment/Intervention grooming  -       Row Name 10/03/23 0937          Grooming Assessment/Training    [place] Level (Grooming) hair care, combing/brushing;oral care regimen;wash face, hands;supervision;contact guard assist;verbal cues;set up  -MM     Position (Grooming) sink side;supported sitting;supported standing  -MM               User Key  (r) = Recorded By, (t) = Taken By, (c) = Cosigned By      Initials Name Provider Type    Gary Reis, OTR/L Occupational Therapist                   Obj/Interventions       Row Name 10/03/23 0937          Balance    Static Sitting Balance standby assist  -MM     Dynamic Sitting Balance standby assist  -MM     Position, Sitting Balance unsupported;sitting edge of bed  -MM     Static Standing Balance contact guard  -MM     Dynamic Standing Balance contact guard  -MM     Position/Device Used, Standing Balance supported;walker, front-wheeled  -MM               User Key  (r) = Recorded By, (t) = Taken By, (c) = Cosigned By      Initials Name Provider Type    Gary Reis, OTR/L  Occupational Therapist                   Goals/Plan    No documentation.                  Clinical Impression       Row Name 10/03/23 0937          Pain Assessment    Pretreatment Pain Rating 1/10  -MM     Posttreatment Pain Rating 1/10  -MM     Pain Location incisional  -MM     Pain Location - back  -MM     Pain Intervention(s) Medication (See MAR);Repositioned;Ambulation/increased activity  -MM       Row Name 10/03/23 0937          Plan of Care Review    Plan of Care Reviewed With patient;daughter  -MM     Progress no change  -MM     Outcome Evaluation OT tx completed. Pt reports incisional back pain 1/10. Pt was SBA for bed mobility. Pt was CGA-supervision for sit to stand t/f. Pt was CGA with RW for functional mobility. Pt with min decreased safety awareness. Pt was supervision to CGA for grooming tasks at sink side. Verbal cues and set up required for ADL tasks. Continue OT POC.  -MM       Row Name 10/03/23 0937          Therapy Plan Review/Discharge Plan (OT)    Anticipated Discharge Disposition (OT) sub acute care setting;inpatient rehabilitation facility  -MM       Row Name 10/03/23 0937          Positioning and Restraints    Pre-Treatment Position in bed  -MM     Post Treatment Position bed  -MM     In Bed sitting EOB;call light within reach;encouraged to call for assist;with family/caregiver;side rails up x2  -MM               User Key  (r) = Recorded By, (t) = Taken By, (c) = Cosigned By      Initials Name Provider Type    MM Gary Bartholomew, OTR/L Occupational Therapist                   Outcome Measures       Row Name 10/03/23 0937          How much help from another is currently needed...    Putting on and taking off regular lower body clothing? 3  -MM     Bathing (including washing, rinsing, and drying) 3  -MM     Toileting (which includes using toilet bed pan or urinal) 3  -MM     Putting on and taking off regular upper body clothing 3  -MM     Taking care of personal grooming (such as brushing  teeth) 3  -MM     Eating meals 4  -MM     AM-PAC 6 Clicks Score (OT) 19  -MM       Row Name 10/03/23 0945          How much help from another person do you currently need...    Turning from your back to your side while in flat bed without using bedrails? 3  -CK     Moving from lying on back to sitting on the side of a flat bed without bedrails? 3  -CK     Moving to and from a bed to a chair (including a wheelchair)? 3  -CK     Standing up from a chair using your arms (e.g., wheelchair, bedside chair)? 3  -CK     Climbing 3-5 steps with a railing? 3  -CK     To walk in hospital room? 3  -CK     AM-PAC 6 Clicks Score (PT) 18  -CK     Highest level of mobility 6 --> Walked 10 steps or more  -CK       Row Name 10/03/23 0937          Functional Assessment    Outcome Measure Options AM-PAC 6 Clicks Daily Activity (OT)  -MM               User Key  (r) = Recorded By, (t) = Taken By, (c) = Cosigned By      Initials Name Provider Type    MM Gary Bartholomew, OTR/L Occupational Therapist    CK Lev Roque, RN Registered Nurse                    Occupational Therapy Education       Title: PT OT SLP Therapies (Done)       Topic: Occupational Therapy (Done)       Point: ADL training (Done)       Description:   Instruct learner(s) on proper safety adaptation and remediation techniques during self care or transfers.   Instruct in proper use of assistive devices.                  Learning Progress Summary             Patient Acceptance, E, VU,NR by AUDREY at 10/3/2023 1447    Acceptance, E,TB,D, DU,NR,VU by CHANDRA at 10/2/2023 1722    Acceptance, E, VU by ALFREDO at 9/30/2023 1157    Comment: Pt educated on safety with RW during functional mobility.    Acceptance, E, VU by MB at 9/27/2023 0859   Family Acceptance, E, VU by ALFREDO at 9/30/2023 1157    Comment: Pt educated on safety with RW during functional mobility.    Acceptance, E, VU by MB at 9/27/2023 0859                         Point: Home exercise program (Done)       Description:    Instruct learner(s) on appropriate technique for monitoring, assisting and/or progressing therapeutic exercises/activities.                  Learning Progress Summary             Patient Acceptance, E,TB,D, DU,NR,VU by  at 10/2/2023 1722    Acceptance, E, VU by MB at 9/27/2023 0859   Family Acceptance, E, VU by MB at 9/27/2023 0859                         Point: Precautions (Done)       Description:   Instruct learner(s) on prescribed precautions during self-care and functional transfers.                  Learning Progress Summary             Patient Acceptance, E, VU,NR by MM at 10/3/2023 1447    Acceptance, E,TB,D, DU,NR,VU by  at 10/2/2023 1722    Acceptance, E,TB, VU,DU,NR by  at 10/1/2023 1737    Acceptance, E, VU by MB at 9/27/2023 0859   Family Acceptance, E,TB, VU,DU,NR by  at 10/1/2023 1737    Acceptance, E, VU by MB at 9/27/2023 0859                         Point: Body mechanics (Done)       Description:   Instruct learner(s) on proper positioning and spine alignment during self-care, functional mobility activities and/or exercises.                  Learning Progress Summary             Patient Acceptance, E, VU,NR by MM at 10/3/2023 1447    Acceptance, E,TB,D, DU,NR,VU by  at 10/2/2023 1722    Acceptance, E,TB, VU,DU,NR by  at 10/1/2023 1737    Acceptance, E, VU by MB at 9/27/2023 0859   Family Acceptance, E,TB, VU,DU,NR by  at 10/1/2023 1737    Acceptance, E, VU by MB at 9/27/2023 0859                                         User Key       Initials Effective Dates Name Provider Type Discipline     07/11/23 -  Gary Bartholomew, OTR/L Occupational Therapist OT     09/22/22 -  Karime Ennis COTA Occupational Therapist Assistant THERAPIES     07/07/23 -  Nikki Kiran RN Registered Nurse Nurse    MB 08/04/23 -  Virginia Cunningham OT Student OT Student OT                  OT Recommendation and Plan     Plan of Care Review  Plan of Care Reviewed With: patient, daughter  Progress: no  change  Outcome Evaluation: OT tx completed. Pt reports incisional back pain 1/10. Pt was SBA for bed mobility. Pt was CGA-supervision for sit to stand t/f. Pt was CGA with RW for functional mobility. Pt with min decreased safety awareness. Pt was supervision to CGA for grooming tasks at sink side. Verbal cues and set up required for ADL tasks. Continue OT POC.     Time Calculation:         Time Calculation- OT       Row Name 10/03/23 1117 10/03/23 0937          Time Calculation- OT    OT Start Time -- 0937  -MM     OT Stop Time -- 1019  -MM     OT Time Calculation (min) -- 42 min  -MM     Total Timed Code Minutes- OT -- 42 minute(s)  -MM     OT Received On -- 10/03/23  -MM        Timed Charges    09801 - Gait Training Minutes  15  -AE --     23366 - OT Therapeutic Activity Minutes -- 25  -MM     57396 - OT Self Care/Mgmt Minutes -- 17  -MM        Total Minutes    Timed Charges Total Minutes 15  -AE 42  -MM      Total Minutes 15  -AE 42  -MM               User Key  (r) = Recorded By, (t) = Taken By, (c) = Cosigned By      Initials Name Provider Type    AE Jen Hutton, PTA Physical Therapist Assistant    MM Gary Bartholomew, OTR/L Occupational Therapist                  Therapy Charges for Today       Code Description Service Date Service Provider Modifiers Qty    40358696270 HC OT THERAPEUTIC ACT EA 15 MIN 10/3/2023 Gary Bartholomew, OTR/L GO 2    86594657095 HC OT SELF CARE/MGMT/TRAIN EA 15 MIN 10/3/2023 Gary Bartholomew OTR/L GO 1                 NEENA Ball/ABELARDO  10/3/2023

## 2023-10-03 NOTE — PLAN OF CARE
Goal Outcome Evaluation:  Plan of Care Reviewed With: patient, family        Progress: improving  Outcome Evaluation: A&Ox3-4, forgetful at times. No c/o pain this shift. Pt having difficulty sleeping. Prn given, not effective. Pt incont b/b. Brief checked and changed throughout shift. Nystatin angel applied per orders. Daughter at bs. Call light within reach. Safety maintained.

## 2023-10-04 PROBLEM — I25.118 CORONARY ARTERY DISEASE OF NATIVE ARTERY OF NATIVE HEART WITH STABLE ANGINA PECTORIS: Status: ACTIVE | Noted: 2021-05-19

## 2023-10-04 LAB
GEN 5 2HR TROPONIN T REFLEX: 29 NG/L
QT INTERVAL: 398 MS
QTC INTERVAL: 467 MS
TROPONIN T DELTA: 0 NG/L
TROPONIN T SERPL HS-MCNC: 29 NG/L

## 2023-10-04 PROCEDURE — A9270 NON-COVERED ITEM OR SERVICE: HCPCS | Performed by: NURSE PRACTITIONER

## 2023-10-04 PROCEDURE — 63710000001 CARVEDILOL 6.25 MG TABLET: Performed by: NURSE PRACTITIONER

## 2023-10-04 PROCEDURE — 63710000001 SODIUM BICARBONATE 650 MG TABLET: Performed by: NURSE PRACTITIONER

## 2023-10-04 PROCEDURE — 84484 ASSAY OF TROPONIN QUANT: CPT | Performed by: STUDENT IN AN ORGANIZED HEALTH CARE EDUCATION/TRAINING PROGRAM

## 2023-10-04 PROCEDURE — 94799 UNLISTED PULMONARY SVC/PX: CPT

## 2023-10-04 PROCEDURE — 63710000001 ISOSORBIDE MONONITRATE 60 MG TABLET SUSTAINED-RELEASE 24 HOUR: Performed by: NURSE PRACTITIONER

## 2023-10-04 PROCEDURE — 63710000001 POTASSIUM CHLORIDE 20 MEQ TABLET CONTROLLED-RELEASE: Performed by: NURSE PRACTITIONER

## 2023-10-04 PROCEDURE — 63710000001 HYDROXYZINE 25 MG TABLET

## 2023-10-04 PROCEDURE — 63710000001 MIRTAZAPINE 15 MG TABLET: Performed by: NURSE PRACTITIONER

## 2023-10-04 PROCEDURE — 63710000001 TAMSULOSIN 0.4 MG CAPSULE: Performed by: NURSE PRACTITIONER

## 2023-10-04 PROCEDURE — 63710000001 CARBAMAZEPINE 200 MG TABLET: Performed by: NURSE PRACTITIONER

## 2023-10-04 PROCEDURE — 63710000001 CHOLECALCIFEROL 25 MCG (1000 UT) TABLET: Performed by: NURSE PRACTITIONER

## 2023-10-04 PROCEDURE — 63710000001 ROSUVASTATIN 20 MG TABLET: Performed by: NURSE PRACTITIONER

## 2023-10-04 PROCEDURE — A9270 NON-COVERED ITEM OR SERVICE: HCPCS

## 2023-10-04 PROCEDURE — 63710000001 PROBIOTIC 250 MG CAPSULE: Performed by: NURSE PRACTITIONER

## 2023-10-04 PROCEDURE — 97116 GAIT TRAINING THERAPY: CPT

## 2023-10-04 PROCEDURE — 63710000001 GUAIFENESIN 600 MG TABLET SUSTAINED-RELEASE 12 HOUR: Performed by: NURSE PRACTITIONER

## 2023-10-04 PROCEDURE — 63710000001 LEVOTHYROXINE 50 MCG TABLET: Performed by: NURSE PRACTITIONER

## 2023-10-04 PROCEDURE — 93005 ELECTROCARDIOGRAM TRACING: CPT | Performed by: STUDENT IN AN ORGANIZED HEALTH CARE EDUCATION/TRAINING PROGRAM

## 2023-10-04 PROCEDURE — 97530 THERAPEUTIC ACTIVITIES: CPT

## 2023-10-04 PROCEDURE — 99024 POSTOP FOLLOW-UP VISIT: CPT | Performed by: NURSE PRACTITIONER

## 2023-10-04 PROCEDURE — G0378 HOSPITAL OBSERVATION PER HR: HCPCS

## 2023-10-04 PROCEDURE — 63710000001 PANTOPRAZOLE 40 MG TABLET DELAYED-RELEASE: Performed by: NURSE PRACTITIONER

## 2023-10-04 PROCEDURE — 63710000001 CIPROFLOXACIN 500 MG TABLET: Performed by: NURSE PRACTITIONER

## 2023-10-04 PROCEDURE — 63710000001 FINASTERIDE 5 MG TABLET: Performed by: NURSE PRACTITIONER

## 2023-10-04 PROCEDURE — 63710000001 APIXABAN 2.5 MG TABLET: Performed by: NURSE PRACTITIONER

## 2023-10-04 PROCEDURE — 63710000001 SENNOSIDES-DOCUSATE 8.6-50 MG TABLET: Performed by: NURSE PRACTITIONER

## 2023-10-04 PROCEDURE — 63710000001 DILTIAZEM CD 180 MG CAPSULE SUSTAINED-RELEASE 24 HR: Performed by: NURSE PRACTITIONER

## 2023-10-04 PROCEDURE — 63710000001 NICOTINE 21 MG/24HR PATCH 24 HOUR: Performed by: NURSE PRACTITIONER

## 2023-10-04 RX ADMIN — ROSUVASTATIN CALCIUM 20 MG: 20 TABLET, FILM COATED ORAL at 20:33

## 2023-10-04 RX ADMIN — FINASTERIDE 5 MG: 5 TABLET, FILM COATED ORAL at 08:49

## 2023-10-04 RX ADMIN — Medication 10 ML: at 08:51

## 2023-10-04 RX ADMIN — Medication 250 MG: at 08:49

## 2023-10-04 RX ADMIN — Medication 10 ML: at 08:56

## 2023-10-04 RX ADMIN — APIXABAN 2.5 MG: 2.5 TABLET, FILM COATED ORAL at 20:33

## 2023-10-04 RX ADMIN — HYDROXYZINE HYDROCHLORIDE 50 MG: 25 TABLET, FILM COATED ORAL at 20:32

## 2023-10-04 RX ADMIN — CARVEDILOL 12.5 MG: 6.25 TABLET, FILM COATED ORAL at 17:49

## 2023-10-04 RX ADMIN — POTASSIUM CHLORIDE 20 MEQ: 1500 TABLET, EXTENDED RELEASE ORAL at 08:50

## 2023-10-04 RX ADMIN — DOCUSATE SODIUM 50 MG AND SENNOSIDES 8.6 MG 2 TABLET: 8.6; 5 TABLET, FILM COATED ORAL at 08:39

## 2023-10-04 RX ADMIN — GUAIFENESIN 600 MG: 600 TABLET, EXTENDED RELEASE ORAL at 20:33

## 2023-10-04 RX ADMIN — NYSTATIN: 100000 POWDER TOPICAL at 09:32

## 2023-10-04 RX ADMIN — GUAIFENESIN 600 MG: 600 TABLET, EXTENDED RELEASE ORAL at 08:48

## 2023-10-04 RX ADMIN — PANTOPRAZOLE SODIUM 40 MG: 40 TABLET, DELAYED RELEASE ORAL at 08:42

## 2023-10-04 RX ADMIN — Medication 250 MG: at 20:33

## 2023-10-04 RX ADMIN — Medication 3 ML: at 08:57

## 2023-10-04 RX ADMIN — SODIUM BICARBONATE 650 MG: 650 TABLET ORAL at 08:49

## 2023-10-04 RX ADMIN — TAMSULOSIN HYDROCHLORIDE 0.4 MG: 0.4 CAPSULE ORAL at 08:48

## 2023-10-04 RX ADMIN — DILTIAZEM HYDROCHLORIDE 180 MG: 180 CAPSULE, EXTENDED RELEASE ORAL at 08:50

## 2023-10-04 RX ADMIN — LEVOTHYROXINE SODIUM 50 MCG: 50 TABLET ORAL at 06:53

## 2023-10-04 RX ADMIN — MIRTAZAPINE 45 MG: 15 TABLET, FILM COATED ORAL at 20:33

## 2023-10-04 RX ADMIN — NYSTATIN: 100000 POWDER TOPICAL at 20:35

## 2023-10-04 RX ADMIN — HYDROXYZINE HYDROCHLORIDE 50 MG: 25 TABLET, FILM COATED ORAL at 08:41

## 2023-10-04 RX ADMIN — CARBAMAZEPINE 200 MG: 200 TABLET ORAL at 08:50

## 2023-10-04 RX ADMIN — BUDESONIDE AND FORMOTEROL FUMARATE DIHYDRATE 2 PUFF: 160; 4.5 AEROSOL RESPIRATORY (INHALATION) at 20:04

## 2023-10-04 RX ADMIN — NICOTINE 1 PATCH: 21 PATCH, EXTENDED RELEASE TRANSDERMAL at 09:31

## 2023-10-04 RX ADMIN — Medication 1000 UNITS: at 08:48

## 2023-10-04 RX ADMIN — CARVEDILOL 12.5 MG: 6.25 TABLET, FILM COATED ORAL at 08:39

## 2023-10-04 RX ADMIN — CIPROFLOXACIN 500 MG: 500 TABLET, FILM COATED ORAL at 13:29

## 2023-10-04 RX ADMIN — ISOSORBIDE MONONITRATE 60 MG: 60 TABLET, EXTENDED RELEASE ORAL at 08:39

## 2023-10-04 RX ADMIN — APIXABAN 2.5 MG: 2.5 TABLET, FILM COATED ORAL at 08:50

## 2023-10-04 RX ADMIN — SODIUM BICARBONATE 650 MG: 650 TABLET ORAL at 20:33

## 2023-10-04 NOTE — THERAPY TREATMENT NOTE
Acute Care - Physical Therapy Treatment Note  [facility]     Patient Name: Brennon Vance  : 1935  MRN: [id number]  Today's Date: 10/4/2023      Visit Dx:     ICD-10-CM ICD-9-CM   1. Acute cystitis without hematuria  N30.00 595.0   2. Acute bilateral low back pain with sciatica, sciatica laterality unspecified  M54.40 724.2     724.3   3. Shortness of breath  R06.02 786.05   4. Compression fracture of T12 vertebra with delayed healing  S22.080G V54.27   5. Closed compression fracture of L3 lumbar vertebra with delayed healing, subsequent encounter  S32.030G V54.17   6. Impaired mobility [Z74.09 (ICD-10-CM)]  Z74.09 799.89   7. Decreased activities of daily living (ADL) [Z78.9 (ICD-10-CM)]  Z78.9 V49.89     Patient Active Problem List   Diagnosis    Chest pain    NSTEMI, initial episode of care    Coronary artery disease of native artery of native heart with stable angina pectoris    Paroxysmal atrial fibrillation    Other emphysema    Essential hypertension    Precordial pain    Hyperlipidemia LDL goal <70    Tobacco abuse    Cardiomyopathy    NSTEMI (non-ST elevated myocardial infarction)    UTI (urinary tract infection)    Aspiration pneumonia    Intractable low back pain    Stage 3b chronic kidney disease    Compression fracture of T12 vertebra with delayed healing    Closed compression fracture of third lumbar vertebra     Past Medical History:   Diagnosis Date    CAD (coronary artery disease)     COPD (chronic obstructive pulmonary disease)     Hiatal hernia     Hyperlipidemia     Hypertension     Stroke      Past Surgical History:   Procedure Laterality Date    ABDOMINAL AORTIC ANEURYSM REPAIR      CARDIAC CATHETERIZATION N/A 2021    Procedure: Left Heart Cath;  Surgeon: Harrison Alcantara MD;  Location: [facility] INVASIVE LOCATION;  Service: Cardiology;  Laterality: N/A;    CORONARY ANGIOPLASTY WITH STENT PLACEMENT      FEMORAL ARTERY STENT      KYPHOPLASTY WITH BIOPSY N/A 2023     Procedure: KYPHOPLASTY WITH BIOPSY T12 and L3;  Surgeon: Jeremiah Brantley MD;  Location:  [facility] OR;  Service: Neurosurgery;  Laterality: N/A;    LEG THROMBECTOMY/EMBOLECTOMY Right 5/20/2021    Procedure: RT GROIN EXPLORATION;  Surgeon: Geoff Remy DO;  Location:  [facility] HYBRID OR 12;  Service: Vascular;  Laterality: Right;    TRIGEMINAL NERVE DECOMPRESSION       PT Assessment (last 12 hours)       PT Evaluation and Treatment       Row Name 10/04/23 1338 10/04/23 0931       Physical Therapy Time and Intention    Subjective Information no complaints (P)   -JAMEEL --    Document Type therapy note (daily note) (P)   -JAMEEL therapy note (daily note) (P)   -JAMEEL    Mode of Treatment physical therapy (P)   -JAMEEL physical therapy (P)   -JAMEEL    Comment, Session Not Performed -- Pt family asked for PT to come back after pt has his medincine. (P)   -JAMEEL    Patient Effort good (P)   -JAMEEL --      Row Name 10/04/23 1338          General Information    Existing Precautions/Restrictions fall;spinal (P)   -       Row Name 10/04/23 1338          Pain    Pretreatment Pain Rating 0/10 - no pain (P)   -JAMEEL     Posttreatment Pain Rating 0/10 - no pain (P)   -       Row Name 10/04/23 1338          Bed Mobility    Bed Mobility rolling right;sidelying-sit;sit-sidelying;rolling left (P)   -JAMEEL     Rolling Left Coffee (Bed Mobility) standby assist (P)   -JAMEEL     Rolling Right Coffee (Bed Mobility) standby assist (P)   -     Sidelying-Sit Coffee (Bed Mobility) standby assist (P)   -JAMEEL     Sit-Sidelying Coffee (Bed Mobility) standby assist (P)   -       Row Name 10/04/23 1338          Transfers    Transfers sit-stand transfer;stand-sit transfer (P)   -       Row Name 10/04/23 1338          Sit-Stand Transfer    Sit-Stand Coffee (Transfers) contact guard (P)   -     Assistive Device (Sit-Stand Transfers) walker, front-wheeled (P)   -       Row Name 10/04/23 1338          Stand-Sit Transfer    Stand-Sit  Sublimity (Transfers) contact guard (P)   -JAMEEL     Assistive Device (Stand-Sit Transfers) walker, front-wheeled (P)   -JAMEEL       Row Name 10/04/23 1338          Gait/Stairs (Locomotion)    Sublimity Level (Gait) contact guard;verbal cues (P)   -JAMEEL     Assistive Device (Gait) walker, front-wheeled (P)   -JAMEEL     Distance in Feet (Gait) 40' (P)   -JAMEEL     Deviations/Abnormal Patterns (Gait) gait speed decreased (P)   -JAMEEL     Bilateral Gait Deviations forward flexed posture (P)   -JAMEEL     Comment, (Gait/Stairs) cues for postural corrections. (P)   -JAMEEL       Row Name 10/04/23 1338          Motor Skills    Therapeutic Exercise aerobic (P)   -JAMEEL       Row Name 10/04/23 1338          Aerobic Exercise    Comment, Aerobic Exercise (Therapeutic Exercise) BLE and BUE AROM x 15 reps (P)   -JAMEEL       Row Name             Wound 09/26/23 1640 lumbar spine Puncture    Wound - Properties Group Placement Date: 09/26/23  -DT Placement Time: 1640  -DT Present on Hospital Admission: N  -DT Location: lumbar spine  -DT Primary Wound Type: Puncture  -DT    Retired Wound - Properties Group Placement Date: 09/26/23  -DT Placement Time: 1640  -DT Present on Hospital Admission: N  -DT Location: lumbar spine  -DT Primary Wound Type: Puncture  -DT    Retired Wound - Properties Group Date first assessed: 09/26/23  -DT Time first assessed: 1640  -DT Present on Hospital Admission: N  -DT Location: lumbar spine  -DT Primary Wound Type: Puncture  -DT      Row Name             Wound 09/26/23 1640 thoracic spine Puncture    Wound - Properties Group Placement Date: 09/26/23  -DT Placement Time: 1640  -DT Present on Hospital Admission: N  -DT Location: thoracic spine  -DT Primary Wound Type: Puncture  -DT    Retired Wound - Properties Group Placement Date: 09/26/23  -DT Placement Time: 1640  -DT Present on Hospital Admission: N  -DT Location: thoracic spine  -DT Primary Wound Type: Puncture  -DT    Retired Wound - Properties Group Date first assessed:  09/26/23  -DT Time first assessed: 1640  -DT Present on Hospital Admission: N  -DT Location: thoracic spine  -DT Primary Wound Type: Puncture  -DT      Row Name 10/04/23 1338          Plan of Care Review    Plan of Care Reviewed With patient (P)   -JAMEEL     Progress improving (P)   -JAMEEL     Outcome Evaluation Pt c/o of no pain. Bed mob SBA. Sit <> stand <> sit CGA. Pt amb 40' at a time with fww, cues required for postural corrections.BUE/BLE AROM x 15. Recommend cont PT for strengthening and balance. (P)   -JAMEEL       Row Name 10/04/23 1338          Positioning and Restraints    Pre-Treatment Position in bed (P)   -JAMEEL     Post Treatment Position bed (P)   -JAMEEL     In Bed call light within reach;fowlers (P)   -JAMEEL               User Key  (r) = Recorded By, (t) = Taken By, (c) = Cosigned By      Initials Name Provider Type    DT Pedro Montoya, RN Registered Nurse    Marcin Cool, PTA Student PTA Student                    Physical Therapy Education       Title: PT OT SLP Therapies (Done)       Topic: Physical Therapy (Done)       Point: Mobility training (Done)       Learning Progress Summary             Patient Acceptance, E,TB,D, DU,NR,VU by  at 10/2/2023 1722    Acceptance, E,TB, VU,DU,NR by  at 10/1/2023 1737    Eager, E, VU,NR by  at 9/27/2023 0745    Comment: Spinal precautions. Pressure injury prevention. Cueing for proper transfers with FWW.   Family Acceptance, E,TB, VU,DU,NR by  at 10/1/2023 1737    Eager, E, VU,NR by  at 9/27/2023 0745    Comment: Spinal precautions. Pressure injury prevention. Cueing for proper transfers with FWW.                         Point: Body mechanics (Done)       Learning Progress Summary             Patient Acceptance, E,TB,D, DU,NR,VU by  at 10/2/2023 1722    Acceptance, E,TB, VU,DU,NR by  at 10/1/2023 1737    Eager, E, VU,NR by  at 9/27/2023 0745    Comment: Spinal precautions. Pressure injury prevention. Cueing for proper transfers with FWW.   Family  Acceptance, E,TB, VU,DU,NR by  at 10/1/2023 1737    Eager, E, VU,NR by OSWALDO at 9/27/2023 0745    Comment: Spinal precautions. Pressure injury prevention. Cueing for proper transfers with FWW.                         Point: Precautions (Done)       Learning Progress Summary             Patient Acceptance, E,TB,D, DU,NR,VU by  at 10/2/2023 1722    Eager, E, VU,NR by OSWALOD at 9/27/2023 0745    Comment: Spinal precautions. Pressure injury prevention. Cueing for proper transfers with FWW.   Family Eager, E, VU,NR by OSWALDO at 9/27/2023 0745    Comment: Spinal precautions. Pressure injury prevention. Cueing for proper transfers with FWW.                                         User Key       Initials Effective Dates Name Provider Type Discipline     07/07/23 -  Nikki Kiran, RN Registered Nurse Nurse    OSWALDO 07/13/23 -  Marcin Estrada, PT Student PT Student PT                  PT Recommendation and Plan     Plan of Care Reviewed With: (P) patient  Progress: (P) improving  Outcome Evaluation: (P) Pt c/o of no pain. Bed mob SBA. Sit <> stand <> sit CGA. Pt amb 40' at a time with fww, cues required for postural corrections.BUE/BLE AROM x 15. Recommend cont PT for strengthening and balance.   Outcome Measures       Row Name 10/04/23 1400 10/04/23 1000 10/02/23 1000       How much help from another person do you currently need...    Turning from your back to your side while in flat bed without using bedrails? 4 (P)   -JAMEEL -- 3  -AH    Moving from lying on back to sitting on the side of a flat bed without bedrails? 4 (P)   -JAMEEL -- 3  -AH    Moving to and from a bed to a chair (including a wheelchair)? 3 (P)   -JAMEEL -- 3  -AH    Standing up from a chair using your arms (e.g., wheelchair, bedside chair)? 3 (P)   -JAMEEL -- 3  -AH    Climbing 3-5 steps with a railing? 3 (P)   -JAMEEL -- 3  -AH    To walk in hospital room? 3 (P)   -JAMEEL -- 3  -AH    AM-PAC 6 Clicks Score (PT) 20 (P)   -JAMEEL -- 18  -AH       How much help from another is currently  needed...    Putting on and taking off regular lower body clothing? -- 3  -EC --    Bathing (including washing, rinsing, and drying) -- 3  -EC --    Toileting (which includes using toilet bed pan or urinal) -- 3  -EC --    Putting on and taking off regular upper body clothing -- 3  -EC --    Taking care of personal grooming (such as brushing teeth) -- 3  -EC --    Eating meals -- 4  -EC --    AM-PAC 6 Clicks Score (OT) -- 19  -EC --       Functional Assessment    Outcome Measure Options AM-PAC 6 Clicks Basic Mobility (PT) (P)   -JAMEEL AM-PAC 6 Clicks Daily Activity (OT)  -EC AM-PAC 6 Clicks Basic Mobility (PT)  -              User Key  (r) = Recorded By, (t) = Taken By, (c) = Cosigned By      Initials Name Provider Type     Lissette Flood, PTA Physical Therapist Assistant    Manisha Sotelo, OT Occupational Therapist    Marcin Cool, PTA Student PTA Student                     Time Calculation:    PT Charges       Row Name 10/04/23 1410             Time Calculation    Start Time 1338 (P)   -JAMEEL      Stop Time 1356 (P)   -JAMEEL      Time Calculation (min) 18 min (P)   -JAMEEL      PT Received On 10/04/23 (P)   -JAMEEL      PT Goal Re-Cert Due Date 10/07/23 (P)   -JAMEEL         Time Calculation- PT    Total Timed Code Minutes- PT 18 minute(s) (P)   -                User Key  (r) = Recorded By, (t) = Taken By, (c) = Cosigned By      Initials Name Provider Type    Marcin Cool PTA Student PTA Student                      PT G-Codes  Outcome Measure Options: (P) AM-PAC 6 Clicks Basic Mobility (PT)  AM-PAC 6 Clicks Score (PT): (P) 20  AM-PAC 6 Clicks Score (OT): 19    Marcin Pardo PTA Student  10/4/2023

## 2023-10-04 NOTE — PLAN OF CARE
Goal Outcome Evaluation:  Plan of Care Reviewed With: patient        Progress: improving  Outcome Evaluation: OT tx completed. Mr. Vance was sitting up in bed w/ 3 L O2 via nc. Pt required encouragement to participate in therapy as he was fatigued. Pt declined OOB activity but agreed to B UE ROM & sit<>stand t/f's to improve strength & act tolerance. Pt completed rolled R & sidelying>sit w/ SBA, demo'd good sitting balance EOB. Pt completed 2 x 10 reps of B UE AROM exercises. c/o SOA, SpO2 99%. Pt completed 2 x 6 sit<>stand t/f's with 1 sitting rest break,edu'd on pursed lip breathing. Pt returned to supine SBA. Continue OT POC, recommend cont rehab at d/c.

## 2023-10-04 NOTE — PROGRESS NOTES
[facility] Palliative Care Services    Palliative Care Daily Progress Note   Chief complaint-chart review    Code Status:   Code Status and Medical Interventions:   Ordered at: 23 1424     Medical Intervention Limits:    NO intubation (DNI)     Level Of Support Discussed With:    Patient    Next of Kin (If No Surrogate)     Code Status (Patient has no pulse and is not breathing):    No CPR (Do Not Attempt to Resuscitate)     Medical Interventions (Patient has pulse or is breathing):    Limited Support      Advanced Directives: Advance Directive Status: Patient does not have advance directive   Goals of Care: Ongoing.     S: Medical record reviewed. Events noted. Episode of chest pain this AM, reportedly decline CABG in past, decline invasive cardiac procedures today. On Imdur. Awaiting discharge to SNF.      Pain Assessment  Preferred Pain Scale: number (Numeric Rating Pain Scale)  Nonverbal Indicators of Pain: nonverbal indicators absent  CPOT Facial Expression: 0-->relaxed, neutral  CPOT Body Movements: 0-->absence of movements  CPOT Muscle Tension: 0-->relaxed  Ventilator Compliance/Vocalization: 0-->talking in normal tone or no sound  CPOT Score: 0  PAINAD Breathin-->normal  PAINAD Negative Vocalization: 0-->none  PAINAD Facial Expression: 0-->smiling or inexpressive  PAINAD Body Language: 0-->relaxed  PAINAD Consolability: 0-->no need to console  PAINAD Score: 0  Pain Location: back  Pain Description: aching    O:     Intake/Output Summary (Last 24 hours) at 10/4/2023 1112  Last data filed at 10/3/2023 1700  Gross per 24 hour   Intake 360 ml   Output --   Net 360 ml       Diagnostics and current medications: Reviewed.      Current Facility-Administered Medications:     acetaminophen (TYLENOL) tablet 650 mg, 650 mg, Oral, Q4H PRN, 650 mg at 10/03/23 2216 **OR** acetaminophen (TYLENOL) 160 MG/5ML oral solution 650 mg, 650 mg, Oral, Q4H PRN **OR** acetaminophen (TYLENOL)  suppository 650 mg, 650 mg, Rectal, Q4H PRN, Ugo, Rohan P, APRN    albuterol (PROVENTIL) nebulizer solution 0.083% 2.5 mg/3mL, 2.5 mg, Nebulization, Q4H PRN, Ugo, Rohan P, APRN    apixaban (ELIQUIS) tablet 2.5 mg, 2.5 mg, Oral, Q12H, Ugo, Rohan P, APRN, 2.5 mg at 10/04/23 0850    sennosides-docusate (PERICOLACE) 8.6-50 MG per tablet 2 tablet, 2 tablet, Oral, BID, 2 tablet at 10/04/23 0839 **AND** polyethylene glycol (MIRALAX) packet 17 g, 17 g, Oral, Daily PRN **AND** bisacodyl (DULCOLAX) EC tablet 5 mg, 5 mg, Oral, Daily PRN **AND** bisacodyl (DULCOLAX) suppository 10 mg, 10 mg, Rectal, Daily PRN, Ugo, Rohan P, APRN    budesonide-formoterol (SYMBICORT) 160-4.5 MCG/ACT inhaler 2 puff, 2 puff, Inhalation, BID - RT, Ugo, Rohan P, APRN, 2 puff at 10/03/23 1913    carBAMazepine (TEGretol) tablet 200 mg, 200 mg, Oral, Daily, Ugo, Rohan P, APRN, 200 mg at 10/04/23 0850    carvedilol (COREG) tablet 12.5 mg, 12.5 mg, Oral, BID With Meals, Ugo, Rohan P, APRN, 12.5 mg at 10/04/23 0839    cholecalciferol (VITAMIN D3) tablet 1,000 Units, 1,000 Units, Oral, Daily, Ugo, Rohan P, APRN, 1,000 Units at 10/04/23 0848    ciprofloxacin (CIPRO) tablet 500 mg, 500 mg, Oral, Q24H, Aaron, Radha, APRN, 500 mg at 10/03/23 1253    dilTIAZem CD (CARDIZEM CD) 24 hr capsule 180 mg, 180 mg, Oral, Daily, Ugo, Rohan P, APRN, 180 mg at 10/04/23 0850    finasteride (PROSCAR) tablet 5 mg, 5 mg, Oral, Daily, Rohan Arizmendi P, APRN, 5 mg at 10/04/23 0849    guaiFENesin (MUCINEX) 12 hr tablet 600 mg, 600 mg, Oral, Q12H, Rohan Arizmendi P, APRN, 600 mg at 10/04/23 0848    hydrOXYzine (ATARAX) tablet 50 mg, 50 mg, Oral, Q6H PRN, Juan Jose Adames, APRN, 50 mg at 10/04/23 0841    isosorbide mononitrate (IMDUR) 24 hr tablet 60 mg, 60 mg, Oral, Daily, Rohan Arizmendi P, APRN, 60 mg at 10/04/23 0839    levothyroxine (SYNTHROID, LEVOTHROID) tablet 50 mcg, 50 mcg, Oral, Q AM, Rohan Arizmendi, APRN, 50 mcg  at 10/04/23 0653    loperamide (IMODIUM) capsule 2 mg, 2 mg, Oral, 4x Daily PRN, Aaron, Radha, APRN, 2 mg at 09/28/23 1631    melatonin tablet 5 mg, 5 mg, Oral, Nightly PRN, Ugo, Rohan P, APRN, 5 mg at 10/03/23 2032    mirtazapine (REMERON) tablet 45 mg, 45 mg, Oral, Nightly, Ugo, Rohan P, APRN, 45 mg at 10/03/23 2032    [DISCONTINUED] Morphine sulfate (PF) injection 1 mg, 1 mg, Intravenous, Q4H PRN **AND** naloxone (NARCAN) injection 0.4 mg, 0.4 mg, Intravenous, Q5 Min PRN, Ugo, Rohan P, APRN    nicotine (NICODERM CQ) 21 MG/24HR patch 1 patch, 1 patch, Transdermal, Q24H, Aaron, Radha, APRN, 1 patch at 10/04/23 0931    nystatin (MYCOSTATIN) powder, , Topical, Q12H, ConeJuan Jose P, APRN, Given at 10/04/23 0932    ondansetron (ZOFRAN) injection 4 mg, 4 mg, Intravenous, Q6H PRN, Ugo, Rohan P, APRN    pantoprazole (PROTONIX) EC tablet 40 mg, 40 mg, Oral, Daily, Ugo, Rohan P, APRN, 40 mg at 10/04/23 0842    potassium chloride (K-DUR,KLOR-CON) CR tablet 20 mEq, 20 mEq, Oral, Daily, Ugo, Rohan P, APRN, 20 mEq at 10/04/23 0850    rosuvastatin (CRESTOR) tablet 20 mg, 20 mg, Oral, Nightly, Ugo, Rohan P, APRN, 20 mg at 10/03/23 2033    saccharomyces boulardii (FLORASTOR) capsule 250 mg, 250 mg, Oral, BID, Aaron, Radha, APRN, 250 mg at 10/04/23 0849    sodium bicarbonate tablet 650 mg, 650 mg, Oral, BID, Ugo, Rohan P, APRN, 650 mg at 10/04/23 0849    sodium chloride 0.9 % flush 10 mL, 10 mL, Intravenous, Q12H, Ugo, Rohan P, APRN, 10 mL at 10/04/23 0856    sodium chloride 0.9 % flush 10 mL, 10 mL, Intravenous, PRN, Ugo, Rohan P, APRN    sodium chloride 0.9 % flush 10 mL, 10 mL, Intravenous, PRN, Ugo, Rohan P, APRN    sodium chloride 0.9 % flush 3 mL, 3 mL, Intravenous, Q12H, Ugo, Rohan P, APRN, 3 mL at 10/04/23 0857    sodium chloride 0.9 % infusion 40 mL, 40 mL, Intravenous, PRN, Ugo, Rohan P, APRN, 40 mL at 09/25/23 0808    sodium chloride 0.9 %  "infusion 40 mL, 40 mL, Intravenous, PRN, Nataliia Arizmendinathan P, APRN    tamsulosin (FLOMAX) 24 hr capsule 0.4 mg, 0.4 mg, Oral, Daily, Ugo, Rohan P, APRN, 0.4 mg at 10/04/23 0848    Allergies   Allergen Reactions    Lyrica [Pregabalin] Other (See Comments)     Passing out/syncope     I have utilized all available immediate resources to obtain, update, or review the patient's current medications (including all prescriptions, over-the-counter products, herbals, cannabis/cannabidiol products, and vitamin/mineral/dietary (nutritional) supplements) for name, route of administration, type, dose and frequency.    A:    /59 (BP Location: Right arm, Patient Position: Lying)   Pulse 84   Temp 97.7 °F (36.5 °C) (Oral)   Resp 16   Ht 177.8 cm (70\")   Wt 45.5 kg (100 lb 3.2 oz)   SpO2 96%   BMI 14.38 kg/m²      Patient status: Disease state: Controlled with current treatments.  Current Functional status: Palliative Performance Scale Score: Performance 60% based on the following measures: Ambulation: Reduced, Activity and Evidence of Disease: Unable to do hobby or some work, significant evidence of disease, Self-Care: Occasional assist necessary,  Intake: Normal or reduced, LOC: Full or confusion   Baseline Functional status: Palliative Performance Scale Score: Performance 60% based on the following measures: Ambulation: Reduced, Activity and Evidence of Disease: Unable to do hobby or some work, significant evidence of disease, Self-Care: Occasional assist necessary,  Intake: Normal or reduced, LOC: Full or confusion   Nutritional status: Albumin 3.4 Body mass index is 14.38 kg/m².      Hospital Problem List      Intractable low back pain    Coronary artery disease involving native coronary artery of native heart without angina pectoris    Paroxysmal atrial fibrillation    Other emphysema    Essential hypertension    UTI (urinary tract infection)    Aspiration pneumonia    Stage 3b chronic kidney disease   "   Impression/Problem List:     Intractable low back pain     Compression deformities of T12 and L3, recent superior endplate  Multilevel degenerative disc disease  Coronary artery disease  Chronic obstructive pulmonary disease, emphysema  Paroxysmal atrial fibrillation  Pulmonary nodule, right upper lobe 4 mm previously 3 mm in 2018  Subclavian artery stenosis, severe left  Anemia  Hypertension  Urinary tract infection, Pseudomonas aeruginosa?  Colonization  Aspiration pneumonia  Chronic kidney disease, stage III  Hyperlipidemia  History of stroke  Advanced age        Recommendations/Plan:  1. plan: Goals of care include CODE STATUS No CPR/limited support interventions per attending.     Family support: The patient receives support from his wife and children..  Advance Directives: Advance Directive Status: Patient does not have advance directive   POA/Healthcare [id number] next of kin.     2.  Palliative care encounter  - Prognosis is good long-term secondary to compression deformity, degenerative disc disease, multiple comorbidities, and advanced age.  -Patient appears to have fair prognostic awareness.      -Treated with antibiotics and started on opiates for pain management.   -Neurosurgery consult placed.    9/25-Apparently patient required Ativan to tolerate MRI developed lethargy and apneic breathing in addition to hypotension.  Patient was treated with CPAP and fluid bolus.    9/29-CODE STATUS clarified.  A MOST document completed.  -Anticipating SNF at discharge    10/4-Episode of chest pain this AM, reportedly decline CABG in past, declined invasive cardiac procedures today. On Imdur. Awaiting discharge to SNF.       Thank you for allowing us to participate in patient's plan of care. Palliative Care Team will follow patient as needed.     Staci Phelps, APRN  10/4/2023  11:12 CDT

## 2023-10-04 NOTE — PROGRESS NOTES
[facility]  INPATIENT PROGRESS NOTE    Patient Name: Brennon Vance  Date of Admission: 9/24/2023  Today's Date: 10/04/23  Length of Stay: 0  Primary Care Physician: Javid Grajeda MD    Subjective   Chief Complaint: Follow-up     Patient is status post kyphoplasty 9/26/2023 by Dr. Brantley.  Patient is doing well postsurgery.  Pain is well controlled on current therapy.  Patient and his daughters were by the bedside when seen this morning.  Plan for patient to go to [facility].    10/4: Patient had an episode of chest pain.  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  Patient expressed that he has declined CABG when he was referred by his cardiologist in the past.  Stat EKG and troponin were obtained.     10/3: Pain is well controlled on current therapy.  Vital signs are stable.  Pt still awaiting placement.    Review of Systems   All pertinent negatives and positives are as above. All other systems have been reviewed and are negative unless otherwise stated.     Objective    Temp:  [97.6 °F (36.4 °C)-99.4 °F (37.4 °C)] 97.6 °F (36.4 °C)  Heart Rate:  [67-91] 91  Resp:  [16-18] 18  BP: (133-183)/(55-82) 183/82  Physical Exam  Vitals and nursing note reviewed.   Constitutional:       General: He is not in acute distress.     Appearance: He is not ill-appearing, toxic-appearing or diaphoretic.   HENT:      Head: Normocephalic and atraumatic.      Right Ear: External ear normal.      Left Ear: External ear normal.   Cardiovascular:      Rate and Rhythm: Normal rate and regular rhythm.      Heart sounds: Normal heart sounds.   Pulmonary:      Effort: Pulmonary effort is normal. No respiratory distress.      Breath sounds: Normal breath sounds.   Chest:      Chest wall: No tenderness.   Abdominal:      General: Bowel sounds are normal.      Palpations: Abdomen is soft.      Tenderness: There is no  abdominal tenderness.   Musculoskeletal:         General: No swelling or tenderness.      Cervical back: Normal range of motion and neck supple.      Right lower leg: No edema.      Left lower leg: No edema.   Skin:     General: Skin is warm and dry.      Capillary Refill: Capillary refill takes less than 2 seconds.      Findings: No erythema or rash.   Neurological:      General: No focal deficit present.      Mental Status: He is alert.      Motor: No weakness.   Psychiatric:         Behavior: Behavior normal.           Results Review:  I have reviewed the labs, radiology results, and diagnostic studies.    Laboratory Data:   Results from last 7 days   Lab Units 09/29/23  0442 09/28/23  0355   WBC 10*3/mm3 6.42 5.84   HEMOGLOBIN g/dL 9.6* 8.9*   HEMATOCRIT % 30.4* 27.5*   PLATELETS 10*3/mm3 252 229        Results from last 7 days   Lab Units 09/29/23  0442 09/28/23  0355   SODIUM mmol/L 141 140   POTASSIUM mmol/L 3.7 3.5   CHLORIDE mmol/L 109* 110*   CO2 mmol/L 21.0* 21.0*   BUN mg/dL 24* 25*   CREATININE mg/dL 1.55* 1.47*   CALCIUM mg/dL 9.0 8.7   BILIRUBIN mg/dL 0.3 0.3   ALK PHOS U/L 85 90   ALT (SGPT) U/L <5 5   AST (SGOT) U/L 18 18   GLUCOSE mg/dL 95 92       Culture Data:   No results found for: BLOODCX, URINECX, WOUNDCX, MRSACX, RESPCX, STOOLCX    Radiology Data:   Imaging Results (Last 24 Hours)       ** No results found for the last 24 hours. **            I have reviewed the patient's current medications.     Assessment/Plan   Assessment  Active Hospital Problems    Diagnosis     **Intractable low back pain     UTI (urinary tract infection)     Aspiration pneumonia     Stage 3b chronic kidney disease     Compression fracture of T12 vertebra with delayed healing     Closed compression fracture of third lumbar vertebra     Paroxysmal atrial fibrillation     Other emphysema     Essential hypertension     Coronary artery disease of native artery of native heart with stable angina pectoris        Treatment  Plan  10/4: Patient had an episode of chest pain.  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  Patient expressed that he has declined CABG when he was referred by his cardiologist in the past.  Stat EKG and troponin were obtained.   -Chest pain is likely angina.  He is no longer having chest pain when seen.  Patient is currently on Imdur.  -Patient states he does not want any invasive cardiac procedures done even if his troponin is elevated.  Patient's daughter noted that he does have a history of chronically elevated troponin  -Patient awaiting placement    10/3: Pain is well controlled on current therapy. Pt still awaiting placement  Continue PT OT.  Continue pain management  Neurosurgery notes reviewed  Social work note reviewed.  Pre-CERT still pending for [facility].      10/2: Patient is status post kyphoplasty 9/26/2023 by Dr. Brantley.  Patient is doing well postsurgery.  Pain is well controlled on current therapy.  Patient and his daughters were by the bedside when seen this morning.  Plan for patient to go to [facility].    Continue pain management    Continue PT OT    Continue chronic medication    Continue Eliquis for A-fib    Medical Decision Making  Number and Complexity of problems: 1 acute problem requiring specialist consultation and surgical management.  Differential Diagnosis: As above    Conditions and Status        Condition is improving.     [facility] Data  External documents reviewed: Epic records  Cardiac tracing (EKG, telemetry) interpretation: No new EKG  Radiology interpretation: Imaging reviewed  Labs reviewed: No new labs  Any tests that were considered but not ordered: None     Decision rules/scores evaluated (example [id number], Wells, etc): Currently on Eliquis     Discussed with: Patient and his daughters     Care Planning  Shared decision making: Patient apprised of current labs, vitals, imaging and treatment plan.   They are agreeable with proceeding with plans as discussed.    Code status and discussions:   Code Status and Medical Interventions:   Ordered at: 09/29/23 1424     Medical Intervention Limits:    NO intubation (DNI)     Level Of Support Discussed With:    Patient    Next of Kin (If No Surrogate)     Code Status (Patient has no pulse and is not breathing):    No CPR (Do Not Attempt to Resuscitate)     Medical Interventions (Patient has pulse or is breathing):    Limited Support         Disposition  Social Determinants of Health that impact treatment or disposition: Wife is sick with brain cancer.  I expect the patient to be discharged to SNF pending approval    Electronically signed by Micki Solano MD, 10/04/23, 09:25 CDT.

## 2023-10-04 NOTE — PROGRESS NOTES
"Brennon Vance  88 y.o.      Chief complaint:   Compression fracture status post kyphoplasty    Subjective  No events overnight    Temp:  [97.6 °F (36.4 °C)-99.4 °F (37.4 °C)] 97.6 °F (36.4 °C)  Heart Rate:  [67-91] 91  Resp:  [16-18] 18  BP: (131-183)/(55-82) 183/82      Objective:  General Appearance:  Comfortable, well-appearing, in no acute distress and in pain.    Vital signs: (most recent): Blood pressure (!) 183/82, pulse 91, temperature 97.6 °F (36.4 °C), temperature source Oral, resp. rate 18, height 177.8 cm (70\"), weight 45.5 kg (100 lb 3.2 oz), SpO2 95 %.  Vital signs are normal.  No fever.    Output: Producing urine.    HEENT: Normal HEENT exam.    Lungs:  Normal effort and normal respiratory rate.  He is not in respiratory distress.    Heart: Normal rate.  Regular rhythm.    Chest: Symmetric chest wall expansion.   Extremities: Normal range of motion.    Skin:  Warm and dry.    Abdomen: Abdomen is soft and non-distended.  Bowel sounds are normal.   There is no abdominal tenderness.     Pulses: Distal pulses are intact.      Neurologic Exam     Mental Status   Oriented to person, place, and time.   Attention: normal. Concentration: normal.   Speech: speech is normal   Level of consciousness: alert  Normal comprehension.     Cranial Nerves     CN II   Visual fields full to confrontation.     CN III, IV, VI   Pupils are equal, round, and reactive to light.  Extraocular motions are normal.     CN V   Facial sensation intact.     CN VII   Facial expression full, symmetric.     CN VIII   CN VIII normal.     CN IX, X   CN IX normal.   CN X normal.     CN XI   CN XI normal.     CN XII   CN XII normal.     Motor Exam   Muscle bulk: normal    Strength   Strength 5/5 throughout.     Sensory Exam   Light touch normal.     Gait, Coordination, and Reflexes     Gait  Gait: normal    Reflexes   Reflexes 2+ except as noted.     Lab Results (last 24 hours)       ** No results found for the last 24 hours. **      "           Plan:   Patient doing well.  Patient to work with physical and Occupational Therapy.  Waiting for rehab placement      Intractable low back pain    Coronary artery disease involving native coronary artery of native heart without angina pectoris    Paroxysmal atrial fibrillation    Other emphysema    Essential hypertension    UTI (urinary tract infection)    Aspiration pneumonia    Stage 3b chronic kidney disease    Compression fracture of T12 vertebra with delayed healing    Closed compression fracture of third lumbar vertebra        Rohan Arizmendi, APRN

## 2023-10-04 NOTE — CASE MANAGEMENT/SOCIAL WORK
Continued Stay Note  [facility]     Patient Name: Brennon Vance  MRN: [id number]  Today's Date: 10/4/2023    Admit Date: 9/24/2023    Plan: SNF Referrals   Discharge Plan       Row Name 10/04/23 0810       Plan    Plan Comments SW spoke to business office (Heather) and precert is still pending. Will check again in AM. Dtr had requested info/options on transport to [place] from hospital. Informed dtr that pt does not qualify for EMS so it would be private pay. KATERIN did call [facility] and spoke to Nataliia. Nataliia states pt does not qualify for ems but did provide private pay costs ([phone]). Dtr provided with info to contact [facility] transportation (non-emergent). Cost is $3.00 per mile. Dtr requested that  contact pt insurance to see if they would provide non-emergent transport. Supervisor did contact pt insurance and spoke to Bernice. Bernice informed supervisor that they follow Medicare guidelines for EMS. Bernice also stated for non-emergent transfers it would require a prior auth that could take up to 14 calender days to get a answer on.  Ref number provided to dtr ([id number]).                   Discharge Codes    No documentation.                 Expected Discharge Date and Time       Expected Discharge Date Expected Discharge Time    Sep 29, 2023               FAISAL Nava

## 2023-10-04 NOTE — PLAN OF CARE
Goal Outcome Evaluation:  Plan of Care Reviewed With: patient, daughter         Patient is alert and disoriented to situation only. VSS. RA. Complained of chest pain this morning. Remains asymptomatic afterwards. Denies any pain or concerns. Denies cardiac sym Incision sites steri strip present. CDI. Reddened and blanchable 1 person assist to BR with walker. Encouraged using spirometer. Education provided on repositioning to prevent skin breakdown, fall precautions and safety. Safety precautions maintained. Will continue to monitor.

## 2023-10-04 NOTE — THERAPY TREATMENT NOTE
Acute Care - Occupational Therapy Treatment Note  [facility]     Patient Name: Brennon Vance  : 1935  MRN: [id number]  Today's Date: 10/4/2023             Admit Date: 2023       ICD-10-CM ICD-9-CM   1. Acute cystitis without hematuria  N30.00 595.0   2. Acute bilateral low back pain with sciatica, sciatica laterality unspecified  M54.40 724.2     724.3   3. Shortness of breath  R06.02 786.05   4. Compression fracture of T12 vertebra with delayed healing  S22.080G V54.27   5. Closed compression fracture of L3 lumbar vertebra with delayed healing, subsequent encounter  S32.030G V54.17   6. Impaired mobility [Z74.09 (ICD-10-CM)]  Z74.09 799.89   7. Decreased activities of daily living (ADL) [Z78.9 (ICD-10-CM)]  Z78.9 V49.89     Patient Active Problem List   Diagnosis    Chest pain    NSTEMI, initial episode of care    Coronary artery disease of native artery of native heart with stable angina pectoris    Paroxysmal atrial fibrillation    Other emphysema    Essential hypertension    Precordial pain    Hyperlipidemia LDL goal <70    Tobacco abuse    Cardiomyopathy    NSTEMI (non-ST elevated myocardial infarction)    UTI (urinary tract infection)    Aspiration pneumonia    Intractable low back pain    Stage 3b chronic kidney disease    Compression fracture of T12 vertebra with delayed healing    Closed compression fracture of third lumbar vertebra     Past Medical History:   Diagnosis Date    CAD (coronary artery disease)     COPD (chronic obstructive pulmonary disease)     Hiatal hernia     Hyperlipidemia     Hypertension     Stroke      Past Surgical History:   Procedure Laterality Date    ABDOMINAL AORTIC ANEURYSM REPAIR      CARDIAC CATHETERIZATION N/A 2021    Procedure: Left Heart Cath;  Surgeon: Harrison Alcantara MD;  Location: [facility] CATH INVASIVE LOCATION;  Service: Cardiology;  Laterality: N/A;    CORONARY ANGIOPLASTY WITH STENT PLACEMENT      FEMORAL ARTERY STENT      KYPHOPLASTY WITH  BIOPSY N/A 9/26/2023    Procedure: KYPHOPLASTY WITH BIOPSY T12 and L3;  Surgeon: Jeremiah Brantley MD;  Location:  [facility] OR;  Service: Neurosurgery;  Laterality: N/A;    LEG THROMBECTOMY/EMBOLECTOMY Right 5/20/2021    Procedure: RT GROIN EXPLORATION;  Surgeon: Geoff Remy DO;  Location:  [facility] HYBRID OR 12;  Service: Vascular;  Laterality: Right;    TRIGEMINAL NERVE DECOMPRESSION           OT ASSESSMENT FLOWSHEET (last 12 hours)       OT Evaluation and Treatment       Row Name 10/04/23 1006                   OT Time and Intention    Subjective Information no complaints  -EC        Document Type therapy note (daily note)  -EC        Mode of Treatment occupational therapy  -EC        Symptoms Noted During/After Treatment shortness of breath  -EC           General Information    Patient Profile Reviewed yes  -EC        Existing Precautions/Restrictions fall;spinal  -EC           Pain Assessment    Pretreatment Pain Rating 0/10 - no pain  -EC        Posttreatment Pain Rating 0/10 - no pain  -EC        Pain Intervention(s) Repositioned;Ambulation/increased activity  -EC           Bed Mobility    Bed Mobility rolling right;sidelying-sit;sit-sidelying;rolling left  -EC        Rolling Left Se[place] (Bed Mobility) standby assist  -EC        Rolling Right [place] (Bed Mobility) standby assist  -EC        Scooting/Bridging Seymour (Bed Mobility) standby assist  -EC        Assistive Device (Bed Mobility) bed rails  -EC           Functional Mobility    Functional Mobility- Comment pt declined any fxnal mobility  -EC           Transfer Assessment/Treatment    Transfers sit-stand transfer;stand-sit transfer  -EC           Sit-Stand Transfer    Sit-Stand Seymour (Transfers) contact guard;verbal cues  -EC        Assistive Device (Sit-Stand Transfers) walker, front-wheeled  -EC        Comment, (Sit-Stand Transfer) 2 x 6 reps  -EC           Stand-Sit Transfer    Stand-Sit Seymour (Transfers) contact  guard  -EC        Assistive Device (Stand-Sit Transfers) walker, front-wheeled  -EC        Comment, (Stand-Sit Transfer) 2 x 6 reps  -EC           Motor Skills    Therapeutic Exercise shoulder;elbow/forearm  -EC        Comments, Therapeutic Exercise 2 x 10 reps AROM in B UE  -EC           Wound 09/26/23 1640 lumbar spine Puncture    Wound - Properties Group Placement Date: 09/26/23  -DT Placement Time: 1640  -DT Present on Hospital Admission: N  -DT Location: lumbar spine  -DT Primary Wound Type: Puncture  -DT    Retired Wound - Properties Group Placement Date: 09/26/23  -DT Placement Time: 1640  -DT Present on Hospital Admission: N  -DT Location: lumbar spine  -DT Primary Wound Type: Puncture  -DT    Retired Wound - Properties Group Date first assessed: 09/26/23  -DT Time first assessed: 1640  -DT Present on Hospital Admission: N  -DT Location: lumbar spine  -DT Primary Wound Type: Puncture  -DT       Wound 09/26/23 1640 thoracic spine Puncture    Wound - Properties Group Placement Date: 09/26/23  -DT Placement Time: 1640  -DT Present on Hospital Admission: N  -DT Location: thoracic spine  -DT Primary Wound Type: Puncture  -DT    Retired Wound - Properties Group Placement Date: 09/26/23  -DT Placement Time: 1640  -DT Present on Hospital Admission: N  -DT Location: thoracic spine  -DT Primary Wound Type: Puncture  -DT    Retired Wound - Properties Group Date first assessed: 09/26/23  -DT Time first assessed: 1640  -DT Present on Hospital Admission: N  -DT Location: thoracic spine  -DT Primary Wound Type: Puncture  -DT       Plan of Care Review    Plan of Care Reviewed With patient  -EC        Progress improving  -EC        Outcome Evaluation OT tx completed. Mr. Vance was sitting up in bed w/ 3 L O2 via nc. Pt required encouragement to participate in therapy as he was fatigued. Pt declined OOB activity but agreed to B UE ROM & sit<>stand t/f's to improve strength & act tolerance. Pt completed rolled R & sidelying>sit  w/ SBA, demo'd good sitting balance EOB. Pt completed 2 x 10 reps of B UE AROM exercises. c/o SOA, SpO2 99%. Pt completed 2 x 6 sit<>stand t/f's with 1 sitting rest break,edu'd on pursed lip breathing. Pt returned to supine SBA. Continue OT POC, recommend cont rehab at d/c.  -EC           Vital Signs    O2 Delivery Pre Treatment nasal cannula  3 L  -EC        Intra SpO2 (%) 99  -EC        O2 Delivery Intra Treatment nasal cannula  3L  -EC        O2 Delivery Post Treatment nasal cannula  3L  -EC           Positioning and Restraints    Pre-Treatment Position in bed  -EC        Post Treatment Position bed  -EC        In Bed fowlers;call light within reach;encouraged to call for assist;exit alarm on  -EC                  User Key  (r) = Recorded By, (t) = Taken By, (c) = Cosigned By      Initials Name Effective Dates    DT Pedro Montoya RN 02/17/22 -     EC Manisha Greco OT 08/14/23 -                      Occupational Therapy Education       Title: PT OT SLP Therapies (Done)       Topic: Occupational Therapy (Done)       Point: ADL training (Done)       Description:   Instruct learner(s) on proper safety adaptation and remediation techniques during self care or transfers.   Instruct in proper use of assistive devices.                  Learning Progress Summary             Patient Acceptance, E, VU,NR by EC at 10/4/2023 1036    Comment: benefits of OOB activity, energy conservation, pursed lip breathing, BUE AROM    Acceptance, E, VU,NR by MM at 10/3/2023 1447    Acceptance, E,TB,D, DU,NR,VU by CHANDRA at 10/2/2023 1722    Acceptance, E, VU by LS at 9/30/2023 1157    Comment: Pt educated on safety with RW during functional mobility.    Acceptance, E, VU by MB at 9/27/2023 0859   Family Acceptance, E, VU by LS at 9/30/2023 1157    Comment: Pt educated on safety with RW during functional mobility.    Acceptance, E, VU by MB at 9/27/2023 0859                         Point: Home exercise program (Done)       Description:    Instruct learner(s) on appropriate technique for monitoring, assisting and/or progressing therapeutic exercises/activities.                  Learning Progress Summary             Patient Acceptance, E, VU,NR by EC at 10/4/2023 1036    Comment: benefits of OOB activity, energy conservation, pursed lip breathing, BUE AROM    Acceptance, E,TB,D, DU,NR,VU by KH at 10/2/2023 1722    Acceptance, E, VU by MB at 9/27/2023 0859   Family Acceptance, E, VU by MB at 9/27/2023 0859                         Point: Precautions (Done)       Description:   Instruct learner(s) on prescribed precautions during self-care and functional transfers.                  Learning Progress Summary             Patient Acceptance, E, VU,NR by EC at 10/4/2023 1036    Comment: benefits of OOB activity, energy conservation, pursed lip breathing, BUE AROM    Acceptance, E, VU,NR by MM at 10/3/2023 1447    Acceptance, E,TB,D, DU,NR,VU by KH at 10/2/2023 1722    Acceptance, E,TB, VU,DU,NR by  at 10/1/2023 1737    Acceptance, E, VU by MB at 9/27/2023 0859   Family Acceptance, E,TB, VU,DU,NR by  at 10/1/2023 1737    Acceptance, E, VU by MB at 9/27/2023 0859                         Point: Body mechanics (Done)       Description:   Instruct learner(s) on proper positioning and spine alignment during self-care, functional mobility activities and/or exercises.                  Learning Progress Summary             Patient Acceptance, E, VU,NR by EC at 10/4/2023 1036    Comment: benefits of OOB activity, energy conservation, pursed lip breathing, BUE AROM    Acceptance, E, VU,NR by MM at 10/3/2023 1447    Acceptance, E,TB,D, DU,NR,VU by KH at 10/2/2023 1722    Acceptance, E,TB, VU,DU,NR by  at 10/1/2023 1737    Acceptance, E, VU by MB at 9/27/2023 0859   Family Acceptance, E,TB, VU,DU,NR by  at 10/1/2023 1737    Acceptance, E, VU by MB at 9/27/2023 0859                                         User Key       Initials Effective Dates Name Provider Type  Discipline    MM 07/11/23 -  Gary Bartholomew, OTR/L Occupational Therapist OT    LS 09/22/22 -  Karime Ennis COTA Occupational Therapist Assistant THERAPIES    KH 07/07/23 -  Nikki Kiran, KATHY Registered Nurse Nurse    MB 08/04/23 -  Virginia Cunningham, OT Student OT Student OT    EC 08/14/23 -  Manisha Greco OT Occupational Therapist OT                      OT Recommendation and Plan     Plan of Care Review  Plan of Care Reviewed With: patient  Progress: improving  Outcome Evaluation: OT tx completed. Mr. Vance was sitting up in bed w/ 3 L O2 via nc. Pt required encouragement to participate in therapy as he was fatigued. Pt declined OOB activity but agreed to B UE ROM & sit<>stand t/f's to improve strength & act tolerance. Pt completed rolled R & sidelying>sit w/ SBA, demo'd good sitting balance EOB. Pt completed 2 x 10 reps of B UE AROM exercises. c/o SOA, SpO2 99%. Pt completed 2 x 6 sit<>stand t/f's with 1 sitting rest break,edu'd on pursed lip breathing. Pt returned to supine SBA. Continue OT POC, recommend cont rehab at d/c.  Plan of Care Reviewed With: patient  Outcome Evaluation: OT tx completed. Mr. Vance was sitting up in bed w/ 3 L O2 via nc. Pt required encouragement to participate in therapy as he was fatigued. Pt declined OOB activity but agreed to B UE ROM & sit<>stand t/f's to improve strength & act tolerance. Pt completed rolled R & sidelying>sit w/ SBA, demo'd good sitting balance EOB. Pt completed 2 x 10 reps of B UE AROM exercises. c/o SOA, SpO2 99%. Pt completed 2 x 6 sit<>stand t/f's with 1 sitting rest break,edu'd on pursed lip breathing. Pt returned to supine SBA. Continue OT POC, recommend cont rehab at d/c.     Outcome Measures       Row Name 10/04/23 1000 10/02/23 1000 10/01/23 1100       How much help from another person do you currently need...    Turning from your back to your side while in flat bed without using bedrails? -- 3  -AH 3  -AH    Moving from lying on back to sitting on  the side of a flat bed without bedrails? -- 3  -AH 3  -AH    Moving to and from a bed to a chair (including a wheelchair)? -- 3  -AH 3  -AH    Standing up from a chair using your arms (e.g., wheelchair, bedside chair)? -- 3  -AH 3  -AH    Climbing 3-5 steps with a railing? -- 3  -AH 3  -AH    To walk in hospital room? -- 3  -AH 3  -AH    AM-PAC 6 Clicks Score (PT) -- 18  -AH 18  -AH       How much help from another is currently needed...    Putting on and taking off regular lower body clothing? 3  -EC -- --    Bathing (including washing, rinsing, and drying) 3  -EC -- --    Toileting (which includes using toilet bed pan or urinal) 3  -EC -- --    Putting on and taking off regular upper body clothing 3  -EC -- --    Taking care of personal grooming (such as brushing teeth) 3  -EC -- --    Eating meals 4  -EC -- --    AM-PAC 6 Clicks Score (OT) 19  -EC -- --       Functional Assessment    Outcome Measure Options AM-PAC 6 Clicks Daily Activity (OT)  - AM-PAC 6 Clicks Basic Mobility (PT)  - AM-PAC 6 Clicks Basic Mobility (PT)  -              User Key  (r) = Recorded By, (t) = Taken By, (c) = Cosigned By      Initials Name Provider Type     Lissette Flood, PTA Physical Therapist Assistant     Manisha Greco OT Occupational Therapist                    Time Calculation:    Time Calculation- OT       Row Name 10/04/23 1024             Time Calculation- OT    OT Start Time 0959  -      OT Stop Time 1023  -      OT Time Calculation (min) 24 min  -      Total Timed Code Minutes- OT 24 minute(s)  -      OT Received On 10/04/23  -                User Key  (r) = Recorded By, (t) = Taken By, (c) = Cosigned By      Initials Name Provider Type     Manisha Greco OT Occupational Therapist                  Therapy Charges for Today       Code Description Service Date Service Provider Modifiers Qty    [id number]  OT THERAPEUTIC ACT EA 15 MIN 10/4/2023 Manisha Greco OT GO 2                 Manisha Greco  OT  10/4/2023

## 2023-10-04 NOTE — PLAN OF CARE
Goal Outcome Evaluation:  Plan of Care Reviewed With: (P) patient        Progress: (P) improving  Outcome Evaluation: (P) Pt c/o of no pain. Bed mob SBA. Sit <> stand <> sit CGA. Pt amb 40' at a time with fww, cues required for postural corrections.BUE/BLE AROM x 15. Recommend cont PT for strengthening and balance.

## 2023-10-04 NOTE — PLAN OF CARE
Goal Outcome Evaluation:  Plan of Care Reviewed With: patient, daughter        Progress: no change  Outcome Evaluation: VSS.A&Ox3, disoriented to situation. 1 c/o pain and anxiety. Prns given. Good relief noted. Pt resting better tonight. Incont b/b. Brief checked and changed. Daughter at bs. Call light within reach. Safety maintained.

## 2023-10-05 VITALS
TEMPERATURE: 98.3 F | BODY MASS INDEX: 14.34 KG/M2 | SYSTOLIC BLOOD PRESSURE: 126 MMHG | RESPIRATION RATE: 15 BRPM | HEART RATE: 72 BPM | DIASTOLIC BLOOD PRESSURE: 57 MMHG | OXYGEN SATURATION: 93 % | HEIGHT: 70 IN | WEIGHT: 100.2 LBS

## 2023-10-05 LAB — SARS-COV-2 AG RESP QL IA.RAPID: NORMAL

## 2023-10-05 PROCEDURE — 94664 DEMO&/EVAL PT USE INHALER: CPT

## 2023-10-05 PROCEDURE — 63710000001 LEVOTHYROXINE 50 MCG TABLET: Performed by: NURSE PRACTITIONER

## 2023-10-05 PROCEDURE — A9270 NON-COVERED ITEM OR SERVICE: HCPCS | Performed by: NURSE PRACTITIONER

## 2023-10-05 PROCEDURE — 94799 UNLISTED PULMONARY SVC/PX: CPT

## 2023-10-05 PROCEDURE — 63710000001 HYDROXYZINE 25 MG TABLET

## 2023-10-05 PROCEDURE — 63710000001 PROBIOTIC 250 MG CAPSULE: Performed by: NURSE PRACTITIONER

## 2023-10-05 PROCEDURE — G0378 HOSPITAL OBSERVATION PER HR: HCPCS

## 2023-10-05 PROCEDURE — 63710000001 CHOLECALCIFEROL 25 MCG (1000 UT) TABLET: Performed by: NURSE PRACTITIONER

## 2023-10-05 PROCEDURE — 63710000001 CARBAMAZEPINE 200 MG TABLET: Performed by: NURSE PRACTITIONER

## 2023-10-05 PROCEDURE — 87426 SARSCOV CORONAVIRUS AG IA: CPT | Performed by: STUDENT IN AN ORGANIZED HEALTH CARE EDUCATION/TRAINING PROGRAM

## 2023-10-05 PROCEDURE — 94761 N-INVAS EAR/PLS OXIMETRY MLT: CPT

## 2023-10-05 PROCEDURE — 63710000001 FINASTERIDE 5 MG TABLET: Performed by: NURSE PRACTITIONER

## 2023-10-05 PROCEDURE — 63710000001 NICOTINE 21 MG/24HR PATCH 24 HOUR: Performed by: NURSE PRACTITIONER

## 2023-10-05 PROCEDURE — 63710000001 APIXABAN 2.5 MG TABLET: Performed by: NURSE PRACTITIONER

## 2023-10-05 PROCEDURE — 63710000001 TAMSULOSIN 0.4 MG CAPSULE: Performed by: NURSE PRACTITIONER

## 2023-10-05 PROCEDURE — 63710000001 POTASSIUM CHLORIDE 20 MEQ TABLET CONTROLLED-RELEASE: Performed by: NURSE PRACTITIONER

## 2023-10-05 PROCEDURE — 63710000001 SODIUM BICARBONATE 650 MG TABLET: Performed by: NURSE PRACTITIONER

## 2023-10-05 PROCEDURE — 63710000001 ISOSORBIDE MONONITRATE 60 MG TABLET SUSTAINED-RELEASE 24 HOUR: Performed by: NURSE PRACTITIONER

## 2023-10-05 PROCEDURE — A9270 NON-COVERED ITEM OR SERVICE: HCPCS

## 2023-10-05 PROCEDURE — 63710000001 CARVEDILOL 6.25 MG TABLET: Performed by: NURSE PRACTITIONER

## 2023-10-05 PROCEDURE — 63710000001 DILTIAZEM CD 180 MG CAPSULE SUSTAINED-RELEASE 24 HR: Performed by: NURSE PRACTITIONER

## 2023-10-05 PROCEDURE — 63710000001 CIPROFLOXACIN 500 MG TABLET: Performed by: NURSE PRACTITIONER

## 2023-10-05 PROCEDURE — 99024 POSTOP FOLLOW-UP VISIT: CPT | Performed by: NURSE PRACTITIONER

## 2023-10-05 PROCEDURE — 63710000001 ACETAMINOPHEN 325 MG TABLET: Performed by: NURSE PRACTITIONER

## 2023-10-05 PROCEDURE — 63710000001 PANTOPRAZOLE 40 MG TABLET DELAYED-RELEASE: Performed by: NURSE PRACTITIONER

## 2023-10-05 PROCEDURE — 63710000001 GUAIFENESIN 600 MG TABLET SUSTAINED-RELEASE 12 HOUR: Performed by: NURSE PRACTITIONER

## 2023-10-05 RX ADMIN — DILTIAZEM HYDROCHLORIDE 180 MG: 180 CAPSULE, EXTENDED RELEASE ORAL at 10:16

## 2023-10-05 RX ADMIN — Medication 250 MG: at 10:17

## 2023-10-05 RX ADMIN — CIPROFLOXACIN 500 MG: 500 TABLET, FILM COATED ORAL at 13:34

## 2023-10-05 RX ADMIN — ACETAMINOPHEN 650 MG: 325 TABLET, FILM COATED ORAL at 10:16

## 2023-10-05 RX ADMIN — FINASTERIDE 5 MG: 5 TABLET, FILM COATED ORAL at 10:16

## 2023-10-05 RX ADMIN — POTASSIUM CHLORIDE 20 MEQ: 1500 TABLET, EXTENDED RELEASE ORAL at 10:17

## 2023-10-05 RX ADMIN — ISOSORBIDE MONONITRATE 60 MG: 60 TABLET, EXTENDED RELEASE ORAL at 10:17

## 2023-10-05 RX ADMIN — LEVOTHYROXINE SODIUM 50 MCG: 50 TABLET ORAL at 05:18

## 2023-10-05 RX ADMIN — PANTOPRAZOLE SODIUM 40 MG: 40 TABLET, DELAYED RELEASE ORAL at 10:16

## 2023-10-05 RX ADMIN — BUDESONIDE AND FORMOTEROL FUMARATE DIHYDRATE 2 PUFF: 160; 4.5 AEROSOL RESPIRATORY (INHALATION) at 05:56

## 2023-10-05 RX ADMIN — TAMSULOSIN HYDROCHLORIDE 0.4 MG: 0.4 CAPSULE ORAL at 10:17

## 2023-10-05 RX ADMIN — CARBAMAZEPINE 200 MG: 200 TABLET ORAL at 10:16

## 2023-10-05 RX ADMIN — GUAIFENESIN 600 MG: 600 TABLET, EXTENDED RELEASE ORAL at 10:17

## 2023-10-05 RX ADMIN — Medication 1000 UNITS: at 10:17

## 2023-10-05 RX ADMIN — NYSTATIN: 100000 POWDER TOPICAL at 10:19

## 2023-10-05 RX ADMIN — APIXABAN 2.5 MG: 2.5 TABLET, FILM COATED ORAL at 10:16

## 2023-10-05 RX ADMIN — NICOTINE 1 PATCH: 21 PATCH, EXTENDED RELEASE TRANSDERMAL at 10:30

## 2023-10-05 RX ADMIN — SODIUM BICARBONATE 650 MG: 650 TABLET ORAL at 10:17

## 2023-10-05 RX ADMIN — HYDROXYZINE HYDROCHLORIDE 50 MG: 25 TABLET, FILM COATED ORAL at 13:34

## 2023-10-05 RX ADMIN — CARVEDILOL 12.5 MG: 6.25 TABLET, FILM COATED ORAL at 10:17

## 2023-10-05 NOTE — DISCHARGE SUMMARY
[facility] Medicine Services  DISCHARGE SUMMARY       Date of Admission: 9/24/2023  Date of Discharge:  10/5/2023  Primary Care Physician: Javid Grajeda MD    Presenting Problem/History of Present Illness:  Adapted from H&P from 9/24/2023 by Micki Solano MD    Patient is a 87-year-old male with history of chronic low back pain with chronic spinal compression fracture, CAD, COPD, hypertension, hyperlipidemia, BPH and stroke who presented to the ER due to worsening lower back pain with radiation to the left leg.  Patient's daughter who was in the room stated she spoke with neurosurgeon Dr. Brantley and mentioned that he will see the patient in the hospital.  In the ED patient was found to have UTI, and CTA of the chest showed possible aspiration pneumonia and moderate emphysematous changes.  CT of the lumbar spine showed chronic appearing T12 and L3 compression fractures with no signs of recent lumbar spine surgery or significant stenosis.  Patient was started on ceftriaxone and given morphine and Dilaudid in the ED.    Final Discharge Diagnoses:  Active Hospital Problems    Diagnosis     **Intractable low back pain     UTI (urinary tract infection)     Aspiration pneumonia     Stage 3b chronic kidney disease     Compression fracture of T12 vertebra with delayed healing     Closed compression fracture of third lumbar vertebra     Paroxysmal atrial fibrillation     Other emphysema     Essential hypertension     Coronary artery disease of native artery of native heart with stable angina pectoris        Consults: Neurosurgery, palliative medicine    Procedures Performed: 9/26/23: T12 and L3 Kyphoplasty and vertebral body biopsy.     Pertinent Test Results:   Results for orders placed during the hospital encounter of 05/09/18    Adult Transthoracic Echo Complete W/ Cont if Necessary Per Protocol    Interpretation Summary  · Left ventricular systolic function is normal.  Estimated EF = 65%.  · Left ventricular diastolic dysfunction.  · No evidence of pulmonary hypertension is present.      Imaging Results (All)       Procedure Component Value Units Date/Time    XR Spine Lumbar AP & Lateral [[id number]] Collected: 09/26/23 1724     Updated: 09/26/23 1730    Narrative:      EXAMINATION:  XR SPINE LUMBAR AP AND LATERAL-  9/26/2023 4:21 PM CDT     HISTORY: Kyphoplasty. N30.00-Acute cystitis without hematuria;  M54.40-Lumbago with sciatica, unspecified side; R06.02-Shortness of  breath; S22.080G-Wedge compression fracture of t11-T12 vertebra,  subsequent encounter for fracture with delayed healing; S32.030G-Wedge  compression fracture of third lumbar vertebra, subsequent encounter for  fracture with delayed healing.     COMPARISON: No comparison study.     NUMBER OF IMAGES: 2     FLUOROSCOPY TIME: 47 seconds.     FLUOROSCOPIC DOSE: 13.8 mGy.     FINDINGS: Bone detail is limited. There has been kyphoplasty at T12 and  L3.          Impression:      Operative images, as described.     This report was signed and finalized on 9/26/2023 5:27 PM CDT by Dr. Juan Carlos Arevalo MD.       FL C Arm During Surgery [[id number]] Resulted: 09/26/23 1721     Updated: 09/26/23 1721    Narrative:      This procedure was auto-finalized with no dictation required.    MRI Lumbar Spine Without Contrast [[id number]] Collected: 09/25/23 1415     Updated: 09/25/23 1426    Narrative:      EXAMINATION: MRI LUMBAR SPINE WO CONTRAST-     9/25/2023 1:15 PM CDT     HISTORY: compression fracture; N30.00-Acute cystitis without hematuria;  M54.40-Lumbago with sciatica, unspecified side; R06.02-Shortness of  breath.     Detail is limited by patient motion.     FINDINGS:  Noncontrast MR imaging of the lumbar spine.  Axial, sagittal, and coronal sequences.     12 degrees LEFT convex scoliosis.  Mildly exaggerated lordosis.  T12 and L3 superior endplate compression fractures.  Recent injuries with marrow edema.  T12 with 35% loss  of height.  L3 with 50% loss of height.  Normal conus medullaris.  Endograft present. Maximum infrarenal aortic diameter of 41-42 mm.     T11-T12:  No disc abnormality or stenosis.     T12-L1:  No significant disc abnormality or stenosis is seen.     L1-L2:  No significant disc abnormality or stenosis is seen.     L2-L3:  Mild facet and ligamentous hypertrophy.  No significant disc abnormality or stenosis.     L3-L4:  Mild facet and ligamentous hypertrophy.  Mild broad-based disc protrusion with mild inferior foraminal narrowing  on both sides.     L4-L5:  Moderate facet hypertrophy.  Mild relative foraminal narrowing.  No high-grade stenosis.     L5-S1:  Mild facet hypertrophy.  Mild relative foraminal narrowing.       Impression:      1.  Recent superior endplate compression deformities of T12 and L3 with  marrow edema.  2.  Multilevel degenerative disc and facet changes. No more than mild  foraminal narrowing.              This report was signed and finalized on 9/25/2023 2:23 PM CDT by Dr. Johnny Lubin MD.       CT Angiogram Chest [[id number]] Collected: 09/24/23 1520     Updated: 09/24/23 1535    Narrative:      EXAM: CT ANGIOGRAM CHEST- 9/24/2023 3:03 PM CDT     HISTORY: Shortness of air, SOA with positive d-dimer      COMPARISON: 5/9/2019     DOSE LENGTH PRODUCT: 218 mGy cm. Automated exposure control was also  utilized to decrease patient radiation dose.     TECHNIQUE: Serial helical tomographic images of the chest were acquired  following the intravenous infusion of contrast according to pulmonary  angiographic protocol. 3D and multiplanar reformatted images were  provided for review.     FINDINGS:      Support Devices: None.     Central Airways: Patent.     Lungs/Pleura:      Mild left lower lobe bronchial wall thickening with airway secretions.  Additionally there are secretions within the left mainstem bronchus.     Biapical scarring with calcifications, unchanged.     Moderate emphysematous changes.      Nodules: Anterior right apical 4 mm pulmonary nodule, previously 3 mm  (series 5 image 40).     Lower Neck: Subcentimeter left thyroid nodule.     Mediastinum/Hilum: No enlarged lymphadenopathy.     Heart/Vasculature: Cardiac size is normal. No pericardial effusion.  Severe coronary artery calcifications and/or stents. Mild aortic and  branch vessel valvular calcifications. Severe stenosis of the left  subclavian artery origin (series 4 image 50). Partially visualized  abdominal aortic stent.     Pulmonary artery: Pulmonary arteries are well-opacified to the segmental  level. No pulmonary embolism. Main pulmonary artery is normal in  caliber.     Chest wall/Soft Tissues: Mild symmetric bilateral gynecomastia.     Upper Abdomen: No acute or suspicious findings.     Osseous Structures: Remote T4, T8, T9, T12 compression deformities. No  acute or suspicious osseous findings.       Impression:         No pulmonary embolism.     Mild left lower lobe bronchial wall thickening with airway secretions  which can be seen with aspiration.     Moderate emphysematous changes.     4 mm right upper lobe pulmonary nodule, previously 3 mm in 2018. Most  likely benign. Recommend follow-up chest CT in 12 months given minimal  enlargement.     Severe left subclavian artery origin stenosis.           This report was signed and finalized on 9/24/2023 3:32 PM CDT by Jorge Chau.       CT Lumbar Spine Without Contrast [[id number]] Collected: 09/24/23 1338     Updated: 09/24/23 1345    Narrative:      EXAMINATION: CT LUMBAR SPINE WO CONTRAST-      9/24/2023 1:07 PM CDT     HISTORY: Provided history of recent back surgery. Back pain and dysuria.     In order to have a CT radiation dose as low as reasonably achievable  Automated Exposure Control was utilized for adjustment of the mA and/or  KV according to patient size.     DLP in mGycm= 231.     Noncontrast lumbar spine CT.  Axial, sagittal, and coronal sequences.     No comparison.      No sign of lumbar spine surgery.  8 degrees LEFT convex scoliosis.  Aortic endograft present.     45% chronic appearing superior endplate compression of T12.  50% chronic appearing superior endplate compression of L3.  No paraspinal soft tissue edema is seen.  No spinal canal stenosis or high-grade foraminal stenosis.     Intact sacrum and symmetric SI joints.       Impression:      1. Chronic appearing T12 and L3 compression fractures.  2. No sign of recent lumbar spine surgery.  3. No significant stenosis is seen.                                         This report was signed and finalized on 9/24/2023 1:42 PM CDT by Dr. Johnny Lubin MD.             LAB RESULTS:      Lab 09/29/23  0442   WBC 6.42   HEMOGLOBIN 9.6*   HEMATOCRIT 30.4*   PLATELETS 252   NEUTROS ABS 4.02   IMMATURE GRANS (ABS) 0.06*   LYMPHS ABS 1.42   MONOS ABS 0.67   EOS ABS 0.20   MCV 96.8         Lab 09/29/23  0442   SODIUM 141   POTASSIUM 3.7   CHLORIDE 109*   CO2 21.0*   ANION GAP 11.0   BUN 24*   CREATININE 1.55*   EGFR 43.6*   GLUCOSE 95   CALCIUM 9.0         Lab 09/29/23  0442   TOTAL PROTEIN 5.8*   ALBUMIN 3.0*   GLOBULIN 2.8   ALT (SGPT) <5   AST (SGOT) 18   BILIRUBIN 0.3   ALK PHOS 85         Lab 10/04/23  1025 10/04/23  0824   HSTROP T 29* 29*                 Brief Urine Lab Results  (Last result in the past 365 days)        Color   Clarity   Blood   Leuk Est   Nitrite   Protein   CREAT   Urine HCG        09/24/23 1247 Yellow   Clear   Trace   Moderate (2+)   Positive   Trace                 Microbiology Results (last 10 days)       ** No results found for the last 240 hours. **            Hospital Course: Patient is a 87-year-old male with history of chronic low back pain with chronic spinal compression fracture, CAD, COPD, hypertension, hyperlipidemia, BPH and stroke who was admitted for intractable low back pain. Patient underwent kyphoplasty on 9/26/2023 by Dr. Brantley. Patient did well postsurgery and had been working with  "physical therapy.    Patient was also treated for UTI with IV antibiotics.  During hospital stay patient did have an episode of chest pain.  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 Patient expressed that he has declined CABG when he was referred by his cardiologist in the past.  EKG was mostly unremarkable and trended troponin remained stable.  Patient did not have any other episode of chest pain after the single event.  Patient's daughter noted that patient does have a history of chronically elevated troponin.  Patient was accepted to [facility] and will be transported to the facility via EMS per family request.  Patient will follow-up with neurosurgery and PCP.      Physical Exam on Discharge:  /86 (BP Location: Right arm, Patient Position: Lying)   Pulse 88   Temp 97.8 °F (36.6 °C) (Oral)   Resp 16   Ht 177.8 cm (70\")   Wt 45.5 kg (100 lb 3.2 oz)   SpO2 95%   BMI 14.38 kg/m²   Physical Exam  Vitals and nursing note reviewed.   Constitutional:       General: He is not in acute distress.     Appearance: Normal appearance. He is not diaphoretic.   HENT:      Head: Normocephalic and atraumatic.      Right Ear: External ear normal.      Left Ear: External ear normal.   Cardiovascular:      Rate and Rhythm: Normal rate and regular rhythm.      Heart sounds: Normal heart sounds.   Pulmonary:      Effort: Pulmonary effort is normal. No respiratory distress.      Breath sounds: Normal breath sounds.   Chest:      Chest wall: No tenderness.   Abdominal:      General: Bowel sounds are normal.      Palpations: Abdomen is soft.      Tenderness: There is no abdominal tenderness.   Musculoskeletal:         General: No swelling or tenderness.      Cervical back: Normal range of motion and neck supple.      Right lower leg: No edema.      Left lower leg: No edema.   Skin:     General: Skin is warm and dry.      Capillary Refill: Capillary refill " takes less than 2 seconds.      Findings: No erythema or rash.   Neurological:      General: No focal deficit present.      Mental Status: He is alert.      Motor: No weakness.   Psychiatric:         Behavior: Behavior normal.         Condition on Discharge: stable ans improving     Discharge Disposition:  Skilled Nursing Facility (DC - External)    Discharge Medications:     Discharge Medications        New Medications        Instructions Start Date   finasteride 5 MG tablet  Commonly known as: PROSCAR   5 mg, Oral, Daily      hydrOXYzine 50 MG tablet  Commonly known as: ATARAX   50 mg, Oral, 2 Times Daily PRN      melatonin 5 MG tablet tablet   5 mg, Oral, Nightly PRN      nicotine 21 MG/24HR patch  Commonly known as: NICODERM CQ   1 patch, Transdermal, Every 24 Hours Scheduled      saccharomyces boulardii 250 MG capsule  Commonly known as: FLORASTOR   250 mg, Oral, 2 Times Daily      tamsulosin 0.4 MG capsule 24 hr capsule  Commonly known as: FLOMAX   0.4 mg, Oral, Daily             Continue These Medications        Instructions Start Date   albuterol (2.5 MG/3ML) 0.083% nebulizer solution  Commonly known as: PROVENTIL   2.5 mg, Nebulization, Every 4 Hours PRN      albuterol sulfate  (90 Base) MCG/ACT inhaler  Commonly known as: PROVENTIL HFA;VENTOLIN HFA;PROAIR HFA   2 puffs, Inhalation, Every 4 Hours PRN      apixaban 5 MG tablet tablet  Commonly known as: ELIQUIS   2.5 mg, Oral, 2 Times Daily      budesonide-formoterol 160-4.5 MCG/ACT inhaler  Commonly known as: SYMBICORT   2 puffs, Inhalation, 2 Times Daily - RT      carBAMazepine 200 MG tablet  Commonly known as: TEGretol   200 mg, Oral, Daily      carvedilol 12.5 MG tablet  Commonly known as: COREG   12.5 mg, Oral, 2 Times Daily With Meals      cholecalciferol 25 MCG (1000 UT) tablet  Commonly known as: VITAMIN D3   1,000 Units, Oral, Daily      dilTIAZem  MG 24 hr capsule  Commonly known as: Cardizem CD   180 mg, Oral, Daily      furosemide 40  MG tablet  Commonly known as: LASIX   40 mg, Oral, Daily      guaiFENesin 600 MG 12 hr tablet  Commonly known as: MUCINEX   1,200 mg, Oral, 2 Times Daily      isosorbide mononitrate 60 MG 24 hr tablet  Commonly known as: IMDUR   60 mg, Oral, Daily      levothyroxine 50 MCG tablet  Commonly known as: SYNTHROID, LEVOTHROID   50 mcg, Oral, Daily      mg-plus protein 133 MG elemental magnesium tablet   1 tablet, Oral, 2 Times Daily      pantoprazole 40 MG EC tablet  Commonly known as: PROTONIX   40 mg, Oral, Daily      potassium chloride 20 MEQ CR tablet  Commonly known as: K-DUR,KLOR-CON   20 mEq, Oral, Daily      rosuvastatin 40 MG tablet  Commonly known as: CRESTOR   20 mg, Oral, Nightly             Stop These Medications      Depo-Testosterone 200 MG/ML injection  Generic drug: Testosterone Cypionate                  Discharge Diet:   Diet Instructions       Diet: Regular/House Diet; Regular Texture (IDDSI 7); Thin (IDDSI 0)      Discharge Diet: Regular/House Diet    Texture: Regular Texture (IDDSI 7)    Fluid Consistency: Thin (IDDSI 0)    Diet: Regular/House Diet; Regular Texture (IDDSI 7); Thin (IDDSI 0)      Discharge Diet: Regular/House Diet    Texture: Regular Texture (IDDSI 7)    Fluid Consistency: Thin (IDDSI 0)            Activity at Discharge:   Activity Instructions       Activity as Tolerated      Activity as Tolerated      Up WIth Assist              Follow-up Appointments:   Future Appointments   Date Time Provider Department Center   10/19/2023  9:45 AM Rohan Arizmendi APRN [facility]       Test Results Pending at Discharge: none    Electronically signed by Micki Solano MD, 10/05/23, 10:38 CDT.    Time: 32 minutes.

## 2023-10-05 NOTE — CASE MANAGEMENT/SOCIAL WORK
Continued Stay Note  [facility]     Patient Name: Brennon Vance  MRN: [id number]  Today's Date: 10/5/2023    Admit Date: 9/24/2023    Plan: SNF Referrals   Discharge Plan       Row Name 10/05/23 1009       Plan    Final Discharge Disposition Code 03 - skilled nursing facility (SNF)    Final Note Precert is complete and pt can dc to [facility] today. Pt will need a new covid test. Dtr aware of plan. Dtr is requesting EMS for transport ([facility]).                   Discharge Codes    No documentation.                 Expected Discharge Date and Time       Expected Discharge Date Expected Discharge Time    Sep 29, 2023               FAISAL Nava

## 2023-10-05 NOTE — PROGRESS NOTES
"Brennon Vance  88 y.o.      Chief complaint:   Compression fracture status post kyphoplasty    Subjective  No events overnight.  Back pain under good control    Temp:  [97.7 °F (36.5 °C)-97.9 °F (36.6 °C)] 97.9 °F (36.6 °C)  Heart Rate:  [74-87] 80  Resp:  [15-18] 15  BP: (105-157)/(59-69) 157/66      Objective:  General Appearance:  Comfortable, well-appearing, in no acute distress and in pain.    Vital signs: (most recent): Blood pressure 157/66, pulse 80, temperature 97.9 °F (36.6 °C), temperature source Oral, resp. rate 15, height 177.8 cm (70\"), weight 45.5 kg (100 lb 3.2 oz), SpO2 96 %.  Vital signs are normal.  No fever.    Output: Producing urine.    HEENT: Normal HEENT exam.    Lungs:  Normal effort and normal respiratory rate.  He is not in respiratory distress.    Heart: Normal rate.  Regular rhythm.    Chest: Symmetric chest wall expansion.   Extremities: Normal range of motion.    Skin:  Warm and dry.    Abdomen: Abdomen is soft and non-distended.  Bowel sounds are normal.   There is no abdominal tenderness.     Pulses: Distal pulses are intact.      Neurologic Exam     Mental Status   Oriented to person, place, and time.   Attention: normal. Concentration: normal.   Speech: speech is normal   Level of consciousness: alert  Normal comprehension.     Cranial Nerves     CN II   Visual fields full to confrontation.     CN III, IV, VI   Pupils are equal, round, and reactive to light.  Extraocular motions are normal.     CN V   Facial sensation intact.     CN VII   Facial expression full, symmetric.     CN VIII   CN VIII normal.     CN IX, X   CN IX normal.   CN X normal.     CN XI   CN XI normal.     CN XII   CN XII normal.     Motor Exam   Muscle bulk: normal    Strength   Strength 5/5 throughout.     Sensory Exam   Light touch normal.     Gait, Coordination, and Reflexes     Gait  Gait: normal    Reflexes   Reflexes 2+ except as noted.     Lab Results (last 24 hours)       Procedure Component Value Units " Date/Time    High Sensitivity Troponin T 2Hr [[id number]]  (Abnormal) Collected: 10/04/23 1025    Specimen: Blood Updated: 10/04/23 1051     HS Troponin T 29 ng/L      Troponin T Delta 0 ng/L     Narrative:      High Sensitive Troponin T Reference Range:  <10.0 ng/L- Negative Female for AMI  <15.0 ng/L- Negative Male for AMI  >=10 - Abnormal Female indicating possible myocardial injury.  >=15 - Abnormal Male indicating possible myocardial injury.   Clinicians would have to utilize clinical acumen, EKG, Troponin, and serial changes to determine if it is an Acute Myocardial Infarction or myocardial injury due to an underlying chronic condition.         High Sensitivity Troponin T [[id number]]  (Abnormal) Collected: 10/04/23 0824    Specimen: Blood Updated: 10/04/23 0929     HS Troponin T 29 ng/L     Narrative:      High Sensitive Troponin T Reference Range:  <10.0 ng/L- Negative Female for AMI  <15.0 ng/L- Negative Male for AMI  >=10 - Abnormal Female indicating possible myocardial injury.  >=15 - Abnormal Male indicating possible myocardial injury.   Clinicians would have to utilize clinical acumen, EKG, Troponin, and serial changes to determine if it is an Acute Myocardial Infarction or myocardial injury due to an underlying chronic condition.                     Plan:   Patient is doing well.  Continue with physical and Occupational Therapy.  Waiting for rehab      Intractable low back pain    Coronary artery disease of native artery of native heart with stable angina pectoris    Paroxysmal atrial fibrillation    Other emphysema    Essential hypertension    UTI (urinary tract infection)    Aspiration pneumonia    Stage 3b chronic kidney disease    Compression fracture of T12 vertebra with delayed healing    Closed compression fracture of third lumbar vertebra        Rohan Arizmendi, APRN

## 2023-10-05 NOTE — PLAN OF CARE
Goal Outcome Evaluation:  Plan of Care Reviewed With: patient                     Pt A&Ox4. Has some moments of confusion, but is quickly reoriented without difficulty. Meds given per MAR. Pt requested atarax at bedtime. Pt ambulates to bathroom with standby assist and a walker.Pt states he has been awake all night. Pt has taken melatonin in the past, but it did not work. Pt is to bee discharged to a rehab facility soon. Safety measures in place. Call light within reach. Daughter at bedside. Bed in lowest position.

## 2023-10-05 NOTE — PLAN OF CARE
Goal Outcome Evaluation:  Plan of Care Reviewed With: patient, daughter        Progress: improving  Outcome Evaluation: Pt a/o x4. pt being dc to [facility]. report given to Jeff. pt insicions c/d/i. dc teaching complete. family at bedside. safety maintained. call light in reach. bed alarm on.

## 2023-10-06 NOTE — THERAPY DISCHARGE NOTE
Acute Care - Occupational Therapy Discharge Summary  [facility]     Patient Name: Brennon Vance  : 1935  MRN: [id number]    Today's Date: 10/6/2023                 Admit Date: 2023        OT Recommendation and Plan    Visit Dx:    ICD-10-CM ICD-9-CM   1. Acute cystitis without hematuria  N30.00 595.0   2. Acute bilateral low back pain with sciatica, sciatica laterality unspecified  M54.40 724.2     724.3   3. Shortness of breath  R06.02 786.05   4. Compression fracture of T12 vertebra with delayed healing  S22.080G V54.27   5. Closed compression fracture of L3 lumbar vertebra with delayed healing, subsequent encounter  S32.030G V54.17   6. Impaired mobility [Z74.09 (ICD-10-CM)]  Z74.09 799.89   7. Decreased activities of daily living (ADL) [Z78.9 (ICD-10-CM)]  Z78.9 V49.89                OT Rehab Goals       Row Name 10/06/23 1300             Transfer Goal 1 (OT)    Activity/Assistive Device (Transfer Goal 1, OT) toilet  -JJ      Ponder Level/Cues Needed (Transfer Goal 1, OT) supervision required  -JJ      Time Frame (Transfer Goal 1, OT) long term goal (LTG)  -JJ      Progress/Outcome (Transfer Goal 1, OT) goal not met;discharged from facility  -JJ         Bathing Goal 1 (OT)    Activity/Device (Bathing Goal 1, OT) lower body bathing  -JJ      [place] Level/Cues Needed (Bathing Goal 1, OT) minimum assist (75% or more patient effort)  -JJ      Time Frame (Bathing Goal 1, OT) long term goal (LTG)  -JJ      Strategies/Barriers (Bathing Goal 1, OT) AE PRN  -JJ      Progress/Outcomes (Bathing Goal 1, OT) goal not met;discharged from facility  -JJ         Grooming Goal 1 (OT)    Activity/Device (Grooming Goal 1, OT) grooming skills, all  -JJ      Ponder (Grooming Goal 1, OT) supervision required  -JJ      Time Frame (Grooming Goal 1, OT) long term goal (LTG)  -JJ      Progress/Outcome (Grooming Goal 1, OT) goal not met;discharged from facility  -JJ                User Key  (r) = Recorded By,  (t) = Taken By, (c) = Cosigned By      Initials Name Provider Type Discipline    Hanane Marvin, OTR/L, CSRS Occupational Therapist OT                     Outcome Measures       Row Name 10/04/23 1400 10/04/23 1000          How much help from another person do you currently need...    Turning from your back to your side while in flat bed without using bedrails? 4  -MS (r) JAMEEL (t) MS (c) --     Moving from lying on back to sitting on the side of a flat bed without bedrails? 4  -MS (r) JAMEEL (t) MS (c) --     Moving to and from a bed to a chair (including a wheelchair)? 3  -MS (r) JAMEEL (t) MS (c) --     Standing up from a chair using your arms (e.g., wheelchair, bedside chair)? 3  -MS (r) JAMEEL (t) MS (c) --     Climbing 3-5 steps with a railing? 3  -MS (r) JAMEEL (t) MS (c) --     To walk in hospital room? 3  -MS (r) JAMEEL (t) MS (c) --     AM-PAC 6 Clicks Score (PT) 20  -MS (r) JAMEEL (t) --        How much help from another is currently needed...    Putting on and taking off regular lower body clothing? -- 3  -EC     Bathing (including washing, rinsing, and drying) -- 3  -EC     Toileting (which includes using toilet bed pan or urinal) -- 3  -EC     Putting on and taking off regular upper body clothing -- 3  -EC     Taking care of personal grooming (such as brushing teeth) -- 3  -EC     Eating meals -- 4  -EC     AM-PAC 6 Clicks Score (OT) -- 19  -EC        Functional Assessment    Outcome Measure Options AM-PAC 6 Clicks Basic Mobility (PT)  -MS (r) JAMEEL (t) MS (c) AM-PAC 6 Clicks Daily Activity (OT)  -EC               User Key  (r) = Recorded By, (t) = Taken By, (c) = Cosigned By      Initials Name Provider Type    Yelena Oerilly R, PT, DPT, NCS Physical Therapist    EC Manisha Greco, OT Occupational Therapist    Marcin Cool, PTA Student PTA Student                    Timed Therapy Charges  Total Units: 3      Suggested Charges  Total Units: 3      Procedure Name Documented Minutes Units Code    HC OT THERAPEUTIC ACT  EA 15 MIN 25 2   23903 (CPT®)      HC OT SELF CARE/MGMT/TRAIN EA 15 MIN 17 1   43243 (CPT®)                 Documented Minutes  Total Minutes: 42      Therapy Provided Minutes    73111 - OT Therapeutic Activity Minutes 25    85753 - OT Self Care/Mgmt Minutes 17                        OT Discharge Summary  Anticipated Discharge Disposition (OT): sub acute care setting, inpatient rehabilitation facility  Reason for Discharge: Discharge from facility  Outcomes Achieved: Refer to plan of care for updates on goals achieved  Discharge Destination: SNF      NEENA Herrera/L, CSRS  10/6/2023

## 2023-10-06 NOTE — THERAPY DISCHARGE NOTE
Acute Care - Physical Therapy Discharge Summary  [facility]       Patient Name: Brennon Vance  : 1935  MRN: [id number]    Today's Date: 10/6/2023                 Admit Date: 2023      PT Recommendation and Plan    Visit Dx:    ICD-10-CM ICD-9-CM   1. Acute cystitis without hematuria  N30.00 595.0   2. Acute bilateral low back pain with sciatica, sciatica laterality unspecified  M54.40 724.2     724.3   3. Shortness of breath  R06.02 786.05   4. Compression fracture of T12 vertebra with delayed healing  S22.080G V54.27   5. Closed compression fracture of L3 lumbar vertebra with delayed healing, subsequent encounter  S32.030G V54.17   6. Impaired mobility [Z74.09 (ICD-10-CM)]  Z74.09 799.89   7. Decreased activities of daily living (ADL) [Z78.9 (ICD-10-CM)]  Z78.9 V49.89        Outcome Measures       Row Name 10/04/23 1400 10/04/23 1000          How much help from another person do you currently need...    Turning from your back to your side while in flat bed without using bedrails? 4  -MS (r) JAMEEL (t) MS (c) --     Moving from lying on back to sitting on the side of a flat bed without bedrails? 4  -MS (r) JAMEEL (t) MS (c) --     Moving to and from a bed to a chair (including a wheelchair)? 3  -MS (r) JAMEEL (t) MS (c) --     Standing up from a chair using your arms (e.g., wheelchair, bedside chair)? 3  -MS (r) JAMEEL (t) MS (c) --     Climbing 3-5 steps with a railing? 3  -MS (r) JAMEEL (t) MS (c) --     To walk in hospital room? 3  -MS (r) JAMEEL (t) MS (c) --     AM-[facility] 6 Clicks Score (PT) 20  -MS (r) JAMEEL (t) --        How much help from another is currently needed...    Putting on and taking off regular lower body clothing? -- 3  -EC     Bathing (including washing, rinsing, and drying) -- 3  -EC     Toileting (which includes using toilet bed pan or urinal) -- 3  -EC     Putting on and taking off regular upper body clothing -- 3  -EC     Taking care of personal grooming (such as brushing teeth) -- 3  -EC     Eating meals -- 4   -EC     AM-PAC 6 Clicks Score (OT) -- 19  -EC        Functional Assessment    Outcome Measure Options AM-PAC 6 Clicks Basic Mobility (PT)  -MS (r) JAMEEL (t) MS (c) AM-PAC 6 Clicks Daily Activity (OT)  -EC               User Key  (r) = Recorded By, (t) = Taken By, (c) = Cosigned By      Initials Name Provider Type    Yelena Oreilly R, PT, DPT, NCS Physical Therapist    EC Manisha Greco, OT Occupational Therapist    Marcin Cool, PTA Student PTA Student                         PT Rehab Goals       Row Name 10/06/23 1557             Bed Mobility Goal 1 (PT)    Activity/Assistive Device (Bed Mobility Goal 1, PT) rolling to left;rolling to right;sit to supine  -LY      [place] Level/Cues Needed (Bed Mobility Goal 1, PT) contact guard required  -LY      Time Frame (Bed Mobility Goal 1, PT) long term goal (LTG);by discharge  -LY      Progress/Outcomes (Bed Mobility Goal 1, PT) goal met  -LY         Transfer Goal 1 (PT)    Activity/Assistive Device (Transfer Goal 1, PT) sit-to-stand/stand-to-sit  -LY      [place] Level/Cues Needed (Transfer Goal 1, PT) standby assist  -LY      Time Frame (Transfer Goal 1, PT) long term goal (LTG);by discharge  -LY      Progress/Outcome (Transfer Goal 1, PT) goal not met  -LY         Gait Training Goal 1 (PT)    Activity/Assistive Device (Gait Training Goal 1, PT) gait (walking locomotion);assistive device use;decrease fall risk;increase endurance/gait distance;improve balance and speed;walker, rolling  -LY      [place] Level (Gait Training Goal 1, PT) standby assist  -LY      Distance (Gait Training Goal 1, PT) 20  -LY      Time Frame (Gait Training Goal 1, PT) long term goal (LTG);by discharge  -LY      Progress/Outcome (Gait Training Goal 1, PT) goal not met  -LY         Patient Education Goal (PT)    Activity (Patient Education Goal, PT) Patient will demonstrate understanding of spinal precautions.  -LY      [place]/Cues/Accuracy (Memory Goal 2, PT)  demonstrates adequately;verbalizes understanding  -LY      Time Frame (Patient Education Goal, PT) long term goal (LTG);by discharge  -LY      Progress/Outcome (Patient Education Goal, PT) goal not met  -LY                User Key  (r) = Recorded By, (t) = Taken By, (c) = Cosigned By      Initials Name Provider Type Discipline    Jaquelin Boone, PTA Physical Therapist Assistant PT                        PT Discharge Summary  Anticipated Discharge Disposition (PT): skilled nursing facility  Reason for Discharge: Discharge from facility  Outcomes Achieved: Refer to plan of care for updates on goals achieved  Discharge Destination: SNF      Jaquelin Mendoza PTA   10/6/2023

## 2023-10-09 ENCOUNTER — LAB REQUISITION (OUTPATIENT)
Dept: LAB | Facility: HOSPITAL | Age: 88
End: 2023-10-09
Payer: MEDICARE

## 2023-10-09 LAB
ANION GAP SERPL CALCULATED.3IONS-SCNC: 14 MMOL/L (ref 5–15)
BASOPHILS # BLD AUTO: 0.06 10*3/MM3 (ref 0–0.2)
BASOPHILS NFR BLD AUTO: 1.3 % (ref 0–1.5)
BUN SERPL-MCNC: 21 MG/DL (ref 8–23)
BUN/CREAT SERPL: 11.4 (ref 7–25)
CALCIUM SPEC-SCNC: 7.7 MG/DL (ref 8.6–10.5)
CHLORIDE SERPL-SCNC: 105 MMOL/L (ref 98–107)
CO2 SERPL-SCNC: 24 MMOL/L (ref 22–29)
CREAT SERPL-MCNC: 1.85 MG/DL (ref 0.76–1.27)
DEPRECATED RDW RBC AUTO: 49.4 FL (ref 37–54)
EGFRCR SERPLBLD CKD-EPI 2021: 35 ML/MIN/1.73
EOSINOPHIL # BLD AUTO: 0.11 10*3/MM3 (ref 0–0.4)
EOSINOPHIL NFR BLD AUTO: 2.4 % (ref 0.3–6.2)
ERYTHROCYTE [DISTWIDTH] IN BLOOD BY AUTOMATED COUNT: 14 % (ref 12.3–15.4)
GLUCOSE SERPL-MCNC: 94 MG/DL (ref 65–99)
HCT VFR BLD AUTO: 30.4 % (ref 37.5–51)
HGB BLD-MCNC: 9.6 G/DL (ref 13–17.7)
IMM GRANULOCYTES # BLD AUTO: 0.02 10*3/MM3 (ref 0–0.05)
IMM GRANULOCYTES NFR BLD AUTO: 0.4 % (ref 0–0.5)
LYMPHOCYTES # BLD AUTO: 1.05 10*3/MM3 (ref 0.7–3.1)
LYMPHOCYTES NFR BLD AUTO: 22.9 % (ref 19.6–45.3)
MCH RBC QN AUTO: 30.7 PG (ref 26.6–33)
MCHC RBC AUTO-ENTMCNC: 31.6 G/DL (ref 31.5–35.7)
MCV RBC AUTO: 97.1 FL (ref 79–97)
MONOCYTES # BLD AUTO: 0.51 10*3/MM3 (ref 0.1–0.9)
MONOCYTES NFR BLD AUTO: 11.1 % (ref 5–12)
NEUTROPHILS NFR BLD AUTO: 2.84 10*3/MM3 (ref 1.7–7)
NEUTROPHILS NFR BLD AUTO: 61.9 % (ref 42.7–76)
NRBC BLD AUTO-RTO: 0 /100 WBC (ref 0–0.2)
PLATELET # BLD AUTO: 212 10*3/MM3 (ref 140–450)
PMV BLD AUTO: 9.2 FL (ref 6–12)
POTASSIUM SERPL-SCNC: 3.5 MMOL/L (ref 3.5–5.2)
RBC # BLD AUTO: 3.13 10*6/MM3 (ref 4.14–5.8)
SODIUM SERPL-SCNC: 143 MMOL/L (ref 136–145)
WBC NRBC COR # BLD: 4.59 10*3/MM3 (ref 3.4–10.8)

## 2023-10-09 PROCEDURE — 80048 BASIC METABOLIC PNL TOTAL CA: CPT | Performed by: INTERNAL MEDICINE

## 2023-10-09 PROCEDURE — 36415 COLL VENOUS BLD VENIPUNCTURE: CPT | Performed by: INTERNAL MEDICINE

## 2023-10-09 PROCEDURE — 85025 COMPLETE CBC W/AUTO DIFF WBC: CPT | Performed by: INTERNAL MEDICINE

## 2023-10-11 LAB
QT INTERVAL: 398 MS
QTC INTERVAL: 467 MS

## 2023-10-26 ENCOUNTER — OFFICE VISIT (OUTPATIENT)
Dept: NEUROSURGERY | Facility: CLINIC | Age: 88
End: 2023-10-26
Payer: MEDICARE

## 2023-10-26 VITALS — BODY MASS INDEX: 14.32 KG/M2 | WEIGHT: 100 LBS | HEIGHT: 70 IN

## 2023-10-26 DIAGNOSIS — S32.010D CLOSED COMPRESSION FRACTURE OF L1 LUMBAR VERTEBRA WITH ROUTINE HEALING, SUBSEQUENT ENCOUNTER: ICD-10-CM

## 2023-10-26 DIAGNOSIS — Z72.0 TOBACCO ABUSE: ICD-10-CM

## 2023-10-26 DIAGNOSIS — S22.080D COMPRESSION FRACTURE OF T12 VERTEBRA WITH ROUTINE HEALING, SUBSEQUENT ENCOUNTER: Primary | ICD-10-CM

## 2023-10-26 PROBLEM — S32.010A CLOSED COMPRESSION FRACTURE OF FIRST LUMBAR VERTEBRA: Status: ACTIVE | Noted: 2023-09-24

## 2023-10-26 PROBLEM — S22.080A COMPRESSION FRACTURE OF T12 VERTEBRA: Status: ACTIVE | Noted: 2023-09-24

## 2023-10-26 PROBLEM — I63.9 STROKE: Status: ACTIVE | Noted: 2023-10-26

## 2023-10-26 PROBLEM — K44.9 HIATAL HERNIA: Status: ACTIVE | Noted: 2023-10-26

## 2023-10-26 RX ORDER — DIPHENOXYLATE HYDROCHLORIDE AND ATROPINE SULFATE 2.5; .025 MG/1; MG/1
TABLET ORAL AS NEEDED
COMMUNITY

## 2023-10-26 RX ORDER — NITROGLYCERIN 0.4 MG/1
0.4 TABLET SUBLINGUAL AS NEEDED
COMMUNITY

## 2023-10-26 NOTE — PATIENT INSTRUCTIONS
"https://www.nhlbi.nih.gov/files/docs/public/heart/dash_brief.pdf\">   DASH Eating Plan  DASH stands for Dietary Approaches to Stop Hypertension. The DASH eating plan is a healthy eating plan that has been shown to:  Reduce high blood pressure (hypertension).  Reduce your risk for type 2 diabetes, heart disease, and stroke.  Help with weight loss.  What are tips for following this plan?  Reading food labels  Check food labels for the amount of salt (sodium) per serving. Choose foods with less than 5 percent of the Daily Value of sodium. Generally, foods with less than 300 milligrams (mg) of sodium per serving fit into this eating plan.  To find whole grains, look for the word \"whole\" as the first word in the ingredient list.  Shopping  Buy products labeled as \"low-sodium\" or \"no salt added.\"  Buy fresh foods. Avoid canned foods and pre-made or frozen meals.  Cooking  Avoid adding salt when cooking. Use salt-free seasonings or herbs instead of table salt or sea salt. Check with your health care provider or pharmacist before using salt substitutes.  Do not lindo foods. Cook foods using healthy methods such as baking, boiling, grilling, roasting, and broiling instead.  Cook with heart-healthy oils, such as olive, canola, avocado, soybean, or sunflower oil.  Meal planning    Eat a balanced diet that includes:  4 or more servings of fruits and 4 or more servings of vegetables each day. Try to fill one-half of your plate with fruits and vegetables.  6-8 servings of whole grains each day.  Less than 6 oz (170 g) of lean meat, poultry, or fish each day. A 3-oz (85-g) serving of meat is about the same size as a deck of cards. One egg equals 1 oz (28 g).  2-3 servings of low-fat dairy each day. One serving is 1 cup (237 mL).  1 serving of nuts, seeds, or beans 5 times each week.  2-3 servings of heart-healthy fats. Healthy fats called omega-3 fatty acids are found in foods such as walnuts, flaxseeds, fortified milks, and eggs. " These fats are also found in cold-water fish, such as sardines, salmon, and mackerel.  Limit how much you eat of:  Canned or prepackaged foods.  Food that is high in trans fat, such as some fried foods.  Food that is high in saturated fat, such as fatty meat.  Desserts and other sweets, sugary drinks, and other foods with added sugar.  Full-fat dairy products.  Do not salt foods before eating.  Do not eat more than 4 egg yolks a week.  Try to eat at least 2 vegetarian meals a week.  Eat more home-cooked food and less restaurant, buffet, and fast food.    Lifestyle  When eating at a restaurant, ask that your food be prepared with less salt or no salt, if possible.  If you drink alcohol:  Limit how much you use to:  0-1 drink a day for women who are not pregnant.  0-2 drinks a day for men.  Be aware of how much alcohol is in your drink. In the [place] one drink equals one 12 oz bottle of beer (355 mL), one 5 oz glass of wine (148 mL), or one 1½ oz glass of hard liquor (44 mL).  General information  Avoid eating more than 2,300 mg of salt a day. If you have hypertension, you may need to reduce your sodium intake to 1,500 mg a day.  Work with your health care provider to maintain a healthy body weight or to lose weight. Ask what an ideal weight is for you.  Get at least 30 minutes of exercise that causes your heart to beat faster (aerobic exercise) most days of the week. Activities may include walking, swimming, or biking.  Work with your health care provider or dietitian to adjust your eating plan to your individual calorie needs.  What foods should I eat?  Fruits  All fresh, dried, or frozen fruit. Canned fruit in natural juice (without added sugar).  Vegetables  Fresh or frozen vegetables (raw, steamed, roasted, or grilled). Low-sodium or reduced-sodium tomato and vegetable juice. Low-sodium or reduced-sodium tomato sauce and tomato paste. Low-sodium or reduced-sodium canned vegetables.  Grains  Whole-grain or  whole-wheat bread. Whole-grain or whole-wheat pasta. Brown rice. Oatmeal. Quinoa. Bulgur. Whole-grain and low-sodium cereals. Chani bread. Low-fat, low-sodium crackers. Whole-wheat flour tortillas.  Meats and other proteins  Skinless chicken or turkey. Ground chicken or turkey. Pork with fat trimmed off. Fish and seafood. Egg whites. Dried beans, peas, or lentils. Unsalted nuts, nut butters, and seeds. Unsalted canned beans. Lean cuts of beef with fat trimmed off. Low-sodium, lean precooked or cured meat, such as sausages or meat loaves.  Dairy  Low-fat (1%) or fat-free (skim) milk. Reduced-fat, low-fat, or fat-free cheeses. Nonfat, low-sodium ricotta or cottage cheese. Low-fat or nonfat yogurt. Low-fat, low-sodium cheese.  Fats and oils  Soft margarine without trans fats. Vegetable oil. Reduced-fat, low-fat, or light mayonnaise and salad dressings (reduced-sodium). Canola, safflower, olive, avocado, soybean, and sunflower oils. Avocado.  Seasonings and condiments  Herbs. Spices. Seasoning mixes without salt.  Other foods  Unsalted popcorn and pretzels. Fat-free sweets.  The items listed above may not be a complete list of foods and beverages you can eat. Contact a dietitian for more information.  What foods should I avoid?  Fruits  Canned fruit in a light or heavy syrup. Fried fruit. Fruit in cream or butter sauce.  Vegetables  Creamed or fried vegetables. Vegetables in a cheese sauce. Regular canned vegetables (not low-sodium or reduced-sodium). Regular canned tomato sauce and paste (not low-sodium or reduced-sodium). Regular tomato and vegetable juice (not low-sodium or reduced-sodium). Pickles. Olives.  Grains  Baked goods made with fat, such as croissants, muffins, or some breads. Dry pasta or rice meal packs.  Meats and other proteins  Fatty cuts of meat. Ribs. Fried meat. Brannon. Bologna, salami, and other precooked or cured meats, such as sausages or meat loaves. Fat from the back of a pig (fatback).  Bratwurst. Salted nuts and seeds. Canned beans with added salt. Canned or smoked fish. Whole eggs or egg yolks. Chicken or turkey with skin.  Dairy  Whole or 2% milk, cream, and half-and-half. Whole or full-fat cream cheese. Whole-fat or sweetened yogurt. Full-fat cheese. Nondairy creamers. Whipped toppings. Processed cheese and cheese spreads.  Fats and oils  Butter. Stick margarine. Lard. Shortening. Ghee. Brannon fat. Tropical oils, such as coconut, palm kernel, or palm oil.  Seasonings and condiments  Onion salt, garlic salt, seasoned salt, table salt, and sea salt. Worcestershire sauce. Tartar sauce. Barbecue sauce. Teriyaki sauce. Soy sauce, including reduced-sodium. Steak sauce. Canned and packaged gravies. Fish sauce. Oyster sauce. Cocktail sauce. Store-bought horseradish. Ketchup. Mustard. Meat flavorings and tenderizers. Bouillon cubes. Hot sauces. Pre-made or packaged marinades. Pre-made or packaged taco seasonings. Relishes. Regular salad dressings.  Other foods  Salted popcorn and pretzels.  The items listed above may not be a complete list of foods and beverages you should avoid. Contact a dietitian for more information.  Where to find more information  National Heart, Lung, and Blood [place]: www.nhlbi.nih.gov  American Heart Association: www.heart.org  Academy of Nutrition and Dietetics: www.eatright.org  National Kidney Foundation: www.kidney.org  Summary  The DASH eating plan is a healthy eating plan that has been shown to reduce high blood pressure (hypertension). It may also reduce your risk for type 2 diabetes, heart disease, and stroke.  When on the DASH eating plan, aim to eat more fresh fruits and vegetables, whole grains, lean proteins, low-fat dairy, and heart-healthy fats.  With the DASH eating plan, you should limit salt (sodium) intake to 2,300 mg a day. If you have hypertension, you may need to reduce your sodium intake to 1,500 mg a day.  Work with your health care provider or  dietitian to adjust your eating plan to your individual calorie needs.  This information is not intended to replace advice given to you by your health care provider. Make sure you discuss any questions you have with your health care provider.  Document Revised: 11/20/2020 Document Reviewed: 11/20/2020  [employer] Patient Education © 2021 [employer] High-Protein and High-Calorie Diet  Eating high-protein and high-calorie foods can help you to gain weight, heal after an injury, and recover after an illness or surgery. The specific amount of daily protein and calories you need depends on:  Your body weight.  The reason this diet is recommended for you.  What is my plan?  Generally, a high-protein, high-calorie diet involves:  Eating 250-500 extra calories each day.  Making sure that you get enough of your daily calories from protein. Ask your health care provider how many of your calories should come from protein.  Talk with a health care provider, such as a diet and nutrition specialist (dietitian), about how much protein and how many calories you need each day. Follow the diet as directed by your health care provider.  What are tips for following this plan?    Preparing meals  Add whole milk, half-and-half, or heavy cream to cereal, pudding, soup, or hot cocoa.  Add whole milk to instant breakfast drinks.  Add peanut butter to oatmeal or smoothies.  Add powdered milk to baked goods, smoothies, or milkshakes.  Add powdered milk, cream, or butter to mashed potatoes.  Add cheese to cooked vegetables.  Make whole-milk yogurt parfaits. Top them with granola, fruit, or nuts.  Add cottage cheese to your fruit.  Add avocado, cheese, or both to sandwiches or salads.  Add meat, poultry, or seafood to rice, pasta, casseroles, salads, and soups.  Use mayonnaise when making egg salad, chicken salad, or tuna salad.  Use peanut butter as a dip for vegetables or as a topping for pretzels, celery, or crackers.  Add beans to  casseroles, dips, and spreads.  Add pureed beans to sauces and soups.  Replace calorie-free drinks with calorie-containing drinks, such as milk and fruit juice.  Replace water with milk or heavy cream when making foods such as oatmeal, pudding, or cocoa.  General instructions  Ask your health care provider if you should take a nutritional supplement.  Try to eat six small meals each day instead of three large meals.  Eat a balanced diet. In each meal, include one food that is high in protein.  Keep nutritious snacks available, such as nuts, trail mixes, dried fruit, and yogurt.  If you have kidney disease or diabetes, talk with your health care provider about how much protein is safe for you. Too much protein may put extra stress on your kidneys.  Drink your calories. Choose high-calorie drinks and have them after your meals.  What high-protein foods should I eat?    Vegetables  Soybeans. Peas.  Grains  Quinoa. Bulgur wheat.  Meats and other proteins  Beef, pork, and poultry. Fish and seafood. Eggs. Tofu. Textured vegetable protein (TVP). Peanut butter. Nuts and seeds. Dried beans. Protein powders.  Dairy  Whole milk. Whole-milk yogurt. Powdered milk. Cheese. Cottage Cheese. Eggnog.  Beverages  High-protein supplement drinks. Soy milk.  Other foods  Protein bars.  The items listed above may not be a complete list of high-protein foods and beverages. Contact a dietitian for more options.  What high-calorie foods should I eat?  Fruits  Dried fruit. Fruit leather. Canned fruit in syrup. Fruit juice. Avocado.  Vegetables  Vegetables cooked in oil or butter. Fried potatoes.  Grains  Pasta. Quick breads. Muffins. Pancakes. Ready-to-eat cereal.  Meats and other proteins  Peanut butter. Nuts and seeds.  Dairy  Heavy cream. Whipped cream. Cream cheese. Sour cream. Ice cream. Custard. Pudding.  Beverages  Meal-replacement beverages. Nutrition shakes. Fruit juice. Sugar-sweetened soft drinks.  Seasonings and condiments  Salad  dressing. Mayonnaise. Pavel sauce. Fruit preserves or jelly. Honey. Syrup.  Sweets and desserts  Cake. Cookies. Pie. Pastries. Candy bars. Chocolate.  Fats and oils  Butter or margarine. Oil. Gravy.  Other foods  Meal-replacement bars.  The items listed above may not be a complete list of high-calorie foods and beverages. Contact a dietitian for more options.  Summary  A high-protein, high-calorie diet can help you gain weight or heal faster after an injury, illness, or surgery.  To increase your protein and calories, add ingredients such as whole milk, peanut butter, cheese, beans, meat, or seafood to meal items.  To get enough extra calories each day, include high-calorie foods and beverages at each meal.  Adding a high-calorie drink or shake can be an easy way to help you get enough calories each day. Talk with your healthcare provider or dietitian about the best options for you.  This information is not intended to replace advice given to you by your health care provider. Make sure you discuss any questions you have with your health care provider.  Document Revised: 11/30/2018 Document Reviewed: 10/30/2018  [employer] Patient Education © 2021 [employer]

## 2023-10-26 NOTE — PROGRESS NOTES
"  Chief complaint:   Chief Complaint   Patient presents with    Back Pain     Pt here for 3wk p/o f/u. Pt states since surgery his symptoms have improved. Pt is walking with a walker today.        Subjective     HPI: This is a 88 y.o. male patient who went to the operating room on 9/26/2023 for a KYPHOPLASTY WITH BIOPSY T12 and L3. The patient is here in follow up today for postoperative visit.  He comes in today and says that his back pain is doing really well.  He is not really complaining any back or leg pain.  He is walking with a walker.  He is still at the nursing facility but they are looking into going into an assisted living facility.  They would like to continue with therapy as well.        Review of Systems   Constitutional:  Negative for fever.   Cardiovascular:  Negative for chest pain.   Gastrointestinal:  Negative for abdominal pain.   Genitourinary:  Negative for dysuria.   Musculoskeletal:  Positive for back pain.   Neurological:  Negative for weakness, numbness and headaches.         Objective      Vital Signs  Ht 177.8 cm (70\")   Wt 45.4 kg (100 lb)   BMI 14.35 kg/m²     Physical Exam  Constitutional:       Appearance: Normal appearance. He is well-developed.   HENT:      Head: Normocephalic.   Eyes:      General: Lids are normal.      Extraocular Movements: EOM normal.      Conjunctiva/sclera: Conjunctivae normal.      Pupils: Pupils are equal, round, and reactive to light.   Pulmonary:      Effort: Pulmonary effort is normal.      Breath sounds: Normal breath sounds.   Musculoskeletal:         General: Normal range of motion.      Cervical back: Normal range of motion.   Skin:     General: Skin is warm.   Neurological:      Mental Status: He is alert and oriented to person, place, and time.      GCS: GCS eye subscore is 4. GCS verbal subscore is 5. GCS motor subscore is 6.      Cranial Nerves: No cranial nerve deficit.      Sensory: No sensory deficit.      Motor: Motor strength is normal.     " Gait: Gait abnormal.      Deep Tendon Reflexes: Reflexes are normal and symmetric. Reflexes normal.   Psychiatric:         Speech: Speech normal.         Behavior: Behavior normal.         Thought Content: Thought content normal.       Incisions clean dry and intact    Neurologic Exam     Mental Status   Oriented to person, place, and time.   Attention: normal. Concentration: normal.   Speech: speech is normal   Level of consciousness: alert  Normal comprehension.     Cranial Nerves     CN II   Visual fields full to confrontation.     CN III, IV, VI   Pupils are equal, round, and reactive to light.  Extraocular motions are normal.     CN V   Facial sensation intact.     CN VII   Facial expression full, symmetric.     CN VIII   CN VIII normal.     CN IX, X   CN IX normal.   CN X normal.     CN XI   CN XI normal.     CN XII   CN XII normal.     Motor Exam   Muscle bulk: normal    Strength   Strength 5/5 throughout.     Sensory Exam   Light touch normal.     Gait, Coordination, and Reflexes Walking independently with a walker       Imaging review: No new imaging          Assessment/Plan: Patient is doing well from a surgery standpoint.  His pain issues are under good control.  I did discuss everything with the family and told him that if they need an order for therapy to give us a call and would be happy to get an order placed for therapy.  At this time I will need to see him on an as-needed basis.  His questions and concerns were addressed    Patient is a nonsmoker  The patient's Body mass index is 14.35 kg/m².. BMI is below normal parameters. Recommendations include: Education material    Diagnoses and all orders for this visit:    1. Compression fracture of T12 vertebra with routine healing, subsequent encounter (Primary)    2. Closed compression fracture of L1 lumbar vertebra with routine healing, subsequent encounter    3. Tobacco abuse    4. Body mass index (BMI) of 20.0-20.9 in adult        I discussed the  patients findings and my recommendations with patient  Rohan GARCIALarry Arizmendi, APRN  10/26/23  09:37 CDT  Answers submitted by the patient for this visit:  Primary Reason for Visit (Submitted on 10/25/2023)  What is the primary reason for your visit?: Back Pain  Back Pain Questionnaire (Submitted on 10/25/2023)  Chief Complaint: Back pain  Chronicity: recurrent  Onset: 1 to 4 weeks ago  Frequency: daily  Progression since onset: gradually improving  Pain location: sacro-iliac  Pain quality: aching, shooting, stabbing  Radiates to: does not radiate  Pain - numeric: 2/10  Pain is: worse during the night  Aggravated by: bending  Stiffness is present: at night  bladder incontinence: No  bowel incontinence: No  leg pain: No  paresis: No  paresthesias: No  pelvic pain: No  perianal numbness: No  tingling: No  weight loss: No  Risk factors: history of osteoporosis, lack of exercise, recent trauma, sedentary lifestyle

## 2023-11-02 ENCOUNTER — APPOINTMENT (OUTPATIENT)
Dept: CT IMAGING | Facility: HOSPITAL | Age: 88
End: 2023-11-02
Payer: MEDICARE

## 2023-11-02 ENCOUNTER — HOSPITAL ENCOUNTER (OUTPATIENT)
Facility: HOSPITAL | Age: 88
Discharge: NURSING, REHAB OR LONG-TERM CARE | End: 2023-11-07
Attending: INTERNAL MEDICINE | Admitting: NEUROLOGICAL SURGERY
Payer: MEDICARE

## 2023-11-02 DIAGNOSIS — Z78.9 DECREASED ACTIVITIES OF DAILY LIVING (ADL): ICD-10-CM

## 2023-11-02 DIAGNOSIS — S22.089A CLOSED FRACTURE OF ELEVENTH THORACIC VERTEBRA, UNSPECIFIED FRACTURE MORPHOLOGY, INITIAL ENCOUNTER: ICD-10-CM

## 2023-11-02 DIAGNOSIS — M54.6 ACUTE MIDLINE THORACIC BACK PAIN: Primary | ICD-10-CM

## 2023-11-02 DIAGNOSIS — N39.0 URINARY TRACT INFECTION WITHOUT HEMATURIA, SITE UNSPECIFIED: ICD-10-CM

## 2023-11-02 DIAGNOSIS — M54.9 INTRACTABLE BACK PAIN: ICD-10-CM

## 2023-11-02 DIAGNOSIS — S22.080A CLOSED WEDGE COMPRESSION FRACTURE OF T11 VERTEBRA, INITIAL ENCOUNTER: ICD-10-CM

## 2023-11-02 DIAGNOSIS — Z74.09 IMPAIRED MOBILITY: ICD-10-CM

## 2023-11-02 DIAGNOSIS — S22.069A CLOSED FRACTURE OF EIGHTH THORACIC VERTEBRA, UNSPECIFIED FRACTURE MORPHOLOGY, INITIAL ENCOUNTER: ICD-10-CM

## 2023-11-02 PROBLEM — D63.8 ANEMIA, CHRONIC DISEASE: Status: ACTIVE | Noted: 2023-11-02

## 2023-11-02 PROBLEM — E46 PROTEIN-CALORIE MALNUTRITION: Status: ACTIVE | Noted: 2023-11-02

## 2023-11-02 PROBLEM — R41.3 MEMORY DIFFICULTIES: Status: ACTIVE | Noted: 2023-11-02

## 2023-11-02 LAB
ANION GAP SERPL CALCULATED.3IONS-SCNC: 11 MMOL/L (ref 5–15)
BACTERIA UR QL AUTO: ABNORMAL /HPF
BASOPHILS # BLD AUTO: 0.04 10*3/MM3 (ref 0–0.2)
BASOPHILS NFR BLD AUTO: 0.7 % (ref 0–1.5)
BILIRUB UR QL STRIP: NEGATIVE
BUN SERPL-MCNC: 28 MG/DL (ref 8–23)
BUN/CREAT SERPL: 15.8 (ref 7–25)
CALCIUM SPEC-SCNC: 8.2 MG/DL (ref 8.6–10.5)
CHLORIDE SERPL-SCNC: 101 MMOL/L (ref 98–107)
CLARITY UR: ABNORMAL
CO2 SERPL-SCNC: 25 MMOL/L (ref 22–29)
COLOR UR: YELLOW
CREAT SERPL-MCNC: 1.77 MG/DL (ref 0.76–1.27)
DEPRECATED RDW RBC AUTO: 48 FL (ref 37–54)
EGFRCR SERPLBLD CKD-EPI 2021: 36.9 ML/MIN/1.73
EOSINOPHIL # BLD AUTO: 0.1 10*3/MM3 (ref 0–0.4)
EOSINOPHIL NFR BLD AUTO: 1.9 % (ref 0.3–6.2)
ERYTHROCYTE [DISTWIDTH] IN BLOOD BY AUTOMATED COUNT: 13.5 % (ref 12.3–15.4)
FERRITIN SERPL-MCNC: 358.8 NG/ML (ref 30–400)
GLUCOSE SERPL-MCNC: 93 MG/DL (ref 65–99)
GLUCOSE UR STRIP-MCNC: NEGATIVE MG/DL
HCT VFR BLD AUTO: 28.3 % (ref 37.5–51)
HGB BLD-MCNC: 9 G/DL (ref 13–17.7)
HGB UR QL STRIP.AUTO: ABNORMAL
HYALINE CASTS UR QL AUTO: ABNORMAL /LPF
IMM GRANULOCYTES # BLD AUTO: 0.03 10*3/MM3 (ref 0–0.05)
IMM GRANULOCYTES NFR BLD AUTO: 0.6 % (ref 0–0.5)
IRON 24H UR-MRATE: 39 MCG/DL (ref 59–158)
IRON SATN MFR SERPL: 17 % (ref 20–50)
KETONES UR QL STRIP: NEGATIVE
LEUKOCYTE ESTERASE UR QL STRIP.AUTO: ABNORMAL
LYMPHOCYTES # BLD AUTO: 1.35 10*3/MM3 (ref 0.7–3.1)
LYMPHOCYTES NFR BLD AUTO: 25.2 % (ref 19.6–45.3)
MCH RBC QN AUTO: 30.9 PG (ref 26.6–33)
MCHC RBC AUTO-ENTMCNC: 31.8 G/DL (ref 31.5–35.7)
MCV RBC AUTO: 97.3 FL (ref 79–97)
MONOCYTES # BLD AUTO: 0.66 10*3/MM3 (ref 0.1–0.9)
MONOCYTES NFR BLD AUTO: 12.3 % (ref 5–12)
NEUTROPHILS NFR BLD AUTO: 3.18 10*3/MM3 (ref 1.7–7)
NEUTROPHILS NFR BLD AUTO: 59.3 % (ref 42.7–76)
NITRITE UR QL STRIP: POSITIVE
NRBC BLD AUTO-RTO: 0 /100 WBC (ref 0–0.2)
PH UR STRIP.AUTO: 5.5 [PH] (ref 5–8)
PLATELET # BLD AUTO: 196 10*3/MM3 (ref 140–450)
PMV BLD AUTO: 9.5 FL (ref 6–12)
POTASSIUM SERPL-SCNC: 3.7 MMOL/L (ref 3.5–5.2)
PROT UR QL STRIP: NEGATIVE
RBC # BLD AUTO: 2.91 10*6/MM3 (ref 4.14–5.8)
RBC # UR STRIP: ABNORMAL /HPF
REF LAB TEST METHOD: ABNORMAL
SODIUM SERPL-SCNC: 137 MMOL/L (ref 136–145)
SP GR UR STRIP: 1.01 (ref 1–1.03)
SQUAMOUS #/AREA URNS HPF: ABNORMAL /HPF
TIBC SERPL-MCNC: 226 MCG/DL (ref 298–536)
TRANSFERRIN SERPL-MCNC: 152 MG/DL (ref 200–360)
UROBILINOGEN UR QL STRIP: ABNORMAL
WBC # UR STRIP: ABNORMAL /HPF
WBC NRBC COR # BLD: 5.36 10*3/MM3 (ref 3.4–10.8)

## 2023-11-02 PROCEDURE — 25010000002 CEFTRIAXONE PER 250 MG

## 2023-11-02 PROCEDURE — 87086 URINE CULTURE/COLONY COUNT: CPT

## 2023-11-02 PROCEDURE — 96365 THER/PROPH/DIAG IV INF INIT: CPT

## 2023-11-02 PROCEDURE — 82728 ASSAY OF FERRITIN: CPT | Performed by: NURSE PRACTITIONER

## 2023-11-02 PROCEDURE — 96375 TX/PRO/DX INJ NEW DRUG ADDON: CPT

## 2023-11-02 PROCEDURE — 72125 CT NECK SPINE W/O DYE: CPT

## 2023-11-02 PROCEDURE — 99284 EMERGENCY DEPT VISIT MOD MDM: CPT

## 2023-11-02 PROCEDURE — G0378 HOSPITAL OBSERVATION PER HR: HCPCS

## 2023-11-02 PROCEDURE — 87186 SC STD MICRODIL/AGAR DIL: CPT

## 2023-11-02 PROCEDURE — 85025 COMPLETE CBC W/AUTO DIFF WBC: CPT | Performed by: EMERGENCY MEDICINE

## 2023-11-02 PROCEDURE — 80048 BASIC METABOLIC PNL TOTAL CA: CPT | Performed by: EMERGENCY MEDICINE

## 2023-11-02 PROCEDURE — 72128 CT CHEST SPINE W/O DYE: CPT

## 2023-11-02 PROCEDURE — 83540 ASSAY OF IRON: CPT | Performed by: NURSE PRACTITIONER

## 2023-11-02 PROCEDURE — 96376 TX/PRO/DX INJ SAME DRUG ADON: CPT

## 2023-11-02 PROCEDURE — 25810000003 SODIUM CHLORIDE 0.9 % SOLUTION: Performed by: EMERGENCY MEDICINE

## 2023-11-02 PROCEDURE — 25010000002 MORPHINE PER 10 MG: Performed by: EMERGENCY MEDICINE

## 2023-11-02 PROCEDURE — 87077 CULTURE AEROBIC IDENTIFY: CPT

## 2023-11-02 PROCEDURE — 25010000002 HYDROMORPHONE PER 4 MG: Performed by: NURSE PRACTITIONER

## 2023-11-02 PROCEDURE — 84466 ASSAY OF TRANSFERRIN: CPT | Performed by: NURSE PRACTITIONER

## 2023-11-02 PROCEDURE — 81001 URINALYSIS AUTO W/SCOPE: CPT

## 2023-11-02 PROCEDURE — 36415 COLL VENOUS BLD VENIPUNCTURE: CPT

## 2023-11-02 PROCEDURE — 72131 CT LUMBAR SPINE W/O DYE: CPT

## 2023-11-02 RX ORDER — HYDROMORPHONE HYDROCHLORIDE 1 MG/ML
0.5 INJECTION, SOLUTION INTRAMUSCULAR; INTRAVENOUS; SUBCUTANEOUS EVERY 4 HOURS PRN
Status: DISCONTINUED | OUTPATIENT
Start: 2023-11-02 | End: 2023-11-07

## 2023-11-02 RX ORDER — MORPHINE SULFATE 2 MG/ML
2 INJECTION, SOLUTION INTRAMUSCULAR; INTRAVENOUS ONCE
Status: COMPLETED | OUTPATIENT
Start: 2023-11-02 | End: 2023-11-02

## 2023-11-02 RX ORDER — OXYCODONE HYDROCHLORIDE AND ACETAMINOPHEN 5; 325 MG/1; MG/1
1 TABLET ORAL EVERY 4 HOURS PRN
Status: DISCONTINUED | OUTPATIENT
Start: 2023-11-02 | End: 2023-11-03

## 2023-11-02 RX ORDER — SODIUM CHLORIDE 0.9 % (FLUSH) 0.9 %
10 SYRINGE (ML) INJECTION AS NEEDED
Status: DISCONTINUED | OUTPATIENT
Start: 2023-11-02 | End: 2023-11-07 | Stop reason: HOSPADM

## 2023-11-02 RX ORDER — ONDANSETRON 2 MG/ML
4 INJECTION INTRAMUSCULAR; INTRAVENOUS EVERY 6 HOURS PRN
Status: DISCONTINUED | OUTPATIENT
Start: 2023-11-02 | End: 2023-11-07 | Stop reason: HOSPADM

## 2023-11-02 RX ORDER — ACETAMINOPHEN 160 MG/5ML
650 SOLUTION ORAL EVERY 4 HOURS PRN
Status: DISCONTINUED | OUTPATIENT
Start: 2023-11-02 | End: 2023-11-07 | Stop reason: HOSPADM

## 2023-11-02 RX ORDER — SODIUM CHLORIDE 9 MG/ML
40 INJECTION, SOLUTION INTRAVENOUS AS NEEDED
Status: DISCONTINUED | OUTPATIENT
Start: 2023-11-02 | End: 2023-11-07 | Stop reason: HOSPADM

## 2023-11-02 RX ORDER — ONDANSETRON 4 MG/1
4 TABLET, FILM COATED ORAL EVERY 6 HOURS PRN
Status: DISCONTINUED | OUTPATIENT
Start: 2023-11-02 | End: 2023-11-07 | Stop reason: HOSPADM

## 2023-11-02 RX ORDER — DEXAMETHASONE SODIUM PHOSPHATE 4 MG/ML
4 INJECTION, SOLUTION INTRA-ARTICULAR; INTRALESIONAL; INTRAMUSCULAR; INTRAVENOUS; SOFT TISSUE EVERY 6 HOURS
Status: DISCONTINUED | OUTPATIENT
Start: 2023-11-03 | End: 2023-11-07 | Stop reason: HOSPADM

## 2023-11-02 RX ORDER — DEXAMETHASONE SODIUM PHOSPHATE 10 MG/ML
10 INJECTION INTRAMUSCULAR; INTRAVENOUS ONCE
Status: COMPLETED | OUTPATIENT
Start: 2023-11-02 | End: 2023-11-03

## 2023-11-02 RX ORDER — SODIUM CHLORIDE 0.9 % (FLUSH) 0.9 %
10 SYRINGE (ML) INJECTION EVERY 12 HOURS SCHEDULED
Status: DISCONTINUED | OUTPATIENT
Start: 2023-11-02 | End: 2023-11-07 | Stop reason: HOSPADM

## 2023-11-02 RX ORDER — NALOXONE HCL 0.4 MG/ML
0.4 VIAL (ML) INJECTION
Status: DISCONTINUED | OUTPATIENT
Start: 2023-11-02 | End: 2023-11-07 | Stop reason: HOSPADM

## 2023-11-02 RX ORDER — BISACODYL 10 MG
10 SUPPOSITORY, RECTAL RECTAL DAILY PRN
Status: DISCONTINUED | OUTPATIENT
Start: 2023-11-02 | End: 2023-11-03

## 2023-11-02 RX ORDER — ACETAMINOPHEN 650 MG/1
650 SUPPOSITORY RECTAL EVERY 4 HOURS PRN
Status: DISCONTINUED | OUTPATIENT
Start: 2023-11-02 | End: 2023-11-07 | Stop reason: HOSPADM

## 2023-11-02 RX ORDER — ACETAMINOPHEN 325 MG/1
650 TABLET ORAL EVERY 4 HOURS PRN
Status: DISCONTINUED | OUTPATIENT
Start: 2023-11-02 | End: 2023-11-07 | Stop reason: HOSPADM

## 2023-11-02 RX ORDER — OXYCODONE AND ACETAMINOPHEN 7.5; 325 MG/1; MG/1
1 TABLET ORAL EVERY 4 HOURS PRN
Status: DISCONTINUED | OUTPATIENT
Start: 2023-11-02 | End: 2023-11-03

## 2023-11-02 RX ORDER — AMOXICILLIN 250 MG
1 CAPSULE ORAL NIGHTLY PRN
Status: DISCONTINUED | OUTPATIENT
Start: 2023-11-02 | End: 2023-11-03

## 2023-11-02 RX ORDER — MAGNESIUM OXIDE 400 MG/1
200 TABLET ORAL 2 TIMES DAILY
COMMUNITY

## 2023-11-02 RX ORDER — HYDROCODONE BITARTRATE AND ACETAMINOPHEN 5; 325 MG/1; MG/1
1 TABLET ORAL ONCE
Status: COMPLETED | OUTPATIENT
Start: 2023-11-02 | End: 2023-11-02

## 2023-11-02 RX ORDER — POLYETHYLENE GLYCOL 3350 17 G/17G
17 POWDER, FOR SOLUTION ORAL DAILY PRN
Status: DISCONTINUED | OUTPATIENT
Start: 2023-11-02 | End: 2023-11-03

## 2023-11-02 RX ORDER — BISACODYL 5 MG/1
5 TABLET, DELAYED RELEASE ORAL DAILY PRN
Status: DISCONTINUED | OUTPATIENT
Start: 2023-11-02 | End: 2023-11-03

## 2023-11-02 RX ORDER — SODIUM CHLORIDE 9 MG/ML
50 INJECTION, SOLUTION INTRAVENOUS CONTINUOUS
Status: DISCONTINUED | OUTPATIENT
Start: 2023-11-02 | End: 2023-11-07

## 2023-11-02 RX ADMIN — SODIUM CHLORIDE 1000 MG: 900 INJECTION INTRAVENOUS at 21:16

## 2023-11-02 RX ADMIN — HYDROMORPHONE HYDROCHLORIDE 0.5 MG: 1 INJECTION, SOLUTION INTRAMUSCULAR; INTRAVENOUS; SUBCUTANEOUS at 23:21

## 2023-11-02 RX ADMIN — MORPHINE SULFATE 2 MG: 2 INJECTION, SOLUTION INTRAMUSCULAR; INTRAVENOUS at 22:05

## 2023-11-02 RX ADMIN — MORPHINE SULFATE 2 MG: 2 INJECTION, SOLUTION INTRAMUSCULAR; INTRAVENOUS at 20:44

## 2023-11-02 RX ADMIN — HYDROCODONE BITARTRATE AND ACETAMINOPHEN 1 TABLET: 5; 325 TABLET ORAL at 17:33

## 2023-11-02 RX ADMIN — SODIUM CHLORIDE 1000 ML: 9 INJECTION, SOLUTION INTRAVENOUS at 22:09

## 2023-11-02 NOTE — ED PROVIDER NOTES
Subjective   History of Present Illness  Patient is an 88-year-old male who presents the emergency department with complaints of back pain.  States that 2 days ago he was putting on his socks and that he developed pain in his lower back.  He had to lay down in order for the pain to go away.  States that the pain is worse on the right side of his lower back.  He just had a kyphoplasty with Dr. Brantley on September 26.  Patient states that the pain radiates down his left leg at times.  He denies any current numbness or tingling.  Patient is able to raise both of his legs.  No loss of sensation.        Review of Systems   Musculoskeletal:  Positive for back pain.   All other systems reviewed and are negative.      Past Medical History:   Diagnosis Date    CAD (coronary artery disease)     COPD (chronic obstructive pulmonary disease)     Hiatal hernia     Hyperlipidemia     Hypertension     Stroke        Allergies   Allergen Reactions    Benzodiazepines Confusion    Lyrica [Pregabalin] Other (See Comments)     Passing out/syncope       Past Surgical History:   Procedure Laterality Date    ABDOMINAL AORTIC ANEURYSM REPAIR      CARDIAC CATHETERIZATION N/A 5/20/2021    Procedure: Left Heart Cath;  Surgeon: Harrison Alcantara MD;  Location:  [facility]ATH [facility]AS[facility];  Service: Cardiology;  Laterality: N/A;    CORONARY ANGIOPLASTY WITH STENT PLACEMENT      FEMORAL ARTERY STENT      KYPHOPLASTY WITH BIOPSY N/A 9/26/2023    Procedure: KYPHOPLASTY WITH BIOPSY T12 and L3;  Surgeon: Jeremiah Brantley MD;  Location:  [facility] OR;  Service: Neurosurgery;  Laterality: N/A;    LEG THROMBECTOMY/EMBOLECTOMY Right 5/20/2021    Procedure: RT GROIN EXPLORATION;  Surgeon: Geoff Remy DO;  Location:  [facility] HYBRID OR 12;  Service: Vascular;  Laterality: Right;    TRIGEMINAL NERVE DECOMPRESSION         History reviewed. No pertinent family history.    Social History     Socioeconomic History    Marital status:    Tobacco  Use    Smoking status: Every Day     Types: Cigars    Smokeless tobacco: Never   Vaping Use    Vaping Use: Never used   Substance and Sexual Activity    Alcohol use: No    Drug use: No    Sexual activity: Not Currently           Objective   Physical Exam  Vitals and nursing note reviewed.   Constitutional:       Appearance: Normal appearance. He is normal weight.   HENT:      Head: Normocephalic.   Cardiovascular:      Rate and Rhythm: Normal rate and regular rhythm.      Pulses: Normal pulses.      Heart sounds: Normal heart sounds.   Pulmonary:      Effort: Pulmonary effort is normal.      Breath sounds: Normal breath sounds.   Abdominal:      General: Abdomen is flat. Bowel sounds are normal.      Palpations: Abdomen is soft.   Musculoskeletal:         General: Normal range of motion.      Cervical back: Normal range of motion and neck supple. Tenderness present.      Thoracic back: Tenderness present.      Lumbar back: Tenderness present.   Skin:     General: Skin is warm and dry.   Neurological:      General: No focal deficit present.      Mental Status: He is alert and oriented to person, place, and time. Mental status is at baseline.      GCS: GCS eye subscore is 4. GCS verbal subscore is 5. GCS motor subscore is 6.      Sensory: Sensation is intact.      Motor: Motor function is intact.      Comments:  strength strong bilaterally.  Dorsiflexion and plantarflexion strong bilaterally.   Psychiatric:         Mood and Affect: Mood normal.         Behavior: Behavior normal.         Thought Content: Thought content normal.         Judgment: Judgment normal.         Procedures           ED Course  ED Course as of 11/02/23 2319   Thu Nov 02, 2023 1937 Spoke with Dr. Lee, neurosurgeon on-call.  He recommended TLSO brace and follow-up with Dr. Brantley if patient's pain is controlled. [KR]   1955 On reevaluation, patient stated that he was still having pain.  He does not feel that he could go home due to his  pain. [KR]   2051 Per his daughter, patient stated that he was having pain 8 out of 10 in his back.  IV morphine was given.  We will reassess his pain in 30 minutes. [KR]   2131 On second reevaluation, patient stated that he was having 10 out of 10 pain when he moved.  We will give him another dose of IV morphine at this time. [KR]   2235 Patient still having pain with movement.  We will plan to admit him for pain control.  Patient also has a urinary tract infection with positive nitrate, 2+ bacteria, too numerous to count WBC.  Patient was given IV fluids and IV antibiotics in the emergency department. [KR]   2241 Spoke with Dr. Guan.  He has agreed to accept this patient for observation.  Patient and daughter are agreeable to this plan. [KR]      ED Course User Index  [KR] Negrita Escobar APRN                                           Medical Decision Making  Brennon Vance is a very pleasant 88 y.o. male who presents to the emergency department for back pain.     Patient was non-toxic and not-ill appearing on arrival. Vital signs stable.     Patient's presentation raises suspicion for differentials including, but not limited to, fracture, strain, misalignment.     External (non-ED) record review: None    Given this, Brennon was placed on the monitor. Laboratory studies and imaging studies were ordered including CBC, BMP, urinalysis, CT cervical spine without contrast, CT thoracic spine without contrast, CT lumbar spine without contrast.     Brennon was given Norco, IV morphine, IV Rocephin, IV fluids for symptomatic relief.    Imaging was reviewed by radiologist.  CT cervical spine revealed No acute fracture identified. 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 Advanced osteoarthritis changes.  CT thoracic spine revealed Bony elements are diffusely osteopenic. There are 2 column fracture deformities at both T8 and T11 which seem likely acute or subacute. Approximately 25% loss of height in the middle  column at T8. No measurable loss of height in the middle column at T11. There is only slight posterior cortical buckling at both levels, not resulting in a significant spinal stenosis. Posterior elements are intact. No traumatic subluxation.  CT lumbar spine revealed Previous T12 and L3 kyphoplasties. Vertebral body heights are otherwise maintained. No new fracture identified. Osteopenia.    Case discussed with Dr. Chavez in the emergency department.    Labs were reviewed.  CBC with no leukocytosis.  Hemoglobin 9, hematocrit 28.3.  BMP with creatinine 1.77, which appears to be near baseline.  Urinalysis with 2+ bacteria, too numerous to count WBC, positive nitrate.      Case discussed with Dr. Gaun.  He has accepted this patient for observation.  Patient and daughter are agreeable to this plan.    Signed by:   CONCEPCION Reilly 11/2/2023 23:15 CDT     Dragon disclaimer:  Part of this note may be an electronic transcription/translation of spoken language to printed text using the Dragon Dictation System.    Problems Addressed:  Acute midline thoracic back pain: complicated acute illness or injury  Closed fracture of eighth thoracic vertebra, unspecified fracture morphology, initial encounter: complicated acute illness or injury  Closed fracture of eleventh thoracic vertebra, unspecified fracture morphology, initial encounter: complicated acute illness or injury  Urinary tract infection without hematuria, site unspecified: complicated acute illness or injury    Amount and/or Complexity of Data Reviewed  Radiology: ordered.    Risk  Decision regarding hospitalization.        Final diagnoses:   Acute midline thoracic back pain   Urinary tract infection without hematuria, site unspecified   Closed fracture of eighth thoracic vertebra, unspecified fracture morphology, initial encounter   Closed fracture of eleventh thoracic vertebra, unspecified fracture morphology, initial encounter       ED Disposition  ED Disposition        ED Disposition   Decision to Admit    Condition   --    Comment   Level of Care: Med/Surg [1]   Diagnosis: Acute thoracic back pain [014938]   Admitting Physician: MILANA BENÍTEZ [[id number]]                 No follow-up provider specified.       Medication List      No changes were made to your prescriptions during this visit.            Negrita Escobar, APRN  11/02/23 1949

## 2023-11-03 ENCOUNTER — APPOINTMENT (OUTPATIENT)
Dept: MRI IMAGING | Facility: HOSPITAL | Age: 88
End: 2023-11-03
Payer: MEDICARE

## 2023-11-03 LAB
ALBUMIN SERPL-MCNC: 3.3 G/DL (ref 3.5–5.2)
ALBUMIN/GLOB SERPL: 1.1 G/DL
ALP SERPL-CCNC: 105 U/L (ref 39–117)
ALT SERPL W P-5'-P-CCNC: 6 U/L (ref 1–41)
ANION GAP SERPL CALCULATED.3IONS-SCNC: 13 MMOL/L (ref 5–15)
AST SERPL-CCNC: 11 U/L (ref 1–40)
BILIRUB SERPL-MCNC: 0.3 MG/DL (ref 0–1.2)
BUN SERPL-MCNC: 25 MG/DL (ref 8–23)
BUN/CREAT SERPL: 15.1 (ref 7–25)
CALCIUM SPEC-SCNC: 8.6 MG/DL (ref 8.6–10.5)
CHLORIDE SERPL-SCNC: 103 MMOL/L (ref 98–107)
CHOLEST SERPL-MCNC: 154 MG/DL (ref 0–200)
CO2 SERPL-SCNC: 23 MMOL/L (ref 22–29)
CREAT SERPL-MCNC: 1.66 MG/DL (ref 0.76–1.27)
DEPRECATED RDW RBC AUTO: 48 FL (ref 37–54)
EGFRCR SERPLBLD CKD-EPI 2021: 39.9 ML/MIN/1.73
ERYTHROCYTE [DISTWIDTH] IN BLOOD BY AUTOMATED COUNT: 13.4 % (ref 12.3–15.4)
GLOBULIN UR ELPH-MCNC: 2.9 GM/DL
GLUCOSE SERPL-MCNC: 134 MG/DL (ref 65–99)
HBA1C MFR BLD: 5.2 % (ref 4.8–5.6)
HCT VFR BLD AUTO: 31.4 % (ref 37.5–51)
HDLC SERPL-MCNC: 50 MG/DL (ref 40–60)
HGB BLD-MCNC: 9.9 G/DL (ref 13–17.7)
LDLC SERPL CALC-MCNC: 81 MG/DL (ref 0–100)
LDLC/HDLC SERPL: 1.56 {RATIO}
MAGNESIUM SERPL-MCNC: 1.5 MG/DL (ref 1.6–2.4)
MCH RBC QN AUTO: 30.6 PG (ref 26.6–33)
MCHC RBC AUTO-ENTMCNC: 31.5 G/DL (ref 31.5–35.7)
MCV RBC AUTO: 96.9 FL (ref 79–97)
PLATELET # BLD AUTO: 198 10*3/MM3 (ref 140–450)
PMV BLD AUTO: 9.2 FL (ref 6–12)
POTASSIUM SERPL-SCNC: 4 MMOL/L (ref 3.5–5.2)
PROT SERPL-MCNC: 6.2 G/DL (ref 6–8.5)
RBC # BLD AUTO: 3.24 10*6/MM3 (ref 4.14–5.8)
SODIUM SERPL-SCNC: 139 MMOL/L (ref 136–145)
TRIGL SERPL-MCNC: 130 MG/DL (ref 0–150)
VLDLC SERPL-MCNC: 23 MG/DL (ref 5–40)
WBC NRBC COR # BLD: 5 10*3/MM3 (ref 3.4–10.8)

## 2023-11-03 PROCEDURE — 80053 COMPREHEN METABOLIC PANEL: CPT | Performed by: NURSE PRACTITIONER

## 2023-11-03 PROCEDURE — 25810000003 SODIUM CHLORIDE 0.9 % SOLUTION: Performed by: NURSE PRACTITIONER

## 2023-11-03 PROCEDURE — 80061 LIPID PANEL: CPT | Performed by: NURSE PRACTITIONER

## 2023-11-03 PROCEDURE — 96375 TX/PRO/DX INJ NEW DRUG ADDON: CPT

## 2023-11-03 PROCEDURE — 99214 OFFICE O/P EST MOD 30 MIN: CPT | Performed by: NURSE PRACTITIONER

## 2023-11-03 PROCEDURE — 72146 MRI CHEST SPINE W/O DYE: CPT

## 2023-11-03 PROCEDURE — 94640 AIRWAY INHALATION TREATMENT: CPT

## 2023-11-03 PROCEDURE — 85027 COMPLETE CBC AUTOMATED: CPT | Performed by: NURSE PRACTITIONER

## 2023-11-03 PROCEDURE — 25010000002 PIPERACILLIN SOD-TAZOBACTAM PER 1 G: Performed by: INTERNAL MEDICINE

## 2023-11-03 PROCEDURE — 96367 TX/PROPH/DG ADDL SEQ IV INF: CPT

## 2023-11-03 PROCEDURE — 25010000002 HYDROMORPHONE PER 4 MG: Performed by: NURSE PRACTITIONER

## 2023-11-03 PROCEDURE — 72148 MRI LUMBAR SPINE W/O DYE: CPT

## 2023-11-03 PROCEDURE — 83735 ASSAY OF MAGNESIUM: CPT | Performed by: NURSE PRACTITIONER

## 2023-11-03 PROCEDURE — 83036 HEMOGLOBIN GLYCOSYLATED A1C: CPT | Performed by: NURSE PRACTITIONER

## 2023-11-03 PROCEDURE — 25010000002 PIPERACILLIN SOD-TAZOBACTAM PER 1 G: Performed by: STUDENT IN AN ORGANIZED HEALTH CARE EDUCATION/TRAINING PROGRAM

## 2023-11-03 PROCEDURE — G0378 HOSPITAL OBSERVATION PER HR: HCPCS

## 2023-11-03 PROCEDURE — 99497 ADVNCD CARE PLAN 30 MIN: CPT

## 2023-11-03 PROCEDURE — 99223 1ST HOSP IP/OBS HIGH 75: CPT

## 2023-11-03 PROCEDURE — 36415 COLL VENOUS BLD VENIPUNCTURE: CPT | Performed by: NURSE PRACTITIONER

## 2023-11-03 PROCEDURE — 94799 UNLISTED PULMONARY SVC/PX: CPT

## 2023-11-03 PROCEDURE — 96376 TX/PRO/DX INJ SAME DRUG ADON: CPT

## 2023-11-03 PROCEDURE — 25010000002 DEXAMETHASONE PER 1 MG: Performed by: NURSE PRACTITIONER

## 2023-11-03 RX ORDER — HYDROXYZINE 50 MG/1
50 TABLET, FILM COATED ORAL EVERY 12 HOURS PRN
COMMUNITY

## 2023-11-03 RX ORDER — CHOLECALCIFEROL (VITAMIN D3) 125 MCG
5 CAPSULE ORAL NIGHTLY PRN
Status: DISCONTINUED | OUTPATIENT
Start: 2023-11-03 | End: 2023-11-07 | Stop reason: HOSPADM

## 2023-11-03 RX ORDER — LEVOTHYROXINE SODIUM 0.05 MG/1
50 TABLET ORAL
Status: DISCONTINUED | OUTPATIENT
Start: 2023-11-03 | End: 2023-11-07 | Stop reason: HOSPADM

## 2023-11-03 RX ORDER — AMOXICILLIN 250 MG
1 CAPSULE ORAL 2 TIMES DAILY
Status: DISCONTINUED | OUTPATIENT
Start: 2023-11-03 | End: 2023-11-07 | Stop reason: HOSPADM

## 2023-11-03 RX ORDER — ISOSORBIDE MONONITRATE 60 MG/1
60 TABLET, EXTENDED RELEASE ORAL DAILY
Status: DISCONTINUED | OUTPATIENT
Start: 2023-11-03 | End: 2023-11-07

## 2023-11-03 RX ORDER — POLYETHYLENE GLYCOL 3350 17 G/17G
17 POWDER, FOR SOLUTION ORAL DAILY PRN
Status: DISCONTINUED | OUTPATIENT
Start: 2023-11-03 | End: 2023-11-07 | Stop reason: HOSPADM

## 2023-11-03 RX ORDER — NICOTINE 21 MG/24HR
1 PATCH, TRANSDERMAL 24 HOURS TRANSDERMAL
Status: DISCONTINUED | OUTPATIENT
Start: 2023-11-03 | End: 2023-11-03

## 2023-11-03 RX ORDER — CARBAMAZEPINE 200 MG/1
200 TABLET ORAL DAILY
Status: DISCONTINUED | OUTPATIENT
Start: 2023-11-03 | End: 2023-11-07 | Stop reason: HOSPADM

## 2023-11-03 RX ORDER — PANTOPRAZOLE SODIUM 40 MG/1
40 TABLET, DELAYED RELEASE ORAL DAILY
Status: DISCONTINUED | OUTPATIENT
Start: 2023-11-03 | End: 2023-11-07 | Stop reason: HOSPADM

## 2023-11-03 RX ORDER — NITROGLYCERIN 0.4 MG/1
0.4 TABLET SUBLINGUAL
Status: DISCONTINUED | OUTPATIENT
Start: 2023-11-03 | End: 2023-11-07 | Stop reason: HOSPADM

## 2023-11-03 RX ORDER — BISACODYL 5 MG/1
5 TABLET, DELAYED RELEASE ORAL DAILY PRN
Status: DISCONTINUED | OUTPATIENT
Start: 2023-11-03 | End: 2023-11-07 | Stop reason: HOSPADM

## 2023-11-03 RX ORDER — HYDROXYZINE HYDROCHLORIDE 25 MG/1
50 TABLET, FILM COATED ORAL 3 TIMES DAILY
Status: DISCONTINUED | OUTPATIENT
Start: 2023-11-03 | End: 2023-11-07 | Stop reason: HOSPADM

## 2023-11-03 RX ORDER — FINASTERIDE 5 MG/1
5 TABLET, FILM COATED ORAL DAILY
Status: DISCONTINUED | OUTPATIENT
Start: 2023-11-03 | End: 2023-11-07 | Stop reason: HOSPADM

## 2023-11-03 RX ORDER — TAMSULOSIN HYDROCHLORIDE 0.4 MG/1
0.4 CAPSULE ORAL DAILY
Status: DISCONTINUED | OUTPATIENT
Start: 2023-11-03 | End: 2023-11-07 | Stop reason: HOSPADM

## 2023-11-03 RX ORDER — NICOTINE 21 MG/24HR
1 PATCH, TRANSDERMAL 24 HOURS TRANSDERMAL
Status: DISCONTINUED | OUTPATIENT
Start: 2023-11-03 | End: 2023-11-07 | Stop reason: HOSPADM

## 2023-11-03 RX ORDER — NICOTINE 21 MG/24HR
1 PATCH, TRANSDERMAL 24 HOURS TRANSDERMAL EVERY 24 HOURS
COMMUNITY
End: 2023-11-07 | Stop reason: HOSPADM

## 2023-11-03 RX ORDER — BISACODYL 10 MG
10 SUPPOSITORY, RECTAL RECTAL DAILY PRN
Status: DISCONTINUED | OUTPATIENT
Start: 2023-11-03 | End: 2023-11-07 | Stop reason: HOSPADM

## 2023-11-03 RX ORDER — BUDESONIDE AND FORMOTEROL FUMARATE DIHYDRATE 160; 4.5 UG/1; UG/1
2 AEROSOL RESPIRATORY (INHALATION)
Status: DISCONTINUED | OUTPATIENT
Start: 2023-11-03 | End: 2023-11-07 | Stop reason: HOSPADM

## 2023-11-03 RX ORDER — MELATONIN
1000 DAILY
Status: DISCONTINUED | OUTPATIENT
Start: 2023-11-03 | End: 2023-11-07 | Stop reason: HOSPADM

## 2023-11-03 RX ORDER — HYDROCODONE BITARTRATE AND ACETAMINOPHEN 5; 325 MG/1; MG/1
1 TABLET ORAL EVERY 4 HOURS PRN
Status: DISCONTINUED | OUTPATIENT
Start: 2023-11-03 | End: 2023-11-07 | Stop reason: HOSPADM

## 2023-11-03 RX ORDER — DILTIAZEM HYDROCHLORIDE 180 MG/1
180 CAPSULE, COATED, EXTENDED RELEASE ORAL DAILY
Status: DISCONTINUED | OUTPATIENT
Start: 2023-11-03 | End: 2023-11-07 | Stop reason: HOSPADM

## 2023-11-03 RX ORDER — GINGER ROOT/GINGER ROOT EXT 262.5 MG
1 CAPSULE ORAL DAILY
Status: DISCONTINUED | OUTPATIENT
Start: 2023-11-03 | End: 2023-11-07 | Stop reason: HOSPADM

## 2023-11-03 RX ORDER — GUAIFENESIN 600 MG/1
1200 TABLET, EXTENDED RELEASE ORAL EVERY 12 HOURS SCHEDULED
Status: DISCONTINUED | OUTPATIENT
Start: 2023-11-03 | End: 2023-11-07 | Stop reason: HOSPADM

## 2023-11-03 RX ORDER — CARVEDILOL 6.25 MG/1
12.5 TABLET ORAL 2 TIMES DAILY WITH MEALS
Status: DISCONTINUED | OUTPATIENT
Start: 2023-11-03 | End: 2023-11-07 | Stop reason: HOSPADM

## 2023-11-03 RX ORDER — ALBUTEROL SULFATE 2.5 MG/3ML
2.5 SOLUTION RESPIRATORY (INHALATION) EVERY 4 HOURS PRN
Status: DISCONTINUED | OUTPATIENT
Start: 2023-11-03 | End: 2023-11-07 | Stop reason: HOSPADM

## 2023-11-03 RX ADMIN — HYDROCODONE BITARTRATE AND ACETAMINOPHEN 1 TABLET: 5; 325 TABLET ORAL at 14:46

## 2023-11-03 RX ADMIN — DEXAMETHASONE SODIUM PHOSPHATE 4 MG: 4 INJECTION, SOLUTION INTRAMUSCULAR; INTRAVENOUS at 17:27

## 2023-11-03 RX ADMIN — BISACODYL 5 MG: 5 TABLET, COATED ORAL at 09:23

## 2023-11-03 RX ADMIN — BUDESONIDE AND FORMOTEROL FUMARATE DIHYDRATE 2 PUFF: 160; 4.5 AEROSOL RESPIRATORY (INHALATION) at 10:11

## 2023-11-03 RX ADMIN — FINASTERIDE 5 MG: 5 TABLET, FILM COATED ORAL at 09:14

## 2023-11-03 RX ADMIN — Medication 10 ML: at 00:51

## 2023-11-03 RX ADMIN — POLYETHYLENE GLYCOL 3350 17 G: 17 POWDER, FOR SOLUTION ORAL at 09:21

## 2023-11-03 RX ADMIN — Medication 1 TABLET: at 11:20

## 2023-11-03 RX ADMIN — APIXABAN 2.5 MG: 2.5 TABLET, FILM COATED ORAL at 09:13

## 2023-11-03 RX ADMIN — ISOSORBIDE MONONITRATE 60 MG: 60 TABLET, EXTENDED RELEASE ORAL at 09:13

## 2023-11-03 RX ADMIN — SODIUM CHLORIDE 50 ML/HR: 9 INJECTION, SOLUTION INTRAVENOUS at 00:50

## 2023-11-03 RX ADMIN — CARBAMAZEPINE 200 MG: 200 TABLET ORAL at 09:14

## 2023-11-03 RX ADMIN — PIPERACILLIN SODIUM AND TAZOBACTAM SODIUM 3.38 G: 3; .375 INJECTION, SOLUTION INTRAVENOUS at 22:47

## 2023-11-03 RX ADMIN — HYDROXYZINE HYDROCHLORIDE 50 MG: 25 TABLET ORAL at 20:51

## 2023-11-03 RX ADMIN — Medication 10 ML: at 09:24

## 2023-11-03 RX ADMIN — PIPERACILLIN SODIUM AND TAZOBACTAM SODIUM 3.38 G: 3; .375 INJECTION, SOLUTION INTRAVENOUS at 00:50

## 2023-11-03 RX ADMIN — CARVEDILOL 12.5 MG: 6.25 TABLET, FILM COATED ORAL at 17:27

## 2023-11-03 RX ADMIN — PIPERACILLIN SODIUM AND TAZOBACTAM SODIUM 3.38 G: 3; .375 INJECTION, SOLUTION INTRAVENOUS at 14:20

## 2023-11-03 RX ADMIN — Medication 1000 UNITS: at 09:13

## 2023-11-03 RX ADMIN — GUAIFENESIN 1200 MG: 600 TABLET, EXTENDED RELEASE ORAL at 20:51

## 2023-11-03 RX ADMIN — TAMSULOSIN HYDROCHLORIDE 0.4 MG: 0.4 CAPSULE ORAL at 09:13

## 2023-11-03 RX ADMIN — DOCUSATE SODIUM 50 MG AND SENNOSIDES 8.6 MG 1 TABLET: 8.6; 5 TABLET, FILM COATED ORAL at 20:51

## 2023-11-03 RX ADMIN — HYDROMORPHONE HYDROCHLORIDE 0.5 MG: 1 INJECTION, SOLUTION INTRAMUSCULAR; INTRAVENOUS; SUBCUTANEOUS at 04:46

## 2023-11-03 RX ADMIN — HYDROXYZINE HYDROCHLORIDE 50 MG: 25 TABLET ORAL at 09:13

## 2023-11-03 RX ADMIN — HYDROXYZINE HYDROCHLORIDE 50 MG: 25 TABLET ORAL at 14:20

## 2023-11-03 RX ADMIN — DEXAMETHASONE SODIUM PHOSPHATE 4 MG: 4 INJECTION, SOLUTION INTRAMUSCULAR; INTRAVENOUS at 05:45

## 2023-11-03 RX ADMIN — PANTOPRAZOLE SODIUM 40 MG: 40 TABLET, DELAYED RELEASE ORAL at 09:14

## 2023-11-03 RX ADMIN — APIXABAN 2.5 MG: 2.5 TABLET, FILM COATED ORAL at 20:51

## 2023-11-03 RX ADMIN — DILTIAZEM HYDROCHLORIDE 180 MG: 180 CAPSULE, COATED, EXTENDED RELEASE ORAL at 09:14

## 2023-11-03 RX ADMIN — DEXAMETHASONE SODIUM PHOSPHATE 10 MG: 10 INJECTION INTRAMUSCULAR; INTRAVENOUS at 00:51

## 2023-11-03 RX ADMIN — LEVOTHYROXINE SODIUM 50 MCG: 50 TABLET ORAL at 05:45

## 2023-11-03 RX ADMIN — CARVEDILOL 12.5 MG: 6.25 TABLET, FILM COATED ORAL at 09:13

## 2023-11-03 RX ADMIN — HYDROMORPHONE HYDROCHLORIDE 0.5 MG: 1 INJECTION, SOLUTION INTRAMUSCULAR; INTRAVENOUS; SUBCUTANEOUS at 11:20

## 2023-11-03 RX ADMIN — DEXAMETHASONE SODIUM PHOSPHATE 4 MG: 4 INJECTION, SOLUTION INTRAMUSCULAR; INTRAVENOUS at 22:47

## 2023-11-03 RX ADMIN — DEXAMETHASONE SODIUM PHOSPHATE 4 MG: 4 INJECTION, SOLUTION INTRAMUSCULAR; INTRAVENOUS at 11:20

## 2023-11-03 RX ADMIN — NICOTINE 1 PATCH: 14 PATCH, EXTENDED RELEASE TRANSDERMAL at 01:11

## 2023-11-03 NOTE — PROGRESS NOTES
"Pharmacy Recommendation  Antimicrobial Stewardship  Initiation     Assessment/Action/Plan:  Current Antimicrobials: Zosyn (completed dose of Rocephin)    Assessment / Recommendation: Received a \"Pharmacy to Dose\" consult for the initiation of Zosyn on the indication of UTI (Pseudomonas on last urine culture). Age, BMI, and labs noted. Initiated patient on Zosyn 3.375 g IVPB Q12H. No other action required at this time per pharmacy based on available information and current clinical status. Will continue routine follow-up evaluation and make any necessary adjustments.     Subjective:   Brennon Vance is a 88 y.o. male currently being treated for UTI (pseudomonas on last urine culture).    Objective:  Lab Results   Component Value Date    WBC 5.36 11/02/2023     Temp Readings from Last 1 Encounters:   11/03/23 98.9 °F (37.2 °C) (Oral)     Culture Results:  Microbiology Results (last 10 days)       ** No results found for the last 240 hours. **          Current Antimicrobials:   Anti-Infectives (From admission, onward)      Ordered     Dose/Rate Route Frequency Start Stop    11/03/23 0029  piperacillin-tazobactam (ZOSYN) 3.375 g in iso-osmotic dextrose 50 ml (premix)        Ordering Provider: Arnav Guan MD    3.375 g  over 30 Minutes Intravenous Once 11/03/23 0200      11/03/23 0029  Pharmacy to Dose Zosyn        Ordering Provider: Arnav Guan MD     Does not apply Continuous PRN 11/03/23 0028 11/06/23 0027    11/03/23 0029  piperacillin-tazobactam (ZOSYN) 3.375 g in iso-osmotic dextrose 50 ml (premix)        Ordering Provider: Arnav Guan MD    3.375 g  over 4 Hours Intravenous Every 8 Hours 11/03/23 0028 11/06/23 0114    11/02/23 2106  cefTRIAXone (ROCEPHIN) 1,000 mg in sodium chloride 0.9 % 100 mL IVPB        Ordering Provider: Negrita Escobar APRN    1,000 mg  200 mL/hr over 30 Minutes Intravenous Once 11/02/23 2122 11/02/23 2151            Crissy Fabian, PharmD  11/03/23 00:38 CDT     "

## 2023-11-03 NOTE — PROGRESS NOTES
"Pharmacy Dosing Service  Antimicrobial Dosing  Zosyn    Assessment/Action/Plan:  Active Hospital Problems    Diagnosis  POA    **Intractable back pain [M54.9]  Yes    Protein-calorie malnutrition [E46]  Unknown    Anemia, chronic disease [D63.8]  Unknown    Memory difficulties [R41.3]  Unknown    Closed fracture of eighth thoracic vertebra [S22.069A]  Unknown    Closed T11 fracture [S22.089A]  Unknown    Stage 3b chronic kidney disease [N18.32]  Yes    UTI (urinary tract infection) [N39.0]  Yes    Essential hypertension [I10]  Yes    Paroxysmal atrial fibrillation [I48.0]  Yes    Other emphysema [J43.8]  Yes       Current order: Zosyn 3.375 g IV q12h EI    Plan: est CrCL > 20. Adjust to Q8H dosing to optimize. (History of pseudomonas UTI)     Subjective:  Brennon Vance is a 88 y.o. male with a  \"Pharmacy to Dose Zosyn\" consult for the treatment of UTI , day 2 of treatment.    Objective:  Ht: 172.7 cm (68\"); Wt: 48.4 kg (106 lb 11.2 oz)  Estimated Creatinine Clearance: 21.9 mL/min (A) (by C-G formula based on SCr of 1.66 mg/dL (H)).   Creatinine   Date Value Ref Range Status   11/03/2023 1.66 (H) 0.76 - 1.27 mg/dL Final   11/02/2023 1.77 (H) 0.76 - 1.27 mg/dL Final   10/09/2023 1.85 (H) 0.76 - 1.27 mg/dL Final      Lab Results   Component Value Date    WBC 5.00 11/03/2023    WBC 5.36 11/02/2023    WBC 4.59 10/09/2023      Baseline culture results:  Microbiology Results (last 10 days)       ** No results found for the last 240 hours. **            Chin Suazo, PharmD  11/03/23 14:39 CDT    "

## 2023-11-03 NOTE — PLAN OF CARE
Problem: Adult Inpatient Plan of Care  Goal: Plan of Care Review  Outcome: Ongoing, Progressing  Flowsheets (Taken 11/3/2023 0343)  Progress: improving  Plan of Care Reviewed With:   patient   daughter  Outcome Evaluation: Pt A&Ox4 this shift but forgetful. VSS. C/o moderate pain when first arrived to room, no further c/o pain so far this shift. Redness to coccyx, turned q 2 hours. Voiding, external catheter in place to wall suction. Room air, CPOX. IVF infusing as ordered. Daughter at bedside. Safety maintained, will continue to monitor.

## 2023-11-03 NOTE — CONSULTS
Reason for consult: Compression fractures    Chief Complaint   Patient presents with    Back Pain     BACK PAIN. RIGHT RIB PAIN. NUMBNESS/TINGLING IN LEGS. HX KYPHOPLASTY END OF SEPT 2023         Requesting provider: Dr. Micki Solano    HPI: This is a 88-year-old male gentleman who is known to our service.  He did go to the operating room on September 26, 2023 for a kyphoplasty at T12 and L3.  He was seen as an outpatient on October 26, 2023 and was doing well and not complaining of any back or leg pain.  He was walking with a walker.  He was in an assisted living facility.  The patient did get admitted to the hospital on November 2, 2023 for intractable back pain.  Imaging has shown that there is concern for possible new compression fractures.  The patient is beginning with worsening pain for the last 3 days.  This came about after he had bent over to put on socks on.  He was admitted to the hospitalist services for work-up for possible UTI as well.    Review of Systems   Constitutional:  Positive for activity change. Negative for fever.   Musculoskeletal:  Positive for arthralgias and back pain.   All other systems reviewed and are negative.       Pertinent positives/negatives documented in HPI.  All other systems reviewed and negative.    Past Medical History:  has a past medical history of CAD (coronary artery disease), CKD (chronic kidney disease) stage 3, GFR 30-59 ml/min, COPD (chronic obstructive pulmonary disease), Hiatal hernia, Hyperlipidemia, Hypertension, and Stroke.    Past Surgical History:  has a past surgical history that includes Coronary angioplasty with stent; Femoral artery stent; Trigeminal nerve decompression; AAA repair, open; Cardiac catheterization (N/A, 5/20/2021); Leg Thrombectomy/Embolectomy (Right, 5/20/2021); and kyphoplasty with biopsy (N/A, 9/26/2023).    Family History: family history includes No Known Problems in his father and mother.    Social History:  reports that he quit  "smoking 10 days ago. His smoking use included cigars and cigarettes. He smoked an average of 4 packs per day. He has never used smokeless tobacco. He reports that he does not drink alcohol and does not use drugs.    Allergies: Benzodiazepines and Lyrica [pregabalin]    Medications: Scheduled Meds:apixaban, 2.5 mg, Oral, Q12H  budesonide-formoterol, 2 puff, Inhalation, BID - RT  carBAMazepine, 200 mg, Oral, Daily  carvedilol, 12.5 mg, Oral, BID With Meals  cholecalciferol, 1,000 Units, Oral, Daily  dexAMETHasone, 4 mg, Intravenous, Q6H  dilTIAZem CD, 180 mg, Oral, Daily  finasteride, 5 mg, Oral, Daily  guaiFENesin, 1,200 mg, Oral, Q12H  hydrOXYzine, 50 mg, Oral, TID  isosorbide mononitrate, 60 mg, Oral, Daily  levothyroxine, 50 mcg, Oral, Q AM  nicotine, 1 patch, Transdermal, Q24H  pantoprazole, 40 mg, Oral, Daily  piperacillin-tazobactam, 3.375 g, Intravenous, Q12H  sodium chloride, 10 mL, Intravenous, Q12H  tamsulosin, 0.4 mg, Oral, Daily      Continuous Infusions:Pharmacy to Dose Zosyn,   sodium chloride, 50 mL/hr, Last Rate: 50 mL/hr (11/03/23 0519)      PRN Meds:.  acetaminophen **OR** acetaminophen **OR** acetaminophen    albuterol    senna-docusate sodium **AND** polyethylene glycol **AND** bisacodyl **AND** bisacodyl    HYDROmorphone **AND** naloxone    melatonin    nitroglycerin    ondansetron **OR** ondansetron    oxyCODONE-acetaminophen    oxyCODONE-acetaminophen    Pharmacy to Dose Zosyn    sodium chloride    sodium chloride     Objective:  General Appearance:  Comfortable, well-appearing, in no acute distress and in pain.    Vital signs: (most recent): Blood pressure 145/59, pulse 77, temperature 97.9 °F (36.6 °C), temperature source Oral, resp. rate 16, height 172.7 cm (68\"), weight 48.4 kg (106 lb 11.2 oz), SpO2 96%.  Vital signs are normal.  No fever.    Output: Producing urine.    HEENT: Normal HEENT exam.    Lungs:  Normal effort and normal respiratory rate.  He is not in respiratory distress.  "   Chest: Symmetric chest wall expansion.   Extremities: Normal range of motion.    Skin:  Warm and dry.    Abdomen: Abdomen is soft and non-distended.  There is no abdominal tenderness.     Pulses: Distal pulses are intact.        Neurologic Exam     Mental Status   Oriented to person, place, and time.   Attention: normal. Concentration: normal.   Speech: speech is normal   Level of consciousness: alert  Normal comprehension.     Cranial Nerves     CN II   Visual fields full to confrontation.     CN III, IV, VI   Pupils are equal, round, and reactive to light.  Extraocular motions are normal.     CN V   Facial sensation intact.     CN VII   Facial expression full, symmetric.     CN VIII   CN VIII normal.     CN IX, X   CN IX normal.   CN X normal.     CN XI   CN XI normal.     CN XII   CN XII normal.     Motor Exam   Muscle bulk: normal    Strength   Strength 5/5 throughout. Moving extremities symmetrically and purposefully.     Sensory Exam   Light touch normal.       Vital Signs  Temp:  [97.7 °F (36.5 °C)-99.4 °F (37.4 °C)] 97.9 °F (36.6 °C)  Heart Rate:  [67-86] 72  Resp:  [15-18] 16  BP: ()/(55-75) 145/59    Physical Exam  Constitutional:       General: Vital signs are normal.      Appearance: He is underweight.   HENT:      Head: Normocephalic.   Eyes:      General: Lids are normal.      Extraocular Movements: EOM normal.      Conjunctiva/sclera: Conjunctivae normal.      Pupils: Pupils are equal, round, and reactive to light.   Cardiovascular:      Pulses: Intact distal pulses.   Pulmonary:      Effort: Pulmonary effort is normal. No respiratory distress.      Breath sounds: Normal breath sounds.   Abdominal:      General: There is no distension.      Palpations: Abdomen is soft.   Musculoskeletal:         General: Normal range of motion.      Cervical back: Normal range of motion.   Skin:     General: Skin is warm and dry.   Neurological:      Mental Status: He is alert and oriented to person, place,  and time.      GCS: GCS eye subscore is 4. GCS verbal subscore is 5. GCS motor subscore is 6.      Cranial Nerves: No cranial nerve deficit.      Sensory: No sensory deficit.      Motor: Motor strength is normal.     Deep Tendon Reflexes: Reflexes are normal and symmetric. Reflexes normal.   Psychiatric:         Speech: Speech normal.         Behavior: Behavior normal.         Thought Content: Thought content normal.         Results Review:   I reviewed the patient's new clinical results.  I reviewed the patient's new imaging results and agree with the interpretation.  I reviewed the patient's other test results and agree with the interpretation          Lab Results (last 24 hours)       Procedure Component Value Units Date/Time    Comprehensive Metabolic Panel [[id number]]  (Abnormal) Collected: 11/03/23 0559    Specimen: Blood Updated: 11/03/23 0720     Glucose 134 mg/dL      BUN 25 mg/dL      Creatinine 1.66 mg/dL      Sodium 139 mmol/L      Potassium 4.0 mmol/L      Chloride 103 mmol/L      CO2 23.0 mmol/L      Calcium 8.6 mg/dL      Total Protein 6.2 g/dL      Albumin 3.3 g/dL      ALT (SGPT) 6 U/L      AST (SGOT) 11 U/L      Alkaline Phosphatase 105 U/L      Total Bilirubin 0.3 mg/dL      Globulin 2.9 gm/dL      A/G Ratio 1.1 g/dL      BUN/Creatinine Ratio 15.1     Anion Gap 13.0 mmol/L      eGFR 39.9 mL/min/1.73     Narrative:      GFR Normal >60  Chronic Kidney Disease <60  Kidney Failure <15    The GFR formula is only valid for adults with stable renal function between ages 18 and 70.    Lipid Panel [[id number]] Collected: 11/03/23 0559    Specimen: Blood Updated: 11/03/23 0720     Total Cholesterol 154 mg/dL      Triglycerides 130 mg/dL      HDL Cholesterol 50 mg/dL      LDL Cholesterol  81 mg/dL      VLDL Cholesterol 23 mg/dL      LDL/HDL Ratio 1.56    Narrative:      Cholesterol Reference Ranges  (U.S. Department of Health and Human Services ATP III Classifications)    Desirable          <200  mg/dL  Borderline High    200-239 mg/dL  High Risk          >240 mg/dL      Triglyceride Reference Ranges  (U.S. Department of Health and Human Services ATP III Classifications)    Normal           <150 mg/dL  Borderline High  150-199 mg/dL  High             200-499 mg/dL  Very High        >500 mg/dL    HDL Reference Ranges  (U.S. Department of Health and Human Services ATP III Classifications)    Low     <40 mg/dl (major risk factor for CHD)  High    >60 mg/dl ('negative' risk factor for CHD)        LDL Reference Ranges  (U.S. Department of Health and Human Services ATP III Classifications)    Optimal          <100 mg/dL  Near Optimal     100-129 mg/dL  Borderline High  130-159 mg/dL  High             160-189 mg/dL  Very High        >189 mg/dL    Magnesium [[id number]]  (Abnormal) Collected: 11/03/23 0559    Specimen: Blood Updated: 11/03/23 0720     Magnesium 1.5 mg/dL     Hemoglobin A1c [[id number]]  (Normal) Collected: 11/03/23 0559    Specimen: Blood Updated: 11/03/23 0705     Hemoglobin A1C 5.20 %     Narrative:      Hemoglobin A1C Ranges:    Increased Risk for Diabetes  5.7% to 6.4%  Diabetes                     >= 6.5%  Diabetic Goal                < 7.0%    CBC (No Diff) [[id number]]  (Abnormal) Collected: 11/03/23 0559    Specimen: Blood Updated: 11/03/23 0658     WBC 5.00 10*3/mm3      RBC 3.24 10*6/mm3      Hemoglobin 9.9 g/dL      Hematocrit 31.4 %      MCV 96.9 fL      MCH 30.6 pg      MCHC 31.5 g/dL      RDW 13.4 %      RDW-SD 48.0 fl      MPV 9.2 fL      Platelets 198 10*3/mm3     Ferritin [[id number]]  (Normal) Collected: 11/02/23 2035    Specimen: Blood Updated: 11/02/23 2345     Ferritin 358.80 ng/mL     Narrative:      Results may be falsely decreased if patient taking Biotin.      Iron Profile [[id number]]  (Abnormal) Collected: 11/02/23 2035    Specimen: Blood Updated: 11/02/23 2345     Iron 39 mcg/dL      Iron Saturation (TSAT) 17 %      Transferrin 152 mg/dL      TIBC 226 mcg/dL     Basic  Metabolic Panel [[id number]]  (Abnormal) Collected: 11/02/23 2035    Specimen: Blood Updated: 11/02/23 2121     Glucose 93 mg/dL      BUN 28 mg/dL      Creatinine 1.77 mg/dL      Sodium 137 mmol/L      Potassium 3.7 mmol/L      Comment: Slight hemolysis detected by analyzer. Results may be affected.        Chloride 101 mmol/L      CO2 25.0 mmol/L      Calcium 8.2 mg/dL      BUN/Creatinine Ratio 15.8     Anion Gap 11.0 mmol/L      eGFR 36.9 mL/min/1.73     Narrative:      GFR Normal >60  Chronic Kidney Disease <60  Kidney Failure <15    The GFR formula is only valid for adults with stable renal function between ages 18 and 70.    CBC & Differential [[id number]]  (Abnormal) Collected: 11/02/23 2035    Specimen: Blood Updated: 11/02/23 2105    Narrative:      The following orders were created for panel order CBC & Differential.  Procedure                               Abnormality         Status                     ---------                               -----------         ------                     CBC Auto Differential[[id number]]        Abnormal            Final result                 Please view results for these tests on the individual orders.    CBC Auto Differential [[id number]]  (Abnormal) Collected: 11/02/23 2035    Specimen: Blood Updated: 11/02/23 2105     WBC 5.36 10*3/mm3      RBC 2.91 10*6/mm3      Hemoglobin 9.0 g/dL      Hematocrit 28.3 %      MCV 97.3 fL      MCH 30.9 pg      MCHC 31.8 g/dL      RDW 13.5 %      RDW-SD 48.0 fl      MPV 9.5 fL      Platelets 196 10*3/mm3      Neutrophil % 59.3 %      Lymphocyte % 25.2 %      Monocyte % 12.3 %      Eosinophil % 1.9 %      Basophil % 0.7 %      Immature Grans % 0.6 %      Neutrophils, Absolute 3.18 10*3/mm3      Lymphocytes, Absolute 1.35 10*3/mm3      Monocytes, Absolute 0.66 10*3/mm3      Eosinophils, Absolute 0.10 10*3/mm3      Basophils, Absolute 0.04 10*3/mm3      Immature Grans, Absolute 0.03 10*3/mm3      nRBC 0.0 /100 WBC     Urinalysis With Culture If  Indicated - Urine, Clean Catch [[id number]]  (Abnormal) Collected: 11/02/23 2024    Specimen: Urine, Clean Catch Updated: 11/02/23 2103     Color, UA Yellow     Appearance, UA Cloudy     pH, UA 5.5     Specific Gravity, UA 1.012     Glucose, UA Negative     Ketones, UA Negative     Bilirubin, UA Negative     Blood, UA Trace     Protein, UA Negative     Leuk Esterase, UA Large (3+)     Nitrite, UA Positive     Urobilinogen, UA 0.2 E.U./dL    Narrative:      In absence of clinical symptoms, the presence of pyuria, bacteria, and/or nitrites on the urinalysis result does not correlate with infection.    Urinalysis, Microscopic Only - Urine, Clean Catch [[id number]]  (Abnormal) Collected: 11/02/23 2024    Specimen: Urine, Clean Catch Updated: 11/02/23 2103     RBC, UA 0-2 /HPF      WBC, UA Too Numerous to Count /HPF      Bacteria, UA 2+ /HPF      Squamous Epithelial Cells, UA 3-6 /HPF      Hyaline Casts, UA None Seen /LPF      Methodology Manual Light Microscopy    Urine Culture - Urine, Urine, Clean Catch [[id number]] Collected: 11/02/23 2024    Specimen: Urine, Clean Catch Updated: 11/02/23 2103          CT scan of the thoracic spine was done November 2, 2023 was compared to imaging that was done on September 24 shows concern for possible new T11 compression fracture.  There is mention of a possible T8 however the fracture was present on the previous scan.  No retropulsion noted.  Kyphoplasty is noted at T12.    CT scan of the lumbar spine does not show any acute fractures.  The previous kyphoplasty is noted at L3        Assessment/Plan:   Imaging has been reviewed with Dr. Brantley.  There is concern about new compression fractures in the thoracic spine however the patient is complaining of pain in the lumbar spine region as well.  We will have him go for an MRI of the thoracic and the lumbar spine to see if there is any acute fractures and if there is a new fracture consider kyphoplasty procedure for the patient.  I did  discuss this with the patient and his daughter.  They acknowledge understanding.  Questions and concerns were addressed      Intractable back pain    Paroxysmal atrial fibrillation    Other emphysema    Essential hypertension    UTI (urinary tract infection)    Stage 3b chronic kidney disease    Protein-calorie malnutrition    Anemia, chronic disease    Memory difficulties    Closed fracture of eighth thoracic vertebra    Closed T11 fracture      I discussed the patient's findings and my recommendations with patient and family    Rohan Arizmendi, APRN  11/03/23  09:47 CDT    I spent 46   minutes caring for Brennon on this date of service. This time includes time spent by me in the following activities: preparing for the visit, reviewing tests, obtaining and/or reviewing a separately obtained history, performing a medically appropriate examination and/or evaluation, counseling and educating the patient/family/caregiver, ordering medications, tests, or procedures, referring and communicating with other health care professionals, documenting information in the medical record, independently interpreting results and communicating that information with the patient/family/caregiver, and care coordination

## 2023-11-03 NOTE — H&P
[facility] Medicine Services  HISTORY AND PHYSICAL    Date of Admission: 11/2/2023  Primary Care Physician: Javid Grajeda MD    Subjective   Primary Historian: Patient's daughter Gaby    Chief Complaint: Intractable back pain    History of Present Illness  Brennon Vance is an 88-year-old male with a past medical history of coronary artery disease, COPD with recent cessation of 4 pack a day smoking, hypertension, hyperlipidemia, BPH, stroke, chronic spinal compression fracture, chronic kidney disease stage IIIb, chronic anemia suspect of chronic disease, BMI 17 upon admission, less than 15 at discharge.  Recent admission 9/24 - 10/5, 2023 with intractable back pain found to have T12 and L3 fractures with need for kyphoplasty per Dr. Jeremiah Brantley.  Patient was also treated for UTI at that time (Pseudomonas aeruginosa), treated with IV antibiotics.  Patient discharged to [facility] rehab facility.  He has subsequently moved to assisted living 10/27/2023.  Daughter Gaby provides history as patient is confused.  She states she drove up from [place] to visit today.  He complained of severe back pain and apparently has been having pain for 3 days.  She reports he bent over to put on his socks 3 days ago and felt sudden pain mid back pain.  Work-up reveals T8 and T11 acute fractures.  Patient continues to have UTI-culture pending, hemoglobin 9 at baseline, creatinine 1.77 at baseline.  CTs of the cervical and lumbar spines without acute problems identified.  Patient unable to provide history.  I asked if he would like to have something to eat and he told me he just had breakfast.  Daughter states he has been extremely confused.  He has received multiple doses of opioids in the emergency department without voiced improvement in pain.  Currently he is unable to score his pain.  Condition is stable.  He is admitted for further evaluation treatment.    Review of Systems    Otherwise complete ROS reviewed and negative except as mentioned in the HPI.    Past Medical History:   Past Medical History:   Diagnosis Date    CAD (coronary artery disease)     COPD (chronic obstructive pulmonary disease)     Hiatal hernia     Hyperlipidemia     Hypertension     Stroke      Past Surgical History:  Past Surgical History:   Procedure Laterality Date    ABDOMINAL AORTIC ANEURYSM REPAIR      CARDIAC CATHETERIZATION N/A 5/20/2021    Procedure: Left Heart Cath;  Surgeon: Harrison Alcantara MD;  Location:  [facility] CATH INVASIVE LOCATION;  Service: Cardiology;  Laterality: N/A;    CORONARY ANGIOPLASTY WITH STENT PLACEMENT      FEMORAL ARTERY STENT      KYPHOPLASTY WITH BIOPSY N/A 9/26/2023    Procedure: KYPHOPLASTY WITH BIOPSY T12 and L3;  Surgeon: Jeremiah Brantley MD;  Location:  [facility] OR;  Service: Neurosurgery;  Laterality: N/A;    LEG THROMBECTOMY/EMBOLECTOMY Right 5/20/2021    Procedure: RT GROIN EXPLORATION;  Surgeon: Geoff Remy DO;  Location:  [facility] HYBRID OR 12;  Service: Vascular;  Laterality: Right;    TRIGEMINAL NERVE DECOMPRESSION       Social History:  reports that he quit smoking 9 days ago. His smoking use included cigars and cigarettes. He smoked an average of 4 packs per day. He has never used smokeless tobacco. He reports that he does not drink alcohol and does not use drugs.    Family History: family history includes No Known Problems in his father and mother.       Allergies:  Allergies   Allergen Reactions    Benzodiazepines Confusion    Lyrica [Pregabalin] Other (See Comments)     Passing out/syncope       Medications:  Prior to Admission medications    Medication Sig Start Date End Date Taking? Authorizing Provider   albuterol (PROVENTIL) (2.5 MG/3ML) 0.083% nebulizer solution Take 2.5 mg by nebulization Every 4 (Four) Hours As Needed for Wheezing.    Provider, MD Sarah   albuterol sulfate  (90 Base) MCG/ACT inhaler Inhale 2 puffs Every 4 (Four) Hours  As Needed for Shortness of Air.    Sarah Berg MD   apixaban (ELIQUIS) 5 MG tablet tablet Take 2.5 mg by mouth 2 (Two) Times a Day.    Sarah Berg MD   budesonide-formoterol (SYMBICORT) 160-4.5 MCG/ACT inhaler Inhale 2 puffs 2 (Two) Times a Day. 5/10/18   Zulema Marc APRN   carBAMazepine (TEGretol) 200 MG tablet Take 1 tablet by mouth Daily.    Sarah Berg MD   carvedilol (COREG) 12.5 MG tablet Take 1 tablet by mouth 2 (Two) Times a Day With Meals. 5/22/21   Brennon Dorado MD   cholecalciferol (VITAMIN D3) 25 MCG (1000 UT) tablet Take 1 tablet by mouth Daily.    Sarah Berg MD   diltiaZEM CD (CARDIZEM CD) 180 MG 24 hr capsule Take 1 capsule by mouth Daily. 5/10/18   Zulema Marc APRN   diphenoxylate-atropine (LOMOTIL) 2.5-0.025 MG per tablet As Needed.    Sarah Berg MD   finasteride (PROSCAR) 5 MG tablet Take 1 tablet by mouth Daily. 9/30/23   Radha Aaron APRN   furosemide (LASIX) 40 MG tablet Take 1 tablet by mouth Daily.    Sarah Berg MD   guaiFENesin (MUCINEX) 600 MG 12 hr tablet Take 2 tablets by mouth 2 (Two) Times a Day.    Sarah Berg MD   hydrOXYzine (ATARAX) 50 MG tablet Take 1 tablet by mouth 2 (Two) Times a Day As Needed for Itching or Anxiety. 9/29/23   Radha Aaron APRN   isosorbide mononitrate (IMDUR) 60 MG 24 hr tablet Take 1 tablet by mouth Daily.    Sarah Berg MD   levothyroxine (SYNTHROID, LEVOTHROID) 50 MCG tablet Take 1 tablet by mouth Daily.    Sarah Berg MD   melatonin 5 MG tablet tablet Take 1 tablet by mouth At Night As Needed (sleep). 9/29/23   Radha Aaron APRN   nicotine (NICODERM CQ) 21 MG/24HR patch Place 1 patch on the skin as directed by provider Daily. 9/30/23   Aaron, Radha, APRN   nitroglycerin (NITROSTAT) 0.4 MG SL tablet 1 tablet As Needed.    Provider, MD Sarah   pantoprazole (PROTONIX) 40 MG EC tablet Take 1 tablet by mouth Daily.    Provider, MD Sarah  "  potassium chloride (K-DUR,KLOR-CON) 20 MEQ CR tablet Take 1 tablet by mouth Daily.    ProviderSarah MD   rosuvastatin (CRESTOR) 40 MG tablet Take 0.5 tablets by mouth Every Night.    Sarah Berg MD   Specialty Vitamins Products (mg-plus protein) 133 MG elemental magnesium tablet Take 1 tablet by mouth 2 (Two) Times a Day.    Sarah Berg MD   tamsulosin (FLOMAX) 0.4 MG capsule 24 hr capsule Take 1 capsule by mouth Daily. 9/30/23   Radha Aaron APRN     I have utilized all available immediate resources to obtain, update, or review the patient's current medications (including all prescriptions, over-the-counter products, herbals, cannabis/cannabidiol products, and vitamin/mineral/dietary (nutritional) supplements).    Objective     Vital Signs: /55 (BP Location: Right arm)   Pulse 80   Temp 99.4 °F (37.4 °C) (Oral)   Resp 15   Ht 172.7 cm (68\")   Wt 45.8 kg (101 lb)   SpO2 95%   BMI 15.36 kg/m²   Physical Exam  Vitals reviewed.   Constitutional:       Appearance: He is ill-appearing.      Comments: Frail, cachectic, unkept   HENT:      Head: Normocephalic and atraumatic.      Mouth/Throat:      Mouth: Mucous membranes are moist.      Pharynx: Oropharynx is clear.   Eyes:      Extraocular Movements: Extraocular movements intact.      Conjunctiva/sclera: Conjunctivae normal.   Cardiovascular:      Rate and Rhythm: Normal rate. Rhythm irregular.   Pulmonary:      Effort: Pulmonary effort is normal.      Comments: Diminished throughout  Abdominal:      General: There is no distension.      Palpations: Abdomen is soft.   Musculoskeletal:      Cervical back: Normal range of motion.      Right lower leg: No edema.      Left lower leg: No edema.      Comments: Generalized weakness and debility, pain with movement   Skin:     General: Skin is warm and dry.   Neurological:      Mental Status: He is alert. Mental status is at baseline. He is disoriented.   Psychiatric:         Mood and " Affect: Mood normal.         Behavior: Behavior normal.        Results Reviewed:  Lab Results (last 24 hours)       Procedure Component Value Units Date/Time    Basic Metabolic Panel [[id number]]  (Abnormal) Collected: 11/02/23 2035    Specimen: Blood Updated: 11/02/23 2121     Glucose 93 mg/dL      BUN 28 mg/dL      Creatinine 1.77 mg/dL      Sodium 137 mmol/L      Potassium 3.7 mmol/L      Comment: Slight hemolysis detected by analyzer. Results may be affected.        Chloride 101 mmol/L      CO2 25.0 mmol/L      Calcium 8.2 mg/dL      BUN/Creatinine Ratio 15.8     Anion Gap 11.0 mmol/L      eGFR 36.9 mL/min/1.73     CBC Auto Differential [[id number]]  (Abnormal) Collected: 11/02/23 2035    Specimen: Blood Updated: 11/02/23 2105     WBC 5.36 10*3/mm3      RBC 2.91 10*6/mm3      Hemoglobin 9.0 g/dL      Hematocrit 28.3 %      MCV 97.3 fL      MCH 30.9 pg      MCHC 31.8 g/dL      RDW 13.5 %      RDW-SD 48.0 fl      MPV 9.5 fL      Platelets 196 10*3/mm3      Neutrophil % 59.3 %      Lymphocyte % 25.2 %      Monocyte % 12.3 %      Eosinophil % 1.9 %      Basophil % 0.7 %      Immature Grans % 0.6 %      Neutrophils, Absolute 3.18 10*3/mm3      Lymphocytes, Absolute 1.35 10*3/mm3      Monocytes, Absolute 0.66 10*3/mm3      Eosinophils, Absolute 0.10 10*3/mm3      Basophils, Absolute 0.04 10*3/mm3      Immature Grans, Absolute 0.03 10*3/mm3      nRBC 0.0 /100 WBC     Urinalysis With Culture If Indicated - Urine, Clean Catch [[id number]]  (Abnormal) Collected: 11/02/23 2024    Specimen: Urine, Clean Catch Updated: 11/02/23 2103     Color, UA Yellow     Appearance, UA Cloudy     pH, UA 5.5     Specific Gravity, UA 1.012     Glucose, UA Negative     Ketones, UA Negative     Bilirubin, UA Negative     Blood, UA Trace     Protein, UA Negative     Leuk Esterase, UA Large (3+)     Nitrite, UA Positive     Urobilinogen, UA 0.2 E.U./dL    Urinalysis, Microscopic Only - Urine, Clean Catch [[id number]]  (Abnormal) Collected: 11/02/23  2024    Specimen: Urine, Clean Catch Updated: 11/02/23 2103     RBC, UA 0-2 /HPF      WBC, UA Too Numerous to Count /HPF      Bacteria, UA 2+ /HPF      Squamous Epithelial Cells, UA 3-6 /HPF      Hyaline Casts, UA None Seen /LPF      Methodology Manual Light Microscopy    Urine Culture - Urine, Urine, Clean Catch [[id number]] Collected: 11/02/23 2024    Specimen: Urine, Clean Catch Updated: 11/02/23 2103          Imaging Results (Last 24 Hours)       Procedure Component Value Units Date/Time    CT Thoracic Spine Without Contrast [[id number]] Collected: 11/02/23 1835     Updated: 11/02/23 1845    Narrative:      CT THORACIC SPINE WO CONTRAST- 11/2/2023 5:35 PM CDT     HISTORY: Back pain     COMPARISON: None      DLP: 352 mGy cm     TECHNIQUE: Serial helical tomographic images of the thoracic spine were  obtained without the use of intravenous contrast. Multiplanar  reformatted images were provided for review.     Automated exposure control was utilized to reduce patient radiation  dose.     FINDINGS:     Bony elements are diffusely osteopenic. Previous T12 kyphoplasty. Acute  appearing T11 fracture with approximately 25% loss of height in the  anterior column. There is a subtle posterior cortical buckling as well  (series 9-images 24 and 23) without significant loss of height in the  middle column. The posterior elements are intact at this level. There  are additional T8, T9 and T4 level compression fractures. The T4 and T9  level fractures appear more chronic. The T8 fracture could be acute or  subacute and this is also a 2 column fracture with loss of height in  both the anterior and middle columns. There is no listhesis or evidence  of traumatic subluxation. In general, the posterior elements are intact  throughout the thoracic spine. Lung emphysema.       Impression:      1. Bony elements are diffusely osteopenic. There are 2 column fracture  deformities at both T8 and T11 which seem likely acute or  subacute.  Approximately 25% loss of height in the middle column at T8. No  measurable loss of height in the middle column at T11. There is only  slight posterior cortical buckling at both levels, not resulting in a  significant spinal stenosis. Posterior elements are intact. No traumatic  subluxation.           This report was signed and finalized on 11/2/2023 6:42 PM CDT by Dr Ifeanyi Angulo.       CT Lumbar Spine Without Contrast [[id number]] Collected: 11/02/23 1831     Updated: 11/02/23 1838    Narrative:      CT LUMBAR SPINE WO CONTRAST- 11/2/2023 5:35 PM CDT     HISTORY: pain, recent kyphoplasty     COMPARISON: None      DLP: 270 mGy cm     TECHNIQUE: Serial helical tomographic images of the lumbar spine were  obtained without the use of intravenous contrast. Additionally, sagittal  and coronal reformatted images were provided for review. Automated  exposure control is utilized to reduce patient radiation dose.     FINDINGS:     Counting will assume 5 lumbar-type vertebrae. The spine is imaged from  T12 to S3.     Bony elements are diffusely osteopenic. Previous T12 and L3 level  kyphoplasty. Vertebral body heights are otherwise maintained. Lordosis  is preserved. No listhesis or subluxation. Posterior elements are  intact. Previous AAA repair.       Impression:      1. Previous T12 and L3 kyphoplasties. Vertebral body heights are  otherwise maintained. No new fracture identified.  2. Osteopenia.           This report was signed and finalized on 11/2/2023 6:35 PM CDT by Dr Ifeanyi Angulo.       CT Cervical Spine Without Contrast [[id number]] Collected: 11/02/23 1822     Updated: 11/02/23 1835    Narrative:      CT CERVICAL SPINE WO CONTRAST- 11/2/2023 5:35 PM CDT     HISTORY: Neck pain     COMPARISON: None      DOSE LENGTH PRODUCT: 291 mGy cm. Automated exposure control was also  utilized to decrease patient radiation dose.     TECHNIQUE: Serial helical tomographic images of the cervical spine were  obtained  without the use of intravenous contrast. Additionally, sagittal  and coronal reformatted images were also provided for review.     FINDINGS:     The spine is visualized from the posterior fossa through T2.  Incidentally noted atlantooccipital assimilation, incomplete type.  Additional C2-C3 level non-segmentation. Preserved cervical lordosis.  There is a degenerative retrolisthesis at C3-C4. Degenerative  anterolisthesis at C4-C5 and C7-T1. No acute fracture is identified.  Vertebral body heights are well-maintained. Multilevel loss of  intervertebral disc height. No high-grade bony narrowing of the spinal  canal is identified. Advanced facet arthropathy. Posterior elements are  intact. Carotid bulb atheromatous calcification. Bilateral apical  pleural thickening.       Impression:      1. No acute fracture identified.  2. 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  3. Advanced osteoarthritis changes.           This report was signed and finalized on 11/2/2023 6:31 PM CDT by Dr Ifeanyi Angulo.             Assessment / Plan   Assessment:   Active Hospital Problems    Diagnosis     **Intractable back pain     Protein-calorie malnutrition     Anemia, chronic disease     Memory difficulties     Closed fracture of eighth thoracic vertebra     Closed T11 fracture     Stage 3b chronic kidney disease     UTI (urinary tract infection)     Essential hypertension     Paroxysmal atrial fibrillation     Other emphysema        Treatment Plan  1.  The patient will be admitted to Dr. Guan's service here at [facility].   2.  Continue Rocephin 1 g IV daily  3.  Consult neurosurgery in a.m.  4.  Decadron 10 mg IV now followed with 4 mg every 6 hours  5.  Pain management  6.  Home medications reviewed and restarted as appropriate, hold diuretics for now  7.  Gentle hydration normal saline 50 mL/hour  8.  Supplemental oxygen, incentive spirometry  9.  Labs in a.m.  10.  Oral care, daily weights, turn  every 2 hours  11.  Consult Tatian, PT, OT and   12.  DNR/DNI    Medical Decision Making  Number and Complexity of problems: 9  Differential Diagnosis: None    Conditions and Status        Condition is unchanged.     [facility] Data  External documents reviewed: Records from assisted living and [facility] rehab  Radiology interpretation: Reviewed  Labs reviewed: Reviewed  Any tests that were considered but not ordered: No     Decision rules/scores evaluated (example [id number], Wells, etc): No     Discussed with: Mervin Griffin (TALON) and Dr. Guan     Care Planning  Shared decision making: Mervin Griffin and Dr. Guan  Code status and discussions: DNR/DNI    Disposition  Social Determinants of Health that impact treatment or disposition: No  Estimated length of stay is 2+ days.     I confirmed that the patient's advanced care plan is present, code status is documented, and a surrogate decision maker is listed in the patient's medical record.     The patient's surrogate decision maker is daughter's Gaby and/or [employer] of 's.     The patient was seen and examined by me on 11/2/2023 at 1:39 PM.    Electronically signed by CONCEPCION Lutz, 11/02/23, 23:39 CDT.

## 2023-11-03 NOTE — PLAN OF CARE
Goal Outcome Evaluation:  Plan of Care Reviewed With: patient, daughter, caregiver        Progress: no change  Outcome Evaluation: Ntn assessment completed. Pt reports poor appetite. Family at bedside reports pt has been on Remeron for the past 3-4 yrs for appetite and report has not worked. Pt may benefit from a different appetite stimulant. Pt likely to qualify for severe malnutrtion. Family reports pt's dentures do not fit well due to his weight loss. Soft texture diet,chopped meats. Pt reports he cannot tolerate standard oral supplement such as ensure. Boost Breeze BID. Con to follow for plan of care.

## 2023-11-03 NOTE — CASE MANAGEMENT/SOCIAL WORK
Continued Stay Note  VIVIAN Espino     Patient Name: Brennon Vance  MRN: [id number]  Today's Date: 11/3/2023    Admit Date: 11/2/2023        Discharge Plan       Row Name 11/03/23 1520       Plan    Plan Comments Dietician is currently in room. SW will try to see pt again later today or over weekend.                   Discharge Codes    No documentation.                       FAISAL Nava

## 2023-11-03 NOTE — PROGRESS NOTES
Malnutrition Severity Assessment    Patient Name:  Brennon Vance  YOB: 1935  MRN: [id number]  Admit Date:  11/2/2023    Patient meets criteria for : Severe Malnutrition (Secondary signs: wound noted to bilateral coccyx,skin dry,flaky)    Malnutrition Severity Assessment  Malnutrition Type: Chronic Disease - Related Malnutrition  Malnutrition Type (last 8 hours)       Malnutrition Severity Assessment       Row Name 11/03/23 1521       Malnutrition Severity Assessment    Malnutrition Type Chronic Disease - Related Malnutrition      Row Name 11/03/23 1521       Insufficient Energy Intake     Insufficient Energy Intake Findings Moderate    Insufficient Energy Intake  <75% of est. energy requirement for > or equal to 1 month      Row Name 11/03/23 1521       Unintentional Weight Loss     Unintentional Weight Loss Findings Severe    Unintentional Weight Loss  Weight loss greater than 5% in one month  13 lbs wt loss (10.9%) over the past 1 1/2 months per wt report      Row Name 11/03/23 1521       Muscle Loss    Loss of Muscle Mass Findings Severe    Muslim Region Severe - deep hollowing/scooping, lack of muscle to touch, facial bones well defined    Dorsal Hand Region Severe - prominent depression    Anterior Thigh Region Moderate - mild depression on inner thigh    Posterior Calf Region Moderate - some roundness, slight firmness      Row Name 11/03/23 1521       Fat Loss    Subcutaneous Fat Loss Findings Severe    Orbital Region  Severe - pronounced hollowness/depression, dark circles, loose saggy skin      Row Name 11/03/23 1521       Criteria Met (Must meet criteria for severity in at least 2 of these categories: M Wasting, Fat Loss, Fluid, Secondary Signs, Wt. Status, Intake)    Patient meets criteria for  Severe Malnutrition  Secondary signs: wound noted to bilateral coccyx,skin dry,flaky                    Electronically signed by:  Lucia Tello MS,RDN,LD  11/03/23 15:28 CDT

## 2023-11-03 NOTE — ED NOTES
Nursing report ED to floor  Brennon Vance  88 y.o.  male    HPI:   Chief Complaint   Patient presents with    Back Pain     BACK PAIN. RIGHT RIB PAIN. NUMBNESS/TINGLING IN LEGS. HX KYPHOPLASTY END OF SEPT 2023       Admitting doctor:   Arnav Guan MD    Consulting provider(s):  Consults       Date and Time Order Name Status Description    11/2/2023 11:12 PM Inpatient Neurosurgery Consult      9/24/2023  5:11 PM Inpatient Neurosurgery Consult Completed              Admitting diagnosis:   The primary encounter diagnosis was Acute midline thoracic back pain. Diagnoses of Urinary tract infection without hematuria, site unspecified, Closed fracture of eighth thoracic vertebra, unspecified fracture morphology, initial encounter, Closed fracture of eleventh thoracic vertebra, unspecified fracture morphology, initial encounter, and Intractable back pain were also pertinent to this visit.    Code status:   Current Code Status       Date Active Code Status Order ID Comments User Context       11/2/2023 2312 No CPR (Do Not Attempt to Resuscitate) [id number]  Lakshmi Montoya, APRN ED        Question Answer    Code Status (Patient has no pulse and is not breathing) No CPR (Do Not Attempt to Resuscitate)    Medical Interventions (Patient has pulse or is breathing) Limited Support    Medical Intervention Limits: NO intubation (DNI)     NO cardioversion    Level Of Support Discussed With Health Care Surrogate                    Allergies:   Benzodiazepines and Lyrica [pregabalin]    Intake and Output  No intake or output data in the 24 hours ending 11/02/23 2327    Weight:       11/02/23  1647   Weight: 45.8 kg (101 lb)       Most recent vitals:   Vitals:    11/02/23 2241 11/02/23 2256 11/02/23 2310 11/02/23 2325   BP: 150/66 131/75 131/63 127/55   BP Location:    Right arm   Pulse: 75 80 86 80   Resp:    15   Temp:    99.4 °F (37.4 °C)   TempSrc:    Oral   SpO2: 94% 94% 93% 95%   Weight:       Height:         Oxygen Therapy:  .    Active LDAs/IV Access:   Lines, Drains & Airways       Active LDAs       Name Placement date Placement time Site Days    Peripheral IV 11/02/23 Anterior;Left Forearm 11/02/23  --  Forearm  less than 1                    Labs (abnormal labs have a star):   Labs Reviewed   URINALYSIS W/ CULTURE IF INDICATED - Abnormal; Notable for the following components:       Result Value    Appearance, UA Cloudy (*)     Blood, UA Trace (*)     Leuk Esterase, UA Large (3+) (*)     Nitrite, UA Positive (*)     All other components within normal limits    Narrative:     In absence of clinical symptoms, the presence of pyuria, bacteria, and/or nitrites on the urinalysis result does not correlate with infection.   BASIC METABOLIC PANEL - Abnormal; Notable for the following components:    BUN 28 (*)     Creatinine 1.77 (*)     Calcium 8.2 (*)     eGFR 36.9 (*)     All other components within normal limits    Narrative:     GFR Normal >60  Chronic Kidney Disease <60  Kidney Failure <15    The GFR formula is only valid for adults with stable renal function between ages 18 and 70.   CBC WITH AUTO DIFFERENTIAL - Abnormal; Notable for the following components:    RBC 2.91 (*)     Hemoglobin 9.0 (*)     Hematocrit 28.3 (*)     MCV 97.3 (*)     Monocyte % 12.3 (*)     Immature Grans % 0.6 (*)     All other components within normal limits   URINALYSIS, MICROSCOPIC ONLY - Abnormal; Notable for the following components:    WBC, UA Too Numerous to Count (*)     Bacteria, UA 2+ (*)     Squamous Epithelial Cells, UA 3-6 (*)     All other components within normal limits   URINE CULTURE   CBC (NO DIFF)   COMPREHENSIVE METABOLIC PANEL   LIPID PANEL   MAGNESIUM   HEMOGLOBIN A1C   FERRITIN   IRON PROFILE   CBC AND DIFFERENTIAL    Narrative:     The following orders were created for panel order CBC & Differential.  Procedure                               Abnormality         Status                     ---------                                -----------         ------                     CBC Auto Differential[[id number]]        Abnormal            Final result                 Please view results for these tests on the individual orders.       Meds given in ED:   Medications   cefTRIAXone (ROCEPHIN) 1,000 mg in sodium chloride 0.9 % 100 mL IVPB (has no administration in time range)   sodium chloride 0.9 % flush 10 mL (has no administration in time range)   sodium chloride 0.9 % flush 10 mL (has no administration in time range)   sodium chloride 0.9 % infusion 40 mL (has no administration in time range)   sennosides-docusate (PERICOLACE) 8.6-50 MG per tablet 1 tablet (has no administration in time range)     And   polyethylene glycol (MIRALAX) packet 17 g (has no administration in time range)     And   bisacodyl (DULCOLAX) EC tablet 5 mg (has no administration in time range)     And   bisacodyl (DULCOLAX) suppository 10 mg (has no administration in time range)   sodium chloride 0.9 % infusion (has no administration in time range)   acetaminophen (TYLENOL) tablet 650 mg (has no administration in time range)     Or   acetaminophen (TYLENOL) 160 MG/5ML oral solution 650 mg (has no administration in time range)     Or   acetaminophen (TYLENOL) suppository 650 mg (has no administration in time range)   oxyCODONE-acetaminophen (PERCOCET) 5-325 MG per tablet 1 tablet (has no administration in time range)   oxyCODONE-acetaminophen (PERCOCET) 7.5-325 MG per tablet 1 tablet (has no administration in time range)   HYDROmorphone (DILAUDID) injection 0.5 mg (0.5 mg Intravenous Given 11/2/23 8401)     And   naloxone (NARCAN) injection 0.4 mg (has no administration in time range)   ondansetron (ZOFRAN) tablet 4 mg (has no administration in time range)     Or   ondansetron (ZOFRAN) injection 4 mg (has no administration in time range)   dexAMETHasone (DECADRON) injection 10 mg (has no administration in time range)   dexAMETHasone (DECADRON) injection 4 mg (has no  administration in time range)   HYDROcodone-acetaminophen (NORCO) 5-325 MG per tablet 1 tablet (1 tablet Oral Given 11/2/23 1733)   Morphine sulfate (PF) injection 2 mg (2 mg Intravenous Given 11/2/23 2044)   cefTRIAXone (ROCEPHIN) 1,000 mg in sodium chloride 0.9 % 100 mL IVPB (0 mg Intravenous Stopped 11/2/23 2151)   sodium chloride 0.9 % bolus 1,000 mL (1,000 mL Intravenous New Bag 11/2/23 2209)   Morphine sulfate (PF) injection 2 mg (2 mg Intravenous Given 11/2/23 2205)     sodium chloride, 50 mL/hr         NIH Stroke Scale:       Isolation/Infection(s):  No active isolations   No active infections     COVID Testing  Collected .  Resulted .    Nursing report ED to floor:  Mental status: .  Ambulatory status: .  Precautions: .    ED nurse phone extentsion- ..  [phone]

## 2023-11-03 NOTE — CONSULTS
[facility] Palliative Care Services  Initial Consult    Attending Physician: Micki Solano MD  Referring Provider: Micki Solano MD    Patient Name: Brennon Vance  Date of Admission: 11/2/2023  Today's Date: 11/03/23     Reason for Referral: Goals of Care/Advance Care Planning    Code Status and Medical Interventions:   Ordered at: 11/02/23 2312     Medical Intervention Limits:    NO intubation (DNI)    NO cardioversion     Level Of Support Discussed With:    Health Care Surrogate     Code Status (Patient has no pulse and is not breathing):    No CPR (Do Not Attempt to Resuscitate)     Medical Interventions (Patient has pulse or is breathing):    Limited Support        Subjective     HPI: 88 y.o. male with past  medical history including coronary artery disease, chronic kidney disease stage III, COPD, hiatal hernia, hyperlipidemia, hypertension and stroke.  According to chart review he was recently hospitalized from 9/24/2023-10/5/2023 due to worsening lower back pain with radiation to left leg.  During hospitalization he was found to have UTI and possible aspiration pneumonia in addition to chronic appearing T12 and L3 compression fractures.  He underwent kyphoplasty on 9/26/2023 with neurosurgery.  During this hospitalization palliative care was consulted and CODE STATUS changes were made per chart review and MOST document was completed.  He was discharged to skilled nursing facility for rehabilitation.  Patient presented to [facility] on 11/2/2023 related to intractable back pain.  ED notes reviewed which revealed pain developed 2 days prior to admission when he was putting on his socks.  Work-up in ED revealed few abnormalities in labs including creatinine 1.77, BUN 28, GFR 36.9, calcium 8.2, hemoglobin 9.0 and hematocrit 28.3.  UA revealed 2+ bacteria, TNTC WBC, trace blood and presence of nitrite.  Urine culture pending.  Multiple CTs obtained including CT of cervical spine  which was negative for acute fracture.  Noted to have atlantooccipital assimilation and C2-C3 level non-segmentation advanced osteoarthritis changes.  CT lumbar spine revealed previous T12 and L3 kyphoplasties and osteopenia however no new fracture identified.  CT of thoracic spine also obtained which revealed bony elements which are diffusely osteopenic, 2 column fracture deformities of both T8 and T11 which appear acute or subacute per radiology report.  He was also noted to have approximately 25% loss of height in the middle column at T8.  No significant spinal stenosis noted or traumatic subluxation.  He was admitted to the medical floor for further work-up and treatment.  He has been started on dexamethasone.  Neurosurgery has been consulted and recommended MRI to better assess.  He is lying in bed, alert and in no apparent distress at time of exam.  His daughter, Gaby, is at bedside.  He provided additional details regarding events leading to hospitalization including back pain however states he was able to take Tylenol and it was bearable.  Reports it began worsening and he presented to ED when his daughter arrived from out of town.       Advance Care Planning   Advanced Directives: Patient has an advance directive on file. Patient reports document is valid.  Also states his daughters, David, have been named his healthcare power of .    Advance Care Planning Discussion: Care conference held with Mr. Vance and his daughter, Gaby, at bedside.  We discussed events leading to current hospitalization including recent hospitalization.  Daughter shared patient's spouse of 60+ years and has recently been placed on hospice and inquired whether this is something that would be beneficial for her father.  We discussed treatment options further including continuing with current measures including imaging and surgical intervention if indicated versus transition to more comfort focused care.  At this time  Mr. Vance shared he is agreeable to obtaining further images and potentially undergoing further surgical interventions if necessary for pain control.  He does state he would not wish to be overly aggressive.  We discussed interventions further.  He confirmed he would not wish to have CPR as well as other interventions including intubation, hemodialysis, artificial nutrition.  CODE STATUS changed to reflect his wishes.  Shared he is agreeable to discussing hospice in future especially if continues to have fractures and intractable pain.  Wishes to proceed with MRI and monitor for any improvements prior to making decision.  He and his daughter both appear to have good prognostic awareness.  He denies any questions and/or concerns at this time.    The patient receives support from his spouse and children. Patient's children are his POA.    Due to the palliative care topics discussed including goals of care, treatment options, discharge options, medical priorities, and hospice services we will establish an advance care plan.      Review of Systems   Constitutional: Negative for chills and fever.   HENT:  Negative for congestion, sore throat and stridor.    Eyes:  Negative for pain, photophobia and visual disturbance.   Endocrine: Negative for polydipsia, polyphagia and polyuria.   Skin:  Negative for dry skin, flushing and itching.   Musculoskeletal:  Positive for back pain (intermittently).   Psychiatric/Behavioral:  Negative for depression. The patient does not have insomnia and is not nervous/anxious.    Allergic/Immunologic: Negative for environmental allergies, hives and persistent infections.     Pain Assessment  Pain Location: (P) back  Pain Description: (P) aching  Past Medical History:   Diagnosis Date    CAD (coronary artery disease)     CKD (chronic kidney disease) stage 3, GFR 30-59 ml/min     COPD (chronic obstructive pulmonary disease)     Hiatal hernia     Hyperlipidemia     Hypertension     Stroke        Past Surgical History:   Procedure Laterality Date    ABDOMINAL AORTIC ANEURYSM REPAIR      CARDIAC CATHETERIZATION N/A 2021    Procedure: Left Heart Cath;  Surgeon: Harrison Alcantara MD;  Location:  [facility] CATH INVASIVE LOCATION;  Service: Cardiology;  Laterality: N/A;    CORONARY ANGIOPLASTY WITH STENT PLACEMENT      FEMORAL ARTERY STENT      KYPHOPLASTY WITH BIOPSY N/A 2023    Procedure: KYPHOPLASTY WITH BIOPSY T12 and L3;  Surgeon: Jeremiah Brantley MD;  Location:  [facility] OR;  Service: Neurosurgery;  Laterality: N/A;    LEG THROMBECTOMY/EMBOLECTOMY Right 2021    Procedure: RT GROIN EXPLORATION;  Surgeon: Geoff Remy DO;  Location:  [facility] HYBRID OR 12;  Service: Vascular;  Laterality: Right;    TRIGEMINAL NERVE DECOMPRESSION        Social History     Socioeconomic History    Marital status:    Tobacco Use    Smoking status: Former     Packs/day: 4     Types: Cigars, Cigarettes     Quit date: 10/24/2023     Years since quittin.0    Smokeless tobacco: Never   Vaping Use    Vaping Use: Never used   Substance and Sexual Activity    Alcohol use: No    Drug use: No    Sexual activity: Not Currently     Family History   Problem Relation Age of Onset    No Known Problems Mother     No Known Problems Father       Allergies   Allergen Reactions    Benzodiazepines Confusion    Lyrica [Pregabalin] Other (See Comments)     Passing out/syncope       Objective   Diagnostics: Reviewed    Intake/Output Summary (Last 24 hours) at 11/3/2023 1019  Last data filed at 11/3/2023 0550  Gross per 24 hour   Intake 270 ml   Output --   Net 270 ml       Current medications patient is presently taking including all prescriptions, over-the-counter, herbals and vitamin/mineral/dietary (nutritional) supplements with reviewed including route, type, dose and frequency and are current per MAR at time of dictation.  Current Facility-Administered Medications   Medication Dose Route Frequency Provider Last  Rate Last Admin    acetaminophen (TYLENOL) tablet 650 mg  650 mg Oral Q4H PRN Lakshmi Montoya, APRSHEFALI        Or    acetaminophen (TYLENOL) 160 MG/5ML oral solution 650 mg  650 mg Oral Q4H PRN Lakshmi Montoya APRN        Or    acetaminophen (TYLENOL) suppository 650 mg  650 mg Rectal Q4H PRN Lakshmi Montoya, APRN        albuterol (PROVENTIL) nebulizer solution 0.083% 2.5 mg/3mL  2.5 mg Nebulization Q4H PRN Lakshmi Montoya, CONCEPCION        apixaban (ELIQUIS) tablet 2.5 mg  2.5 mg Oral Q12H Lakshmi Montoya APRN   2.5 mg at 11/03/23 0913    sennosides-docusate (PERICOLACE) 8.6-50 MG per tablet 1 tablet  1 tablet Oral Nightly PRN Lakshmi Montoya APRN        And    polyethylene glycol (MIRALAX) packet 17 g  17 g Oral Daily PRN Lakshmi Montoya APRN   17 g at 11/03/23 0921    And    bisacodyl (DULCOLAX) EC tablet 5 mg  5 mg Oral Daily PRN Lakshmi Montoya APRN   5 mg at 11/03/23 0923    And    bisacodyl (DULCOLAX) suppository 10 mg  10 mg Rectal Daily PRN Lakshmi Montoya, APRN        budesonide-formoterol (SYMBICORT) 160-4.5 MCG/ACT inhaler 2 puff  2 puff Inhalation BID - RT Lakshmi Montoya APRN   2 puff at 11/03/23 1011    Calcium Carb-Cholecalciferol 600-20 MG-MCG tablet 1 tablet  1 tablet Oral Daily Micki Solano MD        carBAMazepine (TEGretol) tablet 200 mg  200 mg Oral Daily Lakshmi Montoya APRN   200 mg at 11/03/23 0914    carvedilol (COREG) tablet 12.5 mg  12.5 mg Oral BID With Meals Lakshmi Montoya APRN   12.5 mg at 11/03/23 0913    cholecalciferol (VITAMIN D3) tablet 1,000 Units  1,000 Units Oral Daily Lakshmi Montoya APRN   1,000 Units at 11/03/23 0913    dexAMETHasone (DECADRON) injection 4 mg  4 mg Intravenous Q6H Lakshmi Montoya APRN   4 mg at 11/03/23 0545    dilTIAZem CD (CARDIZEM CD) 24 hr capsule 180 mg  180 mg Oral Daily Lakshmi Montoya APRN   180 mg at 11/03/23 0914    finasteride (PROSCAR) tablet 5 mg  5 mg Oral Daily Lakshmi Montoya APRN   5 mg at 11/03/23  0914    guaiFENesin (MUCINEX) 12 hr tablet 1,200 mg  1,200 mg Oral Q12H Lakshmi Montoya APRN        HYDROmorphone (DILAUDID) injection 0.5 mg  0.5 mg Intravenous Q4H PRN Lakshmi Montoya, APRN   0.5 mg at 11/03/23 0446    And    naloxone (NARCAN) injection 0.4 mg  0.4 mg Intravenous Q5 Min PRN Lakshmi Montoya, CONCEPCION        hydrOXYzine (ATARAX) tablet 50 mg  50 mg Oral TID Lakshmi Montoya, APRN   50 mg at 11/03/23 0913    isosorbide mononitrate (IMDUR) 24 hr tablet 60 mg  60 mg Oral Daily Lakshmi Montoya, APRN   60 mg at 11/03/23 0913    levothyroxine (SYNTHROID, LEVOTHROID) tablet 50 mcg  50 mcg Oral Q AM Lakshmi Montoya, APRN   50 mcg at 11/03/23 0545    melatonin tablet 5 mg  5 mg Oral Nightly PRN Lakshmi Montoya APRN        nicotine (NICODERM CQ) 14 MG/24HR patch 1 patch  1 patch Transdermal Q24H Lakshmi Montoya APRN   1 patch at 11/03/23 0111    nitroglycerin (NITROSTAT) SL tablet 0.4 mg  0.4 mg Sublingual Q5 Min PRN Lakshmi Montoya APRN        ondansetron (ZOFRAN) tablet 4 mg  4 mg Oral Q6H PRN Lakshmi Montoya APRN        Or    ondansetron (ZOFRAN) injection 4 mg  4 mg Intravenous Q6H PRN Lakshmi Montoya, CONCEPCION        oxyCODONE-acetaminophen (PERCOCET) 5-325 MG per tablet 1 tablet  1 tablet Oral Q4H PRN Lakshmi Montoya APRN        oxyCODONE-acetaminophen (PERCOCET) 7.5-325 MG per tablet 1 tablet  1 tablet Oral Q4H PRN Lakshmi Montoya, CONCEPCION        pantoprazole (PROTONIX) EC tablet 40 mg  40 mg Oral Daily Lakshmi Montoya, APRN   40 mg at 11/03/23 0914    Pharmacy to Dose Zosyn   Does not apply Continuous PRN Arnav Guan MD        piperacillin-tazobactam (ZOSYN) 3.375 g in iso-osmotic dextrose 50 ml (premix)  3.375 g Intravenous Q12H Arnav Guan MD        sodium chloride 0.9 % flush 10 mL  10 mL Intravenous Q12H Lakshmi Montoya APRN   10 mL at 11/03/23 0924    sodium chloride 0.9 % flush 10 mL  10 mL Intravenous PRN Lakshmi Montoya APRN        sodium chloride 0.9 %  "infusion 40 mL  40 mL Intravenous PRN MontoyaLakshmi APRN        sodium chloride 0.9 % infusion  50 mL/hr Intravenous Continuous Alba Montoyai D, APRN 50 mL/hr at 11/03/23 0519 50 mL/hr at 11/03/23 0519    tamsulosin (FLOMAX) 24 hr capsule 0.4 mg  0.4 mg Oral Daily Alba Montoyai D, APRN   0.4 mg at 11/03/23 0913    Pharmacy to Dose Zosyn,   sodium chloride, 50 mL/hr, Last Rate: 50 mL/hr (11/03/23 0519)       acetaminophen **OR** acetaminophen **OR** acetaminophen    albuterol    senna-docusate sodium **AND** polyethylene glycol **AND** bisacodyl **AND** bisacodyl    HYDROmorphone **AND** naloxone    melatonin    nitroglycerin    ondansetron **OR** ondansetron    oxyCODONE-acetaminophen    oxyCODONE-acetaminophen    Pharmacy to Dose Zosyn    sodium chloride    sodium chloride  Assessment   /59 (BP Location: Right arm, Patient Position: Lying)   Pulse 77   Temp 97.9 °F (36.6 °C) (Oral)   Resp 16   Ht 172.7 cm (68\")   Wt 48.4 kg (106 lb 11.2 oz)   SpO2 96%   BMI 16.22 kg/m²     Physical Exam  Vitals and nursing note reviewed.   Constitutional:       General: He is not in acute distress.  HENT:      Head: Normocephalic and atraumatic.   Eyes:      General: Lids are normal.      Extraocular Movements: Extraocular movements intact.   Neck:      Vascular: No JVD.      Trachea: Trachea normal.   Cardiovascular:      Rate and Rhythm: Normal rate.   Pulmonary:      Effort: Pulmonary effort is normal.   Musculoskeletal:      Cervical back: Neck supple.   Skin:     General: Skin is warm and dry.   Neurological:      Mental Status: He is alert and oriented to person, place, and time.   Psychiatric:         Mood and Affect: Mood normal.         Behavior: Behavior is cooperative.     Functional status: Palliative Performance Scale Score: Performance 70% based on the following measures: Ambulation: Reduced, Activity and Evidence of Disease: Unable to do normal work, some evidence of disease, Self-Care: Fully " independent,  Intake: Normal or reduced, LOC: Full.  Nutritional status: Albumin 3.3. Body mass index is 16.22 kg/m².  Patient status: Disease state: Controlled with current treatments.    Impression/Problem List:  Intractable low back pain  Anemia of chronic disease  Protein calorie malnutrition  Closed fracture of eighth thoracic vertebrae  Closed T11 fracture  Stage IIIb chronic kidney disease  Urinary tract infection  Paroxysmal atrial fibrillation  Memory difficulties  Other emphysema  History of kyphoplasty  Advanced age        Plan / Recommendations     Palliative Care Encounter   Goals of care include CODE STATUS changes to include NO CPR with limited support interventions.    Prognosis is guarded long-term secondary to intractable low back pain, vertebrae fractures, anemia of chronic disease, malnutrition, advanced age and other comorbidities listed above.     Care conference held with Mr. Vance and his daughter, Gaby, at bedside.  Details of discussion above.      Discussed treatment options as well as medical priorities.  After further discussion expressed wishes to de-escalate CODE STATUS to include more limited support and inventions.      Agreeable to proceeding with MRI and potential surgical intervention if indicated.      Discussed hospice as potential option in future if continues to decline and/or experiences continued intractable pain without wishes to continue with interventions/aggressive care.      He and his daughter both appear to have good prognostic awareness.  He denies any questions and/or concerns at this time.    Thank you for allowing us to participate in patient's plan of care. Palliative Care Team will continue to follow patient. Will plan to follow up Monday if patient remains hospitalized as there is no palliative care coverage over the weekend.     Time spent:91 minutes spent reviewing medical and medication records, assessing and examining patient, discussing with family,  answering questions, providing some guidance about a plan and documentation of care, and coordinating care with other healthcare members, with > 50% time spent face to face.   16 minutes spent on advance care planning.    Electronically signed by, CONCEPCION Harper, 11/03/23.

## 2023-11-03 NOTE — PROGRESS NOTES
[facility]  INPATIENT PROGRESS NOTE    Patient Name: Brennon Vance  Date of Admission: 11/2/2023  Today's Date: 11/03/23  Length of Stay: 0  Primary Care Physician: Javid Grajeda MD    Subjective   Chief Complaint: follow up   HPI   No acute event reported overnight.  Pain is better controlled on Dilaudid.  Vital signs are stable.  Neurosurgery consult in place.  Urine culture in process.    Review of Systems   Musculoskeletal:  Positive for back pain.      All pertinent negatives and positives are as above. All other systems have been reviewed and are negative unless otherwise stated.     Objective    Temp:  [97.7 °F (36.5 °C)-99.4 °F (37.4 °C)] 97.7 °F (36.5 °C)  Heart Rate:  [67-86] 70  Resp:  [15-18] 18  BP: ()/(55-75) 149/59  Physical Exam  Vitals and nursing note reviewed.   Constitutional:       General: He is not in acute distress.     Appearance: He is not ill-appearing, toxic-appearing or diaphoretic.   HENT:      Head: Normocephalic and atraumatic.      Right Ear: External ear normal.      Left Ear: External ear normal.   Eyes:      General: No scleral icterus.     Extraocular Movements: Extraocular movements intact.      Conjunctiva/sclera: Conjunctivae normal.   Cardiovascular:      Rate and Rhythm: Normal rate and regular rhythm.      Heart sounds: Normal heart sounds.   Pulmonary:      Effort: Pulmonary effort is normal. No respiratory distress.      Breath sounds: Normal breath sounds.   Chest:      Chest wall: No tenderness.   Abdominal:      General: Bowel sounds are normal.      Palpations: Abdomen is soft.      Tenderness: There is no abdominal tenderness.   Musculoskeletal:         General: No swelling or tenderness.      Cervical back: Normal range of motion and neck supple.      Right lower leg: No edema.      Left lower leg: No edema.   Skin:     General: Skin is warm and dry.      Capillary Refill: Capillary refill takes  "less than 2 seconds.      Findings: No erythema or rash.   Neurological:      General: No focal deficit present.      Mental Status: He is alert.      Motor: No weakness.   Psychiatric:         Behavior: Behavior normal.           Results Review:  I have reviewed the labs, radiology results, and diagnostic studies.    Laboratory Data:   Results from last 7 days   Lab Units 11/03/23  0559 11/02/23 2035   WBC 10*3/mm3 5.00 5.36   HEMOGLOBIN g/dL 9.9* 9.0*   HEMATOCRIT % 31.4* 28.3*   PLATELETS 10*3/mm3 198 196        Results from last 7 days   Lab Units 11/03/23  0559 11/02/23 2035   SODIUM mmol/L 139 137   POTASSIUM mmol/L 4.0 3.7   CHLORIDE mmol/L 103 101   CO2 mmol/L 23.0 25.0   BUN mg/dL 25* 28*   CREATININE mg/dL 1.66* 1.77*   CALCIUM mg/dL 8.6 8.2*   BILIRUBIN mg/dL 0.3  --    ALK PHOS U/L 105  --    ALT (SGPT) U/L 6  --    AST (SGOT) U/L 11  --    GLUCOSE mg/dL 134* 93       Culture Data:   No results found for: \"BLOODCX\", \"URINECX\", \"WOUNDCX\", \"MRSACX\", \"RESPCX\", \"STOOLCX\"    Radiology Data:   Imaging Results (Last 24 Hours)       Procedure Component Value Units Date/Time    CT Thoracic Spine Without Contrast [[id number]] Collected: 11/02/23 1835     Updated: 11/02/23 1845    Narrative:      CT THORACIC SPINE WO CONTRAST- 11/2/2023 5:35 PM CDT     HISTORY: Back pain     COMPARISON: None      DLP: 352 mGy cm     TECHNIQUE: Serial helical tomographic images of the thoracic spine were  obtained without the use of intravenous contrast. Multiplanar  reformatted images were provided for review.     Automated exposure control was utilized to reduce patient radiation  dose.     FINDINGS:     Bony elements are diffusely osteopenic. Previous T12 kyphoplasty. Acute  appearing T11 fracture with approximately 25% loss of height in the  anterior column. There is a subtle posterior cortical buckling as well  (series 9-images 24 and 23) without significant loss of height in the  middle column. The posterior elements are " intact at this level. There  are additional T8, T9 and T4 level compression fractures. The T4 and T9  level fractures appear more chronic. The T8 fracture could be acute or  subacute and this is also a 2 column fracture with loss of height in  both the anterior and middle columns. There is no listhesis or evidence  of traumatic subluxation. In general, the posterior elements are intact  throughout the thoracic spine. Lung emphysema.       Impression:      1. Bony elements are diffusely osteopenic. There are 2 column fracture  deformities at both T8 and T11 which seem likely acute or subacute.  Approximately 25% loss of height in the middle column at T8. No  measurable loss of height in the middle column at T11. There is only  slight posterior cortical buckling at both levels, not resulting in a  significant spinal stenosis. Posterior elements are intact. No traumatic  subluxation.           This report was signed and finalized on 11/2/2023 6:42 PM CDT by Dr Ifeanyi Angulo.       CT Lumbar Spine Without Contrast [[id number]] Collected: 11/02/23 1831     Updated: 11/02/23 1838    Narrative:      CT LUMBAR SPINE WO CONTRAST- 11/2/2023 5:35 PM CDT     HISTORY: pain, recent kyphoplasty     COMPARISON: None      DLP: 270 mGy cm     TECHNIQUE: Serial helical tomographic images of the lumbar spine were  obtained without the use of intravenous contrast. Additionally, sagittal  and coronal reformatted images were provided for review. Automated  exposure control is utilized to reduce patient radiation dose.     FINDINGS:     Counting will assume 5 lumbar-type vertebrae. The spine is imaged from  T12 to S3.     Bony elements are diffusely osteopenic. Previous T12 and L3 level  kyphoplasty. Vertebral body heights are otherwise maintained. Lordosis  is preserved. No listhesis or subluxation. Posterior elements are  intact. Previous AAA repair.       Impression:      1. Previous T12 and L3 kyphoplasties. Vertebral body heights  are  otherwise maintained. No new fracture identified.  2. Osteopenia.           This report was signed and finalized on 11/2/2023 6:35 PM CDT by Dr Ifeanyi Angulo.       CT Cervical Spine Without Contrast [[id number]] Collected: 11/02/23 1822     Updated: 11/02/23 1835    Narrative:      CT CERVICAL SPINE WO CONTRAST- 11/2/2023 5:35 PM CDT     HISTORY: Neck pain     COMPARISON: None      DOSE LENGTH PRODUCT: 291 mGy cm. Automated exposure control was also  utilized to decrease patient radiation dose.     TECHNIQUE: Serial helical tomographic images of the cervical spine were  obtained without the use of intravenous contrast. Additionally, sagittal  and coronal reformatted images were also provided for review.     FINDINGS:     The spine is visualized from the posterior fossa through T2.  Incidentally noted atlantooccipital assimilation, incomplete type.  Additional C2-C3 level non-segmentation. Preserved cervical lordosis.  There is a degenerative retrolisthesis at C3-C4. Degenerative  anterolisthesis at C4-C5 and C7-T1. No acute fracture is identified.  Vertebral body heights are well-maintained. Multilevel loss of  intervertebral disc height. No high-grade bony narrowing of the spinal  canal is identified. Advanced facet arthropathy. Posterior elements are  intact. Carotid bulb atheromatous calcification. Bilateral apical  pleural thickening.       Impression:      1. No acute fracture identified.  2. 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  3. Advanced osteoarthritis changes.           This report was signed and finalized on 11/2/2023 6:31 PM CDT by Dr Ifeanyi Angulo.               I have reviewed the patient's current medications.     Assessment/Plan   Assessment  Active Hospital Problems    Diagnosis     **Intractable back pain     UTI (urinary tract infection)     Protein-calorie malnutrition     Anemia, chronic disease     Memory difficulties     Closed fracture of eighth thoracic  vertebra     Closed T11 fracture     Stage 3b chronic kidney disease     Essential hypertension     Paroxysmal atrial fibrillation     Other emphysema        Treatment Plan  Intractable back pain: Imaging showed new fractures. He is s/p kyphoplasty. Neurosurgery consult in place. Continue back brace and pain management.  Discussed the need for possible bone scan to rule out osteoporosis.    Acute cystitis: Continue antibiotics. Follow up on urine culture    Paroxysmal A-fib: Continue Eliquis and Coreg.    Family requesting palliative care consult to discuss goals of care.  Orders placed.    DVT prophylaxis: Eliquis    Medical Decision Making  Number and Complexity of problems: 2 acute problems   Differential Diagnosis: as above     Conditions and Status        Condition is unchanged.     [facility] Data  External documents reviewed: [facility] records   Cardiac tracing (EKG, telemetry) interpretation: no new EKG   Radiology interpretation: imaging reviewed   Labs reviewed: yes   Any tests that were considered but not ordered: none      Decision rules/scores evaluated (example [id number], Wells, etc): n/a      Discussed with: Patient      Care Planning  Shared decision making: Patient apprised of current labs, vitals, imaging and treatment plan.  They are agreeable with proceeding with plans as discussed.    Code status and discussions:   Code Status and Medical Interventions:   Ordered at: 11/02/23 9338     Medical Intervention Limits:    NO intubation (DNI)    NO cardioversion     Level Of Support Discussed With:    Health Care Surrogate     Code Status (Patient has no pulse and is not breathing):    No CPR (Do Not Attempt to Resuscitate)     Medical Interventions (Patient has pulse or is breathing):    Limited Support         Disposition  Social Determinants of Health that impact treatment or disposition: none   I expect the patient to be discharged to SNF in 1-2 days.     Electronically signed by Micki Solano MD,  11/03/23, 07:21 CDT.

## 2023-11-03 NOTE — PLAN OF CARE
Goal Outcome Evaluation:                      Patient is alert and oriented x4 with mild confusion at times. Vitals stable. Room Air. Patient denies pain at rest, complains of pain upon movement, PRN pain medications given with relief noted. Patient is on soft to chew regular diet, is tolerating well but is eating poorly. Patient is continent of bowel and bladder, voids in external cath. Per patient LBM was ' about 4-5 days ago'. Education provided on bowel regimen, administered PRN oral medication, daughter denied suppository stated patient will have diarrhea non-stop'. Encouraged patient to increase fluid intake. Patient tolerates some mobility in bed. IV fluids per ordered. Safety precautions maintained. Eliquis for VTE. Will continue to monitor.

## 2023-11-04 ENCOUNTER — ANESTHESIA EVENT (OUTPATIENT)
Dept: PERIOP | Facility: HOSPITAL | Age: 88
End: 2023-11-04
Payer: MEDICARE

## 2023-11-04 ENCOUNTER — APPOINTMENT (OUTPATIENT)
Dept: GENERAL RADIOLOGY | Facility: HOSPITAL | Age: 88
End: 2023-11-04
Payer: MEDICARE

## 2023-11-04 ENCOUNTER — ANESTHESIA (OUTPATIENT)
Dept: PERIOP | Facility: HOSPITAL | Age: 88
End: 2023-11-04
Payer: MEDICARE

## 2023-11-04 LAB — GLUCOSE BLDC GLUCOMTR-MCNC: 149 MG/DL (ref 70–130)

## 2023-11-04 PROCEDURE — A9270 NON-COVERED ITEM OR SERVICE: HCPCS | Performed by: NEUROLOGICAL SURGERY

## 2023-11-04 PROCEDURE — 96376 TX/PRO/DX INJ SAME DRUG ADON: CPT

## 2023-11-04 PROCEDURE — 82948 REAGENT STRIP/BLOOD GLUCOSE: CPT

## 2023-11-04 PROCEDURE — A9270 NON-COVERED ITEM OR SERVICE: HCPCS | Performed by: HOSPITALIST

## 2023-11-04 PROCEDURE — 25010000002 CEFAZOLIN PER 500 MG: Performed by: NURSE ANESTHETIST, CERTIFIED REGISTERED

## 2023-11-04 PROCEDURE — 25010000002 PROPOFOL 10 MG/ML EMULSION: Performed by: NURSE ANESTHETIST, CERTIFIED REGISTERED

## 2023-11-04 PROCEDURE — G0378 HOSPITAL OBSERVATION PER HR: HCPCS

## 2023-11-04 PROCEDURE — 63710000001 GUAIFENESIN 600 MG TABLET SUSTAINED-RELEASE 12 HOUR: Performed by: NEUROLOGICAL SURGERY

## 2023-11-04 PROCEDURE — 25810000003 SODIUM CHLORIDE 0.9 % SOLUTION: Performed by: NURSE PRACTITIONER

## 2023-11-04 PROCEDURE — 63710000001 DILTIAZEM CD 180 MG CAPSULE SUSTAINED-RELEASE 24 HR: Performed by: HOSPITALIST

## 2023-11-04 PROCEDURE — 97161 PT EVAL LOW COMPLEX 20 MIN: CPT | Performed by: PHYSICAL THERAPIST

## 2023-11-04 PROCEDURE — 25010000002 PIPERACILLIN SOD-TAZOBACTAM PER 1 G: Performed by: NEUROLOGICAL SURGERY

## 2023-11-04 PROCEDURE — 72100 X-RAY EXAM L-S SPINE 2/3 VWS: CPT

## 2023-11-04 PROCEDURE — 63710000001 HYDROXYZINE 25 MG TABLET: Performed by: NEUROLOGICAL SURGERY

## 2023-11-04 PROCEDURE — 97165 OT EVAL LOW COMPLEX 30 MIN: CPT

## 2023-11-04 PROCEDURE — 25510000001 IOPAMIDOL 61 % SOLUTION: Performed by: NEUROLOGICAL SURGERY

## 2023-11-04 PROCEDURE — 63710000001 SENNOSIDES-DOCUSATE 8.6-50 MG TABLET: Performed by: NEUROLOGICAL SURGERY

## 2023-11-04 PROCEDURE — 63710000001 CARBAMAZEPINE 200 MG TABLET: Performed by: NEUROLOGICAL SURGERY

## 2023-11-04 PROCEDURE — 25010000002 HYDROMORPHONE PER 4 MG: Performed by: NEUROLOGICAL SURGERY

## 2023-11-04 PROCEDURE — 25010000002 DEXAMETHASONE PER 1 MG: Performed by: NURSE PRACTITIONER

## 2023-11-04 PROCEDURE — 25010000002 HYDROMORPHONE PER 4 MG: Performed by: NURSE PRACTITIONER

## 2023-11-04 PROCEDURE — 25010000002 PIPERACILLIN SOD-TAZOBACTAM PER 1 G: Performed by: STUDENT IN AN ORGANIZED HEALTH CARE EDUCATION/TRAINING PROGRAM

## 2023-11-04 PROCEDURE — 94799 UNLISTED PULMONARY SVC/PX: CPT

## 2023-11-04 PROCEDURE — 63710000001 CARVEDILOL 6.25 MG TABLET: Performed by: HOSPITALIST

## 2023-11-04 PROCEDURE — 76000 FLUOROSCOPY <1 HR PHYS/QHP: CPT

## 2023-11-04 PROCEDURE — 25810000003 LACTATED RINGERS PER 1000 ML: Performed by: NURSE ANESTHETIST, CERTIFIED REGISTERED

## 2023-11-04 PROCEDURE — 63710000001 HYDROCODONE-ACETAMINOPHEN 5-325 MG TABLET: Performed by: NEUROLOGICAL SURGERY

## 2023-11-04 PROCEDURE — 99213 OFFICE O/P EST LOW 20 MIN: CPT | Performed by: NEUROLOGICAL SURGERY

## 2023-11-04 PROCEDURE — 25010000002 DEXAMETHASONE PER 1 MG: Performed by: NEUROLOGICAL SURGERY

## 2023-11-04 PROCEDURE — C1713 ANCHOR/SCREW BN/BN,TIS/BN: HCPCS | Performed by: NEUROLOGICAL SURGERY

## 2023-11-04 PROCEDURE — 22514 PERQ VERTEBRAL AUGMENTATION: CPT | Performed by: NEUROLOGICAL SURGERY

## 2023-11-04 PROCEDURE — 97116 GAIT TRAINING THERAPY: CPT

## 2023-11-04 DEVICE — BONE CEMENT C01B HV-R WITH MIXER  US
Type: IMPLANTABLE DEVICE | Site: SPINE THORACIC | Status: FUNCTIONAL
Brand: KYPHON® HV-R® BONE CEMENT AND KYPHON® MIXER PACK

## 2023-11-04 RX ORDER — FENTANYL CITRATE 50 UG/ML
25 INJECTION, SOLUTION INTRAMUSCULAR; INTRAVENOUS
Status: DISCONTINUED | OUTPATIENT
Start: 2023-11-04 | End: 2023-11-04

## 2023-11-04 RX ORDER — DROPERIDOL 2.5 MG/ML
0.62 INJECTION, SOLUTION INTRAMUSCULAR; INTRAVENOUS ONCE AS NEEDED
Status: DISCONTINUED | OUTPATIENT
Start: 2023-11-04 | End: 2023-11-04

## 2023-11-04 RX ORDER — HYDROMORPHONE HYDROCHLORIDE 1 MG/ML
0.5 INJECTION, SOLUTION INTRAMUSCULAR; INTRAVENOUS; SUBCUTANEOUS
Status: DISCONTINUED | OUTPATIENT
Start: 2023-11-04 | End: 2023-11-04

## 2023-11-04 RX ORDER — EPHEDRINE SULFATE 50 MG/ML
INJECTION, SOLUTION INTRAVENOUS AS NEEDED
Status: DISCONTINUED | OUTPATIENT
Start: 2023-11-04 | End: 2023-11-04 | Stop reason: SURG

## 2023-11-04 RX ORDER — PROPOFOL 10 MG/ML
VIAL (ML) INTRAVENOUS AS NEEDED
Status: DISCONTINUED | OUTPATIENT
Start: 2023-11-04 | End: 2023-11-04 | Stop reason: SURG

## 2023-11-04 RX ORDER — FLUMAZENIL 0.1 MG/ML
0.2 INJECTION INTRAVENOUS AS NEEDED
Status: DISCONTINUED | OUTPATIENT
Start: 2023-11-04 | End: 2023-11-04

## 2023-11-04 RX ORDER — NALOXONE HCL 0.4 MG/ML
0.04 VIAL (ML) INJECTION AS NEEDED
Status: DISCONTINUED | OUTPATIENT
Start: 2023-11-04 | End: 2023-11-04

## 2023-11-04 RX ORDER — SODIUM CHLORIDE, SODIUM LACTATE, POTASSIUM CHLORIDE, CALCIUM CHLORIDE 600; 310; 30; 20 MG/100ML; MG/100ML; MG/100ML; MG/100ML
INJECTION, SOLUTION INTRAVENOUS CONTINUOUS PRN
Status: DISCONTINUED | OUTPATIENT
Start: 2023-11-04 | End: 2023-11-04 | Stop reason: SURG

## 2023-11-04 RX ORDER — SUCCINYLCHOLINE/SOD CL,ISO/PF 200MG/10ML
SYRINGE (ML) INTRAVENOUS AS NEEDED
Status: DISCONTINUED | OUTPATIENT
Start: 2023-11-04 | End: 2023-11-04 | Stop reason: SURG

## 2023-11-04 RX ORDER — OXYCODONE AND ACETAMINOPHEN 10; 325 MG/1; MG/1
1 TABLET ORAL ONCE AS NEEDED
Status: DISCONTINUED | OUTPATIENT
Start: 2023-11-04 | End: 2023-11-04

## 2023-11-04 RX ORDER — ATROPINE SULFATE 1 MG/ML
0.5 INJECTION, SOLUTION INTRAMUSCULAR; INTRAVENOUS; SUBCUTANEOUS ONCE AS NEEDED
Status: DISCONTINUED | OUTPATIENT
Start: 2023-11-04 | End: 2023-11-04

## 2023-11-04 RX ORDER — BUPIVACAINE HYDROCHLORIDE AND EPINEPHRINE 2.5; 5 MG/ML; UG/ML
INJECTION, SOLUTION INFILTRATION; PERINEURAL AS NEEDED
Status: DISCONTINUED | OUTPATIENT
Start: 2023-11-04 | End: 2023-11-04 | Stop reason: HOSPADM

## 2023-11-04 RX ORDER — LABETALOL HYDROCHLORIDE 5 MG/ML
5 INJECTION, SOLUTION INTRAVENOUS
Status: DISCONTINUED | OUTPATIENT
Start: 2023-11-04 | End: 2023-11-04

## 2023-11-04 RX ORDER — LIDOCAINE HYDROCHLORIDE 20 MG/ML
INJECTION, SOLUTION EPIDURAL; INFILTRATION; INTRACAUDAL; PERINEURAL AS NEEDED
Status: DISCONTINUED | OUTPATIENT
Start: 2023-11-04 | End: 2023-11-04 | Stop reason: SURG

## 2023-11-04 RX ORDER — ONDANSETRON 2 MG/ML
4 INJECTION INTRAMUSCULAR; INTRAVENOUS
Status: DISCONTINUED | OUTPATIENT
Start: 2023-11-04 | End: 2023-11-04

## 2023-11-04 RX ORDER — CEFAZOLIN SODIUM 1 G/3ML
INJECTION, POWDER, FOR SOLUTION INTRAMUSCULAR; INTRAVENOUS AS NEEDED
Status: DISCONTINUED | OUTPATIENT
Start: 2023-11-04 | End: 2023-11-04 | Stop reason: SURG

## 2023-11-04 RX ORDER — IOPAMIDOL 612 MG/ML
INJECTION, SOLUTION INTRATHECAL AS NEEDED
Status: DISCONTINUED | OUTPATIENT
Start: 2023-11-04 | End: 2023-11-04 | Stop reason: HOSPADM

## 2023-11-04 RX ORDER — MAGNESIUM HYDROXIDE 1200 MG/15ML
LIQUID ORAL AS NEEDED
Status: DISCONTINUED | OUTPATIENT
Start: 2023-11-04 | End: 2023-11-04 | Stop reason: HOSPADM

## 2023-11-04 RX ORDER — BUPIVACAINE HCL/0.9 % NACL/PF 0.125 %
PLASTIC BAG, INJECTION (ML) EPIDURAL AS NEEDED
Status: DISCONTINUED | OUTPATIENT
Start: 2023-11-04 | End: 2023-11-04 | Stop reason: SURG

## 2023-11-04 RX ADMIN — HYDROXYZINE HYDROCHLORIDE 50 MG: 25 TABLET ORAL at 15:28

## 2023-11-04 RX ADMIN — DEXAMETHASONE SODIUM PHOSPHATE 4 MG: 4 INJECTION, SOLUTION INTRAMUSCULAR; INTRAVENOUS at 05:19

## 2023-11-04 RX ADMIN — DOCUSATE SODIUM 50 MG AND SENNOSIDES 8.6 MG 1 TABLET: 8.6; 5 TABLET, FILM COATED ORAL at 21:48

## 2023-11-04 RX ADMIN — HYDROCODONE BITARTRATE AND ACETAMINOPHEN 1 TABLET: 5; 325 TABLET ORAL at 17:20

## 2023-11-04 RX ADMIN — BUDESONIDE AND FORMOTEROL FUMARATE DIHYDRATE 2 PUFF: 160; 4.5 AEROSOL RESPIRATORY (INHALATION) at 21:44

## 2023-11-04 RX ADMIN — Medication 100 MG: at 08:53

## 2023-11-04 RX ADMIN — Medication 200 MCG: at 09:01

## 2023-11-04 RX ADMIN — GUAIFENESIN 1200 MG: 600 TABLET, EXTENDED RELEASE ORAL at 21:48

## 2023-11-04 RX ADMIN — Medication 200 MCG: at 09:08

## 2023-11-04 RX ADMIN — CARBAMAZEPINE 200 MG: 200 TABLET ORAL at 10:28

## 2023-11-04 RX ADMIN — HYDROMORPHONE HYDROCHLORIDE 0.5 MG: 1 INJECTION, SOLUTION INTRAMUSCULAR; INTRAVENOUS; SUBCUTANEOUS at 11:00

## 2023-11-04 RX ADMIN — Medication 200 MCG: at 09:14

## 2023-11-04 RX ADMIN — EPHEDRINE SULFATE 20 MG: 50 INJECTION INTRAVENOUS at 09:22

## 2023-11-04 RX ADMIN — DEXAMETHASONE SODIUM PHOSPHATE 4 MG: 4 INJECTION, SOLUTION INTRAMUSCULAR; INTRAVENOUS at 17:20

## 2023-11-04 RX ADMIN — CARVEDILOL 12.5 MG: 6.25 TABLET, FILM COATED ORAL at 17:21

## 2023-11-04 RX ADMIN — SODIUM CHLORIDE, POTASSIUM CHLORIDE, SODIUM LACTATE AND CALCIUM CHLORIDE: 600; 310; 30; 20 INJECTION, SOLUTION INTRAVENOUS at 08:49

## 2023-11-04 RX ADMIN — CEFAZOLIN 1 G: 1 INJECTION, POWDER, FOR SOLUTION INTRAMUSCULAR; INTRAVENOUS at 09:00

## 2023-11-04 RX ADMIN — PIPERACILLIN SODIUM AND TAZOBACTAM SODIUM 3.38 G: 3; .375 INJECTION, SOLUTION INTRAVENOUS at 22:49

## 2023-11-04 RX ADMIN — PROPOFOL 120 MG: 10 INJECTION, EMULSION INTRAVENOUS at 08:53

## 2023-11-04 RX ADMIN — LEVOTHYROXINE SODIUM 50 MCG: 50 TABLET ORAL at 05:19

## 2023-11-04 RX ADMIN — DILTIAZEM HYDROCHLORIDE 180 MG: 180 CAPSULE, COATED, EXTENDED RELEASE ORAL at 10:28

## 2023-11-04 RX ADMIN — SODIUM CHLORIDE 50 ML/HR: 9 INJECTION, SOLUTION INTRAVENOUS at 05:18

## 2023-11-04 RX ADMIN — PIPERACILLIN SODIUM AND TAZOBACTAM SODIUM 3.38 G: 3; .375 INJECTION, SOLUTION INTRAVENOUS at 06:09

## 2023-11-04 RX ADMIN — LIDOCAINE HYDROCHLORIDE 100 MG: 20 INJECTION, SOLUTION EPIDURAL; INFILTRATION; INTRACAUDAL; PERINEURAL at 08:53

## 2023-11-04 RX ADMIN — CARVEDILOL 12.5 MG: 6.25 TABLET, FILM COATED ORAL at 10:27

## 2023-11-04 RX ADMIN — HYDROXYZINE HYDROCHLORIDE 50 MG: 25 TABLET ORAL at 21:48

## 2023-11-04 RX ADMIN — Medication 10 ML: at 22:49

## 2023-11-04 RX ADMIN — Medication 200 MCG: at 09:20

## 2023-11-04 RX ADMIN — PIPERACILLIN SODIUM AND TAZOBACTAM SODIUM 3.38 G: 3; .375 INJECTION, SOLUTION INTRAVENOUS at 13:35

## 2023-11-04 NOTE — PROGRESS NOTES
[facility]  INPATIENT PROGRESS NOTE    Patient Name: Brennon Vance  Date of Admission: 11/2/2023  Today's Date: 11/04/23  Length of Stay: 0  Primary Care Physician: Javid Grajeda MD    Subjective   Chief Complaint: follow up    88-year-old male with history of spinal compression fractures it was admitted for intractable back pain.  Imaging obtained on admission were concerning for an acute fracture.  Neurosurgery was consulted with plans for patient to have an MRI done and possible surgical repair.    11/4: MRI resulted and patient was taken to surgery this morning for T11 kyphoplasty and vertebral body biopsy.  Patient was seen after surgery back in his room.  Patient reports doing well.  Pain is well controlled.  Vital signs are stable.    11/3: No acute event reported overnight.  Pain is better controlled on Dilaudid.  Vital signs are stable.  Neurosurgery consult in place.  Urine culture in process.    Review of Systems   Constitutional:  Negative for fatigue and fever.   Respiratory: Negative.     Cardiovascular: Negative.    Gastrointestinal: Negative.    Musculoskeletal:  Positive for back pain.      All pertinent negatives and positives are as above. All other systems have been reviewed and are negative unless otherwise stated.     Objective    Temp:  [96.8 °F (36 °C)-98 °F (36.7 °C)] 96.8 °F (36 °C)  Heart Rate:  [58-74] 65  Resp:  [14-18] 16  BP: (119-163)/(46-92) 157/68  Physical Exam  Vitals and nursing note reviewed.   Constitutional:       General: He is not in acute distress.     Appearance: He is not ill-appearing, toxic-appearing or diaphoretic.   HENT:      Head: Normocephalic and atraumatic.      Right Ear: External ear normal.      Left Ear: External ear normal.   Eyes:      General: No scleral icterus.     Extraocular Movements: Extraocular movements intact.      Conjunctiva/sclera: Conjunctivae normal.   Cardiovascular:      Rate and  "Rhythm: Normal rate and regular rhythm.      Heart sounds: Normal heart sounds.   Pulmonary:      Effort: Pulmonary effort is normal. No respiratory distress.      Breath sounds: Normal breath sounds.   Chest:      Chest wall: No tenderness.   Abdominal:      General: Bowel sounds are normal.      Palpations: Abdomen is soft.      Tenderness: There is no abdominal tenderness.   Musculoskeletal:         General: No swelling or tenderness.      Cervical back: Normal range of motion and neck supple.      Right lower leg: No edema.      Left lower leg: No edema.   Skin:     General: Skin is warm and dry.      Capillary Refill: Capillary refill takes less than 2 seconds.      Findings: No erythema or rash.   Neurological:      General: No focal deficit present.      Mental Status: He is alert.      Motor: No weakness.   Psychiatric:         Behavior: Behavior normal.           Results Review:  I have reviewed the labs, radiology results, and diagnostic studies.    Laboratory Data:   Results from last 7 days   Lab Units 11/03/23  0559 11/02/23 2035   WBC 10*3/mm3 5.00 5.36   HEMOGLOBIN g/dL 9.9* 9.0*   HEMATOCRIT % 31.4* 28.3*   PLATELETS 10*3/mm3 198 196        Results from last 7 days   Lab Units 11/03/23  0559 11/02/23 2035   SODIUM mmol/L 139 137   POTASSIUM mmol/L 4.0 3.7   CHLORIDE mmol/L 103 101   CO2 mmol/L 23.0 25.0   BUN mg/dL 25* 28*   CREATININE mg/dL 1.66* 1.77*   CALCIUM mg/dL 8.6 8.2*   BILIRUBIN mg/dL 0.3  --    ALK PHOS U/L 105  --    ALT (SGPT) U/L 6  --    AST (SGOT) U/L 11  --    GLUCOSE mg/dL 134* 93       Culture Data:   No results found for: \"BLOODCX\", \"URINECX\", \"WOUNDCX\", \"MRSACX\", \"RESPCX\", \"STOOLCX\"    Radiology Data:   Imaging Results (Last 24 Hours)       Procedure Component Value Units Date/Time    XR Spine Lumbar AP & Lateral [[id number]] Collected: 11/04/23 0954     Updated: 11/04/23 1000    Narrative:      EXAMINATION: XR SPINE LUMBAR AP AND LATERAL-  11/4/2023 9:54 AM CDT     HISTORY: " Kyphoplasty.     Fluoroscopy time: 22.4 seconds.     Dose: 1.8441 mGy. 2 images     FINDINGS: C-arm was used intraoperatively during performance of a  kyphoplasty at the T11 level. The patient has undergone previous  kyphoplasty at T12. Films are available to the OR for review.     This report was signed and finalized on 11/4/2023 9:57 AM CDT by Dr. Clinton Mcgrath MD.       FL C Arm During Surgery [[id number]] Resulted: 11/04/23 0954     Updated: 11/04/23 0954    Narrative:      This procedure was auto-finalized with no dictation required.    MRI Thoracic Spine Without Contrast [[id number]] Collected: 11/03/23 1530     Updated: 11/03/23 1544    Narrative:      EXAMINATION:  MRI THORACIC SPINE WO CONTRAST-  11/3/2023 11:40 AM CDT     HISTORY: comrpession fractures; M54.6-Pain in thoracic spine;  N39.0-Urinary tract infection, site not specified; S22.069A-Unspecified  fracture of T7-t8 vertebra, initial encounter for closed fracture;  S22.089A-Unspecified fracture of t11-T12 vertebra, initial encounter for  closed fracture; M54.9-Dorsalgia, unspecified     TECHNIQUE: Multiplanar imaging was performed.     COMPARISON: No comparison study.     BONE MARROW AND SPINAL CORD: There has been kyphoplasty at T12. Minimal  posterior displacement of the superior endplate at T12 causes minimal  dural sac compression but no cord compression. There is edema within the  T12 vertebral body consistent with the fracture being acute. There is  also T1 low signal and T2 high signal within the T11 vertebral body with  very minimal wedge shape of T11. The findings are consistent with an  acute fracture. There is a thin linear band of T2 high signal within the  T8 vertebral body seen best on the STIR sequence. I suspect the T8  compression deformity is likely chronic. There are chronic compression  deformities of T4 and T9. There is a 9-10 mm T2 high signal lesion in  the T7 vertebral body of indeterminate etiology. There is no  signal  abnormality within the thoracic spinal cord.     DISC LEVELS: The thoracic spine disc spaces are fairly well preserved in  height with mild narrowing at some levels. No significant disc  herniation or disc bulging is visualized.     ADDITIONAL FINDINGS: There are small bilateral pleural effusions.          Impression:      1. Status post kyphoplasty at T12. There is some edema within the T12  vertebral body consistent with the acute nature of the fracture.  2. Acute minimal compression deformity of T11. There is mild edema  within the vertebral body.  3. The T8 compression deformity is likely chronic in age. There is a  thin linear band of T2 high signal within the body that may represent  residual fracture line. However, there is no other significant edema  within the T8 vertebrae.  4. A 9-10 mm T2 high signal lesion in the T7 vertebrae is not a typical  hemangioma. It may be an atypical hemangioma. A metastatic bone lesion  is also considered in this age group. However, it is probably less  likely with no clinical history of neoplasm.  5. Chronic compression deformities of T4 and T9.  6. Thoracic disc spaces are fairly well-maintained throughout. No disc  herniation or significant disc bulging is seen.  7. Small bilateral pleural effusions.        This report was signed and finalized on 11/3/2023 3:40 PM CDT by Dr. Juan Carlos Arevalo MD.       MRI Lumbar Spine Without Contrast [[id number]] Collected: 11/03/23 1350     Updated: 11/03/23 1405    Narrative:      EXAMINATION:  MRI LUMBAR SPINE WO CONTRAST-  11/3/2023 12:00 PM CDT     HISTORY: M54.6-Pain in thoracic spine; N39.0-Urinary tract infection,  site not specified; S22.069A-Unspecified fracture of T7-t8 vertebra,  initial encounter for closed fracture; S22.089A-Unspecified fracture of  t11-T12 vertebra, initial encounter for closed fracture;  M54.9-Dorsalgia, unspecified.     TECHNIQUE: Multiplanar imaging was performed. The patient had to stop  the  examination before the axial T1 sequence. The axial T1 sequence was  not obtained. All of the other imaging sequences were obtained. There is  motion artifact on some of the imaging sequences.     COMPARISON: 9/25/2023.     ALIGNMENT, BONE MARROW AND SPINAL CORD: There are compression  deformities of T12 and L3. There has been interim kyphoplasty at both  levels since the prior study. Minimal posterior displacement of cortex  at the superior endplate of T12 is stable compared to the prior study.  This causes minimal dural sac compression. There is some residual edema  in both of these vertebral bodies. No new fracture is identified. There  is no signal abnormality in the visualized distal spinal cord. The tip  of the conus is located at L1.     ADDITIONAL FINDINGS: There are no additional findings.     DISC LEVELS:     L1-2: The disc is maintained in height. There is mild facet arthropathy.  There is no spinal or foraminal stenosis.     L2-3: The disc is maintained in height. There is mild disc bulging and  mild dural sac compression. There is mild facet arthropathy. There is  mild narrowing of the central canal-dural sac. There is mild inferior  recess foraminal narrowing bilaterally.     L3-4: The disc is maintained in height. There is mild disc bulging  causing dural sac compression. There is mild facet arthropathy with  thickening of ligamentum flavum. There is mild narrowing of the central  canal-dural sac. There is moderate inferior recess foraminal narrowing  right greater than left.     L4-5: The disc is maintained in height. There is mild disc bulging  causing dural sac compression. There is moderate facet arthropathy with  mild thickening of ligamentum flavum. There is mild to moderate  narrowing of the central canal-dural sac. There is mild inferior recess  foraminal narrowing bilaterally.     L5-S1: The disc is maintained in height. There is mild to moderate facet  arthropathy. There is no central spinal  canal narrowing. There is mild  foraminal narrowing on the left.          Impression:      1. There is mild motion artifact.  2. Interim kyphoplasty of compression deformities of T12 and L3. There  is edema remaining in both of these vertebral bodies. There is no  significant change in height. Minimal posterior displacement of bone at  the superior endplate of T12 is stable causing minimal dural sac  compression.  3. Degenerative changes at multiple disc levels, as described.        This report was signed and finalized on 11/3/2023 2:02 PM CDT by Dr. Juan Carlos Arevalo MD.               I have reviewed the patient's current medications.     Assessment/Plan   Assessment  Active Hospital Problems    Diagnosis     **Intractable back pain     UTI (urinary tract infection)     Protein-calorie malnutrition     Anemia, chronic disease     Memory difficulties     Closed fracture of eighth thoracic vertebra     Closed T11 fracture     Stage 3b chronic kidney disease     Essential hypertension     Paroxysmal atrial fibrillation     Other emphysema        Treatment Plan    11/4: MRI resulted and patient was taken to surgery this morning for T11 kyphoplasty and vertebral body biopsy.  Patient was seen after surgery back in his room.  Patient reports doing well.  Pain is well controlled.  Vital signs are stable.    Intractable back pain: Imaging showed new fractures. He is s/p kyphoplasty. Neurosurgery consult in place. Continue back brace and pain management.  Discussed the need for possible bone scan to rule out osteoporosis.    Acute cystitis: Continue antibiotics. Follow up on urine culture    Paroxysmal A-fib: Continue Eliquis and Coreg.    Family requesting palliative care consult to discuss goals of care.  Orders placed.    DVT prophylaxis: Eliquis    Medical Decision Making  Number and Complexity of problems: 2 acute problems   Differential Diagnosis: as above     Conditions and Status        Condition is improving.     MDM  Data  External documents reviewed: epic records   Cardiac tracing (EKG, telemetry) interpretation: no new EKG   Radiology interpretation: imaging reviewed   Labs reviewed: yes   Any tests that were considered but not ordered: none      Decision rules/scores evaluated (example [id number], Wells, etc): n/a      Discussed with: Patient      Care Planning  Shared decision making: Patient apprised of current labs, vitals, imaging and treatment plan.  They are agreeable with proceeding with plans as discussed.    Code status and discussions:   Code Status and Medical Interventions:   Ordered at: 11/03/23 1208     Medical Intervention Limits:    NO intubation (DNI)    NO cardioversion    NO artificial nutrition    NO dialysis     Level Of Support Discussed With:    Patient     Code Status (Patient has no pulse and is not breathing):    No CPR (Do Not Attempt to Resuscitate)     Medical Interventions (Patient has pulse or is breathing):    Limited Support         Disposition  Social Determinants of Health that impact treatment or disposition: none   I expect the patient to be discharged to SNF in 1-2 days.     Electronically signed by Micki Solano MD, 11/04/23, 14:05 CDT.

## 2023-11-04 NOTE — THERAPY TREATMENT NOTE
Acute Care - Physical Therapy Treatment Note  [facility]     Patient Name: Brennon Vance  : 1935  MRN: [id number]  Today's Date: 2023   Onset of Illness/Injury or Date of Surgery: 23  Visit Dx:     ICD-10-CM ICD-9-CM   1. Acute midline thoracic back pain  M54.6 724.1   2. Urinary tract infection without hematuria, site unspecified  N39.0 599.0   3. Closed fracture of eighth thoracic vertebra, unspecified fracture morphology, initial encounter  S22.069A 805.2   4. Closed fracture of eleventh thoracic vertebra, unspecified fracture morphology, initial encounter  S22.086R 805.2   5. Intractable back pain  M54.9 724.5   6. Impaired mobility [Z74.09]  Z74.09 799.89     Patient Active Problem List   Diagnosis    Chest pain    NSTEMI, initial episode of care    Coronary artery disease of native artery of native heart with stable angina pectoris    Paroxysmal atrial fibrillation    Other emphysema    Essential hypertension    Precordial pain    Hyperlipidemia LDL goal <70    Tobacco abuse    Cardiomyopathy    NSTEMI (non-ST elevated myocardial infarction)    UTI (urinary tract infection)    Aspiration pneumonia    Intractable low back pain    Stage 3b chronic kidney disease    Compression fracture of T12 vertebra    Closed compression fracture of first lumbar vertebra    Body mass index (BMI) of 20.0-20.9 in adult    Hiatal hernia    Stroke    Intractable back pain    Protein-calorie malnutrition    Anemia, chronic disease    Memory difficulties    Closed fracture of eighth thoracic vertebra    Closed T11 fracture     Past Medical History:   Diagnosis Date    CAD (coronary artery disease)     CKD (chronic kidney disease) stage 3, GFR 30-59 ml/min     COPD (chronic obstructive pulmonary disease)     Hiatal hernia     Hyperlipidemia     Hypertension     Stroke      Past Surgical History:   Procedure Laterality Date    ABDOMINAL AORTIC ANEURYSM REPAIR      CARDIAC CATHETERIZATION N/A 2021    Procedure:  Left Heart Cath;  Surgeon: Harrison Alcantara MD;  Location:  [facility];  Service: Cardiology;  Laterality: N/A;    CORONARY ANGIOPLASTY WITH STENT PLACEMENT      FEMORAL ARTERY STENT      KYPHOPLASTY WITH BIOPSY N/A 9/26/2023    Procedure: KYPHOPLASTY WITH BIOPSY T12 and L3;  Surgeon: Jeremiah Brantley MD;  Location:  [facility] OR;  Service: Neurosurgery;  Laterality: N/A;    LEG THROMBECTOMY/EMBOLECTOMY Right 5/20/2021    Procedure: RT GROIN EXPLORATION;  Surgeon: Geoff Remy DO;  Location:  [facility] HYBRID OR 12;  Service: Vascular;  Laterality: Right;    TRIGEMINAL NERVE DECOMPRESSION       PT Assessment (last 12 hours)       PT Evaluation and Treatment       Row Name 11/04/23 1412 11/04/23 0800       Physical Therapy Time and Intention    Subjective Information no complaints  -JAMEEL --    Document Type therapy note (daily note)  -JAMEEL --    Mode of Treatment physical therapy  -JAMEEL --    Session Not Performed -- other (see comments)  -MS    Comment, Session Not Performed -- pt going for kyphoplasty today. PT to see after surgery  -MS      Row Name 11/04/23 1412          General Information    Existing Precautions/Restrictions fall;spinal  -JAMEEL       Row Name 11/04/23 1412          Pain    Pretreatment Pain Rating 0/10 - no pain  -JAMEEL     Posttreatment Pain Rating 0/10 - no pain  -JAMEEL       Row Name 11/04/23 1412          Bed Mobility    Comment, (Bed Mobility) chair  -JAMEEL       Row Name 11/04/23 1412          Sit-Stand Transfer    Sit-Stand [place] (Transfers) verbal cues;contact guard  -JAMEEL     Assistive Device (Sit-Stand Transfers) walker, front-wheeled  -JAMEEL       Row Name 11/04/23 1412          Gait/Stairs (Locomotion)    [place] Level (Gait) verbal cues;contact guard  -JAMEEL     Assistive Device (Gait) walker, front-wheeled  -JAMEEL     Distance in Feet (Gait) 300  -JAMEEL     Pattern (Gait) step-through  -JAMEEL     Deviations/Abnormal Patterns (Gait) gait speed decreased  -JAMEEL     Bilateral Gait  Deviations forward flexed posture  -JAMEEL       Row Name             Wound 11/02/23 2354 Bilateral coccyx    Wound - Properties Group Placement Date: 11/02/23  -AM Placement Time: 2354  -AM Present on Original Admission: Y  -AM Side: Bilateral  -AM Location: coccyx  -AM    Retired Wound - Properties Group Placement Date: 11/02/23  -AM Placement Time: 2354  -AM Present on Original Admission: Y  -AM Side: Bilateral  -AM Location: coccyx  -AM    Retired Wound - Properties Group Date first assessed: 11/02/23  -AM Time first assessed: 2354  -AM Present on Original Admission: Y  -AM Side: Bilateral  -AM Location: coccyx  -AM      Row Name             [REMOVED] Wound 09/26/23 1640 lumbar spine Puncture    Wound - Properties Group Placement Date: 09/26/23  -DT Placement Time: 1640  -DT Present on Original Admission: N  -DT Location: lumbar spine  -DT Primary Wound Type: Puncture  -DT Removal Date: 11/04/23  -DT Removal Time: 0917  -DT    Retired Wound - Properties Group Placement Date: 09/26/23  -DT Placement Time: 1640  -DT Present on Original Admission: N  -DT Location: lumbar spine  -DT Primary Wound Type: Puncture  -DT Removal Date: 11/04/23  -DT Removal Time: 0917  -DT    Retired Wound - Properties Group Date first assessed: 09/26/23  -DT Time first assessed: 1640  -DT Present on Original Admission: N  -DT Location: lumbar spine  -DT Primary Wound Type: Puncture  -DT Resolution Date: 11/04/23  -DT Resolution Time: 0917  -DT      Row Name             [REMOVED] Wound 09/26/23 1640 thoracic spine Puncture    Wound - Properties Group Placement Date: 09/26/23  -DT Placement Time: 1640  -DT Present on Original Admission: N  -DT Location: thoracic spine  -DT Primary Wound Type: Puncture  -DT Removal Date: 11/04/23  -DT Removal Time: 0917  -DT    Retired Wound - Properties Group Placement Date: 09/26/23  -DT Placement Time: 1640  -DT Present on Original Admission: N  -DT Location: thoracic spine  -DT Primary Wound Type: Puncture   -DT Removal Date: 11/04/23  -DT Removal Time: 0917 -DT    Retired Wound - Properties Group Date first assessed: 09/26/23 -DT Time first assessed: 1640  -DT Present on Original Admission: N  -DT Location: thoracic spine  -DT Primary Wound Type: Puncture  -DT Resolution Date: 11/04/23 -DT Resolution Time: 0917 -DT      Row Name             Wound 11/04/23 0917 thoracic spine Incision    Wound - Properties Group Placement Date: 11/04/23  -DT Placement Time: 0917 -DT Location: thoracic spine  -DT Primary Wound Type: Incision  -DT    Retired Wound - Properties Group Placement Date: 11/04/23  -DT Placement Time: 0917 -DT Location: thoracic spine  -DT Primary Wound Type: Incision  -DT    Retired Wound - Properties Group Date first assessed: 11/04/23 -DT Time first assessed: 0917 -DT Location: thoracic spine  -DT Primary Wound Type: Incision  -DT      Row Name 11/04/23 1412          Positioning and Restraints    Pre-Treatment Position sitting in chair/recliner  -JAMEEL     Post Treatment Position chair  -JAMEEL     In Chair reclined;call light within reach;encouraged to call for assist  -JAMEEL               User Key  (r) = Recorded By, (t) = Taken By, (c) = Cosigned By      Initials Name Provider Type    MS Yelena Reddy R, PT, DPT, NCS Physical Therapist    Lico Cross, PTA Physical Therapist Assistant    Pedro Phoenix, RN Registered Nurse    Jasmin Grace RN Registered Nurse                    Physical Therapy Education       Title: PT OT SLP Therapies (In Progress)       Topic: Physical Therapy (In Progress)       Point: Mobility training (Done)       Learning Progress Summary             Patient Acceptance, E, VU by MS at 11/4/2023 1321    Comment: role of PT in his care, spinal restrictions                         Point: Home exercise program (Not Started)       Learner Progress:  Not documented in this visit.              Point: Body mechanics (Not Started)       Learner Progress:  Not  documented in this visit.              Point: Precautions (Done)       Learning Progress Summary             Patient Acceptance, E, VU by MS at 11/4/2023 1321    Comment: role of PT in his care, spinal restrictions                                         User Key       Initials Effective Dates Name Provider Type Discipline    MS 07/11/23 -  Yelena Reddy, PT, DPT, NCS Physical Therapist PT                  PT Recommendation and Plan         Outcome Measures       Row Name 11/04/23 1300             How much help from another is currently needed...    Putting on and taking off regular lower body clothing? 3  -EC      Bathing (including washing, rinsing, and drying) 3  -EC      Toileting (which includes using toilet bed pan or urinal) 3  -EC      Putting on and taking off regular upper body clothing 4  -EC      Taking care of personal grooming (such as brushing teeth) 4  -EC      Eating meals 4  -EC      AM-PAC 6 Clicks Score (OT) 21  -EC         Functional Assessment    Outcome Measure Options AM-PAC 6 Clicks Daily Activity (OT)  -EC                User Key  (r) = Recorded By, (t) = Taken By, (c) = Cosigned By      Initials Name Provider Type    EC Manisha Greco, OTR/L Occupational Therapist                     Time Calculation:    PT Charges       Row Name 11/04/23 1412 11/04/23 1150          Time Calculation    Start Time 1412  -JAMEEL 1140  -MS     Stop Time 1435  -JAMEEL 1220  -MS     Time Calculation (min) 23 min  -JAMEEL 40 min  -MS     PT Received On 11/04/23  -JAMEEL 11/04/23  -MS     PT Goal Re-Cert Due Date -- 11/14/23  -MS        Time Calculation- PT    Total Timed Code Minutes- PT 23 minute(s)  -JAMEEL --        Timed Charges    23742 - Gait Training Minutes  23  -JAMEEL --        Untimed Charges    PT Eval/Re-eval Minutes -- 40  -MS        Total Minutes    Timed Charges Total Minutes 23  -JAMEEL --     Untimed Charges Total Minutes -- 40  -MS      Total Minutes 23  -JAMEEL 40  -MS               User Key  (r) = Recorded By, (t) =  Taken By, (c) = Cosigned By      Initials Name Provider Type    MS Yelena Reddy R, PT, DPT, NCS Physical Therapist    JAMEEL Lico Celaya PTA Physical Therapist Assistant                  Therapy Charges for Today       Code Description Service Date Service Provider Modifiers Qty    [id number] HC GAIT TRAINING EA 15 MIN 11/4/2023 Lico Celaya PTA GP 2            PT G-Codes  Outcome Measure Options: AM-PAC 6 Clicks Daily Activity (OT)  AM-PAC 6 Clicks Score (PT): 19  AM-PAC 6 Clicks Score (OT): 21    Lico Celaya PTA  11/4/2023

## 2023-11-04 NOTE — ADDENDUM NOTE
Addendum  created 11/04/23 1055 by Bernabe Luz CRNA    Flowsheet accepted, Intraprocedure Meds edited

## 2023-11-04 NOTE — OP NOTE
Procedure Note  Preop Diagnosis: * No pre-op diagnosis entered *    * No Diagnosis Codes entered *     Procedure Name: T11 Kyphoplasty and vertebral body biopsy    Indications:  A CT of the T11 revealed findings of compression fracture of T11.  The patient now presents for T11 kyphoplasty and vertebral biopsy after discussing therapeutic alternatives.          Surgeon: Jeremiah Brantley MD     Assistants: none    Anesthesia: General endotracheal anesthesia    ASA Class: 3    Procedure Details   After obtaining informed consent, having the risks and benefits of the procedure explained including but not limited to infection, bleeding, paralysis, spinal fluid leak, bowel bladder incontinence, extravasation of kyphoplasty cement resulting in neurocompression, pulmonary embolism, stroke, coma, and death, the patient was brought to the operating room.  The patient was given general anesthesia via an endotracheal tube. The patient was flipped prone onto a Marcin axis table.  Portable fluoroscopy was used to localize level of T11 . Preplanned incisions of the left and right of midline were then marked with an indelible marker.  The patient was then prepped and draped in a standard sterile fashion.  The preplanned incisions were infiltrated with Marcaine and epinephrine.  A 10 blade scalpel was used to make an incision at the dermis and epidermis.  Jamshidi needles were then advanced to the pedicles at the identified levels on the left and the right under direct fluoroscopic guidance.  The inner cannula was removed.  Hand drill was then used to drill channel through the fractured vertebral bodies from the left and the right under direct fluoroscopic guidance.  Kyphoplasty balloons were then inserted from the left side and the right side under direct fluoroscopic guidance into the vertebral bodies.  The balloons were inflated and deflated and then removed.  And then several syringes of kyphoplasty cement were injected into  the fractured vertebral bodies under direct fluoroscopic guidance from the left and the right.  The cement was allowed to harden.  The Jamshidi needles were removed.  The wounds were then irrigated with an antibiotic solution and closed with Mastisol and Steri-Strips.  All sponge, needle and instrument counts were correct at the end of the procedure.  The patient was extubated in stable condition and returned to recovery room with about 5 mL of blood loss.        Findings:  Pathologic osteoporotic compression fracture T11        Estimated Blood Loss:   5           Drains: none           Total IV Fluids: ml           Specimens: None           Implants:   Implant Name Type Inv. Item Serial No.  Lot No. LRB No. Used Action   KT MIXER KYPHON W/CMT BONE KYPHX HV R - [id number] Implant KT MIXER KYPHON W/CMT BONE KYPHX HV R  [employer] [id number] N/A 1 Implanted              Complications:  none           Disposition: PACU - hemodynamically stable.           Condition: stable        Jeremiah Brantley MD

## 2023-11-04 NOTE — PLAN OF CARE
Goal Outcome Evaluation:  Plan of Care Reviewed With: patient        Progress: no change       Pt A/O X4 (with intermittent forgetfullness), resp e/u, HRR, VSS, MUNOZ. Pt went to surgery for kyphoplasty procedure. Pt has been up to walk in hallway twice this shift. Daughter at bedside. Pt has IV to LFA IID. Pt has not complained of pain greater than 5 on scale of 1 to 10. Pt has external catheter placed for urinary in continence. Pt is on RA. Bed in low position, call light in reach, safety maintained. Daughter to stay at bedside.

## 2023-11-04 NOTE — ANESTHESIA POSTPROCEDURE EVALUATION
"Patient: Brennon Vance    Procedure Summary       Date: 11/04/23 Room / Location:  [facility] OR  /  [facility] OR    Anesthesia Start: 0850 Anesthesia Stop: 0940    Procedure: KYPHOPLASTY WITHOUT BIOPSY T-11 (Spine Lumbar) Diagnosis:     Surgeons: Jeremiah Brantley MD Provider: Bernabe Luz CRNA    Anesthesia Type: general ASA Status: 3 - Emergent            Anesthesia Type: general    Vitals  Vitals Value Taken Time   /92 11/04/23 0950   Temp 97.4 °F (36.3 °C) 11/04/23 0950   Pulse 74 11/04/23 0950   Resp 16 11/04/23 0950   SpO2 98 % 11/04/23 0950           Post Anesthesia Care and Evaluation    Patient location during evaluation: PACU  Patient participation: complete - patient participated  Level of consciousness: awake and alert  Pain management: adequate    Airway patency: patent  Anesthetic complications: No anesthetic complications    Cardiovascular status: acceptable  Respiratory status: acceptable  Hydration status: acceptable    Comments: Blood pressure 147/92, pulse 74, temperature 97.4 °F (36.3 °C), resp. rate 16, height 172.7 cm (68\"), weight 47.9 kg (105 lb 8 oz), SpO2 98%.    Pt discharged from PACU based on jeo score >8    "

## 2023-11-04 NOTE — ANESTHESIA PREPROCEDURE EVALUATION
Anesthesia Evaluation     Patient summary reviewed and Nursing notes reviewed   NPO Solid Status: Waived due to emergency  NPO Liquid Status: Waived due to emergency           Airway   Mallampati: II  No difficulty expected  Dental - normal exam     Pulmonary - normal exam   (+) pneumonia , COPD,  Cardiovascular - normal exam  Exercise tolerance: good (4-7 METS)    ECG reviewed  PT is on anticoagulation therapy    (+) hypertension, past MI , CAD, dysrhythmias Atrial Fib, hyperlipidemia      Neuro/Psych  (+) CVA  GI/Hepatic/Renal/Endo    (+) hiatal hernia, renal disease- CRI    Musculoskeletal     Abdominal    Substance History      OB/GYN          Other                        Anesthesia Plan    ASA 3 - emergent     general     (Ppoor historian)  intravenous induction     Anesthetic plan, risks, benefits, and alternatives have been provided, discussed and informed consent has been obtained with: patient.    Plan discussed with CRNA.      CODE STATUS:    Medical Intervention Limits: NO intubation (DNI); NO cardioversion; NO artificial nutrition; NO dialysis  Level Of Support Discussed With: Patient  Code Status (Patient has no pulse and is not breathing): No CPR (Do Not Attempt to Resuscitate)  Medical Interventions (Patient has pulse or is breathing): Limited Support

## 2023-11-04 NOTE — THERAPY EVALUATION
Acute Care - Occupational Therapy Initial Evaluation  [facility]     Patient Name: Brennon Vance  : 1935  MRN: [id number]  Today's Date: 2023  Onset of Illness/Injury or Date of Surgery: 23  Date of Referral to OT: 23  Referring Physician: Lakshmi Montoya    Admit Date: 2023       ICD-10-CM ICD-9-CM   1. Acute midline thoracic back pain  M54.6 724.1   2. Urinary tract infection without hematuria, site unspecified  N39.0 599.0   3. Closed fracture of eighth thoracic vertebra, unspecified fracture morphology, initial encounter  S22.069A 805.2   4. Closed fracture of eleventh thoracic vertebra, unspecified fracture morphology, initial encounter  S22.089A 805.2   5. Intractable back pain  M54.9 724.5   6. Impaired mobility [Z74.09]  Z74.09 799.89     Patient Active Problem List   Diagnosis    Chest pain    NSTEMI, initial episode of care    Coronary artery disease of native artery of native heart with stable angina pectoris    Paroxysmal atrial fibrillation    Other emphysema    Essential hypertension    Precordial pain    Hyperlipidemia LDL goal <70    Tobacco abuse    Cardiomyopathy    NSTEMI (non-ST elevated myocardial infarction)    UTI (urinary tract infection)    Aspiration pneumonia    Intractable low back pain    Stage 3b chronic kidney disease    Compression fracture of T12 vertebra    Closed compression fracture of first lumbar vertebra    Body mass index (BMI) of 20.0-20.9 in adult    Hiatal hernia    Stroke    Intractable back pain    Protein-calorie malnutrition    Anemia, chronic disease    Memory difficulties    Closed fracture of eighth thoracic vertebra    Closed T11 fracture     Past Medical History:   Diagnosis Date    CAD (coronary artery disease)     CKD (chronic kidney disease) stage 3, GFR 30-59 ml/min     COPD (chronic obstructive pulmonary disease)     Hiatal hernia     Hyperlipidemia     Hypertension     Stroke      Past Surgical History:   Procedure Laterality Date     ABDOMINAL AORTIC ANEURYSM REPAIR      CARDIAC CATHETERIZATION N/A 5/20/2021    Procedure: Left Heart Cath;  Surgeon: Harrison Alcantara MD;  Location:  [facility] CATH INVASIVE LOC[facility];  Service: Cardiology;  Laterality: N/A;    CORONARY ANGIOPLASTY WITH STENT PLACEMENT      FEMORAL ARTERY STENT      KYPHOPLASTY WITH BIOPSY N/A 9/26/2023    Procedure: KYPHOPLASTY WITH BIOPSY T12 and L3;  Surgeon: Jeremiah Brantley MD;  Location:  [facility] OR;  Service: Neurosurgery;  Laterality: N/A;    LEG THROMBECTOMY/EMBOLECTOMY Right 5/20/2021    Procedure: RT GROIN EXPLORATION;  Surgeon: Geoff Remy DO;  Location:  [facility] HYBRID OR 12;  Service: Vascular;  Laterality: Right;    TRIGEMINAL NERVE DECOMPRESSION           OT ASSESSMENT FLOWSHEET (last 12 hours)       OT Evaluation and Treatment       Row Name 11/04/23 1150                   OT Time and Intention    Subjective Information no complaints  -EC        Document Type evaluation  -EC        Mode of Treatment occupational therapy  -EC           General Information    Patient Profile Reviewed yes  -EC        Onset of Illness/Injury or Date of Surgery 11/04/23  -EC        Referring Physician Lakshmi Montoya  -EC        Prior Level of Function min assist:;transfer;feeding;bathing;dressing;grooming;independent:;all household mobility  -EC        Equipment Currently Used at Home walker, rolling  -EC        Pertinent History of Current Functional Problem s/p T11 kyphoplasty & vertebral body biopsy on 11/4/23 PMH: recent T12 & L3 kyphoplasty  -EC        Existing Precautions/Restrictions fall;spinal  -EC        Barriers to Rehab none identified  -EC           Living Environment    Current Living Arrangements assisted living facility  -EC        People in Home facility resident  -EC           Pain Assessment    Pretreatment Pain Rating 0/10 - no pain  -EC        Posttreatment Pain Rating 0/10 - no pain  -EC        Pain Intervention(s) Repositioned;Ambulation/increased activity   -EC           Cognition    Orientation Status (Cognition) oriented x 4  -EC        Personal Safety Interventions fall prevention program maintained;gait belt;nonskid shoes/slippers when out of bed;supervised activity  -EC           Range of Motion Comprehensive    General Range of Motion bilateral upper extremity ROM WFL  -EC           Strength Comprehensive (MMT)    Comment, General Manual Muscle Testing (MMT) Assessment BUE MMT 4/5  -EC           Sensory Assessment (Somatosensory)    Sensory Assessment (Somatosensory) sensation intact  -EC           Activities of Daily Living    BADL Assessment/Intervention lower body dressing  -EC           Lower Body Dressing Assessment/Training    [place] Level (Lower Body Dressing) don;socks;contact guard assist  -EC        Position (Lower Body Dressing) edge of bed sitting  -EC           BADL Safety/Performance    Impairments, BADL Safety/Performance strength  -EC           Bed Mobility    Bed Mobility supine-sit  -EC        Supine-Sit [place] (Bed Mobility) modified independence  -EC        Assistive Device (Bed Mobility) head of bed elevated;bed rails  -EC           Functional Mobility    Functional Mobility- Ind. Level contact guard assist  -EC        Functional Mobility- Device walker, front-wheeled  -EC        Functional Mobility- Comment amb in hallway  -EC           Transfer Assessment/Treatment    Transfers sit-stand transfer;stand-sit transfer  -EC           Sit-Stand Transfer    Sit-Stand [place] (Transfers) minimum assist (75% patient effort);verbal cues  -EC        Assistive Device (Sit-Stand Transfers) walker, front-wheeled  -EC           Stand-Sit Transfer    Stand-Sit [place] (Transfers) contact guard  -EC        Assistive Device (Stand-Sit Transfers) walker, front-wheeled  -EC           Safety Issues, Functional Mobility    Impairments Affecting Function (Mobility) strength  -EC           Balance    Balance Assessment sitting static  balance;sitting dynamic balance;standing static balance;standing dynamic balance  -EC        Static Sitting Balance independent  -EC        Dynamic Sitting Balance standby assist  -EC        Position, Sitting Balance unsupported;sitting edge of bed  -EC        Static Standing Balance contact guard  -EC        Dynamic Standing Balance contact guard  -EC        Position/Device Used, Standing Balance walker, rolling  -EC           Wound 11/02/23 2354 Bilateral coccyx    Wound - Properties Group Placement Date: 11/02/23  -AM Placement Time: 2354  -AM Present on Original Admission: Y  -AM Side: Bilateral  -AM Location: coccyx  -AM    Retired Wound - Properties Group Placement Date: 11/02/23  -AM Placement Time: 2354  -AM Present on Original Admission: Y  -AM Side: Bilateral  -AM Location: coccyx  -AM    Retired Wound - Properties Group Date first assessed: 11/02/23  -AM Time first assessed: 2354  -AM Present on Original Admission: Y  -AM Side: Bilateral  -AM Location: coccyx  -AM       [REMOVED] Wound 09/26/23 1640 lumbar spine Puncture    Wound - Properties Group Placement Date: 09/26/23  -DT Placement Time: 1640  -DT Present on Original Admission: N  -DT Location: lumbar spine  -DT Primary Wound Type: Puncture  -DT Removal Date: 11/04/23  -DT Removal Time: 0917  -DT    Retired Wound - Properties Group Placement Date: 09/26/23  -DT Placement Time: 1640  -DT Present on Original Admission: N  -DT Location: lumbar spine  -DT Primary Wound Type: Puncture  -DT Removal Date: 11/04/23  -DT Removal Time: 0917  -DT    Retired Wound - Properties Group Date first assessed: 09/26/23  -DT Time first assessed: 1640  -DT Present on Original Admission: N  -DT Location: lumbar spine  -DT Primary Wound Type: Puncture  -DT Resolution Date: 11/04/23  -DT Resolution Time: 0917  -DT       [REMOVED] Wound 09/26/23 1640 thoracic spine Puncture    Wound - Properties Group Placement Date: 09/26/23  -DT Placement Time: 1640  -DT Present on  Original Admission: N  -DT Location: thoracic spine  -DT Primary Wound Type: Puncture  -DT Removal Date: 11/04/23  -DT Removal Time: 0917 -DT    Retired Wound - Properties Group Placement Date: 09/26/23  -DT Placement Time: 1640  -DT Present on Original Admission: N  -DT Location: thoracic spine  -DT Primary Wound Type: Puncture  -DT Removal Date: 11/04/23  -DT Removal Time: 0917  -DT    Retired Wound - Properties Group Date first assessed: 09/26/23  -DT Time first assessed: 1640  -DT Present on Original Admission: N  -DT Location: thoracic spine  -DT Primary Wound Type: Puncture  -DT Resolution Date: 11/04/23  -DT Resolution Time: 0917  -DT       Wound 11/04/23 0917 thoracic spine Incision    Wound - Properties Group Placement Date: 11/04/23  -DT Placement Time: 0917 -DT Location: thoracic spine  -DT Primary Wound Type: Incision  -DT    Retired Wound - Properties Group Placement Date: 11/04/23  -DT Placement Time: 0917 -DT Location: thoracic spine  -DT Primary Wound Type: Incision  -DT    Retired Wound - Properties Group Date first assessed: 11/04/23  -DT Time first assessed: 0917 -DT Location: thoracic spine  -DT Primary Wound Type: Incision  -DT       Plan of Care Review    Plan of Care Reviewed With patient  -EC        Progress no change  -EC        Outcome Evaluation OT eval complete. Dr Brantley OK'd pt for OOB activity, eval completed per order placed prior to sx. Pt alert & orientedx4 w/ no c/o at rest. Pt was able to come to EOB w/ CGA. BUE ROM WFL, strength 4/5 grossly. Donned socks w/ CGA & increased time. Pt required Rubi for sit>stand & amb in hallway CGA using rwx. Demos good balance. Pt returned to chair CGA, left sitting up eating lunch. Pt would benefit from skilled OT to maximize ind w/ ADLs & safety w/ fxnal mobility. [facility]  -EC           Positioning and Restraints    Pre-Treatment Position in bed  -EC        Post Treatment Position chair  -EC        In Chair notified  nsg;sitting;encouraged to call for assist;call light within reach  -EC           Therapy Assessment/Plan (OT)    Date of Referral to OT 11/02/23  -EC        OT Diagnosis decreased ADLs  -EC        Rehab Potential (OT) good, to achieve stated therapy goals  -EC        Criteria for Skilled Therapeutic Interventions Met (OT) yes;meets criteria;skilled treatment is necessary  -EC        Therapy Frequency (OT) 5 times/wk  -EC        Predicted Duration of Therapy Intervention (OT) 10 days  -EC        Planned Therapy Interventions (OT) BADL retraining;functional balance retraining;occupation/activity based interventions;patient/caregiver education/training;strengthening exercise;transfer/mobility retraining  -EC                  User Key  (r) = Recorded By, (t) = Taken By, (c) = Cosigned By      Initials Name Effective Dates    DT Pedro Montoya RN 02/17/22 -     AM Jasmin Ramesh RN 06/16/21 -     EC Manisha Greco OTR/L 10/13/23 -                     11/04/23 1520   Occupational Therapy Goals   Transfer Goal Selection (OT) transfer, OT goal 1   Dressing Goal Selection (OT) dressing, OT goal 1   Transfer Goal 1 (OT)   Progress/Outcome (Transfer Goal 1, OT) new goal   [place] Level/Cues Needed (Transfer Goal 1, OT) supervision required   Activity/Assistive Device (Transfer Goal 1, OT) sit-to-stand/stand-to-sit;bed-to-chair/chair-to-bed;toilet;commode   Time Frame (Transfer Goal 1, OT) long term goal (LTG)   Dressing Goal 1 (OT)   Activity/Device (Dressing Goal 1, OT) upper body dressing;lower body dressing   Progress/Outcome (Dressing Goal 1, OT) new goal   Time Frame (Dressing Goal 1, OT) long term goal (LTG)   [place]/Cues Needed (Dressing Goal 1, OT) minimum assist (75% or more patient effort)       Occupational Therapy Education       Title: PT OT SLP Therapies (In Progress)       Topic: Occupational Therapy (Done)       Point: ADL training (Done)       Description:   Instruct learner(s) on proper  safety adaptation and remediation techniques during self care or transfers.   Instruct in proper use of assistive devices.                  Learning Progress Summary             Patient Acceptance, E, VU by  at 11/4/2023 1345                         Point: Precautions (Done)       Description:   Instruct learner(s) on prescribed precautions during self-care and functional transfers.                  Learning Progress Summary             Patient Acceptance, E, VU by  at 11/4/2023 1345                         Point: Body mechanics (Done)       Description:   Instruct learner(s) on proper positioning and spine alignment during self-care, functional mobility activities and/or exercises.                  Learning Progress Summary             Patient Acceptance, E, VU by  at 11/4/2023 1345                                         User Key       Initials Effective Dates Name Provider Type Discipline     10/13/23 -  Manisha Greco, OTR/L Occupational Therapist OT                      OT Recommendation and Plan  Planned Therapy Interventions (OT): BADL retraining, functional balance retraining, occupation/activity based interventions, patient/caregiver education/training, strengthening exercise, transfer/mobility retraining  Therapy Frequency (OT): 5 times/wk  Plan of Care Review  Plan of Care Reviewed With: patient  Progress: no change  Outcome Evaluation: OT eval complete. Dr Brantley OK'd pt for OOB activity, eval completed per order placed prior to sx. Pt alert & orientedx4 w/ no c/o at rest. Pt was able to come to EOB w/ CGA. BUE ROM WFL, strength 4/5 grossly. Donned socks w/ CGA & increased time. Pt required Rubi for sit>stand & amb in hallway CGA using rwx. Demos good balance. Pt returned to chair CGA, left sitting up eating lunch. Pt would benefit from skilled OT to maximize ind w/ ADLs & safety w/ fxnal mobility. Recommend d/c back to [facility].  Plan of Care Reviewed With: patient  Outcome Evaluation: OT efra  complete. Dr Brantley OK'd pt for OOB activity, eval completed per order placed prior to sx. Pt alert & orientedx4 w/ no c/o at rest. Pt was able to come to EOB w/ CGA. BUE ROM WFL, strength 4/5 grossly. Donned socks w/ CGA & increased time. Pt required Rubi for sit>stand & amb in hallway CGA using rwx. Demos good balance. Pt returned to chair CGA, left sitting up eating lunch. Pt would benefit from skilled OT to maximize ind w/ ADLs & safety w/ fxnal mobility. Recommend d/c back to [facility].     Outcome Measures       Row Name 11/04/23 1300             How much help from another is currently needed...    Putting on and taking off regular lower body clothing? 3  -EC      Bathing (including washing, rinsing, and drying) 3  -EC      Toileting (which includes using toilet bed pan or urinal) 3  -EC      Putting on and taking off regular upper body clothing 4  -EC      Taking care of personal grooming (such as brushing teeth) 4  -EC      Eating meals 4  -EC      AM-PAC 6 Clicks Score (OT) 21  -EC         Functional Assessment    Outcome Measure Options AM-PAC 6 Clicks Daily Activity (OT)  -EC                User Key  (r) = Recorded By, (t) = Taken By, (c) = Cosigned By      Initials Name Provider Type    EC Manisha Greco, OTR/L Occupational Therapist                    Time Calculation:    Time Calculation- OT       Row Name 11/04/23 1331             Time Calculation- OT    OT Start Time 1153  -EC      OT Stop Time 1217  -EC      OT Time Calculation (min) 24 min  -EC      OT Received On 11/04/23  -EC      OT Goal Re-Cert Due Date 11/14/23  -EC                User Key  (r) = Recorded By, (t) = Taken By, (c) = Cosigned By      Initials Name Provider Type    EC Manisha Greco, OTR/L Occupational Therapist                  Therapy Charges for Today       Code Description Service Date Service Provider Modifiers Qty    85112058576 HC OT EVAL LOW COMPLEXITY 2 11/4/2023 Manisha Greco, OTR/L GO 1                 Manisha Greco  OTR/L  11/4/2023

## 2023-11-04 NOTE — PROGRESS NOTES
"Brennon Vance  88 y.o.      Chief complaint:   Back pain    Subjective  Patient with known history of osteoporosis status post kyphoplasty's.  Sudden onset of back pain the other day.  No lower extremity pain numbness or tingling.  Patient has been doing well until he bent over to put his shoe on and had a new onset of back pain.    Temp:  [97.6 °F (36.4 °C)-98 °F (36.7 °C)] 97.6 °F (36.4 °C)  Heart Rate:  [58-77] 61  Resp:  [15-18] 18  BP: (119-145)/(46-59) 135/53      Objective:  Vital signs: (most recent): Blood pressure 135/53, pulse 61, temperature 97.6 °F (36.4 °C), temperature source Oral, resp. rate 18, height 172.7 cm (68\"), weight 47.9 kg (105 lb 8 oz), SpO2 97%.        Neurologic Exam     Mental Status   Oriented to person, place, and time.   Attention: normal.   Speech: speech is normal   Level of consciousness: alert  Knowledge: good.     Cranial Nerves     CN II   Visual fields full to confrontation.     CN III, IV, VI   Pupils are equal, round, and reactive to light.  Extraocular motions are normal.     CN V   Facial sensation intact.     CN VII   Facial expression full, symmetric.     CN VIII   CN VIII normal.     CN IX, X   CN IX normal.   CN X normal.     CN XI   CN XI normal.     CN XII   CN XII normal.     Motor Exam   Muscle bulk: normal  Overall muscle tone: normal  Right arm pronator drift: absent  Left arm pronator drift: absent    Strength   Strength 5/5 throughout.     Sensory Exam   Light touch normal.   Pinprick normal.     Gait, Coordination, and Reflexes     Gait  Gait: normal    Coordination   Finger to nose coordination: normal    Tremor   Resting tremor: absent  Intention tremor: absent  Action tremor: absent    Reflexes   Reflexes 2+ except as noted.       Lab Results (last 24 hours)       ** No results found for the last 24 hours. **                Plan:   MRI of the thoracic spine shows a new compression fracture at T11.  We discussed this at length with him and the family.  We will " plan to repeat kyphoplasty today.  There is some benefits were reviewed which included but were not limited to infection, bleeding, paralysis, spinal fluid leak, extravasation of kyphoplasty cement resulting in neurocompression, pulmonary embolism, stroke, coma, and death.      Intractable back pain    Paroxysmal atrial fibrillation    Other emphysema    Essential hypertension    UTI (urinary tract infection)    Stage 3b chronic kidney disease    Protein-calorie malnutrition    Anemia, chronic disease    Memory difficulties    Closed fracture of eighth thoracic vertebra    Closed T11 fracture        Jeremiah Brantley MD

## 2023-11-04 NOTE — PLAN OF CARE
Goal Outcome Evaluation:  Plan of Care Reviewed With: patient        Progress: no change  Outcome Evaluation: OT eval complete. Dr Brantley OK'd pt for OOB activity, eval completed per order placed prior to sx. Pt alert & orientedx4 w/ no c/o at rest. Pt was able to come to EOB w/ CGA. BUE ROM WFL, strength 4/5 grossly. Donned socks w/ CGA & increased time. Pt required Rubi for sit>stand & amb in hallway CGA using rwx. Demos good balance. Pt returned to chair CGA, left sitting up eating lunch. Pt would benefit from skilled OT to maximize ind w/ ADLs & safety w/ fxnal mobility. Recommend d/c back to [facility].

## 2023-11-04 NOTE — PLAN OF CARE
Goal Outcome Evaluation:  Plan of Care Reviewed With: patient, daughter        Progress: improving  Outcome Evaluation: Pt is A&Ox4. VSS. RA. Voiding via external catheter. Red blanchable spot on buttocks, barrier cream applied. IV to L FA, NS infusing at 50 mL/hr per order. Tolerating soft chew diet. No c/o pain this shift. Family at bedside. Call light in reach. Safety maintained.

## 2023-11-04 NOTE — PLAN OF CARE
Goal Outcome Evaluation:  Plan of Care Reviewed With: patient        Progress: improving  Outcome Evaluation: The patient presents alert and oriented x4 lying in bed. He reports no pain at rest or during activity. He demonstrates no focal neurological deficits in strength, sensation, or coordination. He required minimal assist to stand, but was then able to ambulate down the hallway with use of a RW. PT will continue to work with the patient to improve his ability to stand more independently. I spoke with Dr. Brantley and he stated he wanted the pt up and moving after surgery so the PT eval was completed off the original order place prior to surgery.  Recommend discharge back to [facility].      Anticipated Discharge Disposition (PT): assisted living

## 2023-11-04 NOTE — CASE MANAGEMENT/SOCIAL WORK
Discharge Planning Assessment  [facility]     Patient Name: Brennon Vance  MRN: [id number]  Today's Date: 11/4/2023    Admit Date: 11/2/2023    Plan: Plan for return to [facility]   Discharge Needs Assessment       Row Name 11/04/23 1022       Living Environment    People in Home facility resident    Current Living Arrangements assisted living facility    Potentially Unsafe Housing Conditions none    In the past 12 months has the electric, gas, oil, or water company threatened to shut off services in your home? No    Primary Care Provided by self;other (see comments)    Provides Primary Care For no one, unable/limited ability to care for self    Family Caregiver if Needed --  Spouse lives with patient at [facility]    Quality of Family Relationships supportive;involved;helpful    Able to Return to Prior Arrangements yes       Resource/Environmental Concerns    Resource/Environmental Concerns none       Food Insecurity    Within the past 12 months, you worried that your food would run out before you got the money to buy more. Never true    Within the past 12 months, the food you bought just didn't last and you didn't have money to get more. Never true       Transition Planning    Patient/Family Anticipates Transition to other (see comments)    Patient/Family Anticipated Services at Transition home health care    Transportation Anticipated family or friend will provide       Discharge Needs Assessment    Readmission Within the Last 30 Days no previous admission in last 30 days    Current Outpatient/Agency/Support Group assisted living facility    Equipment Currently Used at Home walker, rolling;shower chair    Concerns to be Addressed basic needs;discharge planning    Anticipated Changes Related to Illness none    Outpatient/Agency/Support Group Needs assisted living facility    Discharge Facility/Level of Care Needs assisted living facility    Current Discharge Risk chronically ill                    Discharge Plan       Row Name 11/04/23 1024       Plan    Plan Plan for return to [facility]    Patient/Family in Agreement with Plan yes    Plan Comments Spoke with Gaby Rankin Daughter   [phone] to assess for d/c needs. She is not interested in pt being placed in SNF, pt will return to [facility] and lives with wife there. Pt has RX coverage/PCP.  Dtr has interest in therapy there, wants to use who facility is contracted with.  Will follow.                  Continued Care and Services - Admitted Since 11/2/2023    Coordination has not been started for this encounter.       Selected Continued Care - Prior Encounters Includes continued care and service providers with selected services from prior encounters from 8/4/2023 to 11/4/2023      Discharged on 10/5/2023 Admission date: 9/24/2023 - Discharge disposition: Skilled Nursing Facility (DC - External)      Destination       Service Provider Selected Services Address Phone Fax Patient Preferred    [facility] [address] [facility], [place] [address] [phone] --                             Demographic Summary    No documentation.                  Functional Status    No documentation.                  Psychosocial    No documentation.                  Abuse/Neglect    No documentation.                  Legal    No documentation.                  Substance Abuse    No documentation.                  Patient Forms    No documentation.                     DIAZ Wang

## 2023-11-04 NOTE — ANESTHESIA PROCEDURE NOTES
Airway  Urgency: elective    Date/Time: 11/4/2023 8:54 AM  Airway not difficult    General Information and Staff    Patient location during procedure: OR  CRNA/CAA: Bernabe Luz CRNA    Indications and Patient Condition  Indications for airway management: airway protection    Preoxygenated: yes  Mask difficulty assessment: 1 - vent by mask    Final Airway Details  Final airway type: endotracheal airway      Successful airway: ETT  Cuffed: yes   Successful intubation technique: direct laryngoscopy  Endotracheal tube insertion site: oral  Blade: Sahni  Blade size: 2  ETT size (mm): 7.5  Cormack-Lehane Classification: grade I - full view of glottis  Placement verified by: capnometry   Measured from: lips  Number of attempts at approach: 1  Assessment: lips, teeth, and gum same as pre-op and atraumatic intubation

## 2023-11-04 NOTE — THERAPY EVALUATION
Patient Name: Brennon Vance  : 1935    MRN: [id number]                              Today's Date: 2023       Admit Date: 2023    Visit Dx:     ICD-10-CM ICD-9-CM   1. Acute midline thoracic back pain  M54.6 724.1   2. Urinary tract infection without hematuria, site unspecified  N39.0 599.0   3. Closed fracture of eighth thoracic vertebra, unspecified fracture morphology, initial encounter  S22.069A 805.2   4. Closed fracture of eleventh thoracic vertebra, unspecified fracture morphology, initial encounter  S22.089A 805.2   5. Intractable back pain  M54.9 724.5   6. Impaired mobility [Z74.09]  Z74.09 799.89     Patient Active Problem List   Diagnosis    Chest pain    NSTEMI, initial episode of care    Coronary artery disease of native artery of native heart with stable angina pectoris    Paroxysmal atrial fibrillation    Other emphysema    Essential hypertension    Precordial pain    Hyperlipidemia LDL goal <70    Tobacco abuse    Cardiomyopathy    NSTEMI (non-ST elevated myocardial infarction)    UTI (urinary tract infection)    Aspiration pneumonia    Intractable low back pain    Stage 3b chronic kidney disease    Compression fracture of T12 vertebra    Closed compression fracture of first lumbar vertebra    Body mass index (BMI) of 20.0-20.9 in adult    Hiatal hernia    Stroke    Intractable back pain    Protein-calorie malnutrition    Anemia, chronic disease    Memory difficulties    Closed fracture of eighth thoracic vertebra    Closed T11 fracture     Past Medical History:   Diagnosis Date    CAD (coronary artery disease)     CKD (chronic kidney disease) stage 3, GFR 30-59 ml/min     COPD (chronic obstructive pulmonary disease)     Hiatal hernia     Hyperlipidemia     Hypertension     Stroke      Past Surgical History:   Procedure Laterality Date    ABDOMINAL AORTIC ANEURYSM REPAIR      CARDIAC CATHETERIZATION N/A 2021    Procedure: Left Heart Cath;  Surgeon: Harrison Alcantara MD;   Location:  [facility];  Service: Cardiology;  Laterality: N/A;    CORONARY ANGIOPLASTY WITH STENT PLACEMENT      FEMORAL ARTERY STENT      KYPHOPLASTY WITH BIOPSY N/A 9/26/2023    Procedure: KYPHOPLASTY WITH BIOPSY T12 and L3;  Surgeon: Jeremiah Brantley MD;  Location:  [facility] OR;  Service: Neurosurgery;  Laterality: N/A;    LEG THROMBECTOMY/EMBOLECTOMY Right 5/20/2021    Procedure: RT GROIN EXPLORATION;  Surgeon: Geoff Remy DO;  Location:  [facility] HYBRID OR 12;  Service: Vascular;  Laterality: Right;    TRIGEMINAL NERVE DECOMPRESSION        General Information       Row Name 11/04/23 1151          Physical Therapy Time and Intention    Document Type evaluation  s/p kyphoplasty T11 due to compression fx and back pain, recent T12 and L3 kyphoplasty, UTI, memory difficulties  -MS     Mode of Treatment physical therapy;co-treatment  -MS       Row Name 11/04/23 1151          General Information    Patient Profile Reviewed yes  -MS     Prior Level of Function independent:;all household mobility;min assist:;transfer;bathing;dressing;grooming  -MS     Existing Precautions/Restrictions fall;spinal  -MS     Barriers to Rehab none identified  -MS       Row Name 11/04/23 1151          Living Environment    People in Home facility resident  BRISEIDA  -MS       Row Name 11/04/23 1151          Home Main Entrance    Number of Stairs, Main Entrance none  -MS       Row Name 11/04/23 1151          Stairs Within Home, Primary    Number of Stairs, Within Home, Primary none  -MS       Row Name 11/04/23 1151          Cognition    Orientation Status (Cognition) oriented x 4  -MS               User Key  (r) = Recorded By, (t) = Taken By, (c) = Cosigned By      Initials Name Provider Type    MS Yelena Reddy R, PT, DPT, NCS Physical Therapist                   Mobility       Row Name 11/04/23 1151          Bed Mobility    Bed Mobility supine-sit  -MS     Supine-Sit [place] (Bed Mobility) modified independence  -MS      Assistive Device (Bed Mobility) head of bed elevated;bed rails  -MS       Row Name 11/04/23 1151          Sit-Stand Transfer    Sit-Stand [place] (Transfers) minimum assist (75% patient effort);verbal cues;nonverbal cues (demo/gesture)  -MS     Assistive Device (Sit-Stand Transfers) walker, front-wheeled  -MS       Row Name 11/04/23 1151          Gait/Stairs (Locomotion)    [place] Level (Gait) contact guard  -MS     Assistive Device (Gait) walker, front-wheeled  -MS     Distance in Feet (Gait) 300ft with forward flexed posture  -MS               User Key  (r) = Recorded By, (t) = Taken By, (c) = Cosigned By      Initials Name Provider Type    Yelena Oreilly, PT, DPT, NCS Physical Therapist                   Obj/Interventions       Row Name 11/04/23 1151          Range of Motion Comprehensive    General Range of Motion bilateral upper extremity ROM WFL;bilateral lower extremity ROM WFL  -MS       Row Name 11/04/23 1151          Strength Comprehensive (MMT)    General Manual Muscle Testing (MMT) Assessment no strength deficits identified  -MS       Row Name 11/04/23 1151          Balance    Balance Assessment sitting static balance;sitting dynamic balance;standing dynamic balance;standing static balance  -MS     Static Sitting Balance independent  -MS     Dynamic Sitting Balance standby assist  -MS     Position, Sitting Balance unsupported;sitting edge of bed  -MS     Static Standing Balance contact guard  -MS     Dynamic Standing Balance contact guard  -MS     Position/Device Used, Standing Balance walker, rolling  -MS       Row Name 11/04/23 1151          Sensory Assessment (Somatosensory)    Sensory Assessment (Somatosensory) sensation intact  -MS               User Key  (r) = Recorded By, (t) = Taken By, (c) = Cosigned By      Initials Name Provider Type    Yelena Oreilly, PT, DPT, NCS Physical Therapist                   Goals/Plan       Row Name 11/04/23 1150          Bed Mobility Goal 1  (PT)    Activity/Assistive Device (Bed Mobility Goal 1, PT) bed mobility activities, all  -MS     [place] Level/Cues Needed (Bed Mobility Goal 1, PT) independent  -MS     Time Frame (Bed Mobility Goal 1, PT) long term goal (LTG);by discharge  -MS     Progress/Outcomes (Bed Mobility Goal 1, PT) new goal  -MS       Row Name 11/04/23 1150          Transfer Goal 1 (PT)    Activity/Assistive Device (Transfer Goal 1, PT) sit-to-stand/stand-to-sit;bed-to-chair/chair-to-bed;walker, rolling  -MS     [place] Level/Cues Needed (Transfer Goal 1, PT) modified independence  -MS     Time Frame (Transfer Goal 1, PT) long term goal (LTG);by discharge  -MS     Progress/Outcome (Transfer Goal 1, PT) new goal  -MS       Row Name 11/04/23 1150          Gait Training Goal 1 (PT)    Activity/Assistive Device (Gait Training Goal 1, PT) gait (walking locomotion);assistive device use;decrease fall risk;increase endurance/gait distance;improve balance and speed;walker, rolling  -MS     [place] Level (Gait Training Goal 1, PT) modified independence  -MS     Distance (Gait Training Goal 1, PT) 400ft  -MS     Time Frame (Gait Training Goal 1, PT) long term goal (LTG);by discharge  -MS     Progress/Outcome (Gait Training Goal 1, PT) new goal  -MS       Row Name 11/04/23 1150          Therapy Assessment/Plan (PT)    Planned Therapy Interventions (PT) balance training;bed mobility training;gait training;patient/family education;strengthening;transfer training  -MS               User Key  (r) = Recorded By, (t) = Taken By, (c) = Cosigned By      Initials Name Provider Type    Yelena Oreilly R, PT, DPT, NCS Physical Therapist                   Clinical Impression       Row Name 11/04/23 1151          Pain    Pretreatment Pain Rating 0/10 - no pain  -MS     Posttreatment Pain Rating 0/10 - no pain  -MS       Row Name 11/04/23 1151          Plan of Care Review    Plan of Care Reviewed With patient  -MS     Progress improving  -MS      Outcome Evaluation The patient presents alert and oriented x4 lying in bed. He reports no pain at rest or during activity. He demonstrates no focal neurological deficits in strength, sensation, or coordination. He required minimal assist to stand, but was then able to ambulate down the hallway with use of a RW. PT will continue to work with the patient to improve his ability to stand more independently. I spoke with Dr. Brantley and he stated he wanted the pt up and moving after surgery so the PT eval was completed off the original order place prior to surgery.  Recommend discharge back to [facility].  -MS       Row Name 11/04/23 1151          Therapy Assessment/Plan (PT)    Patient/Family Therapy Goals Statement (PT) go back to [facility]  -MS     Rehab Potential (PT) good, to achieve stated therapy goals  -MS     Criteria for Skilled Interventions Met (PT) yes;meets criteria;skilled treatment is necessary  -MS     Therapy Frequency (PT) 2 times/day  -MS     Predicted Duration of Therapy Intervention (PT) until discharge  -MS       Row Name 11/04/23 1151          Positioning and Restraints    Post Treatment Position chair  -MS     In Chair notified nsg;sitting;call light within reach;encouraged to call for assist  -MS               User Key  (r) = Recorded By, (t) = Taken By, (c) = Cosigned By      Initials Name Provider Type    MS Yelena Reddy R, PT, DPT, NCS Physical Therapist                   Outcome Measures       Row Name 11/04/23 1150 11/04/23 1010       How much help from another person do you currently need...    Turning from your back to your side while in flat bed without using bedrails? 3  -MS 2  -MD    Moving from lying on back to sitting on the side of a flat bed without bedrails? 4  -MS 2  -MD    Moving to and from a bed to a chair (including a wheelchair)? 3  -MS 2  -MD    Standing up from a chair using your arms (e.g., wheelchair, bedside chair)? 3  -MS 2  -MD    Climbing 3-5 steps with a railing? 3  -MS 1  -MD     To walk in hospital room? 3  -MS 2  -MD    AM-PAC 6 Clicks Score (PT) 19  -MS 11  -MD    Highest level of mobility 6 --> Walked 10 steps or more  -MS 4 --> Transferred to chair/commode  -MD      Row Name 11/04/23 1150          Functional Assessment    Outcome Measure Options AM-PAC 6 Clicks Basic Mobility (PT)  -MS               User Key  (r) = Recorded By, (t) = Taken By, (c) = Cosigned By      Initials Name Provider Type    MS Yelena Reddy, PT, DPT, NCS Physical Therapist    Mey Brunner RN Registered Nurse                                 Physical Therapy Education       Title: PT OT SLP Therapies (In Progress)       Topic: Physical Therapy (In Progress)       Point: Mobility training (Done)       Learning Progress Summary             Patient Acceptance, E, VU by MS at 11/4/2023 1321    Comment: role of PT in his care, spinal restrictions                         Point: Home exercise program (Not Started)       Learner Progress:  Not documented in this visit.              Point: Body mechanics (Not Started)       Learner Progress:  Not documented in this visit.              Point: Precautions (Done)       Learning Progress Summary             Patient Acceptance, E, VU by MS at 11/4/2023 1321    Comment: role of PT in his care, spinal restrictions                                         User Key       Initials Effective Dates Name Provider Type Discipline    MS 07/11/23 -  Yelena Reddy, PT, DPT, NCS Physical Therapist PT                  PT Recommendation and Plan  Planned Therapy Interventions (PT): balance training, bed mobility training, gait training, patient/family education, strengthening, transfer training  Plan of Care Reviewed With: patient  Progress: improving  Outcome Evaluation: The patient presents alert and oriented x4 lying in bed. He reports no pain at rest or during activity. He demonstrates no focal neurological deficits in strength, sensation, or coordination. He required minimal  assist to stand, but was then able to ambulate down the hallway with use of a RW. PT will continue to work with the patient to improve his ability to stand more independently. I spoke with Dr. Brantley and he stated he wanted the pt up and moving after surgery so the PT eval was completed off the original order place prior to surgery.  Recommend discharge back to [facility].     Time Calculation:         PT Charges       Row Name 11/04/23 1150             Time Calculation    Start Time 1140  -MS      Stop Time 1220  -MS      Time Calculation (min) 40 min  -MS      PT Received On 11/04/23  -MS      PT Goal Re-Cert Due Date 11/14/23  -MS         Untimed Charges    PT Eval/Re-eval Minutes 40  -MS         Total Minutes    Untimed Charges Total Minutes 40  -MS       Total Minutes 40  -MS                User Key  (r) = Recorded By, (t) = Taken By, (c) = Cosigned By      Initials Name Provider Type    MS Yelena Reddy, PT, DPT, NCS Physical Therapist                  Therapy Charges for Today       Code Description Service Date Service Provider Modifiers Qty    [id number]  PT EVAL LOW COMPLEXITY 3 11/4/2023 Yelena Reddy PT, DPT, NCS GP 1            PT G-Codes  Outcome Measure Options: AM-PAC 6 Clicks Basic Mobility (PT)  AM-PAC 6 Clicks Score (PT): 19  PT Discharge Summary  Anticipated Discharge Disposition (PT): assisted living    Yelena Reddy, PT, DPT, NCS  11/4/2023

## 2023-11-05 PROBLEM — E43 SEVERE MALNUTRITION: Status: ACTIVE | Noted: 2023-11-05

## 2023-11-05 LAB
ALBUMIN SERPL-MCNC: 3.3 G/DL (ref 3.5–5.2)
ALBUMIN/GLOB SERPL: 1 G/DL
ALP SERPL-CCNC: 96 U/L (ref 39–117)
ALT SERPL W P-5'-P-CCNC: 7 U/L (ref 1–41)
ANION GAP SERPL CALCULATED.3IONS-SCNC: 14 MMOL/L (ref 5–15)
AST SERPL-CCNC: 15 U/L (ref 1–40)
BACTERIA SPEC AEROBE CULT: ABNORMAL
BASOPHILS # BLD AUTO: 0 10*3/MM3 (ref 0–0.2)
BASOPHILS NFR BLD AUTO: 0 % (ref 0–1.5)
BILIRUB SERPL-MCNC: 0.2 MG/DL (ref 0–1.2)
BUN SERPL-MCNC: 30 MG/DL (ref 8–23)
BUN/CREAT SERPL: 17.9 (ref 7–25)
CALCIUM SPEC-SCNC: 8.3 MG/DL (ref 8.6–10.5)
CHLORIDE SERPL-SCNC: 104 MMOL/L (ref 98–107)
CO2 SERPL-SCNC: 23 MMOL/L (ref 22–29)
CREAT SERPL-MCNC: 1.68 MG/DL (ref 0.76–1.27)
DEPRECATED RDW RBC AUTO: 48.8 FL (ref 37–54)
EGFRCR SERPLBLD CKD-EPI 2021: 39.3 ML/MIN/1.73
EOSINOPHIL # BLD AUTO: 0 10*3/MM3 (ref 0–0.4)
EOSINOPHIL NFR BLD AUTO: 0 % (ref 0.3–6.2)
ERYTHROCYTE [DISTWIDTH] IN BLOOD BY AUTOMATED COUNT: 14.1 % (ref 12.3–15.4)
GLOBULIN UR ELPH-MCNC: 3.2 GM/DL
GLUCOSE SERPL-MCNC: 144 MG/DL (ref 65–99)
HCT VFR BLD AUTO: 35.8 % (ref 37.5–51)
HGB BLD-MCNC: 11.5 G/DL (ref 13–17.7)
IMM GRANULOCYTES # BLD AUTO: 0.06 10*3/MM3 (ref 0–0.05)
IMM GRANULOCYTES NFR BLD AUTO: 0.9 % (ref 0–0.5)
LYMPHOCYTES # BLD AUTO: 0.54 10*3/MM3 (ref 0.7–3.1)
LYMPHOCYTES NFR BLD AUTO: 8.2 % (ref 19.6–45.3)
MCH RBC QN AUTO: 30.7 PG (ref 26.6–33)
MCHC RBC AUTO-ENTMCNC: 32.1 G/DL (ref 31.5–35.7)
MCV RBC AUTO: 95.5 FL (ref 79–97)
MONOCYTES # BLD AUTO: 0.12 10*3/MM3 (ref 0.1–0.9)
MONOCYTES NFR BLD AUTO: 1.8 % (ref 5–12)
NEUTROPHILS NFR BLD AUTO: 5.84 10*3/MM3 (ref 1.7–7)
NEUTROPHILS NFR BLD AUTO: 89.1 % (ref 42.7–76)
NRBC BLD AUTO-RTO: 0 /100 WBC (ref 0–0.2)
PLATELET # BLD AUTO: 256 10*3/MM3 (ref 140–450)
PMV BLD AUTO: 9.4 FL (ref 6–12)
POTASSIUM SERPL-SCNC: 3.7 MMOL/L (ref 3.5–5.2)
PROT SERPL-MCNC: 6.5 G/DL (ref 6–8.5)
RBC # BLD AUTO: 3.75 10*6/MM3 (ref 4.14–5.8)
SODIUM SERPL-SCNC: 141 MMOL/L (ref 136–145)
WBC NRBC COR # BLD: 6.56 10*3/MM3 (ref 3.4–10.8)

## 2023-11-05 PROCEDURE — A9270 NON-COVERED ITEM OR SERVICE: HCPCS | Performed by: NEUROLOGICAL SURGERY

## 2023-11-05 PROCEDURE — 94664 DEMO&/EVAL PT USE INHALER: CPT

## 2023-11-05 PROCEDURE — 94799 UNLISTED PULMONARY SVC/PX: CPT

## 2023-11-05 PROCEDURE — 25810000003 SODIUM CHLORIDE 0.9 % SOLUTION: Performed by: NEUROLOGICAL SURGERY

## 2023-11-05 PROCEDURE — 85025 COMPLETE CBC W/AUTO DIFF WBC: CPT | Performed by: STUDENT IN AN ORGANIZED HEALTH CARE EDUCATION/TRAINING PROGRAM

## 2023-11-05 PROCEDURE — 97116 GAIT TRAINING THERAPY: CPT

## 2023-11-05 PROCEDURE — A9270 NON-COVERED ITEM OR SERVICE: HCPCS | Performed by: HOSPITALIST

## 2023-11-05 PROCEDURE — 25010000002 PIPERACILLIN SOD-TAZOBACTAM PER 1 G: Performed by: NEUROLOGICAL SURGERY

## 2023-11-05 PROCEDURE — 63710000001 SENNOSIDES-DOCUSATE 8.6-50 MG TABLET: Performed by: NEUROLOGICAL SURGERY

## 2023-11-05 PROCEDURE — 97110 THERAPEUTIC EXERCISES: CPT

## 2023-11-05 PROCEDURE — 63710000001 LEVOTHYROXINE 50 MCG TABLET: Performed by: NEUROLOGICAL SURGERY

## 2023-11-05 PROCEDURE — 63710000001 FINASTERIDE 5 MG TABLET: Performed by: NEUROLOGICAL SURGERY

## 2023-11-05 PROCEDURE — G0378 HOSPITAL OBSERVATION PER HR: HCPCS

## 2023-11-05 PROCEDURE — 63710000001 APIXABAN 2.5 MG TABLET: Performed by: NEUROLOGICAL SURGERY

## 2023-11-05 PROCEDURE — 63710000001 TAMSULOSIN 0.4 MG CAPSULE: Performed by: NEUROLOGICAL SURGERY

## 2023-11-05 PROCEDURE — 63710000001 CARVEDILOL 6.25 MG TABLET: Performed by: HOSPITALIST

## 2023-11-05 PROCEDURE — 63710000001 PANTOPRAZOLE 40 MG TABLET DELAYED-RELEASE: Performed by: NEUROLOGICAL SURGERY

## 2023-11-05 PROCEDURE — 63710000001 HYDROXYZINE 25 MG TABLET: Performed by: NEUROLOGICAL SURGERY

## 2023-11-05 PROCEDURE — 80053 COMPREHEN METABOLIC PANEL: CPT | Performed by: STUDENT IN AN ORGANIZED HEALTH CARE EDUCATION/TRAINING PROGRAM

## 2023-11-05 PROCEDURE — 63710000001 CARBAMAZEPINE 200 MG TABLET: Performed by: NEUROLOGICAL SURGERY

## 2023-11-05 PROCEDURE — 63710000001 DILTIAZEM CD 180 MG CAPSULE SUSTAINED-RELEASE 24 HR: Performed by: HOSPITALIST

## 2023-11-05 PROCEDURE — 25010000002 DEXAMETHASONE PER 1 MG: Performed by: NEUROLOGICAL SURGERY

## 2023-11-05 PROCEDURE — 63710000001 CALCIUM CARB-CHOLECALCIFEROL 600-20 MG-MCG TABLET: Performed by: NEUROLOGICAL SURGERY

## 2023-11-05 PROCEDURE — 63710000001 GUAIFENESIN 600 MG TABLET SUSTAINED-RELEASE 12 HOUR: Performed by: NEUROLOGICAL SURGERY

## 2023-11-05 PROCEDURE — 63710000001 CHOLECALCIFEROL 25 MCG (1000 UT) TABLET: Performed by: NEUROLOGICAL SURGERY

## 2023-11-05 PROCEDURE — 96376 TX/PRO/DX INJ SAME DRUG ADON: CPT

## 2023-11-05 PROCEDURE — 63710000001 ISOSORBIDE MONONITRATE 60 MG TABLET SUSTAINED-RELEASE 24 HOUR: Performed by: NEUROLOGICAL SURGERY

## 2023-11-05 RX ADMIN — PIPERACILLIN SODIUM AND TAZOBACTAM SODIUM 3.38 G: 3; .375 INJECTION, SOLUTION INTRAVENOUS at 22:19

## 2023-11-05 RX ADMIN — ISOSORBIDE MONONITRATE 60 MG: 60 TABLET, EXTENDED RELEASE ORAL at 08:51

## 2023-11-05 RX ADMIN — SODIUM CHLORIDE 50 ML/HR: 9 INJECTION, SOLUTION INTRAVENOUS at 22:19

## 2023-11-05 RX ADMIN — DEXAMETHASONE SODIUM PHOSPHATE 4 MG: 4 INJECTION, SOLUTION INTRAMUSCULAR; INTRAVENOUS at 10:44

## 2023-11-05 RX ADMIN — APIXABAN 2.5 MG: 2.5 TABLET, FILM COATED ORAL at 20:59

## 2023-11-05 RX ADMIN — Medication 1000 UNITS: at 08:50

## 2023-11-05 RX ADMIN — APIXABAN 2.5 MG: 2.5 TABLET, FILM COATED ORAL at 08:50

## 2023-11-05 RX ADMIN — Medication 1 TABLET: at 08:51

## 2023-11-05 RX ADMIN — Medication 10 ML: at 22:20

## 2023-11-05 RX ADMIN — DEXAMETHASONE SODIUM PHOSPHATE 4 MG: 4 INJECTION, SOLUTION INTRAMUSCULAR; INTRAVENOUS at 00:12

## 2023-11-05 RX ADMIN — Medication 10 ML: at 05:14

## 2023-11-05 RX ADMIN — Medication 10 ML: at 00:14

## 2023-11-05 RX ADMIN — DEXAMETHASONE SODIUM PHOSPHATE 4 MG: 4 INJECTION, SOLUTION INTRAMUSCULAR; INTRAVENOUS at 05:12

## 2023-11-05 RX ADMIN — PIPERACILLIN SODIUM AND TAZOBACTAM SODIUM 3.38 G: 3; .375 INJECTION, SOLUTION INTRAVENOUS at 14:16

## 2023-11-05 RX ADMIN — HYDROXYZINE HYDROCHLORIDE 50 MG: 25 TABLET ORAL at 08:51

## 2023-11-05 RX ADMIN — CARVEDILOL 12.5 MG: 6.25 TABLET, FILM COATED ORAL at 17:27

## 2023-11-05 RX ADMIN — DILTIAZEM HYDROCHLORIDE 180 MG: 180 CAPSULE, COATED, EXTENDED RELEASE ORAL at 08:51

## 2023-11-05 RX ADMIN — PANTOPRAZOLE SODIUM 40 MG: 40 TABLET, DELAYED RELEASE ORAL at 08:50

## 2023-11-05 RX ADMIN — Medication 10 ML: at 08:52

## 2023-11-05 RX ADMIN — HYDROXYZINE HYDROCHLORIDE 50 MG: 25 TABLET ORAL at 20:59

## 2023-11-05 RX ADMIN — PIPERACILLIN SODIUM AND TAZOBACTAM SODIUM 3.38 G: 3; .375 INJECTION, SOLUTION INTRAVENOUS at 06:08

## 2023-11-05 RX ADMIN — HYDROXYZINE HYDROCHLORIDE 50 MG: 25 TABLET ORAL at 14:16

## 2023-11-05 RX ADMIN — FINASTERIDE 5 MG: 5 TABLET, FILM COATED ORAL at 08:51

## 2023-11-05 RX ADMIN — DEXAMETHASONE SODIUM PHOSPHATE 4 MG: 4 INJECTION, SOLUTION INTRAMUSCULAR; INTRAVENOUS at 22:19

## 2023-11-05 RX ADMIN — CARVEDILOL 12.5 MG: 6.25 TABLET, FILM COATED ORAL at 08:51

## 2023-11-05 RX ADMIN — CARBAMAZEPINE 200 MG: 200 TABLET ORAL at 08:50

## 2023-11-05 RX ADMIN — DEXAMETHASONE SODIUM PHOSPHATE 4 MG: 4 INJECTION, SOLUTION INTRAMUSCULAR; INTRAVENOUS at 17:27

## 2023-11-05 RX ADMIN — TAMSULOSIN HYDROCHLORIDE 0.4 MG: 0.4 CAPSULE ORAL at 08:50

## 2023-11-05 RX ADMIN — BUDESONIDE AND FORMOTEROL FUMARATE DIHYDRATE 2 PUFF: 160; 4.5 AEROSOL RESPIRATORY (INHALATION) at 10:44

## 2023-11-05 RX ADMIN — DOCUSATE SODIUM 50 MG AND SENNOSIDES 8.6 MG 1 TABLET: 8.6; 5 TABLET, FILM COATED ORAL at 08:51

## 2023-11-05 RX ADMIN — GUAIFENESIN 1200 MG: 600 TABLET, EXTENDED RELEASE ORAL at 08:51

## 2023-11-05 RX ADMIN — GUAIFENESIN 1200 MG: 600 TABLET, EXTENDED RELEASE ORAL at 20:59

## 2023-11-05 RX ADMIN — LEVOTHYROXINE SODIUM 50 MCG: 50 TABLET ORAL at 05:12

## 2023-11-05 NOTE — PLAN OF CARE
Goal Outcome Evaluation:  Plan of Care Reviewed With: patient        Progress: improving  Outcome Evaluation: Pt stated to be feeling better.  Able to perform bed mobility independently with HOB elevated.  Transfered sit to stand with CGA.  Amb 300' with RWX and CGA.

## 2023-11-05 NOTE — THERAPY TREATMENT NOTE
Acute Care - Physical Therapy Treatment Note  [facility]     Patient Name: Brennon Vance  : 1935  MRN: [id number]  Today's Date: 2023   Onset of Illness/Injury or Date of Surgery: 23  Visit Dx:     ICD-10-CM ICD-9-CM   1. Acute midline thoracic back pain  M54.6 724.1   2. Urinary tract infection without hematuria, site unspecified  N39.0 599.0   3. Closed fracture of eighth thoracic vertebra, unspecified fracture morphology, initial encounter  S22.069A 805.2   4. Closed fracture of eleventh thoracic vertebra, unspecified fracture morphology, initial encounter  S22.089A 805.2   5. Intractable back pain  M54.9 724.5   6. Impaired mobility [Z74.09]  Z74.09 799.89     Patient Active Problem List   Diagnosis    Chest pain    NSTEMI, initial episode of care    Coronary artery disease of native artery of native heart with stable angina pectoris    Paroxysmal atrial fibrillation    Other emphysema    Essential hypertension    Precordial pain    Hyperlipidemia LDL goal <70    Tobacco abuse    Cardiomyopathy    NSTEMI (non-ST elevated myocardial infarction)    UTI (urinary tract infection)    Aspiration pneumonia    Intractable low back pain    Stage 3b chronic kidney disease    Compression fracture of T12 vertebra    Closed compression fracture of first lumbar vertebra    Body mass index (BMI) of 20.0-20.9 in adult    Hiatal hernia    Stroke    Intractable back pain    Protein-calorie malnutrition    Anemia, chronic disease    Memory difficulties    Closed fracture of eighth thoracic vertebra    Closed T11 fracture    Severe malnutrition     Past Medical History:   Diagnosis Date    CAD (coronary artery disease)     CKD (chronic kidney disease) stage 3, GFR 30-59 ml/min     COPD (chronic obstructive pulmonary disease)     Hiatal hernia     Hyperlipidemia     Hypertension     Stroke      Past Surgical History:   Procedure Laterality Date    ABDOMINAL AORTIC ANEURYSM REPAIR      CARDIAC CATHETERIZATION N/A  5/20/2021    Procedure: Left Heart Cath;  Surgeon: Harrison Alcantara MD;  Location:  [facility] CATH [facility]VASIVE [facility];  Service: Cardiology;  Laterality: N/A;    CORONARY ANGIOPLASTY WITH STENT PLACEMENT      FEMORAL ARTERY STENT      KYPHOPLASTY WITH BIOPSY N/A 9/26/2023    Procedure: KYPHOPLASTY WITH BIOPSY T12 and L3;  Surgeon: Jeremiah Brantley MD;  Location:  [facility] OR;  Service: Neurosurgery;  Laterality: N/A;    LEG THROMBECTOMY/EMBOLECTOMY Right 5/20/2021    Procedure: RT GROIN EXPLORATION;  Surgeon: Geoff Remy DO;  Location:  [facility] HYBRID OR 12;  Service: Vascular;  Laterality: Right;    TRIGEMINAL NERVE DECOMPRESSION       PT Assessment (last 12 hours)       PT Evaluation and Treatment       Row Name 11/05/23 0934          Physical Therapy Time and Intention    Subjective Information no complaints  -     Document Type therapy note (daily note)  -     Mode of Treatment physical therapy  -       Row Name 11/05/23 0934          General Information    Patient/Family/Caregiver Comments/Observations daughter present  -     Existing Precautions/Restrictions fall;spinal  -JAMEEL       Row Name 11/05/23 0934          Pain    Pretreatment Pain Rating 0/10 - no pain  -JAMEEL     Posttreatment Pain Rating 0/10 - no pain  -JAMEEL       Row Name 11/05/23 0934          Bed Mobility    Supine-Sit Todd (Bed Mobility) modified independence  -     Assistive Device (Bed Mobility) head of bed elevated  -JAMEEL       Row Name 11/05/23 0934          Sit-Stand Transfer    Sit-Stand Todd (Transfers) verbal cues;contact guard  -     Assistive Device (Sit-Stand Transfers) walker, front-wheeled  -JAMEEL       Row Name 11/05/23 0934          Stand-Sit Transfer    Stand-Sit Todd (Transfers) standby assist  -     Assistive Device (Stand-Sit Transfers) walker, front-wheeled  -JAMEEL       Row Name 11/05/23 0934          Gait/Stairs (Locomotion)    Todd Level (Gait) verbal cues;contact guard  -JAMEEL      Assistive Device (Gait) walker, front-wheeled  -JAMEEL     Distance in Feet (Gait) 300  -JAMEEL     Pattern (Gait) step-through  -JAMEEL     Deviations/Abnormal Patterns (Gait) gait speed decreased  -JAMEEL     Bilateral Gait Deviations forward flexed posture  -JAMEEL       Row Name 11/05/23 0934          Motor Skills    Comments, Therapeutic Exercise sitting AROM BLE X 20  -JAMEEL     Additional Documentation Comments, Therapeutic Exercise (Row)  -JAMEEL       Row Name             Wound 11/02/23 2354 Bilateral coccyx    Wound - Properties Group Placement Date: 11/02/23  -AM Placement Time: 2354  -AM Present on Original Admission: Y  -AM Side: Bilateral  -AM Location: coccyx  -AM    Retired Wound - Properties Group Placement Date: 11/02/23  -AM Placement Time: 2354  -AM Present on Original Admission: Y  -AM Side: Bilateral  -AM Location: coccyx  -AM    Retired Wound - Properties Group Date first assessed: 11/02/23  -AM Time first assessed: 2354  -AM Present on Original Admission: Y  -AM Side: Bilateral  -AM Location: coccyx  -AM      Row Name             Wound 11/04/23 0917 thoracic spine Incision    Wound - Properties Group Placement Date: 11/04/23  -DT Placement Time: 0917 -DT Location: thoracic spine  -DT Primary Wound Type: Incision  -DT    Retired Wound - Properties Group Placement Date: 11/04/23  -DT Placement Time: 0917 -DT Location: thoracic spine  -DT Primary Wound Type: Incision  -DT    Retired Wound - Properties Group Date first assessed: 11/04/23  -DT Time first assessed: 0917 -DT Location: thoracic spine  -DT Primary Wound Type: Incision  -DT      Row Name 11/05/23 0934          Positioning and Restraints    Pre-Treatment Position in bed  -JAMEEL     Post Treatment Position chair  -JAMEEL     In Chair reclined;call light within reach;encouraged to call for assist;with family/caregiver  -JAMEEL               User Key  (r) = Recorded By, (t) = Taken By, (c) = Cosigned By      Initials Name Provider Type    Lico Cross, PTA Physical  Therapist Assistant    Pedro Phoenix, RN Registered Nurse    Jasmin Grace RN Registered Nurse                    Physical Therapy Education       Title: PT OT SLP Therapies (In Progress)       Topic: Physical Therapy (In Progress)       Point: Mobility training (Done)       Learning Progress Summary             Patient Acceptance, E, VU by MS at 11/4/2023 1321    Comment: role of PT in his care, spinal restrictions                         Point: Home exercise program (Not Started)       Learner Progress:  Not documented in this visit.              Point: Body mechanics (Not Started)       Learner Progress:  Not documented in this visit.              Point: Precautions (Done)       Learning Progress Summary             Patient Acceptance, E, VU by MS at 11/4/2023 1321    Comment: role of PT in his care, spinal restrictions                                         User Key       Initials Effective Dates Name Provider Type Discipline    MS 07/11/23 -  Yelena Reddy, PT, DPT, NCS Physical Therapist PT                  PT Recommendation and Plan     Plan of Care Reviewed With: patient  Progress: improving  Outcome Evaluation: Pt stated to be feeling better.  Able to perform bed mobility independently with HOB elevated.  Transfered sit to stand with CGA.  Amb 300' with RWX and CGA.   Outcome Measures       Row Name 11/05/23 0934 11/04/23 1300          How much help from another person do you currently need...    Turning from your back to your side while in flat bed without using bedrails? 3  -JAMEEL --     Moving from lying on back to sitting on the side of a flat bed without bedrails? 3  -JAMEEL --     Moving to and from a bed to a chair (including a wheelchair)? 3  -JAMEEL --     Standing up from a chair using your arms (e.g., wheelchair, bedside chair)? 3  -JAMEEL --     Climbing 3-5 steps with a railing? 3  -JAMEEL --     To walk in hospital room? 3  -JAMEEL --     AM-PAC 6 Clicks Score (PT) 18  -JAMEEL --     Highest level  of mobility 6 --> Walked 10 steps or more  -JAMEEL --        How much help from another is currently needed...    Putting on and taking off regular lower body clothing? -- 3  -EC     Bathing (including washing, rinsing, and drying) -- 3  -EC     Toileting (which includes using toilet bed pan or urinal) -- 3  -EC     Putting on and taking off regular upper body clothing -- 4  -EC     Taking care of personal grooming (such as brushing teeth) -- 4  -EC     Eating meals -- 4  -EC     AM-PAC 6 Clicks Score (OT) -- 21  -EC        Functional Assessment    Outcome Measure Options AM-PAC 6 Clicks Basic Mobility (PT)  -JAMEEL AM-PAC 6 Clicks Daily Activity (OT)  -EC               User Key  (r) = Recorded By, (t) = Taken By, (c) = Cosigned By      Initials Name Provider Type    Lico Cross PTA Physical Therapist Assistant    EC Manisha Greco, OTR/L Occupational Therapist                     Time Calculation:    PT Charges       Row Name 11/05/23 0934             Time Calculation    Start Time 0934  -JAMEEL      Stop Time 1000  -JAMEEL      Time Calculation (min) 26 min  -JAMEEL      PT Received On 11/05/23  -JAMEEL         Time Calculation- PT    Total Timed Code Minutes- PT 26 minute(s)  -JAMEEL         Timed Charges    04787 - PT Therapeutic Exercise Minutes 10  -JAMEEL      63301 - Gait Training Minutes  16  -JAMEEL         Total Minutes    Timed Charges Total Minutes 26  -JAMEEL       Total Minutes 26  -JAMEEL                User Key  (r) = Recorded By, (t) = Taken By, (c) = Cosigned By      Initials Name Provider Type    Lico Cross PTA Physical Therapist Assistant                  Therapy Charges for Today       Code Description Service Date Service Provider Modifiers Qty    71711900803 HC GAIT TRAINING EA 15 MIN 11/4/2023 Lico Celaya, EMY GP 2    21757876245 HC GAIT TRAINING EA 15 MIN 11/5/2023 Lico Celaya, PTA GP 1    15994317758 HC PT THER PROC EA 15 MIN 11/5/2023 Lico Celaya, PTA GP 1            PT G-Codes  Outcome  Measure Options: AM-PAC 6 Clicks Basic Mobility (PT)  AM-PAC 6 Clicks Score (PT): 18  AM-PAC 6 Clicks Score (OT): 21    Lico Celaya, PTA  11/5/2023

## 2023-11-05 NOTE — PROGRESS NOTES
[facility]  INPATIENT PROGRESS NOTE    Patient Name: Brennon Vance  Date of Admission: 11/2/2023  Today's Date: 11/05/23  Length of Stay: 0  Primary Care Physician: Javid Grajeda MD    Subjective   Chief Complaint: follow up    88-year-old male with history of spinal compression fractures it was admitted for intractable back pain.  Imaging obtained on admission were concerning for an acute fracture.  Neurosurgery was consulted with plans for patient to have an MRI done and possible surgical repair.    11/5: Urine culture grew gram neg bacilli. Currently on zosyn. Awaiting susceptibility report. Pain is well controlled. Vital signs are stable. Some elevation in SBP this morning.     11/4: MRI resulted and patient was taken to surgery this morning for T11 kyphoplasty and vertebral body biopsy.  Patient was seen after surgery back in his room.  Patient reports doing well.  Pain is well controlled.  Vital signs are stable.    11/3: No acute event reported overnight.  Pain is better controlled on Dilaudid.  Vital signs are stable.  Neurosurgery consult in place.  Urine culture in process.    Review of Systems   Constitutional:  Negative for fatigue and fever.   Respiratory: Negative.     Cardiovascular: Negative.    Gastrointestinal: Negative.    Musculoskeletal:  Negative for back pain.      All pertinent negatives and positives are as above. All other systems have been reviewed and are negative unless otherwise stated.     Objective    Temp:  [97.6 °F (36.4 °C)-98 °F (36.7 °C)] 97.6 °F (36.4 °C)  Heart Rate:  [60-70] 70  Resp:  [15-18] 18  BP: (127-179)/(50-68) 147/56  Physical Exam  Vitals and nursing note reviewed.   Constitutional:       General: He is not in acute distress.     Appearance: He is not ill-appearing, toxic-appearing or diaphoretic.      Comments: Frail older gentleman   HENT:      Head: Normocephalic and atraumatic.      Right Ear: External  "ear normal.      Left Ear: External ear normal.   Eyes:      General: No scleral icterus.     Extraocular Movements: Extraocular movements intact.      Conjunctiva/sclera: Conjunctivae normal.   Cardiovascular:      Rate and Rhythm: Normal rate and regular rhythm.      Heart sounds: Normal heart sounds.   Pulmonary:      Effort: Pulmonary effort is normal. No respiratory distress.      Breath sounds: Normal breath sounds.   Chest:      Chest wall: No tenderness.   Abdominal:      General: Bowel sounds are normal.      Palpations: Abdomen is soft.      Tenderness: There is no abdominal tenderness.   Musculoskeletal:         General: No swelling or tenderness.      Cervical back: Normal range of motion and neck supple.      Right lower leg: No edema.      Left lower leg: No edema.   Skin:     General: Skin is warm and dry.      Capillary Refill: Capillary refill takes less than 2 seconds.      Findings: No erythema or rash.   Neurological:      General: No focal deficit present.      Mental Status: He is alert.      Motor: No weakness.   Psychiatric:         Behavior: Behavior normal.           Results Review:  I have reviewed the labs, radiology results, and diagnostic studies.    Laboratory Data:   Results from last 7 days   Lab Units 11/05/23  0914 11/03/23 0559 11/02/23 2035   WBC 10*3/mm3 6.56 5.00 5.36   HEMOGLOBIN g/dL 11.5* 9.9* 9.0*   HEMATOCRIT % 35.8* 31.4* 28.3*   PLATELETS 10*3/mm3 256 198 196        Results from last 7 days   Lab Units 11/05/23  0914 11/03/23 0559 11/02/23 2035   SODIUM mmol/L 141 139 137   POTASSIUM mmol/L 3.7 4.0 3.7   CHLORIDE mmol/L 104 103 101   CO2 mmol/L 23.0 23.0 25.0   BUN mg/dL 30* 25* 28*   CREATININE mg/dL 1.68* 1.66* 1.77*   CALCIUM mg/dL 8.3* 8.6 8.2*   BILIRUBIN mg/dL 0.2 0.3  --    ALK PHOS U/L 96 105  --    ALT (SGPT) U/L 7 6  --    AST (SGOT) U/L 15 11  --    GLUCOSE mg/dL 144* 134* 93       Culture Data:   No results found for: \"BLOODCX\", \"URINECX\", \"WOUNDCX\", " "\"MRSACX\", \"RESPCX\", \"STOOLCX\"    Radiology Data:   Imaging Results (Last 24 Hours)       ** No results found for the last 24 hours. **            I have reviewed the patient's current medications.     Assessment/Plan   Assessment  Active Hospital Problems    Diagnosis     **Intractable back pain     UTI (urinary tract infection)     Severe malnutrition     Protein-calorie malnutrition     Anemia, chronic disease     Memory difficulties     Closed fracture of eighth thoracic vertebra     Closed T11 fracture     Stage 3b chronic kidney disease     Essential hypertension     Paroxysmal atrial fibrillation     Other emphysema      88-year-old male with history of spinal compression fractures it was admitted for intractable back pain.  Imaging obtained on admission were concerning for an acute fracture.  Neurosurgery was consulted with plans for patient to have an MRI done and possible surgical repair.     Treatment Plan  11/5: Urine culture grew gram neg bacilli. Currently on zosyn. Awaiting susceptibility report. Pain is well controlled. Vital signs are stable. Some elavation in SBP this morning.   Continue Zosyn based on susceptibility report.  Continue PT.   Recommend osteoporosis screening outpatient.  Likely discharge back to the facility once neurosurgery signed off.       11/4: MRI resulted and patient was taken to surgery this morning for T11 kyphoplasty and vertebral body biopsy.  Patient was seen after surgery back in his room.  Patient reports doing well.  Pain is well controlled.  Vital signs are stable.    Intractable back pain: Imaging showed new fractures. He is s/p kyphoplasty. Neurosurgery consult in place. Continue back brace and pain management.  Discussed the need for possible bone scan to rule out osteoporosis.    Acute cystitis: Continue antibiotics. Follow up on urine culture    Paroxysmal A-fib: Continue Eliquis and Coreg.    Family requesting palliative care consult to discuss goals of care.  " Orders placed.    DVT prophylaxis: Eliquis    Medical Decision Making  Number and Complexity of problems: 2 acute problems   Differential Diagnosis: as above     Conditions and Status        Condition is improving.     [facility] Data  External documents reviewed: epic records   Cardiac tracing (EKG, telemetry) interpretation: no new EKG   Radiology interpretation: imaging reviewed   Labs reviewed: yes   Any tests that were considered but not ordered: none      Decision rules/scores evaluated (example [id number], [place], etc): n/a      Discussed with: Patient      Care Planning  Shared decision making: Patient apprised of current labs, vitals, imaging and treatment plan.  They are agreeable with proceeding with plans as discussed.    Code status and discussions:   Code Status and Medical Interventions:   Ordered at: 11/03/23 1208     Medical Intervention Limits:    NO intubation (DNI)    NO cardioversion    NO artificial nutrition    NO dialysis     Level Of Support Discussed With:    Patient     Code Status (Patient has no pulse and is not breathing):    No CPR (Do Not Attempt to Resuscitate)     Medical Interventions (Patient has pulse or is breathing):    Limited Support         Disposition  Social Determinants of Health that impact treatment or disposition: none   I expect the patient to be discharged to SNF in 1-2 days.     Electronically signed by Micki Solano MD, 11/05/23, 18:17 CST.

## 2023-11-05 NOTE — THERAPY TREATMENT NOTE
Acute Care - Physical Therapy Treatment Note  [facility]     Patient Name: Brennon Vance  : 1935  MRN: [id number]  Today's Date: 2023   Onset of Illness/Injury or Date of Surgery: 23  Visit Dx:     ICD-10-CM ICD-9-CM   1. Acute midline thoracic back pain  M54.6 724.1   2. Urinary tract infection without hematuria, site unspecified  N39.0 599.0   3. Closed fracture of eighth thoracic vertebra, unspecified fracture morphology, initial encounter  S22.069A 805.2   4. Closed fracture of eleventh thoracic vertebra, unspecified fracture morphology, initial encounter  S22.089A 805.2   5. Intractable back pain  M54.9 724.5   6. Impaired mobility [Z74.09]  Z74.09 799.89     Patient Active Problem List   Diagnosis    Chest pain    NSTEMI, initial episode of care    Coronary artery disease of native artery of native heart with stable angina pectoris    Paroxysmal atrial fibrillation    Other emphysema    Essential hypertension    Precordial pain    Hyperlipidemia LDL goal <70    Tobacco abuse    Cardiomyopathy    NSTEMI (non-ST elevated myocardial infarction)    UTI (urinary tract infection)    Aspiration pneumonia    Intractable low back pain    Stage 3b chronic kidney disease    Compression fracture of T12 vertebra    Closed compression fracture of first lumbar vertebra    Body mass index (BMI) of 20.0-20.9 in adult    Hiatal hernia    Stroke    Intractable back pain    Protein-calorie malnutrition    Anemia, chronic disease    Memory difficulties    Closed fracture of eighth thoracic vertebra    Closed T11 fracture    Severe malnutrition     Past Medical History:   Diagnosis Date    CAD (coronary artery disease)     CKD (chronic kidney disease) stage 3, GFR 30-59 ml/min     COPD (chronic obstructive pulmonary disease)     Hiatal hernia     Hyperlipidemia     Hypertension     Stroke      Past Surgical History:   Procedure Laterality Date    ABDOMINAL AORTIC ANEURYSM REPAIR      CARDIAC CATHETERIZATION N/A  5/20/2021    Procedure: Left Heart Cath;  Surgeon: Harrison Alcantara MD;  Location:  [facility] CATH [facility]ASIVE [facility];  Service: Cardiology;  Laterality: N/A;    CORONARY ANGIOPLASTY WITH STENT PLACEMENT      FEMORAL ARTERY STENT      KYPHOPLASTY WITH BIOPSY N/A 9/26/2023    Procedure: KYPHOPLASTY WITH BIOPSY T12 and L3;  Surgeon: Jeremiah Brantley MD;  Location:  [facility] OR;  Service: Neurosurgery;  Laterality: N/A;    LEG THROMBECTOMY/EMBOLECTOMY Right 5/20/2021    Procedure: RT GROIN EXPLORATION;  Surgeon: Geoff Remy DO;  Location:  [facility] HYBRID OR 12;  Service: Vascular;  Laterality: Right;    TRIGEMINAL NERVE DECOMPRESSION       PT Assessment (last 12 hours)       PT Evaluation and Treatment       Row Name 11/05/23 1427 11/05/23 0934       Physical Therapy Time and Intention    Subjective Information no complaints  -JAMEEL no complaints  -JAMEEL    Document Type therapy note (daily note)  -JAMEEL therapy note (daily note)  -JAMEEL    Mode of Treatment physical therapy  -JAMEEL physical therapy  -JAMEEL    Comment some confusion  -JAMEEL --      Row Name 11/05/23 1427 11/05/23 0934       General Information    Patient/Family/Caregiver Comments/Observations -- daughter present  -JAMEEL    Existing Precautions/Restrictions fall;spinal  -JAMEEL fall;spinal  -JAMEEL      Row Name 11/05/23 1427 11/05/23 0934       Pain    Pretreatment Pain Rating 0/10 - no pain  -JAMEEL 0/10 - no pain  -JAMEEL    Posttreatment Pain Rating 0/10 - no pain  -JAMEEL 0/10 - no pain  -JAMEEL      Row Name 11/05/23 1427 11/05/23 0934       Bed Mobility    Bed Mobility sit-supine  -JAMEEL --    Supine-Sit [place] (Bed Mobility) modified independence  -JAMEEL modified independence  -JAMEEL    Sit-Supine [place] (Bed Mobility) supervision  -JAMEEL --    Assistive Device (Bed Mobility) -- head of bed elevated  -JAMEEL      Row Name 11/05/23 1427 11/05/23 0934       Sit-Stand Transfer    Sit-Stand [place] (Transfers) verbal cues;contact guard  -JAMEEL verbal cues;contact guard  -JAMEEL    Assistive  Device (Sit-Stand Transfers) walker, front-wheeled  -JAMEEL walker, front-wheeled  -JAMEEL      Row Name 11/05/23 1427 11/05/23 0934       Stand-Sit Transfer    Stand-Sit [place] (Transfers) standby assist  -JAMEEL standby assist  -JAMEEL    Assistive Device (Stand-Sit Transfers) walker, front-wheeled  -JAMEEL walker, front-wheeled  -JAMEEL      Row Name 11/05/23 1427 11/05/23 0934       Gait/Stairs (Locomotion)    [place] Level (Gait) verbal cues;contact guard  -JAMEEL verbal cues;contact guard  -JAMEEL    Assistive Device (Gait) walker, front-wheeled  -JAMEEL walker, front-wheeled  -JAMEEL    Distance in Feet (Gait) 350  -JAMEEL 300  -JAMEEL    Pattern (Gait) step-through  -JAMEEL step-through  -JAMEEL    Deviations/Abnormal Patterns (Gait) gait speed decreased  -JAMEEL gait speed decreased  -JAMEEL    Bilateral Gait Deviations forward flexed posture  -JMAEEL forward flexed posture  -JAMEEL    Comment, (Gait/Stairs) pt had increase in shortness of breath returning to the room, O2 was in the 90's on RA  -JAMEEL --      Row Name 11/05/23 0934          Motor Skills    Comments, Therapeutic Exercise sitting AROM BLE X 20  -JAMEEL     Additional Documentation Comments, Therapeutic Exercise (Row)  -JAMEEL       Row Name             Wound 11/02/23 2354 Bilateral coccyx    Wound - Properties Group Placement Date: 11/02/23  -AM Placement Time: 2354  -AM Present on Original Admission: Y  -AM Side: Bilateral  -AM Location: coccyx  -AM    Retired Wound - Properties Group Placement Date: 11/02/23  -AM Placement Time: 2354  -AM Present on Original Admission: Y  -AM Side: Bilateral  -AM Location: coccyx  -AM    Retired Wound - Properties Group Date first assessed: 11/02/23  -AM Time first assessed: 2354  -AM Present on Original Admission: Y  -AM Side: Bilateral  -AM Location: coccyx  -AM      Row Name             Wound 11/04/23 0917 thoracic spine Incision    Wound - Properties Group Placement Date: 11/04/23  -DT Placement Time: 0917 -DT Location: thoracic spine  -DT Primary Wound Type: Incision  -DT     Retired Wound - Properties Group Placement Date: 11/04/23  -DT Placement Time: 0917 -DT Location: thoracic spine  -DT Primary Wound Type: Incision  -DT    Retired Wound - Properties Group Date first assessed: 11/04/23  -DT Time first assessed: 0917 -DT Location: thoracic spine  -DT Primary Wound Type: Incision  -DT      Row Name 11/05/23 1427 11/05/23 0934       Positioning and Restraints    Pre-Treatment Position in bed  -JAMEEL in bed  -JAMEEL    Post Treatment Position bed  -JAMEEL chair  -JAMEEL    In Bed fowlers;call light within reach;encouraged to call for assist;exit alarm on;side rails up x2  -JAMEEL --    In Chair -- reclined;call light within reach;encouraged to call for assist;with family/caregiver  -JAMEEL              User Key  (r) = Recorded By, (t) = Taken By, (c) = Cosigned By      Initials Name Provider Type    JAMEEL Lico Celaya, PTA Physical Therapist Assistant    Pedro Phoenix, RN Registered Nurse    Jasmin Grace RN Registered Nurse                    Physical Therapy Education       Title: PT OT SLP Therapies (In Progress)       Topic: Physical Therapy (In Progress)       Point: Mobility training (Done)       Learning Progress Summary             Patient Acceptance, E, VU by MS at 11/4/2023 1321    Comment: role of PT in his care, spinal restrictions                         Point: Home exercise program (Not Started)       Learner Progress:  Not documented in this visit.              Point: Body mechanics (Not Started)       Learner Progress:  Not documented in this visit.              Point: Precautions (Done)       Learning Progress Summary             Patient Acceptance, E, VU by MS at 11/4/2023 1321    Comment: role of PT in his care, spinal restrictions                                         User Key       Initials Effective Dates Name Provider Type Discipline    MS 07/11/23 -  Yelena Reddy, PT, DPT, NCS Physical Therapist PT                  PT Recommendation and Plan     Plan of  Care Reviewed With: patient  Progress: improving  Outcome Evaluation: Pt stated to be feeling better.  Able to perform bed mobility independently with HOB elevated.  Transfered sit to stand with CGA.  Amb 300' with RWX and CGA.   Outcome Measures       Row Name 11/05/23 0934 11/04/23 1300          How much help from another person do you currently need...    Turning from your back to your side while in flat bed without using bedrails? 3  -JAMEEL --     Moving from lying on back to sitting on the side of a flat bed without bedrails? 3  -JAMEEL --     Moving to and from a bed to a chair (including a wheelchair)? 3  -JAMEEL --     Standing up from a chair using your arms (e.g., wheelchair, bedside chair)? 3  -JAMEEL --     Climbing 3-5 steps with a railing? 3  -JAMEEL --     To walk in hospital room? 3  -JAMEEL --     AM-PAC 6 Clicks Score (PT) 18  -JAMEEL --     Highest level of mobility 6 --> Walked 10 steps or more  -JAMEEL --        How much help from another is currently needed...    Putting on and taking off regular lower body clothing? -- 3  -EC     Bathing (including washing, rinsing, and drying) -- 3  -EC     Toileting (which includes using toilet bed pan or urinal) -- 3  -EC     Putting on and taking off regular upper body clothing -- 4  -EC     Taking care of personal grooming (such as brushing teeth) -- 4  -EC     Eating meals -- 4  -EC     AM-PAC 6 Clicks Score (OT) -- 21  -EC        Functional Assessment    Outcome Measure Options AM-PAC 6 Clicks Basic Mobility (PT)  -JAMEEL AM-PAC 6 Clicks Daily Activity (OT)  -EC               User Key  (r) = Recorded By, (t) = Taken By, (c) = Cosigned By      Initials Name Provider Type    JAMEEL Lico Celaya, PTA Physical Therapist Assistant    Manisha Sotelo OTR/L Occupational Therapist                     Time Calculation:    PT Charges       Row Name 11/05/23 1427 11/05/23 0934          Time Calculation    Start Time 1427  -JAMEEL 0934  -JAMEEL     Stop Time 1445  -JAMEEL 1000  -JAMEEL     Time Calculation  (min) 18 min  -JAMEEL 26 min  -JAMEEL     PT Received On 11/05/23  -JAMEEL 11/05/23  -JAMEEL        Time Calculation- PT    Total Timed Code Minutes- PT 18 minute(s)  -JAMEEL 26 minute(s)  -JAMEEL        Timed Charges    59185 - PT Therapeutic Exercise Minutes -- 10  -JAMEEL     73750 - Gait Training Minutes  18  -JAMEEL 16  -JAMEEL        Total Minutes    Timed Charges Total Minutes 18  -JAMEEL 26  -JAMEEL      Total Minutes 18  -JAMEEL 26  -JAMEEL               User Key  (r) = Recorded By, (t) = Taken By, (c) = Cosigned By      Initials Name Provider Type    Lico Cross PTA Physical Therapist Assistant                  Therapy Charges for Today       Code Description Service Date Service Provider Modifiers Qty    94226558388 HC GAIT TRAINING EA 15 MIN 11/4/2023 Lico Celaya, PTA GP 2    70415942867 HC GAIT TRAINING EA 15 MIN 11/5/2023 Lico Celaya, PTA GP 1    71172648733 HC PT THER PROC EA 15 MIN 11/5/2023 Lico Celaya, PTA GP 1    49720778959 HC GAIT TRAINING EA 15 MIN 11/5/2023 Lico Celaya, PTA GP 1            PT G-Codes  Outcome Measure Options: AM-PAC 6 Clicks Basic Mobility (PT)  AM-PAC 6 Clicks Score (PT): 18  AM-PAC 6 Clicks Score (OT): 21    Lico Celaya PTA  11/5/2023

## 2023-11-05 NOTE — PLAN OF CARE
Goal Outcome Evaluation:  Plan of Care Reviewed With: patient           Outcome Evaluation: Pt has had no complaints of pain.  Pt daughter at bedside.  Pt safety maintained and will continue to monitor.

## 2023-11-06 LAB
ALBUMIN SERPL-MCNC: 2.8 G/DL (ref 3.5–5.2)
ALBUMIN/GLOB SERPL: 1.2 G/DL
ALP SERPL-CCNC: 70 U/L (ref 39–117)
ALT SERPL W P-5'-P-CCNC: 5 U/L (ref 1–41)
ANION GAP SERPL CALCULATED.3IONS-SCNC: 10 MMOL/L (ref 5–15)
AST SERPL-CCNC: 11 U/L (ref 1–40)
BASOPHILS # BLD AUTO: 0 10*3/MM3 (ref 0–0.2)
BASOPHILS NFR BLD AUTO: 0 % (ref 0–1.5)
BILIRUB SERPL-MCNC: 0.2 MG/DL (ref 0–1.2)
BUN SERPL-MCNC: 33 MG/DL (ref 8–23)
BUN/CREAT SERPL: 20 (ref 7–25)
CALCIUM SPEC-SCNC: 8.1 MG/DL (ref 8.6–10.5)
CHLORIDE SERPL-SCNC: 107 MMOL/L (ref 98–107)
CO2 SERPL-SCNC: 23 MMOL/L (ref 22–29)
CREAT SERPL-MCNC: 1.65 MG/DL (ref 0.76–1.27)
DEPRECATED RDW RBC AUTO: 48.9 FL (ref 37–54)
EGFRCR SERPLBLD CKD-EPI 2021: 40.2 ML/MIN/1.73
EOSINOPHIL # BLD AUTO: 0 10*3/MM3 (ref 0–0.4)
EOSINOPHIL NFR BLD AUTO: 0 % (ref 0.3–6.2)
ERYTHROCYTE [DISTWIDTH] IN BLOOD BY AUTOMATED COUNT: 13.9 % (ref 12.3–15.4)
GEN 5 2HR TROPONIN T REFLEX: 35 NG/L
GLOBULIN UR ELPH-MCNC: 2.3 GM/DL
GLUCOSE SERPL-MCNC: 118 MG/DL (ref 65–99)
HCT VFR BLD AUTO: 28.6 % (ref 37.5–51)
HGB BLD-MCNC: 8.9 G/DL (ref 13–17.7)
IMM GRANULOCYTES # BLD AUTO: 0.06 10*3/MM3 (ref 0–0.05)
IMM GRANULOCYTES NFR BLD AUTO: 1.1 % (ref 0–0.5)
LYMPHOCYTES # BLD AUTO: 0.67 10*3/MM3 (ref 0.7–3.1)
LYMPHOCYTES NFR BLD AUTO: 12.4 % (ref 19.6–45.3)
MCH RBC QN AUTO: 29.9 PG (ref 26.6–33)
MCHC RBC AUTO-ENTMCNC: 31.1 G/DL (ref 31.5–35.7)
MCV RBC AUTO: 96 FL (ref 79–97)
MONOCYTES # BLD AUTO: 0.15 10*3/MM3 (ref 0.1–0.9)
MONOCYTES NFR BLD AUTO: 2.8 % (ref 5–12)
NEUTROPHILS NFR BLD AUTO: 4.53 10*3/MM3 (ref 1.7–7)
NEUTROPHILS NFR BLD AUTO: 83.7 % (ref 42.7–76)
NRBC BLD AUTO-RTO: 0 /100 WBC (ref 0–0.2)
PLATELET # BLD AUTO: 192 10*3/MM3 (ref 140–450)
PMV BLD AUTO: 9.2 FL (ref 6–12)
POTASSIUM SERPL-SCNC: 3.9 MMOL/L (ref 3.5–5.2)
PROT SERPL-MCNC: 5.1 G/DL (ref 6–8.5)
RBC # BLD AUTO: 2.98 10*6/MM3 (ref 4.14–5.8)
SODIUM SERPL-SCNC: 140 MMOL/L (ref 136–145)
TROPONIN T DELTA: 4 NG/L
TROPONIN T SERPL HS-MCNC: 31 NG/L
TROPONIN T SERPL HS-MCNC: 44 NG/L
WBC NRBC COR # BLD: 5.41 10*3/MM3 (ref 3.4–10.8)

## 2023-11-06 PROCEDURE — 94799 UNLISTED PULMONARY SVC/PX: CPT

## 2023-11-06 PROCEDURE — 93010 ELECTROCARDIOGRAM REPORT: CPT | Performed by: INTERNAL MEDICINE

## 2023-11-06 PROCEDURE — A9270 NON-COVERED ITEM OR SERVICE: HCPCS | Performed by: NEUROLOGICAL SURGERY

## 2023-11-06 PROCEDURE — 63710000001 TAMSULOSIN 0.4 MG CAPSULE: Performed by: NEUROLOGICAL SURGERY

## 2023-11-06 PROCEDURE — 93005 ELECTROCARDIOGRAM TRACING: CPT | Performed by: HOSPITALIST

## 2023-11-06 PROCEDURE — 99214 OFFICE O/P EST MOD 30 MIN: CPT | Performed by: HOSPITALIST

## 2023-11-06 PROCEDURE — 99024 POSTOP FOLLOW-UP VISIT: CPT | Performed by: NURSE PRACTITIONER

## 2023-11-06 PROCEDURE — 63710000001 NICOTINE 14 MG/24HR PATCH 24 HOUR: Performed by: NEUROLOGICAL SURGERY

## 2023-11-06 PROCEDURE — 63710000001 CARBAMAZEPINE 200 MG TABLET: Performed by: NEUROLOGICAL SURGERY

## 2023-11-06 PROCEDURE — 63710000001 HYDROXYZINE 25 MG TABLET: Performed by: NEUROLOGICAL SURGERY

## 2023-11-06 PROCEDURE — 63710000001 PANTOPRAZOLE 40 MG TABLET DELAYED-RELEASE: Performed by: NEUROLOGICAL SURGERY

## 2023-11-06 PROCEDURE — 63710000001 CARVEDILOL 6.25 MG TABLET: Performed by: HOSPITALIST

## 2023-11-06 PROCEDURE — A9270 NON-COVERED ITEM OR SERVICE: HCPCS

## 2023-11-06 PROCEDURE — 63710000001 ISOSORBIDE MONONITRATE 60 MG TABLET SUSTAINED-RELEASE 24 HOUR: Performed by: NEUROLOGICAL SURGERY

## 2023-11-06 PROCEDURE — 94664 DEMO&/EVAL PT USE INHALER: CPT

## 2023-11-06 PROCEDURE — 63710000001 APIXABAN 2.5 MG TABLET: Performed by: NEUROLOGICAL SURGERY

## 2023-11-06 PROCEDURE — 84484 ASSAY OF TROPONIN QUANT: CPT | Performed by: HOSPITALIST

## 2023-11-06 PROCEDURE — 96376 TX/PRO/DX INJ SAME DRUG ADON: CPT

## 2023-11-06 PROCEDURE — 99232 SBSQ HOSP IP/OBS MODERATE 35: CPT

## 2023-11-06 PROCEDURE — 25010000002 ENOXAPARIN PER 10 MG: Performed by: HOSPITALIST

## 2023-11-06 PROCEDURE — 94761 N-INVAS EAR/PLS OXIMETRY MLT: CPT

## 2023-11-06 PROCEDURE — 63710000001 LEVOTHYROXINE 50 MCG TABLET: Performed by: NEUROLOGICAL SURGERY

## 2023-11-06 PROCEDURE — 63710000001 GUAIFENESIN 600 MG TABLET SUSTAINED-RELEASE 12 HOUR: Performed by: NEUROLOGICAL SURGERY

## 2023-11-06 PROCEDURE — 85025 COMPLETE CBC W/AUTO DIFF WBC: CPT | Performed by: STUDENT IN AN ORGANIZED HEALTH CARE EDUCATION/TRAINING PROGRAM

## 2023-11-06 PROCEDURE — G0378 HOSPITAL OBSERVATION PER HR: HCPCS

## 2023-11-06 PROCEDURE — 25010000002 HYDROMORPHONE PER 4 MG: Performed by: NEUROLOGICAL SURGERY

## 2023-11-06 PROCEDURE — 63710000001 CHOLECALCIFEROL 25 MCG (1000 UT) TABLET: Performed by: NEUROLOGICAL SURGERY

## 2023-11-06 PROCEDURE — 80053 COMPREHEN METABOLIC PANEL: CPT | Performed by: STUDENT IN AN ORGANIZED HEALTH CARE EDUCATION/TRAINING PROGRAM

## 2023-11-06 PROCEDURE — A9270 NON-COVERED ITEM OR SERVICE: HCPCS | Performed by: HOSPITALIST

## 2023-11-06 PROCEDURE — 63710000001 CALCIUM CARB-CHOLECALCIFEROL 600-20 MG-MCG TABLET: Performed by: NEUROLOGICAL SURGERY

## 2023-11-06 PROCEDURE — 25810000003 SODIUM CHLORIDE 0.9 % SOLUTION: Performed by: NEUROLOGICAL SURGERY

## 2023-11-06 PROCEDURE — 63710000001 ASPIRIN 81 MG CHEWABLE TABLET

## 2023-11-06 PROCEDURE — 25010000002 DEXAMETHASONE PER 1 MG: Performed by: NEUROLOGICAL SURGERY

## 2023-11-06 PROCEDURE — 25010000002 PIPERACILLIN SOD-TAZOBACTAM PER 1 G: Performed by: NEUROLOGICAL SURGERY

## 2023-11-06 PROCEDURE — 63710000001 FINASTERIDE 5 MG TABLET: Performed by: NEUROLOGICAL SURGERY

## 2023-11-06 PROCEDURE — 97116 GAIT TRAINING THERAPY: CPT

## 2023-11-06 PROCEDURE — 63710000001 NITROGLYCERIN 0.4 MG SUBLINGUAL TABLET: Performed by: NEUROLOGICAL SURGERY

## 2023-11-06 RX ORDER — ASPIRIN 81 MG/1
81 TABLET ORAL DAILY
Status: DISCONTINUED | OUTPATIENT
Start: 2023-11-07 | End: 2023-11-07 | Stop reason: HOSPADM

## 2023-11-06 RX ORDER — ROSUVASTATIN CALCIUM 20 MG/1
40 TABLET, COATED ORAL NIGHTLY
Status: DISCONTINUED | OUTPATIENT
Start: 2023-11-06 | End: 2023-11-07 | Stop reason: HOSPADM

## 2023-11-06 RX ORDER — ENOXAPARIN SODIUM 100 MG/ML
1 INJECTION SUBCUTANEOUS EVERY 24 HOURS
Status: DISCONTINUED | OUTPATIENT
Start: 2023-11-06 | End: 2023-11-07

## 2023-11-06 RX ORDER — ASPIRIN 81 MG/1
324 TABLET, CHEWABLE ORAL DAILY
Status: COMPLETED | OUTPATIENT
Start: 2023-11-06 | End: 2023-11-06

## 2023-11-06 RX ADMIN — CARVEDILOL 12.5 MG: 6.25 TABLET, FILM COATED ORAL at 16:50

## 2023-11-06 RX ADMIN — APIXABAN 2.5 MG: 2.5 TABLET, FILM COATED ORAL at 08:22

## 2023-11-06 RX ADMIN — DEXAMETHASONE SODIUM PHOSPHATE 4 MG: 4 INJECTION, SOLUTION INTRAMUSCULAR; INTRAVENOUS at 06:24

## 2023-11-06 RX ADMIN — FINASTERIDE 5 MG: 5 TABLET, FILM COATED ORAL at 08:26

## 2023-11-06 RX ADMIN — TAMSULOSIN HYDROCHLORIDE 0.4 MG: 0.4 CAPSULE ORAL at 08:30

## 2023-11-06 RX ADMIN — HYDROXYZINE HYDROCHLORIDE 50 MG: 25 TABLET ORAL at 21:44

## 2023-11-06 RX ADMIN — DEXAMETHASONE SODIUM PHOSPHATE 4 MG: 4 INJECTION, SOLUTION INTRAMUSCULAR; INTRAVENOUS at 17:03

## 2023-11-06 RX ADMIN — Medication 1000 UNITS: at 10:07

## 2023-11-06 RX ADMIN — PANTOPRAZOLE SODIUM 40 MG: 40 TABLET, DELAYED RELEASE ORAL at 08:33

## 2023-11-06 RX ADMIN — NITROGLYCERIN 0.4 MG: 0.4 TABLET SUBLINGUAL at 14:09

## 2023-11-06 RX ADMIN — HYDROMORPHONE HYDROCHLORIDE 0.5 MG: 1 INJECTION, SOLUTION INTRAMUSCULAR; INTRAVENOUS; SUBCUTANEOUS at 11:10

## 2023-11-06 RX ADMIN — NICOTINE 1 PATCH: 14 PATCH, EXTENDED RELEASE TRANSDERMAL at 08:22

## 2023-11-06 RX ADMIN — Medication 10 ML: at 21:48

## 2023-11-06 RX ADMIN — DEXAMETHASONE SODIUM PHOSPHATE 4 MG: 4 INJECTION, SOLUTION INTRAMUSCULAR; INTRAVENOUS at 23:10

## 2023-11-06 RX ADMIN — PIPERACILLIN SODIUM AND TAZOBACTAM SODIUM 3.38 G: 3; .375 INJECTION, SOLUTION INTRAVENOUS at 06:24

## 2023-11-06 RX ADMIN — ENOXAPARIN SODIUM 50 MG: 100 INJECTION SUBCUTANEOUS at 21:48

## 2023-11-06 RX ADMIN — HYDROXYZINE HYDROCHLORIDE 50 MG: 25 TABLET ORAL at 14:30

## 2023-11-06 RX ADMIN — Medication 10 ML: at 08:30

## 2023-11-06 RX ADMIN — SODIUM CHLORIDE 50 ML/HR: 9 INJECTION, SOLUTION INTRAVENOUS at 21:49

## 2023-11-06 RX ADMIN — ISOSORBIDE MONONITRATE 60 MG: 60 TABLET, EXTENDED RELEASE ORAL at 08:29

## 2023-11-06 RX ADMIN — ASPIRIN 324 MG: 81 TABLET, CHEWABLE ORAL at 17:03

## 2023-11-06 RX ADMIN — NITROGLYCERIN 0.4 MG: 0.4 TABLET SUBLINGUAL at 14:30

## 2023-11-06 RX ADMIN — Medication 1 TABLET: at 08:23

## 2023-11-06 RX ADMIN — CARBAMAZEPINE 200 MG: 200 TABLET ORAL at 08:23

## 2023-11-06 RX ADMIN — GUAIFENESIN 1200 MG: 600 TABLET, EXTENDED RELEASE ORAL at 21:44

## 2023-11-06 RX ADMIN — PIPERACILLIN SODIUM AND TAZOBACTAM SODIUM 3.38 G: 3; .375 INJECTION, SOLUTION INTRAVENOUS at 14:30

## 2023-11-06 RX ADMIN — BUDESONIDE AND FORMOTEROL FUMARATE DIHYDRATE 2 PUFF: 160; 4.5 AEROSOL RESPIRATORY (INHALATION) at 06:32

## 2023-11-06 RX ADMIN — BUDESONIDE AND FORMOTEROL FUMARATE DIHYDRATE 2 PUFF: 160; 4.5 AEROSOL RESPIRATORY (INHALATION) at 21:28

## 2023-11-06 RX ADMIN — DEXAMETHASONE SODIUM PHOSPHATE 4 MG: 4 INJECTION, SOLUTION INTRAMUSCULAR; INTRAVENOUS at 11:55

## 2023-11-06 RX ADMIN — HYDROXYZINE HYDROCHLORIDE 50 MG: 25 TABLET ORAL at 08:29

## 2023-11-06 RX ADMIN — GUAIFENESIN 1200 MG: 600 TABLET, EXTENDED RELEASE ORAL at 08:28

## 2023-11-06 RX ADMIN — LEVOTHYROXINE SODIUM 50 MCG: 50 TABLET ORAL at 06:24

## 2023-11-06 NOTE — PLAN OF CARE
Goal Outcome Evaluation:  Plan of Care Reviewed With: patient        Progress: no change   Pt is a&o but can be forgetful at times. Up x1 with walker. Sat up in chair today. No c/o pain so far this shift. Dressing CDI. Voiding per pw per pt pt request. MUNOZ. PPP.

## 2023-11-06 NOTE — NURSING NOTE
Approx 1405 I was called to pt room.  Pt daughter stated that pt c/o 8/10 left arm pain and this was the same exact scenario of his past Mi's.  I ordered a EKG, got a set of vitals, notified Dr Brennan, gave pt nitro SL.    Before the EKG was completed, pt daughter repositioned EKG stickers.  MD was in room at this time, and gave orders as needed.  Pt daughter attempted to give orders while medical personnel was working with pt.  Pt daughter made it difficult to hear orders and maneuver around pt and room because she was talking over us and in the way.

## 2023-11-06 NOTE — PROGRESS NOTES
"Pharmacy Dosing Service  Anticoagulant  Enoxaparin    Assessment/Action/Plan:  Lovenox dosed at 1mg/kig 50mg sc q24 based on indication and renal function. Pharmacy will follow daily and adjust as needed.      Subjective:  Brennon Vance is a 88 y.o. male   Pharmacy to dose Lovenox for indication of ACS.    Objective:  [Ht: 172.7 cm (68\"); Wt: 47.9 kg (105 lb 8 oz); BMI: Body mass index is 16.04 kg/m².]  Estimated Creatinine Clearance: 21.8 mL/min (A) (by C-G formula based on SCr of 1.65 mg/dL (H)).        Lab Results   Component Value Date    HGB 8.9 (L) 11/06/2023    HGB 11.5 (L) 11/05/2023    HGB 9.9 (L) 11/03/2023      Lab Results   Component Value Date     11/06/2023     11/05/2023     11/03/2023       BRYANNA Alexander, PharmD  11/06/23 16:26 CST     "

## 2023-11-06 NOTE — CONSULTS
[facility] CONSULT NOTE    Referring Provider: Arnav Guan MD    Reason for Consultation: Chest pain    Chief complaint:   Chief Complaint   Patient presents with    Back Pain     BACK PAIN. RIGHT RIB PAIN. NUMBNESS/TINGLING IN LEGS. HX KYPHOPLASTY END OF SEPT 2023       Subjective .     History of present illness:  Brennon Vance is a 88 y.o. male with known history of coronary artery disease, paroxysmal atrial fibrillation, COPD, hypertension, hyperlipidemia, tobacco abuse, and cardiomyopathy who cardiology is asked to evaluate due to sudden onset chest pain.    Patient was last seen by cardiology during hospitalization in May 2021.  He was seen at that time as a transfer due to non-STEMI.  Patient was taken to cardiac catheterization lab which revealed a 75% stenosis of the left circumflex/OM and a  of the RCA.  It appears during that hospitalization that he had acute closure of his right femoral artery and required vascular surgery to repair the artery.  CT surgery was consulted during this hospitalization and recommended CABG but the patient refused.  It appears he was offered a  procedure but he want to think about it.  He is to follow-up with Dr. Araujo in [place].    Patient presented to [facility] 11/2/2023 due to intractable back pain.  He had had a recent admission for this and was found to have a T12 and L3 fracture with need for kyphoplasty.  Unfortunately the patient began living in assisted living 10/27/2023.  He presented back on 11/2 due to worsening of his severe back pain.  CTs obtained did not show any acute problems.  Patient is unable to provide reliable history and therefore his daughter provides.  MRI of the back ultimately showed new compression fracture.  Patient was taken for kyphoplasty on 11/4/2023.  She appears to be recovering from this decently well.  Subsequently it appears the family has decided for potential hospice referral.  Palliative  care nurse practitioner following along.    According to nursing note, at approximately 1405, the patient was complaining of left arm pain 8 out of 10.  The attending physician came to bedside and gave sublingual nitroglycerin x2.      Upon my examination the patient is lying comfortably in bed.  Daughter is at bedside.  Patient states that left arm pain began around 1400.  Patient states the pain was gradual at first but then escalated to about 8 out of 10.  Because of this he notified his daughter who notified nursing staff.  He said that his pain resolved after the second nitroglycerin.  He states this pain is equivalent to when he has had in-stent restenosis in the past.  At the time my examination the patient is in no distress.      History  Past Medical History:   Diagnosis Date    CAD (coronary artery disease)     CKD (chronic kidney disease) stage 3, GFR 30-59 ml/min     COPD (chronic obstructive pulmonary disease)     Hiatal hernia     Hyperlipidemia     Hypertension     Stroke    ,   Past Surgical History:   Procedure Laterality Date    ABDOMINAL AORTIC ANEURYSM REPAIR      CARDIAC CATHETERIZATION N/A 5/20/2021    Procedure: Left Heart Cath;  Surgeon: Harrison Alcantara MD;  Location:  [facility] CATH INVASIVE LOCATION;  Service: Cardiology;  Laterality: N/A;    CORONARY ANGIOPLASTY WITH STENT PLACEMENT      FEMORAL ARTERY STENT      KYPHOPLASTY N/A 11/4/2023    Procedure: KYPHOPLASTY WITHOUT BIOPSY T-11;  Surgeon: Jeremiah Brantley MD;  Location:  [facility] OR;  Service: Neurosurgery;  Laterality: N/A;    KYPHOPLASTY WITH BIOPSY N/A 9/26/2023    Procedure: KYPHOPLASTY WITH BIOPSY T12 and L3;  Surgeon: Jeremiah Brantley MD;  Location:  [facility] OR;  Service: Neurosurgery;  Laterality: N/A;    LEG THROMBECTOMY/EMBOLECTOMY Right 5/20/2021    Procedure: RT GROIN EXPLORATION;  Surgeon: Geoff Remy DO;  Location:  [facility] HYBRID OR 12;  Service: Vascular;  Laterality: Right;    TRIGEMINAL NERVE DECOMPRESSION      ,   Family History   Problem Relation Age of Onset    No Known Problems Mother     No Known Problems Father    ,   Social History     Tobacco Use    Smoking status: Former     Packs/day: 4     Types: Cigars, Cigarettes     Quit date: 10/24/2023     Years since quittin.0    Smokeless tobacco: Never   Vaping Use    Vaping Use: Never used   Substance Use Topics    Alcohol use: No    Drug use: No   ,     Medications    Prior to Admission medications    Medication Sig Start Date End Date Taking? Authorizing Provider   albuterol sulfate  (90 Base) MCG/ACT inhaler Inhale 2 puffs Every 4 (Four) Hours As Needed for Shortness of Air.   Yes ProviderSarah MD   apixaban (ELIQUIS) 5 MG tablet tablet Take 1 tablet by mouth 2 (Two) Times a Day.   Yes ProviderSarah MD   budesonide-formoterol (SYMBICORT) 160-4.5 MCG/ACT inhaler Inhale 2 puffs 2 (Two) Times a Day. 5/10/18  Yes Zulema Marc APRN   carBAMazepine (TEGretol) 200 MG tablet Take 1 tablet by mouth Daily.   Yes ProviderSarah MD   carvedilol (COREG) 12.5 MG tablet Take 1 tablet by mouth 2 (Two) Times a Day With Meals. 21  Yes Brennon Dorado MD   cholecalciferol (VITAMIN D3) 25 MCG (1000 UT) tablet Take 1 tablet by mouth Daily.   Yes ProviderSarah MD   diltiaZEM CD (CARDIZEM CD) 180 MG 24 hr capsule Take 1 capsule by mouth Daily. 5/10/18  Yes Zulema Marc APRN   finasteride (PROSCAR) 5 MG tablet Take 1 tablet by mouth Daily. 23  Yes Radha Aaron APRN   furosemide (LASIX) 40 MG tablet Take 1 tablet by mouth Daily.   Yes Sarah Berg MD   guaiFENesin ER 1200 MG tablet sustained-release 12 hour Take 1 tablet by mouth 2 (Two) Times a Day.   Yes Sarah Berg MD   hydrOXYzine (ATARAX) 50 MG tablet Take 1 tablet by mouth Every 12 (Twelve) Hours As Needed for Itching.   Yes Sarah Berg MD   isosorbide mononitrate (IMDUR) 60 MG 24 hr tablet Take 1 tablet by mouth Daily.   Yes Provider  MD Sarah   levothyroxine (SYNTHROID, LEVOTHROID) 50 MCG tablet Take 1 tablet by mouth Daily.   Yes ProviderSarah MD   magnesium oxide (MAG-OX) 400 MG tablet Take 0.5 tablets by mouth 2 (Two) Times a Day.   Yes Sarah Berg MD   melatonin 5 MG tablet tablet Take 1 tablet by mouth At Night As Needed (sleep). 9/29/23  Yes Radha Aaron APRN   nicotine (NICODERM CQ) 21 MG/24HR patch Place 1 patch on the skin as directed by provider Daily.   Yes Sarah Berg MD   pantoprazole (PROTONIX) 40 MG EC tablet Take 1 tablet by mouth Daily.   Yes Sarah Berg MD   potassium chloride (K-DUR,KLOR-CON) 20 MEQ CR tablet Take 1 tablet by mouth Daily.   Yes Sarah Berg MD   rosuvastatin (CRESTOR) 20 MG tablet Take 1 tablet by mouth Every Night.   Yes Sarah Berg MD   tamsulosin (FLOMAX) 0.4 MG capsule 24 hr capsule Take 1 capsule by mouth Daily. 9/30/23  Yes Radha Aaron APRN       Current Facility-Administered Medications   Medication Dose Route Frequency Provider Last Rate Last Admin    acetaminophen (TYLENOL) tablet 650 mg  650 mg Oral Q4H PRN Jeremiah Brantley MD        Or    acetaminophen (TYLENOL) 160 MG/5ML oral solution 650 mg  650 mg Oral Q4H PRN Jeremiah Brantley MD        Or    acetaminophen (TYLENOL) suppository 650 mg  650 mg Rectal Q4H PRN Jeremiah Brantley MD        albuterol (PROVENTIL) nebulizer solution 0.083% 2.5 mg/3mL  2.5 mg Nebulization Q4H PRN Jeremiah Brantley MD        apixaban (ELIQUIS) tablet 2.5 mg  2.5 mg Oral Q12H Jeremiah Brantley MD   2.5 mg at 11/06/23 0822    sennosides-docusate (PERICOLACE) 8.6-50 MG per tablet 1 tablet  1 tablet Oral BID Jeremiah Brantley MD   1 tablet at 11/05/23 0851    And    polyethylene glycol (MIRALAX) packet 17 g  17 g Oral Daily PRN Jeremiah Brantley MD        And    bisacodyl (DULCOLAX) EC tablet 5 mg  5 mg Oral Daily PRN Jeremiah Brantley MD        And    bisacodyl (DULCOLAX) suppository 10 mg  10 mg Rectal Daily  PRN Jeremiah Brantley MD        budesonide-formoterol (SYMBICORT) 160-4.5 MCG/ACT inhaler 2 puff  2 puff Inhalation BID - RT Jeremiah Brantley MD   2 puff at 11/06/23 0632    Calcium Carb-Cholecalciferol 600-20 MG-MCG tablet 1 tablet  1 tablet Oral Daily Jeremiah Brantley MD   1 tablet at 11/06/23 0823    carBAMazepine (TEGretol) tablet 200 mg  200 mg Oral Daily Jeremiah Brantley MD   200 mg at 11/06/23 0823    carvedilol (COREG) tablet 12.5 mg  12.5 mg Oral BID With Meals Jeremiah Brantley MD   12.5 mg at 11/05/23 1727    cholecalciferol (VITAMIN D3) tablet 1,000 Units  1,000 Units Oral Daily Jeremiah Brantley MD   1,000 Units at 11/06/23 1007    dexAMETHasone (DECADRON) injection 4 mg  4 mg Intravenous Q6H Jeremiah Brantley MD   4 mg at 11/06/23 1155    dilTIAZem CD (CARDIZEM CD) 24 hr capsule 180 mg  180 mg Oral Daily Jeremiah Brantley MD   180 mg at 11/05/23 0851    finasteride (PROSCAR) tablet 5 mg  5 mg Oral Daily Jeremiah Brantley MD   5 mg at 11/06/23 0826    guaiFENesin (MUCINEX) 12 hr tablet 1,200 mg  1,200 mg Oral Q12H Jeremiah Brantley MD   1,200 mg at 11/06/23 0828    HYDROcodone-acetaminophen (NORCO) 5-325 MG per tablet 1 tablet  1 tablet Oral Q4H PRN Jeremiah Brantley MD   1 tablet at 11/04/23 1720    HYDROmorphone (DILAUDID) injection 0.5 mg  0.5 mg Intravenous Q4H PRN Jeremiah Brantley MD   0.5 mg at 11/06/23 1110    And    naloxone (NARCAN) injection 0.4 mg  0.4 mg Intravenous Q5 Min PRN Jeremiah Brantley MD        hydrOXYzine (ATARAX) tablet 50 mg  50 mg Oral TID Jeremiah Brantley MD   50 mg at 11/06/23 1430    isosorbide mononitrate (IMDUR) 24 hr tablet 60 mg  60 mg Oral Daily Jeremiah Brantley MD   60 mg at 11/06/23 0829    levothyroxine (SYNTHROID, LEVOTHROID) tablet 50 mcg  50 mcg Oral Q AM Jeremiah Brantley MD   50 mcg at 11/06/23 0624    melatonin tablet 5 mg  5 mg Oral Nightly PRN Jeremiah Brantley MD        nicotine (NICODERM CQ) 14 MG/24HR patch 1 patch  1 patch Transdermal Q24H Karon  Jeremiah GOULD MD   1 patch at 11/06/23 0822    nitroglycerin (NITROSTAT) SL tablet 0.4 mg  0.4 mg Sublingual Q5 Min PRN Jeremiah Brantley MD   0.4 mg at 11/06/23 1430    ondansetron (ZOFRAN) tablet 4 mg  4 mg Oral Q6H PRN Jeremiah Brantley MD        Or    ondansetron (ZOFRAN) injection 4 mg  4 mg Intravenous Q6H PRN Jeremiah Brantley MD        pantoprazole (PROTONIX) EC tablet 40 mg  40 mg Oral Daily Jeremiah Brantley MD   40 mg at 11/06/23 0833    piperacillin-tazobactam (ZOSYN) 3.375 g in iso-osmotic dextrose 50 ml (premix)  3.375 g Intravenous Q8H Jeremiah Brantley MD   3.375 g at 11/06/23 1430    sodium chloride 0.9 % flush 10 mL  10 mL Intravenous Q12H Jeremiah Brantley MD   10 mL at 11/06/23 0830    sodium chloride 0.9 % flush 10 mL  10 mL Intravenous PRN Jeremiah Brantley MD   10 mL at 11/05/23 2220    sodium chloride 0.9 % infusion 40 mL  40 mL Intravenous PRN Jeremiah Brantley MD        sodium chloride 0.9 % infusion  50 mL/hr Intravenous Continuous Jeremiah Brantley MD 50 mL/hr at 11/05/23 2219 50 mL/hr at 11/05/23 2219    tamsulosin (FLOMAX) 24 hr capsule 0.4 mg  0.4 mg Oral Daily Jeremiah Brantley MD   0.4 mg at 11/06/23 0830       Allergies:  Benzodiazepines and Lyrica [pregabalin]    Review of Systems  Review of Systems   Constitutional: Negative.   HENT: Negative.     Eyes: Negative.    Cardiovascular: Negative.    Respiratory: Negative.     Endocrine: Negative.    Hematologic/Lymphatic: Negative.    Skin: Negative.    Musculoskeletal: Negative.    Gastrointestinal: Negative.    Genitourinary: Negative.    Neurological: Negative.    Psychiatric/Behavioral: Negative.         Objective     Physical Exam:  Patient Vitals for the past 24 hrs:   BP Temp Temp src Pulse Resp SpO2   11/06/23 1409 173/96 -- -- 116 -- --   11/06/23 1114 141/67 97.6 °F (36.4 °C) Axillary 76 16 93 %   11/06/23 0727 176/49 97.5 °F (36.4 °C) Axillary 65 18 95 %   11/06/23 0635 -- -- -- 64 16 96 %   11/06/23 0632 -- -- -- 77 16 95 %    11/06/23 0300 160/66 97.4 °F (36.3 °C) Oral 58 16 94 %   11/06/23 0036 147/61 97.1 °F (36.2 °C) Oral 62 16 95 %   11/05/23 2022 144/62 97.6 °F (36.4 °C) Oral 69 -- 94 %   11/05/23 1544 147/56 -- Oral 70 18 96 %     Constitutional:       Appearance: Healthy appearance. Not in distress.   Neck:      Vascular: JVD normal.   Pulmonary:      Effort: Pulmonary effort is normal.      Breath sounds: Normal breath sounds. No wheezing. No rhonchi. No rales.   Cardiovascular:      PMI at left midclavicular line. Normal rate. Regular rhythm. Normal S1. Normal S2.       Murmurs: There is no murmur.      No gallop.  No click. No rub.   Edema:     Peripheral edema absent.   Musculoskeletal: Normal range of motion.      Cervical back: Normal range of motion. Skin:     General: Skin is warm and dry.   Neurological:      Mental Status: Alert and oriented to person, place and time.         Results Review:   I reviewed the patient's new clinical results.    Lab Results (last 72 hours)       Procedure Component Value Units Date/Time    Comprehensive Metabolic Panel [[id number]]  (Abnormal) Collected: 11/06/23 0352    Specimen: Blood Updated: 11/06/23 0437     Glucose 118 mg/dL      BUN 33 mg/dL      Creatinine 1.65 mg/dL      Sodium 140 mmol/L      Potassium 3.9 mmol/L      Chloride 107 mmol/L      CO2 23.0 mmol/L      Calcium 8.1 mg/dL      Total Protein 5.1 g/dL      Albumin 2.8 g/dL      ALT (SGPT) 5 U/L      AST (SGOT) 11 U/L      Alkaline Phosphatase 70 U/L      Total Bilirubin 0.2 mg/dL      Globulin 2.3 gm/dL      A/G Ratio 1.2 g/dL      BUN/Creatinine Ratio 20.0     Anion Gap 10.0 mmol/L      eGFR 40.2 mL/min/1.73     Narrative:      GFR Normal >60  Chronic Kidney Disease <60  Kidney Failure <15    The GFR formula is only valid for adults with stable renal function between ages 18 and 70.    CBC & Differential [[id number]]  (Abnormal) Collected: 11/06/23 0352    Specimen: Blood Updated: 11/06/23 0435    Narrative:      The  following orders were created for panel order CBC & Differential.  Procedure                               Abnormality         Status                     ---------                               -----------         ------                     CBC Auto Differential[[id number]]        Abnormal            Final result                 Please view results for these tests on the individual orders.    CBC Auto Differential [[id number]]  (Abnormal) Collected: 11/06/23 0352    Specimen: Blood Updated: 11/06/23 0435     WBC 5.41 10*3/mm3      RBC 2.98 10*6/mm3      Hemoglobin 8.9 g/dL      Hematocrit 28.6 %      MCV 96.0 fL      MCH 29.9 pg      MCHC 31.1 g/dL      RDW 13.9 %      RDW-SD 48.9 fl      MPV 9.2 fL      Platelets 192 10*3/mm3      Neutrophil % 83.7 %      Lymphocyte % 12.4 %      Monocyte % 2.8 %      Eosinophil % 0.0 %      Basophil % 0.0 %      Immature Grans % 1.1 %      Neutrophils, Absolute 4.53 10*3/mm3      Lymphocytes, Absolute 0.67 10*3/mm3      Monocytes, Absolute 0.15 10*3/mm3      Eosinophils, Absolute 0.00 10*3/mm3      Basophils, Absolute 0.00 10*3/mm3      Immature Grans, Absolute 0.06 10*3/mm3      nRBC 0.0 /100 WBC     Urine Culture - Urine, Urine, Clean Catch [[id number]]  (Abnormal)  (Susceptibility) Collected: 11/02/23 2024    Specimen: Urine, Clean Catch Updated: 11/05/23 1202     Urine Culture >100,000 CFU/mL Pseudomonas aeruginosa    Narrative:      Colonization of the urinary tract without infection is common. Treatment is discouraged unless the patient is symptomatic, pregnant, or undergoing an invasive urologic procedure.    Susceptibility        Pseudomonas aeruginosa      JUAN JOSÉ      Cefepime Susceptible      Ceftazidime Susceptible      Ciprofloxacin Susceptible      Gentamicin Susceptible      Levofloxacin Susceptible      Piperacillin + Tazobactam Susceptible                           Comprehensive Metabolic Panel [[id number]]  (Abnormal) Collected: 11/05/23 0914    Specimen: Blood Updated:  11/05/23 1054     Glucose 144 mg/dL      BUN 30 mg/dL      Creatinine 1.68 mg/dL      Sodium 141 mmol/L      Potassium 3.7 mmol/L      Comment: Slight hemolysis detected by analyzer. Results may be affected.        Chloride 104 mmol/L      CO2 23.0 mmol/L      Calcium 8.3 mg/dL      Total Protein 6.5 g/dL      Albumin 3.3 g/dL      ALT (SGPT) 7 U/L      AST (SGOT) 15 U/L      Alkaline Phosphatase 96 U/L      Total Bilirubin 0.2 mg/dL      Globulin 3.2 gm/dL      A/G Ratio 1.0 g/dL      BUN/Creatinine Ratio 17.9     Anion Gap 14.0 mmol/L      eGFR 39.3 mL/min/1.73     Narrative:      GFR Normal >60  Chronic Kidney Disease <60  Kidney Failure <15    The GFR formula is only valid for adults with stable renal function between ages 18 and 70.    CBC & Differential [[id number]]  (Abnormal) Collected: 11/05/23 0914    Specimen: Blood Updated: 11/05/23 1035    Narrative:      The following orders were created for panel order CBC & Differential.  Procedure                               Abnormality         Status                     ---------                               -----------         ------                     CBC Auto Differential[[id number]]        Abnormal            Final result                 Please view results for these tests on the individual orders.    CBC Auto Differential [[id number]]  (Abnormal) Collected: 11/05/23 0914    Specimen: Blood Updated: 11/05/23 1035     WBC 6.56 10*3/mm3      RBC 3.75 10*6/mm3      Hemoglobin 11.5 g/dL      Hematocrit 35.8 %      MCV 95.5 fL      MCH 30.7 pg      MCHC 32.1 g/dL      RDW 14.1 %      RDW-SD 48.8 fl      MPV 9.4 fL      Platelets 256 10*3/mm3      Neutrophil % 89.1 %      Lymphocyte % 8.2 %      Monocyte % 1.8 %      Eosinophil % 0.0 %      Basophil % 0.0 %      Immature Grans % 0.9 %      Neutrophils, Absolute 5.84 10*3/mm3      Lymphocytes, Absolute 0.54 10*3/mm3      Monocytes, Absolute 0.12 10*3/mm3      Eosinophils, Absolute 0.00 10*3/mm3      Basophils,  Absolute 0.00 10*3/mm3      Immature Grans, Absolute 0.06 10*3/mm3      nRBC 0.0 /100 WBC     POC Glucose Once [[id number]]  (Abnormal) Collected: 11/04/23 1110    Specimen: Blood Updated: 11/04/23 1122     Glucose 149 mg/dL      Comment: : [id number]  (Sims) MistyMeter ID: [id number]               Lab Results   Component Value Date    ECHOEFEST 65 05/09/2018       Imaging Results (Last 72 Hours)       Procedure Component Value Units Date/Time    XR Spine Lumbar AP & Lateral [[id number]] Collected: 11/04/23 0954     Updated: 11/04/23 1000    Narrative:      EXAMINATION: XR SPINE LUMBAR AP AND LATERAL-  11/4/2023 9:54 AM CDT     HISTORY: Kyphoplasty.     Fluoroscopy time: 22.4 seconds.     Dose: 1.8441 mGy. 2 images     FINDINGS: C-arm was used intraoperatively during performance of a  kyphoplasty at the T11 level. The patient has undergone previous  kyphoplasty at T12. Films are available to the OR for review.     This report was signed and finalized on 11/4/2023 9:57 AM CDT by Dr. Clinton Mcgrath MD.       FL C Arm During Surgery [[id number]] Resulted: 11/04/23 0954     Updated: 11/04/23 0954    Narrative:      This procedure was auto-finalized with no dictation required.    MRI Thoracic Spine Without Contrast [[id number]] Collected: 11/03/23 1530     Updated: 11/03/23 1544    Narrative:      EXAMINATION:  MRI THORACIC SPINE WO CONTRAST-  11/3/2023 11:40 AM CDT     HISTORY: comrpession fractures; M54.6-Pain in thoracic spine;  N39.0-Urinary tract infection, site not specified; S22.069A-Unspecified  fracture of T7-t8 vertebra, initial encounter for closed fracture;  S22.089A-Unspecified fracture of t11-T12 vertebra, initial encounter for  closed fracture; M54.9-Dorsalgia, unspecified     TECHNIQUE: Multiplanar imaging was performed.     COMPARISON: No comparison study.     BONE MARROW AND SPINAL CORD: There has been kyphoplasty at T12. Minimal  posterior displacement of the superior endplate at T12 causes  minimal  dural sac compression but no cord compression. There is edema within the  T12 vertebral body consistent with the fracture being acute. There is  also T1 low signal and T2 high signal within the T11 vertebral body with  very minimal wedge shape of T11. The findings are consistent with an  acute fracture. There is a thin linear band of T2 high signal within the  T8 vertebral body seen best on the STIR sequence. I suspect the T8  compression deformity is likely chronic. There are chronic compression  deformities of T4 and T9. There is a 9-10 mm T2 high signal lesion in  the T7 vertebral body of indeterminate etiology. There is no signal  abnormality within the thoracic spinal cord.     DISC LEVELS: The thoracic spine disc spaces are fairly well preserved in  height with mild narrowing at some levels. No significant disc  herniation or disc bulging is visualized.     ADDITIONAL FINDINGS: There are small bilateral pleural effusions.          Impression:      1. Status post kyphoplasty at T12. There is some edema within the T12  vertebral body consistent with the acute nature of the fracture.  2. Acute minimal compression deformity of T11. There is mild edema  within the vertebral body.  3. The T8 compression deformity is likely chronic in age. There is a  thin linear band of T2 high signal within the body that may represent  residual fracture line. However, there is no other significant edema  within the T8 vertebrae.  4. A 9-10 mm T2 high signal lesion in the T7 vertebrae is not a typical  hemangioma. It may be an atypical hemangioma. A metastatic bone lesion  is also considered in this age group. However, it is probably less  likely with no clinical history of neoplasm.  5. Chronic compression deformities of T4 and T9.  6. Thoracic disc spaces are fairly well-maintained throughout. No disc  herniation or significant disc bulging is seen.  7. Small bilateral pleural effusions.        This report was signed and  "finalized on 11/3/2023 3:40 PM CDT by Dr. Juan Carlos Arevalo MD.                     Assessment & Plan         Chest pain: Left arm pain resolved after nitroglycerin x2.  Patient in no current distress.  - EKG did not show any signs of ongoing ischemia  - Troponin pending  - Patient and daughter are requesting left heart catheterization, however they are also and potential work-up for hospice services  - We had a long discussion about role of PCI in patients and how it may not be appropriate in situations where patient is considering hospice  - Patient states he does not want any sort of surgical intervention (more specifically \" I do not want my chest cracked open\") I discussed with him that if he went to the Cath Lab and something was to occur that he may have to be taken for emergent surgery.  - At this time I believe there is no emergent indication for left heart catheterization, I told the family that they need to have long discussion about whether this is something when they want to go through with as it may potentially lead to a surgery given his known history of  and 75% left circumflex  - We will get echocardiogram at this time.      Further orders per Dr. Cruz    Thank you for asking us to follow this patient with you.       Electronically signed by CONCEPCION Hilliard, 11/06/23, 2:53 PM CST.    "

## 2023-11-06 NOTE — DISCHARGE PLACEMENT REQUEST
"Haley Plummer (88 y.o. Male)       Date of Birth   1935    Social Security Number       Address   [address] [place] [address]    Home Phone   [phone]    MRN   [id number]       [facility]    Marital Status                               Admission Date   11/2/23    Admission Type   Emergency    Admitting Provider   Sidra Brennan MD    Attending Provider   Sidra Brennan MD    Department, Room/Bed   [facility] 3A, 338/1       Discharge Date       Discharge Disposition       Discharge Destination                                 Attending Provider: Sidra Brennan MD    Allergies: Benzodiazepines, Lyrica [Pregabalin]    Isolation: None   Infection: None   Code Status: No CPR    Ht: 172.7 cm (68\")   Wt: 47.9 kg (105 lb 8 oz)    Admission Cmt: None   Principal Problem: Intractable back pain [M54.9]                   Active Insurance as of 11/2/2023       Primary Coverage       Payor Plan Insurance Group Employer/Plan Group    ANTHEM MEDICARE REPLACEMENT [employer] MEDICARE ADVANTAGE [id number]       Payor Plan Address Payor Plan Phone Number Payor Plan Fax Number Effective Dates    [address] [phone]  1/1/2018 - None Entered    [facility] [id number]         Subscriber Name Subscriber Birth Date Member ID       HALEY PLUMMER 1935 [id number]                     Emergency Contacts        (Rel.) Home Phone Work Phone Mobile Phone    Gaby Rankin (Daughter) -- -- [phone]    Homa Patrick (Daughter) [phone] -- [phone]    Tierra Gorman (Relative) [phone] -- --              Insurance Information                  ANTHEM MEDICARE REPLACEMENT/ANTHEM MEDICARE ADVANTAGE Phone: [phone]    Subscriber: Haley Plummer Subscriber#: [id number]    Group#: [id number] Precert#: --             History & Physical        Lakshmi Montoya APRN at 11/02/23 7263       Attestation signed by Arnav Guan MD at 11/03/23 0152    I have reviewed " this documentation and agree.                      [facility] Medicine Services  HISTORY AND PHYSICAL    Date of Admission: 11/2/2023  Primary Care Physician: Javid Grajeda MD    Subjective   Primary Historian: Patient's daughter Gaby    Chief Complaint: Intractable back pain    History of Present Illness  Brennon Vance is an 88-year-old male with a past medical history of coronary artery disease, COPD with recent cessation of 4 pack a day smoking, hypertension, hyperlipidemia, BPH, stroke, chronic spinal compression fracture, chronic kidney disease stage IIIb, chronic anemia suspect of chronic disease, BMI 17 upon admission, less than 15 at discharge.  Recent admission 9/24 - 10/5, 2023 with intractable back pain found to have T12 and L3 fractures with need for kyphoplasty per Dr. Jeremiah Brantley.  Patient was also treated for UTI at that time (Pseudomonas aeruginosa), treated with IV antibiotics.  Patient discharged to [facility] rehab facility.  He has subsequently moved to assisted living 10/27/2023.  Daughter Gaby provides history as patient is confused.  She states she drove up from [place] to visit today.  He complained of severe back pain and apparently has been having pain for 3 days.  She reports he bent over to put on his socks 3 days ago and felt sudden pain mid back pain.  Work-up reveals T8 and T11 acute fractures.  Patient continues to have UTI-culture pending, hemoglobin 9 at baseline, creatinine 1.77 at baseline.  CTs of the cervical and lumbar spines without acute problems identified.  Patient unable to provide history.  I asked if he would like to have something to eat and he told me he just had breakfast.  Daughter states he has been extremely confused.  He has received multiple doses of opioids in the emergency department without voiced improvement in pain.  Currently he is unable to score his pain.  Condition is stable.  He is admitted for further  evaluation treatment.    Review of Systems   Otherwise complete ROS reviewed and negative except as mentioned in the HPI.    Past Medical History:   Past Medical History:   Diagnosis Date    CAD (coronary artery disease)     COPD (chronic obstructive pulmonary disease)     Hiatal hernia     Hyperlipidemia     Hypertension     Stroke      Past Surgical History:  Past Surgical History:   Procedure Laterality Date    ABDOMINAL AORTIC ANEURYSM REPAIR      CARDIAC CATHETERIZATION N/A 5/20/2021    Procedure: Left Heart Cath;  Surgeon: Harrison Alcantara MD;  Location:  [facility] CATH INVASIVE LOCATION;  Service: Cardiology;  Laterality: N/A;    CORONARY ANGIOPLASTY WITH STENT PLACEMENT      FEMORAL ARTERY STENT      KYPHOPLASTY WITH BIOPSY N/A 9/26/2023    Procedure: KYPHOPLASTY WITH BIOPSY T12 and L3;  Surgeon: Jeremiah Brantley MD;  Location:  [facility] OR;  Service: Neurosurgery;  Laterality: N/A;    LEG THROMBECTOMY/EMBOLECTOMY Right 5/20/2021    Procedure: RT GROIN EXPLORATION;  Surgeon: Geoff Remy DO;  Location:  [facility] HYBRID OR 12;  Service: Vascular;  Laterality: Right;    TRIGEMINAL NERVE DECOMPRESSION       Social History:  reports that he quit smoking 9 days ago. His smoking use included cigars and cigarettes. He smoked an average of 4 packs per day. He has never used smokeless tobacco. He reports that he does not drink alcohol and does not use drugs.    Family History: family history includes No Known Problems in his father and mother.       Allergies:  Allergies   Allergen Reactions    Benzodiazepines Confusion    Lyrica [Pregabalin] Other (See Comments)     Passing out/syncope       Medications:  Prior to Admission medications    Medication Sig Start Date End Date Taking? Authorizing Provider   albuterol (PROVENTIL) (2.5 MG/3ML) 0.083% nebulizer solution Take 2.5 mg by nebulization Every 4 (Four) Hours As Needed for Wheezing.    Provider, MD Sarah   albuterol sulfate  (90 Base) MCG/ACT  inhaler Inhale 2 puffs Every 4 (Four) Hours As Needed for Shortness of Air.    Sarah Berg MD   apixaban (ELIQUIS) 5 MG tablet tablet Take 2.5 mg by mouth 2 (Two) Times a Day.    Sarah Berg MD   budesonide-formoterol (SYMBICORT) 160-4.5 MCG/ACT inhaler Inhale 2 puffs 2 (Two) Times a Day. 5/10/18   Zulema Marc APRN   carBAMazepine (TEGretol) 200 MG tablet Take 1 tablet by mouth Daily.    Sarah Berg MD   carvedilol (COREG) 12.5 MG tablet Take 1 tablet by mouth 2 (Two) Times a Day With Meals. 5/22/21   Brennon Dorado MD   cholecalciferol (VITAMIN D3) 25 MCG (1000 UT) tablet Take 1 tablet by mouth Daily.    Sarah Berg MD   diltiaZEM CD (CARDIZEM CD) 180 MG 24 hr capsule Take 1 capsule by mouth Daily. 5/10/18   Zulema Marc APRN   diphenoxylate-atropine (LOMOTIL) 2.5-0.025 MG per tablet As Needed.    Sarah Berg MD   finasteride (PROSCAR) 5 MG tablet Take 1 tablet by mouth Daily. 9/30/23   Radha Aaron APRN   furosemide (LASIX) 40 MG tablet Take 1 tablet by mouth Daily.    Sarah Berg MD   guaiFENesin (MUCINEX) 600 MG 12 hr tablet Take 2 tablets by mouth 2 (Two) Times a Day.    Sarah Berg MD   hydrOXYzine (ATARAX) 50 MG tablet Take 1 tablet by mouth 2 (Two) Times a Day As Needed for Itching or Anxiety. 9/29/23   Radha Aaron APRN   isosorbide mononitrate (IMDUR) 60 MG 24 hr tablet Take 1 tablet by mouth Daily.    Sarah Berg MD   levothyroxine (SYNTHROID, LEVOTHROID) 50 MCG tablet Take 1 tablet by mouth Daily.    Sarah Berg MD   melatonin 5 MG tablet tablet Take 1 tablet by mouth At Night As Needed (sleep). 9/29/23   Radha Aaron APRN   nicotine (NICODERM CQ) 21 MG/24HR patch Place 1 patch on the skin as directed by provider Daily. 9/30/23   Radha Aaron APRN   nitroglycerin (NITROSTAT) 0.4 MG SL tablet 1 tablet As Needed.    Provider, MD Sarah   pantoprazole (PROTONIX) 40 MG EC tablet Take 1 tablet by  "mouth Daily.    Sarah Berg MD   potassium chloride (K-DUR,KLOR-CON) 20 MEQ CR tablet Take 1 tablet by mouth Daily.    Sarah Berg MD   rosuvastatin (CRESTOR) 40 MG tablet Take 0.5 tablets by mouth Every Night.    Sarah Berg MD   Specialty Vitamins Products (mg-plus protein) 133 MG elemental magnesium tablet Take 1 tablet by mouth 2 (Two) Times a Day.    Sarah Berg MD   tamsulosin (FLOMAX) 0.4 MG capsule 24 hr capsule Take 1 capsule by mouth Daily. 9/30/23   Radha Aaron APRN     I have utilized all available immediate resources to obtain, update, or review the patient's current medications (including all prescriptions, over-the-counter products, herbals, cannabis/cannabidiol products, and vitamin/mineral/dietary (nutritional) supplements).    Objective     Vital Signs: /55 (BP Location: Right arm)   Pulse 80   Temp 99.4 °F (37.4 °C) (Oral)   Resp 15   Ht 172.7 cm (68\")   Wt 45.8 kg (101 lb)   SpO2 95%   BMI 15.36 kg/m²   Physical Exam  Vitals reviewed.   Constitutional:       Appearance: He is ill-appearing.      Comments: Frail, cachectic, unkept   HENT:      Head: Normocephalic and atraumatic.      Mouth/Throat:      Mouth: Mucous membranes are moist.      Pharynx: Oropharynx is clear.   Eyes:      Extraocular Movements: Extraocular movements intact.      Conjunctiva/sclera: Conjunctivae normal.   Cardiovascular:      Rate and Rhythm: Normal rate. Rhythm irregular.   Pulmonary:      Effort: Pulmonary effort is normal.      Comments: Diminished throughout  Abdominal:      General: There is no distension.      Palpations: Abdomen is soft.   Musculoskeletal:      Cervical back: Normal range of motion.      Right lower leg: No edema.      Left lower leg: No edema.      Comments: Generalized weakness and debility, pain with movement   Skin:     General: Skin is warm and dry.   Neurological:      Mental Status: He is alert. Mental status is at baseline. He is " disoriented.   Psychiatric:         Mood and Affect: Mood normal.         Behavior: Behavior normal.        Results Reviewed:  Lab Results (last 24 hours)       Procedure Component Value Units Date/Time    Basic Metabolic Panel [[id number]]  (Abnormal) Collected: 11/02/23 2035    Specimen: Blood Updated: 11/02/23 2121     Glucose 93 mg/dL      BUN 28 mg/dL      Creatinine 1.77 mg/dL      Sodium 137 mmol/L      Potassium 3.7 mmol/L      Comment: Slight hemolysis detected by analyzer. Results may be affected.        Chloride 101 mmol/L      CO2 25.0 mmol/L      Calcium 8.2 mg/dL      BUN/Creatinine Ratio 15.8     Anion Gap 11.0 mmol/L      eGFR 36.9 mL/min/1.73     CBC Auto Differential [[id number]]  (Abnormal) Collected: 11/02/23 2035    Specimen: Blood Updated: 11/02/23 2105     WBC 5.36 10*3/mm3      RBC 2.91 10*6/mm3      Hemoglobin 9.0 g/dL      Hematocrit 28.3 %      MCV 97.3 fL      MCH 30.9 pg      MCHC 31.8 g/dL      RDW 13.5 %      RDW-SD 48.0 fl      MPV 9.5 fL      Platelets 196 10*3/mm3      Neutrophil % 59.3 %      Lymphocyte % 25.2 %      Monocyte % 12.3 %      Eosinophil % 1.9 %      Basophil % 0.7 %      Immature Grans % 0.6 %      Neutrophils, Absolute 3.18 10*3/mm3      Lymphocytes, Absolute 1.35 10*3/mm3      Monocytes, Absolute 0.66 10*3/mm3      Eosinophils, Absolute 0.10 10*3/mm3      Basophils, Absolute 0.04 10*3/mm3      Immature Grans, Absolute 0.03 10*3/mm3      nRBC 0.0 /100 WBC     Urinalysis With Culture If Indicated - Urine, Clean Catch [[id number]]  (Abnormal) Collected: 11/02/23 2024    Specimen: Urine, Clean Catch Updated: 11/02/23 2103     Color, UA Yellow     Appearance, UA Cloudy     pH, UA 5.5     Specific Gravity, UA 1.012     Glucose, UA Negative     Ketones, UA Negative     Bilirubin, UA Negative     Blood, UA Trace     Protein, UA Negative     Leuk Esterase, UA Large (3+)     Nitrite, UA Positive     Urobilinogen, UA 0.2 E.U./dL    Urinalysis, Microscopic Only - Urine, Clean  Catch [[id number]]  (Abnormal) Collected: 11/02/23 2024    Specimen: Urine, Clean Catch Updated: 11/02/23 2103     RBC, UA 0-2 /HPF      WBC, UA Too Numerous to Count /HPF      Bacteria, UA 2+ /HPF      Squamous Epithelial Cells, UA 3-6 /HPF      Hyaline Casts, UA None Seen /LPF      Methodology Manual Light Microscopy    Urine Culture - Urine, Urine, Clean Catch [[id number]] Collected: 11/02/23 2024    Specimen: Urine, Clean Catch Updated: 11/02/23 2103          Imaging Results (Last 24 Hours)       Procedure Component Value Units Date/Time    CT Thoracic Spine Without Contrast [[id number]] Collected: 11/02/23 1835     Updated: 11/02/23 1845    Narrative:      CT THORACIC SPINE WO CONTRAST- 11/2/2023 5:35 PM CDT     HISTORY: Back pain     COMPARISON: None      DLP: 352 mGy cm     TECHNIQUE: Serial helical tomographic images of the thoracic spine were  obtained without the use of intravenous contrast. Multiplanar  reformatted images were provided for review.     Automated exposure control was utilized to reduce patient radiation  dose.     FINDINGS:     Bony elements are diffusely osteopenic. Previous T12 kyphoplasty. Acute  appearing T11 fracture with approximately 25% loss of height in the  anterior column. There is a subtle posterior cortical buckling as well  (series 9-images 24 and 23) without significant loss of height in the  middle column. The posterior elements are intact at this level. There  are additional T8, T9 and T4 level compression fractures. The T4 and T9  level fractures appear more chronic. The T8 fracture could be acute or  subacute and this is also a 2 column fracture with loss of height in  both the anterior and middle columns. There is no listhesis or evidence  of traumatic subluxation. In general, the posterior elements are intact  throughout the thoracic spine. Lung emphysema.       Impression:      1. Bony elements are diffusely osteopenic. There are 2 column fracture  deformities at both T8  and T11 which seem likely acute or subacute.  Approximately 25% loss of height in the middle column at T8. No  measurable loss of height in the middle column at T11. There is only  slight posterior cortical buckling at both levels, not resulting in a  significant spinal stenosis. Posterior elements are intact. No traumatic  subluxation.           This report was signed and finalized on 11/2/2023 6:42 PM CDT by Dr Ifeanyi Angulo.       CT Lumbar Spine Without Contrast [[id number]] Collected: 11/02/23 1831     Updated: 11/02/23 1838    Narrative:      CT LUMBAR SPINE WO CONTRAST- 11/2/2023 5:35 PM CDT     HISTORY: pain, recent kyphoplasty     COMPARISON: None      DLP: 270 mGy cm     TECHNIQUE: Serial helical tomographic images of the lumbar spine were  obtained without the use of intravenous contrast. Additionally, sagittal  and coronal reformatted images were provided for review. Automated  exposure control is utilized to reduce patient radiation dose.     FINDINGS:     Counting will assume 5 lumbar-type vertebrae. The spine is imaged from  T12 to S3.     Bony elements are diffusely osteopenic. Previous T12 and L3 level  kyphoplasty. Vertebral body heights are otherwise maintained. Lordosis  is preserved. No listhesis or subluxation. Posterior elements are  intact. Previous AAA repair.       Impression:      1. Previous T12 and L3 kyphoplasties. Vertebral body heights are  otherwise maintained. No new fracture identified.  2. Osteopenia.           This report was signed and finalized on 11/2/2023 6:35 PM CDT by Dr Ifeanyi Angulo.       CT Cervical Spine Without Contrast [[id number]] Collected: 11/02/23 1822     Updated: 11/02/23 1835    Narrative:      CT CERVICAL SPINE WO CONTRAST- 11/2/2023 5:35 PM CDT     HISTORY: Neck pain     COMPARISON: None      DOSE LENGTH PRODUCT: 291 mGy cm. Automated exposure control was also  utilized to decrease patient radiation dose.     TECHNIQUE: Serial helical tomographic images of  the cervical spine were  obtained without the use of intravenous contrast. Additionally, sagittal  and coronal reformatted images were also provided for review.     FINDINGS:     The spine is visualized from the posterior fossa through T2.  Incidentally noted atlantooccipital assimilation, incomplete type.  Additional C2-C3 level non-segmentation. Preserved cervical lordosis.  There is a degenerative retrolisthesis at C3-C4. Degenerative  anterolisthesis at C4-C5 and C7-T1. No acute fracture is identified.  Vertebral body heights are well-maintained. Multilevel loss of  intervertebral disc height. No high-grade bony narrowing of the spinal  canal is identified. Advanced facet arthropathy. Posterior elements are  intact. Carotid bulb atheromatous calcification. Bilateral apical  pleural thickening.       Impression:      1. No acute fracture identified.  2. 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  3. Advanced osteoarthritis changes.           This report was signed and finalized on 11/2/2023 6:31 PM CDT by Dr Ifeanyi Angulo.             Assessment / Plan   Assessment:   Active Hospital Problems    Diagnosis     **Intractable back pain     Protein-calorie malnutrition     Anemia, chronic disease     Memory difficulties     Closed fracture of eighth thoracic vertebra     Closed T11 fracture     Stage 3b chronic kidney disease     UTI (urinary tract infection)     Essential hypertension     Paroxysmal atrial fibrillation     Other emphysema        Treatment Plan  1.  The patient will be admitted to Dr. Guan's service here at [facility].   2.  Continue Rocephin 1 g IV daily  3.  Consult neurosurgery in a.m.  4.  Decadron 10 mg IV now followed with 4 mg every 6 hours  5.  Pain management  6.  Home medications reviewed and restarted as appropriate, hold diuretics for now  7.  Gentle hydration normal saline 50 mL/hour  8.  Supplemental oxygen, incentive spirometry  9.  Labs in a.m.  10.   Oral care, daily weights, turn every 2 hours  11.  Consult Tatian, PT, OT and   12.  DNR/DNI    Medical Decision Making  Number and Complexity of problems: 9  Differential Diagnosis: None    Conditions and Status        Condition is unchanged.     [facility] Data  External documents reviewed: Records from assisted living and [facility] rehab  Radiology interpretation: Reviewed  Labs reviewed: Reviewed  Any tests that were considered but not ordered: No     Decision rules/scores evaluated (example [id number], Wells, etc): No     Discussed with: Mervin Griffin (TALON) and Dr. Guan     Care Planning  Shared decision making: Mervin Griffin and Dr. Guan  Code status and discussions: DNR/DNI    Disposition  Social Determinants of Health that impact treatment or disposition: No  Estimated length of stay is 2+ days.     I confirmed that the patient's advanced care plan is present, code status is documented, and a surrogate decision maker is listed in the patient's medical record.     The patient's surrogate decision maker is daughter's Gaby and/or [employer]power of 's.     The patient was seen and examined by me on 11/2/2023 at 1:39 PM.    Electronically signed by CONCEPCION Lutz, 11/02/23, 23:39 CDT.               Electronically signed by Arnav Guan MD at 11/03/23 0152       Referral Orders (last 24 hours) (24h ago, onward)      None             Physician Progress Notes (last 48 hours)        Elyse Ching APRN at 11/06/23 1100              [facility] Palliative Care Services  Progress Note  Patient Name: Brennon Vance  Date of Admission: 11/2/2023  Today's Date: 11/06/23     Code Status and Medical Interventions:   Ordered at: 11/03/23 1208     Medical Intervention Limits:    NO intubation (DNI)    NO cardioversion    NO artificial nutrition    NO dialysis     Level Of Support Discussed With:    Patient     Code Status (Patient has no pulse and is not breathing):    No  "CPR (Do Not Attempt to Resuscitate)     Medical Interventions (Patient has pulse or is breathing):    Limited Support     Subjective   Chief complaint/Reason for Referral/Visit: Follow up on Goals of Care/Advance Care Planning.    Medical record reviewed. Events noted.  Underwent T11 kyphoplasty with biopsy on 11/4/2023 with Dr. Brantley.  Labs collected this morning reviewed.  He is lying in bed, awake and in no apparent distress at time of exam.  Reports he just received pain medication and pain is well controlled at this time.  His daughter, Gaby, is present.      Advance Care Planning   Advanced Directives: Patient has an advance directive on file. Patient reports document is valid.    Advance Care Planning Discussion: Followed up with Mr. Vance and his daughter to determine if they had any questions and/or concerns regarding previous discussions.  Daughter reflected on events over weekend and shared she feels her father is \"tired\" and \"ready to go.\"  Mr. Vance became tearful and shared he wants to get back to assisted living facility to be with his spouse.  Daughter inquired whether her father could have the same hospice team evaluate her father that is following patient's spouse.  Explained would discuss with  and place consult for hospice liaison to discuss further.  Patient/family denied any further questions and/or concerns at this time. Support provided.      The patient receives support from his spouse and children. Patient reports his daughters are his healthcare POA.     Due to the palliative care topics discussed including goals of care, treatment options, discharge options, and hospice services we will establish an advance care plan.    Goals of care: Ongoing.    Review of Systems   HENT:  Negative for congestion, sore throat and stridor.    Eyes:  Negative for pain, photophobia and visual disturbance.   Endocrine: Negative for polydipsia, polyphagia and polyuria.   Skin:  Negative for dry skin, " flushing and itching.   Musculoskeletal:  Positive for back pain (intermittently).   Psychiatric/Behavioral:  Negative for depression. The patient does not have insomnia and is not nervous/anxious.    Allergic/Immunologic: Negative for environmental allergies, hives and persistent infections.       Pain Assessment  Preferred Pain Scale: number (Numeric Rating Pain Scale)  Nonverbal Indicators of Pain: nonverbal indicators absent  CPOT Facial Expression: 0-->relaxed, neutral  CPOT Body Movements: 0-->absence of movements  CPOT Muscle Tension: 0-->relaxed  Ventilator Compliance/Vocalization: 0-->talking in normal tone or no sound  CPOT Score: 0  Pain Location: back  Pain Description: burning  Objective   Diagnostics: Reviewed      Intake/Output Summary (Last 24 hours) at 11/6/2023 1134  Last data filed at 11/6/2023 0334  Gross per 24 hour   Intake 600 ml   Output 200 ml   Net 400 ml     Current Facility-Administered Medications   Medication Dose Route Frequency Provider Last Rate Last Admin    acetaminophen (TYLENOL) tablet 650 mg  650 mg Oral Q4H PRN Jeremiah Brantley MD        Or    acetaminophen (TYLENOL) 160 MG/5ML oral solution 650 mg  650 mg Oral Q4H PRN Jeremiah Brantley MD        Or    acetaminophen (TYLENOL) suppository 650 mg  650 mg Rectal Q4H PRN Jeremiah Brantley MD        albuterol (PROVENTIL) nebulizer solution 0.083% 2.5 mg/3mL  2.5 mg Nebulization Q4H PRN Jeremiah Brantley MD        apixaban (ELIQUIS) tablet 2.5 mg  2.5 mg Oral Q12H Jeremiah Brantley MD   2.5 mg at 11/06/23 0822    sennosides-docusate (PERICOLACE) 8.6-50 MG per tablet 1 tablet  1 tablet Oral BID Jeremiah Brantley MD   1 tablet at 11/05/23 0851    And    polyethylene glycol (MIRALAX) packet 17 g  17 g Oral Daily PRN Jeremiah Brantley MD        And    bisacodyl (DULCOLAX) EC tablet 5 mg  5 mg Oral Daily PRN Jeremiah Brantley MD        And    bisacodyl (DULCOLAX) suppository 10 mg  10 mg Rectal Daily PRN Jeremiah Brantley MD         budesonide-formoterol (SYMBICORT) 160-4.5 MCG/ACT inhaler 2 puff  2 puff Inhalation BID - RT Jeremiah Brantley MD   2 puff at 11/06/23 0632    Calcium Carb-Cholecalciferol 600-20 MG-MCG tablet 1 tablet  1 tablet Oral Daily Jeremiah Brantley MD   1 tablet at 11/06/23 0823    carBAMazepine (TEGretol) tablet 200 mg  200 mg Oral Daily Jeremiah Brantley MD   200 mg at 11/06/23 0823    carvedilol (COREG) tablet 12.5 mg  12.5 mg Oral BID With Meals Jeremiah Brantley MD   12.5 mg at 11/05/23 1727    cholecalciferol (VITAMIN D3) tablet 1,000 Units  1,000 Units Oral Daily Jeremiah Brantley MD   1,000 Units at 11/06/23 1007    dexAMETHasone (DECADRON) injection 4 mg  4 mg Intravenous Q6H Jeremiah Brantley MD   4 mg at 11/06/23 0624    dilTIAZem CD (CARDIZEM CD) 24 hr capsule 180 mg  180 mg Oral Daily Jeremiah Brantley MD   180 mg at 11/05/23 0851    finasteride (PROSCAR) tablet 5 mg  5 mg Oral Daily Jeremiah Brantley MD   5 mg at 11/06/23 0826    guaiFENesin (MUCINEX) 12 hr tablet 1,200 mg  1,200 mg Oral Q12H Jeremiah Brantley MD   1,200 mg at 11/06/23 0828    HYDROcodone-acetaminophen (NORCO) 5-325 MG per tablet 1 tablet  1 tablet Oral Q4H PRN Jeremiah Brantley MD   1 tablet at 11/04/23 1720    HYDROmorphone (DILAUDID) injection 0.5 mg  0.5 mg Intravenous Q4H PRN Jeremiah Brantley MD   0.5 mg at 11/06/23 1110    And    naloxone (NARCAN) injection 0.4 mg  0.4 mg Intravenous Q5 Min PRN Jeremiah Brantley MD        hydrOXYzine (ATARAX) tablet 50 mg  50 mg Oral TID Jeremiah Brantley MD   50 mg at 11/06/23 0829    isosorbide mononitrate (IMDUR) 24 hr tablet 60 mg  60 mg Oral Daily Jeremiah Brantley MD   60 mg at 11/06/23 0829    levothyroxine (SYNTHROID, LEVOTHROID) tablet 50 mcg  50 mcg Oral Q AM Jeremiah Brantley MD   50 mcg at 11/06/23 0624    melatonin tablet 5 mg  5 mg Oral Nightly PRN Jeremiah Brantley MD        nicotine (NICODERM CQ) 14 MG/24HR patch 1 patch  1 patch Transdermal Q24H Jeremiah Brantley MD   1 patch at  "11/06/23 0822    nitroglycerin (NITROSTAT) SL tablet 0.4 mg  0.4 mg Sublingual Q5 Min PRN Jeremiah Brantley MD        ondansetron (ZOFRAN) tablet 4 mg  4 mg Oral Q6H PRN Jeremiah Brantley MD        Or    ondansetron (ZOFRAN) injection 4 mg  4 mg Intravenous Q6H PRN Jeremiah Brantley MD        pantoprazole (PROTONIX) EC tablet 40 mg  40 mg Oral Daily Jeremiah Brantley MD   40 mg at 11/06/23 0833    piperacillin-tazobactam (ZOSYN) 3.375 g in iso-osmotic dextrose 50 ml (premix)  3.375 g Intravenous Q8H Jeremiah Brantley MD   3.375 g at 11/06/23 0624    sodium chloride 0.9 % flush 10 mL  10 mL Intravenous Q12H Jeremiah Brantley MD   10 mL at 11/06/23 0830    sodium chloride 0.9 % flush 10 mL  10 mL Intravenous PRN Jeremiah Brantley MD   10 mL at 11/05/23 2220    sodium chloride 0.9 % infusion 40 mL  40 mL Intravenous PRN Jeremiah Brantley MD        sodium chloride 0.9 % infusion  50 mL/hr Intravenous Continuous Jeremiah Brantley MD 50 mL/hr at 11/05/23 2219 50 mL/hr at 11/05/23 2219    tamsulosin (FLOMAX) 24 hr capsule 0.4 mg  0.4 mg Oral Daily Jeremiah Brantley MD   0.4 mg at 11/06/23 0830     sodium chloride, 50 mL/hr, Last Rate: 50 mL/hr (11/05/23 2219)        acetaminophen **OR** acetaminophen **OR** acetaminophen    albuterol    senna-docusate sodium **AND** polyethylene glycol **AND** bisacodyl **AND** bisacodyl    HYDROcodone-acetaminophen    HYDROmorphone **AND** naloxone    melatonin    nitroglycerin    ondansetron **OR** ondansetron    sodium chloride    sodium chloride  Current medications patient is presently taking including all prescriptions, over-the-counter, herbals and vitamin/mineral/dietary (nutritional) supplements with reviewed including route, type, dose and frequency and are current per MAR at time of dictation.    Assessment:  Vital Signs: /67 (BP Location: Right arm, Patient Position: Lying)   Pulse 76   Temp 97.6 °F (36.4 °C) (Axillary)   Resp 16   Ht 172.7 cm (68\")   Wt 47.9 kg (105 " lb 8 oz)   SpO2 93%   BMI 16.04 kg/m²     Physical Exam  Vitals and nursing note reviewed.   Constitutional:       General: He is not in acute distress.     Appearance: He is ill-appearing.   HENT:      Head: Normocephalic and atraumatic.   Eyes:      General: Lids are normal.      Extraocular Movements: Extraocular movements intact.   Neck:      Vascular: No JVD.      Trachea: Trachea normal.   Cardiovascular:      Rate and Rhythm: Normal rate.   Pulmonary:      Effort: Pulmonary effort is normal.   Musculoskeletal:      Cervical back: Neck supple.   Skin:     General: Skin is warm and dry.   Neurological:      Mental Status: He is alert.   Psychiatric:         Mood and Affect: Affect is tearful.         Behavior: Behavior is cooperative.        Functional status: Palliative Performance Scale Score: Performance 50% based on the following measures: Ambulation: Mainly sit or lie down, Activity and Evidence of Disease: Unable to do any work, extensive evidence of disease, Self-Care: Considerable assistance required,  Intake: Normal or reduced, LOC: Full or confusion.  Nutritional status: Albumin 2.8. Body mass index is 16.04 kg/m².   Patient status: Disease state: Controlled with current treatments.    Active Hospital Problems    Diagnosis     **Intractable back pain     Severe malnutrition     Protein-calorie malnutrition     Anemia, chronic disease     Memory difficulties     Closed fracture of eighth thoracic vertebra     Closed T11 fracture     Stage 3b chronic kidney disease     UTI (urinary tract infection)     Essential hypertension     Paroxysmal atrial fibrillation     Other emphysema        Impression/Problem List:  Intractable low back pain  Severe malnutrition   Memory difficulties - Chronic ischemic changes per MRI of brain   Closed T11 fracture s/p kyphoplasty   Closed fracture of eighth thoracic vertebrae  Anemia of chronic disease  Stage IIIb chronic kidney disease  Urinary tract infection due to  pseudomonas aeruginosa   Paroxysmal atrial fibrillation  Other emphysema  History of kyphoplasty  Advanced age         Plan / Recommendations     Palliative Care Encounter   Goals of care include CODE STATUS NO CPR with limited support interventions.    Prognosis is guarded long-term secondary to intractable low back pain, severe malnutrition, chronic cerebral ischemic changes/memory difficulties, recent vertebral fractures requiring multiple kyphoplasty and other comorbidities listed above.     Followed up with Mr. Vance and his daughter to determine if they had any questions and/or concerns regarding previous discussions.  Details of discussion above.     Patient/family interested in hospice services.    Discussed with SW and hospice consult placed.      Patient/family denied any further questions and/or concerns at this time. Support provided.      Thank you for allowing us to participate in patient's plan of care. Palliative Care Team will continue to follow patient.     Time spent:35 minutes spent reviewing medical and medication records, assessing and examining patient, discussing with family, answering questions, providing some guidance about a plan and documentation of care, and coordinating care with other healthcare members, with > 50% time spent face to face.       Electronically signed byElyse APRN, 11/06/23.     Electronically signed by Elyse Ching APRN at 11/06/23 1156       Rohan Arizmendi APRN at 11/06/23 0806          Brennon Vance  88 y.o.      Chief complaint:   Compression fracture status post kyphoplasty no    Subjective  No events overnight    Temp:  [97.1 °F (36.2 °C)-97.6 °F (36.4 °C)] 97.5 °F (36.4 °C)  Heart Rate:  [58-77] 65  Resp:  [16-18] 18  BP: (127-176)/(49-66) 176/49      Objective:  General Appearance:  Comfortable, well-appearing, in no acute distress and not in pain.    Vital signs: (most recent): Blood pressure 176/49, pulse 65, temperature 97.5 °F (36.4 °C),  "temperature source Axillary, resp. rate 18, height 172.7 cm (68\"), weight 47.9 kg (105 lb 8 oz), SpO2 95%.  Vital signs are normal.  No fever.    Output: Producing urine.    HEENT: Normal HEENT exam.    Lungs:  Normal effort and normal respiratory rate.  He is not in respiratory distress.    Heart: Normal rate.  Regular rhythm.    Chest: Symmetric chest wall expansion.   Skin:  Warm and dry.    Abdomen: Abdomen is soft and non-distended.  Bowel sounds are normal.   There is no abdominal tenderness.     Pulses: Distal pulses are intact.        Neurologic Exam     Mental Status   Oriented to person, place, and time.   Attention: normal. Concentration: normal.   Speech: speech is normal   Level of consciousness: alert  Normal comprehension.     Cranial Nerves     CN II   Visual fields full to confrontation.     CN III, IV, VI   Pupils are equal, round, and reactive to light.  Extraocular motions are normal.     CN V   Facial sensation intact.     CN VII   Facial expression full, symmetric.     CN VIII   CN VIII normal.     CN IX, X   CN IX normal.   CN X normal.     CN XI   CN XI normal.     CN XII   CN XII normal.     Motor Exam   Muscle bulk: normal    Strength   Strength 5/5 throughout.     Sensory Exam   Light touch normal.       Lab Results (last 24 hours)       Procedure Component Value Units Date/Time    Comprehensive Metabolic Panel [[id number]]  (Abnormal) Collected: 11/06/23 0352    Specimen: Blood Updated: 11/06/23 0437     Glucose 118 mg/dL      BUN 33 mg/dL      Creatinine 1.65 mg/dL      Sodium 140 mmol/L      Potassium 3.9 mmol/L      Chloride 107 mmol/L      CO2 23.0 mmol/L      Calcium 8.1 mg/dL      Total Protein 5.1 g/dL      Albumin 2.8 g/dL      ALT (SGPT) 5 U/L      AST (SGOT) 11 U/L      Alkaline Phosphatase 70 U/L      Total Bilirubin 0.2 mg/dL      Globulin 2.3 gm/dL      A/G Ratio 1.2 g/dL      BUN/Creatinine Ratio 20.0     Anion Gap 10.0 mmol/L      eGFR 40.2 mL/min/1.73     Narrative:      " GFR Normal >60  Chronic Kidney Disease <60  Kidney Failure <15    The GFR formula is only valid for adults with stable renal function between ages 18 and 70.    CBC & Differential [[id number]]  (Abnormal) Collected: 11/06/23 0352    Specimen: Blood Updated: 11/06/23 0435    Narrative:      The following orders were created for panel order CBC & Differential.  Procedure                               Abnormality         Status                     ---------                               -----------         ------                     CBC Auto Differential[[id number]]        Abnormal            Final result                 Please view results for these tests on the individual orders.    CBC Auto Differential [[id number]]  (Abnormal) Collected: 11/06/23 0352    Specimen: Blood Updated: 11/06/23 0435     WBC 5.41 10*3/mm3      RBC 2.98 10*6/mm3      Hemoglobin 8.9 g/dL      Hematocrit 28.6 %      MCV 96.0 fL      MCH 29.9 pg      MCHC 31.1 g/dL      RDW 13.9 %      RDW-SD 48.9 fl      MPV 9.2 fL      Platelets 192 10*3/mm3      Neutrophil % 83.7 %      Lymphocyte % 12.4 %      Monocyte % 2.8 %      Eosinophil % 0.0 %      Basophil % 0.0 %      Immature Grans % 1.1 %      Neutrophils, Absolute 4.53 10*3/mm3      Lymphocytes, Absolute 0.67 10*3/mm3      Monocytes, Absolute 0.15 10*3/mm3      Eosinophils, Absolute 0.00 10*3/mm3      Basophils, Absolute 0.00 10*3/mm3      Immature Grans, Absolute 0.06 10*3/mm3      nRBC 0.0 /100 WBC     Urine Culture - Urine, Urine, Clean Catch [[id number]]  (Abnormal)  (Susceptibility) Collected: 11/02/23 2024    Specimen: Urine, Clean Catch Updated: 11/05/23 1202     Urine Culture >100,000 CFU/mL Pseudomonas aeruginosa    Narrative:      Colonization of the urinary tract without infection is common. Treatment is discouraged unless the patient is symptomatic, pregnant, or undergoing an invasive urologic procedure.    Susceptibility        Pseudomonas aeruginosa      JUAN JOSÉ      Cefepime Susceptible       Ceftazidime Susceptible      Ciprofloxacin Susceptible      Gentamicin Susceptible      Levofloxacin Susceptible      Piperacillin + Tazobactam Susceptible                           Comprehensive Metabolic Panel [[id number]]  (Abnormal) Collected: 11/05/23 0914    Specimen: Blood Updated: 11/05/23 1054     Glucose 144 mg/dL      BUN 30 mg/dL      Creatinine 1.68 mg/dL      Sodium 141 mmol/L      Potassium 3.7 mmol/L      Comment: Slight hemolysis detected by analyzer. Results may be affected.        Chloride 104 mmol/L      CO2 23.0 mmol/L      Calcium 8.3 mg/dL      Total Protein 6.5 g/dL      Albumin 3.3 g/dL      ALT (SGPT) 7 U/L      AST (SGOT) 15 U/L      Alkaline Phosphatase 96 U/L      Total Bilirubin 0.2 mg/dL      Globulin 3.2 gm/dL      A/G Ratio 1.0 g/dL      BUN/Creatinine Ratio 17.9     Anion Gap 14.0 mmol/L      eGFR 39.3 mL/min/1.73     Narrative:      GFR Normal >60  Chronic Kidney Disease <60  Kidney Failure <15    The GFR formula is only valid for adults with stable renal function between ages 18 and 70.    CBC & Differential [[id number]]  (Abnormal) Collected: 11/05/23 0914    Specimen: Blood Updated: 11/05/23 1035    Narrative:      The following orders were created for panel order CBC & Differential.  Procedure                               Abnormality         Status                     ---------                               -----------         ------                     CBC Auto Differential[[id number]]        Abnormal            Final result                 Please view results for these tests on the individual orders.    CBC Auto Differential [[id number]]  (Abnormal) Collected: 11/05/23 0914    Specimen: Blood Updated: 11/05/23 1035     WBC 6.56 10*3/mm3      RBC 3.75 10*6/mm3      Hemoglobin 11.5 g/dL      Hematocrit 35.8 %      MCV 95.5 fL      MCH 30.7 pg      MCHC 32.1 g/dL      RDW 14.1 %      RDW-SD 48.8 fl      MPV 9.4 fL      Platelets 256 10*3/mm3      Neutrophil % 89.1 %       Lymphocyte % 8.2 %      Monocyte % 1.8 %      Eosinophil % 0.0 %      Basophil % 0.0 %      Immature Grans % 0.9 %      Neutrophils, Absolute 5.84 10*3/mm3      Lymphocytes, Absolute 0.54 10*3/mm3      Monocytes, Absolute 0.12 10*3/mm3      Eosinophils, Absolute 0.00 10*3/mm3      Basophils, Absolute 0.00 10*3/mm3      Immature Grans, Absolute 0.06 10*3/mm3      nRBC 0.0 /100 WBC                 Plan:   Patient is doing well.  Back pain under control.  Patient is walking with physical and Occupational Therapy without any increase in back pain.  Discussed the patient's situation with family this morning.  They are looking into a hospice option.  We will follow-up with the patient on an as-needed basis.  They were told to call us if any increase in back pain issues.  Okay to be DC'd from a neurosurgical standpoint      Intractable back pain    Paroxysmal atrial fibrillation    Other emphysema    Essential hypertension    UTI (urinary tract infection)    Stage 3b chronic kidney disease    Protein-calorie malnutrition    Anemia, chronic disease    Memory difficulties    Closed fracture of eighth thoracic vertebra    Closed T11 fracture    Severe malnutrition        CONCEPCION Drew      Electronically signed by Rohan Arizmendi APRN at 11/06/23 0809       Micki Solano MD at 11/05/23 0817              [facility] Medicine Services  INPATIENT PROGRESS NOTE    Patient Name: Brennon Vance  Date of Admission: 11/2/2023  Today's Date: 11/05/23  Length of Stay: 0  Primary Care Physician: Javid Grajeda MD    Subjective   Chief Complaint: follow up    88-year-old male with history of spinal compression fractures it was admitted for intractable back pain.  Imaging obtained on admission were concerning for an acute fracture.  Neurosurgery was consulted with plans for patient to have an MRI done and possible surgical repair.    11/5: Urine culture grew gram neg bacilli.  Currently on zosyn. Awaiting susceptibility report. Pain is well controlled. Vital signs are stable. Some elevation in SBP this morning.     11/4: MRI resulted and patient was taken to surgery this morning for T11 kyphoplasty and vertebral body biopsy.  Patient was seen after surgery back in his room.  Patient reports doing well.  Pain is well controlled.  Vital signs are stable.    11/3: No acute event reported overnight.  Pain is better controlled on Dilaudid.  Vital signs are stable.  Neurosurgery consult in place.  Urine culture in process.    Review of Systems   Constitutional:  Negative for fatigue and fever.   Respiratory: Negative.     Cardiovascular: Negative.    Gastrointestinal: Negative.    Musculoskeletal:  Negative for back pain.      All pertinent negatives and positives are as above. All other systems have been reviewed and are negative unless otherwise stated.     Objective    Temp:  [97.6 °F (36.4 °C)-98 °F (36.7 °C)] 97.6 °F (36.4 °C)  Heart Rate:  [60-70] 70  Resp:  [15-18] 18  BP: (127-179)/(50-68) 147/56  Physical Exam  Vitals and nursing note reviewed.   Constitutional:       General: He is not in acute distress.     Appearance: He is not ill-appearing, toxic-appearing or diaphoretic.      Comments: Frail older gentleman   HENT:      Head: Normocephalic and atraumatic.      Right Ear: External ear normal.      Left Ear: External ear normal.   Eyes:      General: No scleral icterus.     Extraocular Movements: Extraocular movements intact.      Conjunctiva/sclera: Conjunctivae normal.   Cardiovascular:      Rate and Rhythm: Normal rate and regular rhythm.      Heart sounds: Normal heart sounds.   Pulmonary:      Effort: Pulmonary effort is normal. No respiratory distress.      Breath sounds: Normal breath sounds.   Chest:      Chest wall: No tenderness.   Abdominal:      General: Bowel sounds are normal.      Palpations: Abdomen is soft.      Tenderness: There is no abdominal tenderness.  "  Musculoskeletal:         General: No swelling or tenderness.      Cervical back: Normal range of motion and neck supple.      Right lower leg: No edema.      Left lower leg: No edema.   Skin:     General: Skin is warm and dry.      Capillary Refill: Capillary refill takes less than 2 seconds.      Findings: No erythema or rash.   Neurological:      General: No focal deficit present.      Mental Status: He is alert.      Motor: No weakness.   Psychiatric:         Behavior: Behavior normal.           Results Review:  I have reviewed the labs, radiology results, and diagnostic studies.    Laboratory Data:   Results from last 7 days   Lab Units 11/05/23 0914 11/03/23 0559 11/02/23 2035   WBC 10*3/mm3 6.56 5.00 5.36   HEMOGLOBIN g/dL 11.5* 9.9* 9.0*   HEMATOCRIT % 35.8* 31.4* 28.3*   PLATELETS 10*3/mm3 256 198 196        Results from last 7 days   Lab Units 11/05/23 0914 11/03/23 0559 11/02/23 2035   SODIUM mmol/L 141 139 137   POTASSIUM mmol/L 3.7 4.0 3.7   CHLORIDE mmol/L 104 103 101   CO2 mmol/L 23.0 23.0 25.0   BUN mg/dL 30* 25* 28*   CREATININE mg/dL 1.68* 1.66* 1.77*   CALCIUM mg/dL 8.3* 8.6 8.2*   BILIRUBIN mg/dL 0.2 0.3  --    ALK PHOS U/L 96 105  --    ALT (SGPT) U/L 7 6  --    AST (SGOT) U/L 15 11  --    GLUCOSE mg/dL 144* 134* 93       Culture Data:   No results found for: \"BLOODCX\", \"URINECX\", \"WOUNDCX\", \"MRSACX\", \"RESPCX\", \"STOOLCX\"    Radiology Data:   Imaging Results (Last 24 Hours)       ** No results found for the last 24 hours. **            I have reviewed the patient's current medications.     Assessment/Plan   Assessment  Active Hospital Problems    Diagnosis     **Intractable back pain     UTI (urinary tract infection)     Severe malnutrition     Protein-calorie malnutrition     Anemia, chronic disease     Memory difficulties     Closed fracture of eighth thoracic vertebra     Closed T11 fracture     Stage 3b chronic kidney disease     Essential hypertension     Paroxysmal atrial " fibrillation     Other emphysema      88-year-old male with history of spinal compression fractures it was admitted for intractable back pain.  Imaging obtained on admission were concerning for an acute fracture.  Neurosurgery was consulted with plans for patient to have an MRI done and possible surgical repair.     Treatment Plan  11/5: Urine culture grew gram neg bacilli. Currently on zosyn. Awaiting susceptibility report. Pain is well controlled. Vital signs are stable. Some elavation in SBP this morning.   Continue Zosyn based on susceptibility report.  Continue PT.   Recommend osteoporosis screening outpatient.  Likely discharge back to the facility once neurosurgery signed off.       11/4: MRI resulted and patient was taken to surgery this morning for T11 kyphoplasty and vertebral body biopsy.  Patient was seen after surgery back in his room.  Patient reports doing well.  Pain is well controlled.  Vital signs are stable.    Intractable back pain: Imaging showed new fractures. He is s/p kyphoplasty. Neurosurgery consult in place. Continue back brace and pain management.  Discussed the need for possible bone scan to rule out osteoporosis.    Acute cystitis: Continue antibiotics. Follow up on urine culture    Paroxysmal A-fib: Continue Eliquis and Coreg.    Family requesting palliative care consult to discuss goals of care.  Orders placed.    DVT prophylaxis: Eliquis    Medical Decision Making  Number and Complexity of problems: 2 acute problems   Differential Diagnosis: as above     Conditions and Status        Condition is improving.     [facility] Data  External documents reviewed: [facility] records   Cardiac tracing (EKG, telemetry) interpretation: no new EKG   Radiology interpretation: imaging reviewed   Labs reviewed: yes   Any tests that were considered but not ordered: none      Decision rules/scores evaluated (example [id number], Wells, etc): n/a      Discussed with: Patient      Care Planning  Shared decision  making: Patient apprised of current labs, vitals, imaging and treatment plan.  They are agreeable with proceeding with plans as discussed.    Code status and discussions:   Code Status and Medical Interventions:   Ordered at: 23 1208     Medical Intervention Limits:    NO intubation (DNI)    NO cardioversion    NO artificial nutrition    NO dialysis     Level Of Support Discussed With:    Patient     Code Status (Patient has no pulse and is not breathing):    No CPR (Do Not Attempt to Resuscitate)     Medical Interventions (Patient has pulse or is breathing):    Limited Support         Disposition  Social Determinants of Health that impact treatment or disposition: none   I expect the patient to be discharged to SNF in 1-2 days.     Electronically signed by Micki Solano MD, 23, 18:17 CST.      Electronically signed by Micki Solano MD at 23 1818       Consult Notes (last 24 hours)  Notes from 23 1352 through 23 1352   No notes of this type exist for this encounter.        [address] [address]  Phone:  [phone]  Fax:  [phone]        Patient: ROOM: [address] [place] [address] MRN:  [id number]   [address] [place], [place] [id number] :  1935  SSN:    Phone: [phone] Sex:  M   PCP: Javid Grajeda                Emergency Contact Information      Name Relation Home Work Mobile     Gaby Rankin Daughter     [phone]     Homa Patrick Daughter [phone]     [employer] Relative [phone]              INSURANCE PAYOR [employer]AN [employer] # SUBSCRIBER ID   Primary:    [employer] REPLACEMENT [id number]   Admitting Diagnosis: Acute thoracic back pain [M54.6]  Order Date:  2023        Inpatient Hospice / [facility] Consult       (Order ID: [id number])     Diagnosis:         Priority:  Routine Expected Date:   Expiration Date:        Interval:   Count:    Reason for  Consult? Outpatient Hospice        Authorizing Provider:Elyse Ching APRN  Authorizing Provider's NPI: [id number]  Order Entered By: Elyse Ching APRN 11/6/2023 11:30 AM     Electronically signed by: Elyse Ching APRN 11/6/2023 11:30 AM

## 2023-11-06 NOTE — PROGRESS NOTES
"Brennon Vance  88 y.o.      Chief complaint:   Compression fracture status post kyphoplasty no    Subjective  No events overnight    Temp:  [97.1 °F (36.2 °C)-97.6 °F (36.4 °C)] 97.5 °F (36.4 °C)  Heart Rate:  [58-77] 65  Resp:  [16-18] 18  BP: (127-176)/(49-66) 176/49      Objective:  General Appearance:  Comfortable, well-appearing, in no acute distress and not in pain.    Vital signs: (most recent): Blood pressure 176/49, pulse 65, temperature 97.5 °F (36.4 °C), temperature source Axillary, resp. rate 18, height 172.7 cm (68\"), weight 47.9 kg (105 lb 8 oz), SpO2 95%.  Vital signs are normal.  No fever.    Output: Producing urine.    HEENT: Normal HEENT exam.    Lungs:  Normal effort and normal respiratory rate.  He is not in respiratory distress.    Heart: Normal rate.  Regular rhythm.    Chest: Symmetric chest wall expansion.   Skin:  Warm and dry.    Abdomen: Abdomen is soft and non-distended.  Bowel sounds are normal.   There is no abdominal tenderness.     Pulses: Distal pulses are intact.        Neurologic Exam     Mental Status   Oriented to person, place, and time.   Attention: normal. Concentration: normal.   Speech: speech is normal   Level of consciousness: alert  Normal comprehension.     Cranial Nerves     CN II   Visual fields full to confrontation.     CN III, IV, VI   Pupils are equal, round, and reactive to light.  Extraocular motions are normal.     CN V   Facial sensation intact.     CN VII   Facial expression full, symmetric.     CN VIII   CN VIII normal.     CN IX, X   CN IX normal.   CN X normal.     CN XI   CN XI normal.     CN XII   CN XII normal.     Motor Exam   Muscle bulk: normal    Strength   Strength 5/5 throughout.     Sensory Exam   Light touch normal.       Lab Results (last 24 hours)       Procedure Component Value Units Date/Time    Comprehensive Metabolic Panel [[id number]]  (Abnormal) Collected: 11/06/23 0352    Specimen: Blood Updated: 11/06/23 0437     Glucose 118 mg/dL      BUN " 33 mg/dL      Creatinine 1.65 mg/dL      Sodium 140 mmol/L      Potassium 3.9 mmol/L      Chloride 107 mmol/L      CO2 23.0 mmol/L      Calcium 8.1 mg/dL      Total Protein 5.1 g/dL      Albumin 2.8 g/dL      ALT (SGPT) 5 U/L      AST (SGOT) 11 U/L      Alkaline Phosphatase 70 U/L      Total Bilirubin 0.2 mg/dL      Globulin 2.3 gm/dL      A/G Ratio 1.2 g/dL      BUN/Creatinine Ratio 20.0     Anion Gap 10.0 mmol/L      eGFR 40.2 mL/min/1.73     Narrative:      GFR Normal >60  Chronic Kidney Disease <60  Kidney Failure <15    The GFR formula is only valid for adults with stable renal function between ages 18 and 70.    CBC & Differential [[id number]]  (Abnormal) Collected: 11/06/23 0352    Specimen: Blood Updated: 11/06/23 0435    Narrative:      The following orders were created for panel order CBC & Differential.  Procedure                               Abnormality         Status                     ---------                               -----------         ------                     CBC Auto Differential[[id number]]        Abnormal            Final result                 Please view results for these tests on the individual orders.    CBC Auto Differential [[id number]]  (Abnormal) Collected: 11/06/23 0352    Specimen: Blood Updated: 11/06/23 0435     WBC 5.41 10*3/mm3      RBC 2.98 10*6/mm3      Hemoglobin 8.9 g/dL      Hematocrit 28.6 %      MCV 96.0 fL      MCH 29.9 pg      MCHC 31.1 g/dL      RDW 13.9 %      RDW-SD 48.9 fl      MPV 9.2 fL      Platelets 192 10*3/mm3      Neutrophil % 83.7 %      Lymphocyte % 12.4 %      Monocyte % 2.8 %      Eosinophil % 0.0 %      Basophil % 0.0 %      Immature Grans % 1.1 %      Neutrophils, Absolute 4.53 10*3/mm3      Lymphocytes, Absolute 0.67 10*3/mm3      Monocytes, Absolute 0.15 10*3/mm3      Eosinophils, Absolute 0.00 10*3/mm3      Basophils, Absolute 0.00 10*3/mm3      Immature Grans, Absolute 0.06 10*3/mm3      nRBC 0.0 /100 WBC     Urine Culture - Urine, Urine, Clean  Catch [[id number]]  (Abnormal)  (Susceptibility) Collected: 11/02/23 2024    Specimen: Urine, Clean Catch Updated: 11/05/23 1202     Urine Culture >100,000 CFU/mL Pseudomonas aeruginosa    Narrative:      Colonization of the urinary tract without infection is common. Treatment is discouraged unless the patient is symptomatic, pregnant, or undergoing an invasive urologic procedure.    Susceptibility        Pseudomonas aeruginosa      JUAN JOSÉ      Cefepime Susceptible      Ceftazidime Susceptible      Ciprofloxacin Susceptible      Gentamicin Susceptible      Levofloxacin Susceptible      Piperacillin + Tazobactam Susceptible                           Comprehensive Metabolic Panel [[id number]]  (Abnormal) Collected: 11/05/23 0914    Specimen: Blood Updated: 11/05/23 1054     Glucose 144 mg/dL      BUN 30 mg/dL      Creatinine 1.68 mg/dL      Sodium 141 mmol/L      Potassium 3.7 mmol/L      Comment: Slight hemolysis detected by analyzer. Results may be affected.        Chloride 104 mmol/L      CO2 23.0 mmol/L      Calcium 8.3 mg/dL      Total Protein 6.5 g/dL      Albumin 3.3 g/dL      ALT (SGPT) 7 U/L      AST (SGOT) 15 U/L      Alkaline Phosphatase 96 U/L      Total Bilirubin 0.2 mg/dL      Globulin 3.2 gm/dL      A/G Ratio 1.0 g/dL      BUN/Creatinine Ratio 17.9     Anion Gap 14.0 mmol/L      eGFR 39.3 mL/min/1.73     Narrative:      GFR Normal >60  Chronic Kidney Disease <60  Kidney Failure <15    The GFR formula is only valid for adults with stable renal function between ages 18 and 70.    CBC & Differential [[id number]]  (Abnormal) Collected: 11/05/23 0914    Specimen: Blood Updated: 11/05/23 1035    Narrative:      The following orders were created for panel order CBC & Differential.  Procedure                               Abnormality         Status                     ---------                               -----------         ------                     CBC Auto Differential[[id number]]        Abnormal             Final result                 Please view results for these tests on the individual orders.    CBC Auto Differential [[id number]]  (Abnormal) Collected: 11/05/23 0914    Specimen: Blood Updated: 11/05/23 1035     WBC 6.56 10*3/mm3      RBC 3.75 10*6/mm3      Hemoglobin 11.5 g/dL      Hematocrit 35.8 %      MCV 95.5 fL      MCH 30.7 pg      MCHC 32.1 g/dL      RDW 14.1 %      RDW-SD 48.8 fl      MPV 9.4 fL      Platelets 256 10*3/mm3      Neutrophil % 89.1 %      Lymphocyte % 8.2 %      Monocyte % 1.8 %      Eosinophil % 0.0 %      Basophil % 0.0 %      Immature Grans % 0.9 %      Neutrophils, Absolute 5.84 10*3/mm3      Lymphocytes, Absolute 0.54 10*3/mm3      Monocytes, Absolute 0.12 10*3/mm3      Eosinophils, Absolute 0.00 10*3/mm3      Basophils, Absolute 0.00 10*3/mm3      Immature Grans, Absolute 0.06 10*3/mm3      nRBC 0.0 /100 WBC                 Plan:   Patient is doing well.  Back pain under control.  Patient is walking with physical and Occupational Therapy without any increase in back pain.  Discussed the patient's situation with family this morning.  They are looking into a hospice option.  We will follow-up with the patient on an as-needed basis.  They were told to call us if any increase in back pain issues.  Okay to be DC'd from a neurosurgical standpoint      Intractable back pain    Paroxysmal atrial fibrillation    Other emphysema    Essential hypertension    UTI (urinary tract infection)    Stage 3b chronic kidney disease    Protein-calorie malnutrition    Anemia, chronic disease    Memory difficulties    Closed fracture of eighth thoracic vertebra    Closed T11 fracture    Severe malnutrition        Rohan Arizmendi, APRN

## 2023-11-06 NOTE — CASE MANAGEMENT/SOCIAL WORK
Continued Stay Note   [place]     Patient Name: Brennon Vance  MRN: [id number]  Today's Date: 11/6/2023    Admit Date: 11/2/2023    Plan: Plan for return to [facility]   Discharge Plan       Row Name 11/06/23 1439       Plan    Plan Comments SW received hospice consult. Pt wife is already on hospice ([facility]) at [facility] and dtr plans for pt to return there. Palliative Care APRN did inform dtr that intake would need to review info to see if pt qualifies for hospice. Hospice referral sent to [facility] and SW notified Julieth ([phone]) of new referral. Julieth states it may be tomorrow before Dr. Cabello can review info and make determination.                   Discharge Codes    No documentation.                       FAISAL Nava

## 2023-11-06 NOTE — PLAN OF CARE
Goal Outcome Evaluation:  Plan of Care Reviewed With: patient           Outcome Evaluation: pt daughter at bedside.  Pt has no complaints of pain, safety maintained and will continue to monitor.

## 2023-11-06 NOTE — THERAPY TREATMENT NOTE
Acute Care - Physical Therapy Treatment Note  [facility]     Patient Name: Brennon Vance  : 1935  MRN: [id number]  Today's Date: 2023   Onset of Illness/Injury or Date of Surgery: 23  Visit Dx:     ICD-10-CM ICD-9-CM   1. Acute midline thoracic back pain  M54.6 724.1   2. Urinary tract infection without hematuria, site unspecified  N39.0 599.0   3. Closed fracture of eighth thoracic vertebra, unspecified fracture morphology, initial encounter  S22.069A 805.2   4. Closed fracture of eleventh thoracic vertebra, unspecified fracture morphology, initial encounter  S22.082L 805.2   5. Intractable back pain  M54.9 724.5   6. Impaired mobility [Z74.09]  Z74.09 799.89     Patient Active Problem List   Diagnosis    Chest pain    NSTEMI, initial episode of care    Coronary artery disease of native artery of native heart with stable angina pectoris    Paroxysmal atrial fibrillation    Other emphysema    Essential hypertension    Precordial pain    Hyperlipidemia LDL goal <70    Tobacco abuse    Cardiomyopathy    NSTEMI (non-ST elevated myocardial infarction)    UTI (urinary tract infection)    Aspiration pneumonia    Intractable low back pain    Stage 3b chronic kidney disease    Compression fracture of T12 vertebra    Closed compression fracture of first lumbar vertebra    Body mass index (BMI) of 20.0-20.9 in adult    Hiatal hernia    Stroke    Intractable back pain    Protein-calorie malnutrition    Anemia, chronic disease    Memory difficulties    Closed fracture of eighth thoracic vertebra    Closed T11 fracture    Severe malnutrition     Past Medical History:   Diagnosis Date    CAD (coronary artery disease)     CKD (chronic kidney disease) stage 3, GFR 30-59 ml/min     COPD (chronic obstructive pulmonary disease)     Hiatal hernia     Hyperlipidemia     Hypertension     Stroke      Past Surgical History:   Procedure Laterality Date    ABDOMINAL AORTIC ANEURYSM REPAIR      CARDIAC CATHETERIZATION N/A  5/20/2021    Procedure: Left Heart Cath;  Surgeon: Harrison Alcantara MD;  Location:  [facility] CATH [facility]VASIVE [facility];  Service: Cardiology;  Laterality: N/A;    CORONARY ANGIOPLASTY WITH STENT PLACEMENT      FEMORAL ARTERY STENT      KYPHOPLASTY N/A 11/4/2023    Procedure: KYPHOPLASTY WITHOUT BIOPSY T-11;  Surgeon: Jeremiah Brantley MD;  Location:  [facility] OR;  Service: Neurosurgery;  Laterality: N/A;    KYPHOPLASTY WITH BIOPSY N/A 9/26/2023    Procedure: KYPHOPLASTY WITH BIOPSY T12 and L3;  Surgeon: Jeremiah Brantley MD;  Location:  [facility] OR;  Service: Neurosurgery;  Laterality: N/A;    LEG THROMBECTOMY/EMBOLECTOMY Right 5/20/2021    Procedure: RT GROIN EXPLORATION;  Surgeon: Geoff Remy DO;  Location:  [facility] HYBRID OR 12;  Service: Vascular;  Laterality: Right;    TRIGEMINAL NERVE DECOMPRESSION       PT Assessment (last 12 hours)       PT Evaluation and Treatment       Row Name 11/06/23 0900          Physical Therapy Time and Intention    Subjective Information complains of  -     Document Type therapy note (daily note)  -     Mode of Treatment physical therapy  -[facility] Name 11/06/23 0900          General Information    Existing Precautions/Restrictions fall;spinal  -       Row Name 11/06/23 0900          Bed Mobility    Supine-Sit [place] (Bed Mobility) modified independence  -     Sit-Supine [place] (Bed Mobility) modified independence  -       Row Name 11/06/23 0900          Transfers    Transfers toilet transfer  -[facility] Name 11/06/23 0900          Sit-Stand Transfer    Sit-Stand [place] (Transfers) contact guard;verbal cues  -[facility] Name 11/06/23 0900          Stand-Sit Transfer    Stand-Sit [place] (Transfers) contact guard;verbal cues  -[facility] Name 11/06/23 0900          Toilet Transfer    [place] Level (Toilet Transfer) contact guard;verbal cues;minimum assist (75% patient effort)  -     Assistive Device (Toilet Transfer)  commode;walker, front-wheeled  -AH       Row Name 11/06/23 0900          Gait/Stairs (Locomotion)    [place] Level (Gait) contact guard;verbal cues  -     Assistive Device (Gait) walker, front-wheeled  -AH     Distance in Feet (Gait) 15 x 2  -AH       Row Name             Wound 11/02/23 2354 Bilateral coccyx    Wound - Properties Group Placement Date: 11/02/23  -AM Placement Time: 2354  -AM Present on Original Admission: Y  -AM Side: Bilateral  -AM Location: coccyx  -AM    Retired Wound - Properties Group Placement Date: 11/02/23  -AM Placement Time: 2354  -AM Present on Original Admission: Y  -AM Side: Bilateral  -AM Location: coccyx  -AM    Retired Wound - Properties Group Date first assessed: 11/02/23  -AM Time first assessed: 2354  -AM Present on Original Admission: Y  -AM Side: Bilateral  -AM Location: coccyx  -AM      Row Name             Wound 11/04/23 0917 thoracic spine Incision    Wound - Properties Group Placement Date: 11/04/23  -DT Placement Time: 0917 -DT Location: thoracic spine  -DT Primary Wound Type: Incision  -DT    Retired Wound - Properties Group Placement Date: 11/04/23  -DT Placement Time: 0917 -DT Location: thoracic spine  -DT Primary Wound Type: Incision  -DT    Retired Wound - Properties Group Date first assessed: 11/04/23  -DT Time first assessed: 0917 -DT Location: thoracic spine  -DT Primary Wound Type: Incision  -DT      Row Name 11/06/23 0900          Positioning and Restraints    Pre-Treatment Position in bed  -     Post Treatment Position bed  -     In Bed side lying right;call light within reach;encouraged to call for assist;exit alarm on;with family/caregiver  -               User Key  (r) = Recorded By, (t) = Taken By, (c) = Cosigned By      Initials Name Provider Type     Lissette Flood PTA Physical Therapist Assistant    Pedro Phoenix RN Registered Nurse    Jasmin Grace, RN Registered Nurse                    Physical Therapy Education        Title: PT OT SLP Therapies (In Progress)       Topic: Physical Therapy (In Progress)       Point: Mobility training (Done)       Learning Progress Summary             Patient Acceptance, E, VU by MS at 11/4/2023 1321    Comment: role of PT in his care, spinal restrictions                         Point: Home exercise program (Not Started)       Learner Progress:  Not documented in this visit.              Point: Body mechanics (Not Started)       Learner Progress:  Not documented in this visit.              Point: Precautions (Done)       Learning Progress Summary             Patient Acceptance, E, VU by MS at 11/4/2023 1321    Comment: role of PT in his care, spinal restrictions                                         User Key       Initials Effective Dates Name Provider Type Discipline    MS 07/11/23 -  Yelena Reddy, PT, DPT, NCS Physical Therapist PT                  PT Recommendation and Plan         Outcome Measures       Row Name 11/06/23 0900 11/05/23 0934 11/04/23 1300       How much help from another person do you currently need...    Turning from your back to your side while in flat bed without using bedrails? 3  - 3  -JAMEEL --    Moving from lying on back to sitting on the side of a flat bed without bedrails? 3  - 3  -JAMEEL --    Moving to and from a bed to a chair (including a wheelchair)? 3  - 3  -JAMEEL --    Standing up from a chair using your arms (e.g., wheelchair, bedside chair)? 3  - 3  -JAMEEL --    Climbing 3-5 steps with a railing? 3  - 3  -JAMEEL --    To walk in hospital room? 3  - 3  -JAMEEL --    AM-PAC 6 Clicks Score (PT) 18  -AH 18  -JAMEEL --    Highest level of mobility 6 --> Walked 10 steps or more  - 6 --> Walked 10 steps or more  -JAMEEL --       How much help from another is currently needed...    Putting on and taking off regular lower body clothing? -- -- 3  -EC    Bathing (including washing, rinsing, and drying) -- -- 3  -EC    Toileting (which includes using toilet bed pan or urinal) -- -- 3   -EC    Putting on and taking off regular upper body clothing -- -- 4  -EC    Taking care of personal grooming (such as brushing teeth) -- -- 4  -EC    Eating meals -- -- 4  -EC    AM-PAC 6 Clicks Score (OT) -- -- 21  -EC       Functional Assessment    Outcome Measure Options AM-PAC 6 Clicks Basic Mobility (PT)  - AM-PAC 6 Clicks Basic Mobility (PT)  -JAMEEL AM-PAC 6 Clicks Daily Activity (OT)  -EC              User Key  (r) = Recorded By, (t) = Taken By, (c) = Cosigned By      Initials Name Provider Type    Lissette Hyde PTA Physical Therapist Assistant    Lico Cross, EMY Physical Therapist Assistant    Manisha Sotelo, OTR/L Occupational Therapist                     Time Calculation:    PT Charges       Row Name 11/06/23 0924             Time Calculation    Start Time 0900  -      Stop Time 0923  -      Time Calculation (min) 23 min  -      PT Received On 11/06/23  -         Time Calculation- PT    Total Timed Code Minutes- PT 23 minute(s)  -         Timed Charges    72803 - Gait Training Minutes  23  -AH         Total Minutes    Timed Charges Total Minutes 23  -AH       Total Minutes 23  -AH                User Key  (r) = Recorded By, (t) = Taken By, (c) = Cosigned By      Initials Name Provider Type    Lissette Hyde PTA Physical Therapist Assistant                  Therapy Charges for Today       Code Description Service Date Service Provider Modifiers Qty    [id number] HC GAIT TRAINING EA 15 MIN 11/6/2023 Lissette Flood PTA GP 2            PT G-Codes  Outcome Measure Options: AM-PAC 6 Clicks Basic Mobility (PT)  AM-PAC 6 Clicks Score (PT): 18  AM-PAC 6 Clicks Score (OT): 21    Lissette Flood PTA  11/6/2023

## 2023-11-06 NOTE — PROGRESS NOTES
"    [facility] Palliative Care Services  Progress Note  Patient Name: Brennon Vance  Date of Admission: 11/2/2023  Today's Date: 11/06/23     Code Status and Medical Interventions:   Ordered at: 11/03/23 1208     Medical Intervention Limits:    NO intubation (DNI)    NO cardioversion    NO artificial nutrition    NO dialysis     Level Of Support Discussed With:    Patient     Code Status (Patient has no pulse and is not breathing):    No CPR (Do Not Attempt to Resuscitate)     Medical Interventions (Patient has pulse or is breathing):    Limited Support     Subjective   Chief complaint/Reason for Referral/Visit: Follow up on Goals of Care/Advance Care Planning.    Medical record reviewed. Events noted.  Underwent T11 kyphoplasty with biopsy on 11/4/2023 with Dr. Brantley.  Labs collected this morning reviewed.  He is lying in bed, awake and in no apparent distress at time of exam.  Reports he just received pain medication and pain is well controlled at this time.  His daughter, Gaby, is present.      Advance Care Planning   Advanced Directives: Patient has an advance directive on file. Patient reports document is valid.    Advance Care Planning Discussion: Followed up with Mr. Vance and his daughter to determine if they had any questions and/or concerns regarding previous discussions.  Daughter reflected on events over weekend and shared she feels her father is \"tired\" and \"ready to go.\"  Mr. Vance became tearful and shared he wants to get back to assisted living facility to be with his spouse.  Daughter inquired whether her father could have the same hospice team evaluate her father that is following patient's spouse.  Explained would discuss with  and place consult for hospice liaison to discuss further.  Patient/family denied any further questions and/or concerns at this time. Support provided.      The patient receives support from his spouse and children. Patient reports his daughters are his healthcare " POA.     Due to the palliative care topics discussed including goals of care, treatment options, discharge options, and hospice services we will establish an advance care plan.    Goals of care: Ongoing.    Review of Systems   HENT:  Negative for congestion, sore throat and stridor.    Eyes:  Negative for pain, photophobia and visual disturbance.   Endocrine: Negative for polydipsia, polyphagia and polyuria.   Skin:  Negative for dry skin, flushing and itching.   Musculoskeletal:  Positive for back pain (intermittently).   Psychiatric/Behavioral:  Negative for depression. The patient does not have insomnia and is not nervous/anxious.    Allergic/Immunologic: Negative for environmental allergies, hives and persistent infections.       Pain Assessment  Preferred Pain Scale: number (Numeric Rating Pain Scale)  Nonverbal Indicators of Pain: nonverbal indicators absent  CPOT Facial Expression: 0-->relaxed, neutral  CPOT Body Movements: 0-->absence of movements  CPOT Muscle Tension: 0-->relaxed  Ventilator Compliance/Vocalization: 0-->talking in normal tone or no sound  CPOT Score: 0  Pain Location: back  Pain Description: burning  Objective   Diagnostics: Reviewed      Intake/Output Summary (Last 24 hours) at 11/6/2023 1134  Last data filed at 11/6/2023 0334  Gross per 24 hour   Intake 600 ml   Output 200 ml   Net 400 ml     Current Facility-Administered Medications   Medication Dose Route Frequency Provider Last Rate Last Admin    acetaminophen (TYLENOL) tablet 650 mg  650 mg Oral Q4H PRN Jeremiah Brantley MD        Or    acetaminophen (TYLENOL) 160 MG/5ML oral solution 650 mg  650 mg Oral Q4H PRN Jeremiah Brantley MD        Or    acetaminophen (TYLENOL) suppository 650 mg  650 mg Rectal Q4H PRN Jeremiah Brantley MD        albuterol (PROVENTIL) nebulizer solution 0.083% 2.5 mg/3mL  2.5 mg Nebulization Q4H PRN Jeremiah Brantley MD        apixaban (ELIQUIS) tablet 2.5 mg  2.5 mg Oral Q12H Jeremiah Brantley MD   2.5 mg at  11/06/23 0822    sennosides-docusate (PERICOLACE) 8.6-50 MG per tablet 1 tablet  1 tablet Oral BID Jeremiah Brantley MD   1 tablet at 11/05/23 0851    And    polyethylene glycol (MIRALAX) packet 17 g  17 g Oral Daily PRN Jeremiah Brantley MD        And    bisacodyl (DULCOLAX) EC tablet 5 mg  5 mg Oral Daily PRN Jeremiah Brantley MD        And    bisacodyl (DULCOLAX) suppository 10 mg  10 mg Rectal Daily PRN Jeremiah Brantley MD        budesonide-formoterol (SYMBICORT) 160-4.5 MCG/ACT inhaler 2 puff  2 puff Inhalation BID - RT Jeremiah Brantley MD   2 puff at 11/06/23 0632    Calcium Carb-Cholecalciferol 600-20 MG-MCG tablet 1 tablet  1 tablet Oral Daily Jeremiah Brantley MD   1 tablet at 11/06/23 0823    carBAMazepine (TEGretol) tablet 200 mg  200 mg Oral Daily Jeremiah Brantley MD   200 mg at 11/06/23 0823    carvedilol (COREG) tablet 12.5 mg  12.5 mg Oral BID With Meals Jeremiah Brantley MD   12.5 mg at 11/05/23 1727    cholecalciferol (VITAMIN D3) tablet 1,000 Units  1,000 Units Oral Daily Jeremiah Brantley MD   1,000 Units at 11/06/23 1007    dexAMETHasone (DECADRON) injection 4 mg  4 mg Intravenous Q6H Jeremiah Brantley MD   4 mg at 11/06/23 0624    dilTIAZem CD (CARDIZEM CD) 24 hr capsule 180 mg  180 mg Oral Daily Jeremiah Brantley MD   180 mg at 11/05/23 0851    finasteride (PROSCAR) tablet 5 mg  5 mg Oral Daily Jeremiah Brantley MD   5 mg at 11/06/23 0826    guaiFENesin (MUCINEX) 12 hr tablet 1,200 mg  1,200 mg Oral Q12H Jeremiah Brantley MD   1,200 mg at 11/06/23 0828    HYDROcodone-acetaminophen (NORCO) 5-325 MG per tablet 1 tablet  1 tablet Oral Q4H PRN Jeremiah Brantley MD   1 tablet at 11/04/23 1720    HYDROmorphone (DILAUDID) injection 0.5 mg  0.5 mg Intravenous Q4H PRN Jeremiah Brantley MD   0.5 mg at 11/06/23 1110    And    naloxone (NARCAN) injection 0.4 mg  0.4 mg Intravenous Q5 Min PRN Jeremiah Brantley MD        hydrOXYzine (ATARAX) tablet 50 mg  50 mg Oral TID Jeremiah Brantley MD   50 mg at  11/06/23 0829    isosorbide mononitrate (IMDUR) 24 hr tablet 60 mg  60 mg Oral Daily Jeremiah Brantley MD   60 mg at 11/06/23 0829    levothyroxine (SYNTHROID, LEVOTHROID) tablet 50 mcg  50 mcg Oral Q AM Jeremiah Brantley MD   50 mcg at 11/06/23 0624    melatonin tablet 5 mg  5 mg Oral Nightly PRN Jeremiah Brantley MD        nicotine (NICODERM CQ) 14 MG/24HR patch 1 patch  1 patch Transdermal Q24H Jeremiah Brantley MD   1 patch at 11/06/23 0822    nitroglycerin (NITROSTAT) SL tablet 0.4 mg  0.4 mg Sublingual Q5 Min PRN Jeremiah Brantley MD        ondansetron (ZOFRAN) tablet 4 mg  4 mg Oral Q6H PRN Jeremiah Brantley MD        Or    ondansetron (ZOFRAN) injection 4 mg  4 mg Intravenous Q6H PRN Jeremiah Brantley MD        pantoprazole (PROTONIX) EC tablet 40 mg  40 mg Oral Daily Jeremiah Brantley MD   40 mg at 11/06/23 0833    piperacillin-tazobactam (ZOSYN) 3.375 g in iso-osmotic dextrose 50 ml (premix)  3.375 g Intravenous Q8H Jeremiah Brantley MD   3.375 g at 11/06/23 0624    sodium chloride 0.9 % flush 10 mL  10 mL Intravenous Q12H Jeremiah Brantley MD   10 mL at 11/06/23 0830    sodium chloride 0.9 % flush 10 mL  10 mL Intravenous PRN Jeremiah Brantley MD   10 mL at 11/05/23 2220    sodium chloride 0.9 % infusion 40 mL  40 mL Intravenous PRN Jeremiah Brantley MD        sodium chloride 0.9 % infusion  50 mL/hr Intravenous Continuous Jeremiah Brantley MD 50 mL/hr at 11/05/23 2219 50 mL/hr at 11/05/23 2219    tamsulosin (FLOMAX) 24 hr capsule 0.4 mg  0.4 mg Oral Daily Jeremiah Brantley MD   0.4 mg at 11/06/23 0830     sodium chloride, 50 mL/hr, Last Rate: 50 mL/hr (11/05/23 2219)        acetaminophen **OR** acetaminophen **OR** acetaminophen    albuterol    senna-docusate sodium **AND** polyethylene glycol **AND** bisacodyl **AND** bisacodyl    HYDROcodone-acetaminophen    HYDROmorphone **AND** naloxone    melatonin    nitroglycerin    ondansetron **OR** ondansetron    sodium chloride    sodium chloride  Current  "medications patient is presently taking including all prescriptions, over-the-counter, herbals and vitamin/mineral/dietary (nutritional) supplements with reviewed including route, type, dose and frequency and are current per MAR at time of dictation.    Assessment:  Vital Signs: /67 (BP Location: Right arm, Patient Position: Lying)   Pulse 76   Temp 97.6 °F (36.4 °C) (Axillary)   Resp 16   Ht 172.7 cm (68\")   Wt 47.9 kg (105 lb 8 oz)   SpO2 93%   BMI 16.04 kg/m²     Physical Exam  Vitals and nursing note reviewed.   Constitutional:       General: He is not in acute distress.     Appearance: He is ill-appearing.   HENT:      Head: Normocephalic and atraumatic.   Eyes:      General: Lids are normal.      Extraocular Movements: Extraocular movements intact.   Neck:      Vascular: No JVD.      Trachea: Trachea normal.   Cardiovascular:      Rate and Rhythm: Normal rate.   Pulmonary:      Effort: Pulmonary effort is normal.   Musculoskeletal:      Cervical back: Neck supple.   Skin:     General: Skin is warm and dry.   Neurological:      Mental Status: He is alert.   Psychiatric:         Mood and Affect: Affect is tearful.         Behavior: Behavior is cooperative.        Functional status: Palliative Performance Scale Score: Performance 50% based on the following measures: Ambulation: Mainly sit or lie down, Activity and Evidence of Disease: Unable to do any work, extensive evidence of disease, Self-Care: Considerable assistance required,  Intake: Normal or reduced, LOC: Full or confusion.  Nutritional status: Albumin 2.8. Body mass index is 16.04 kg/m².   Patient status: Disease state: Controlled with current treatments.    Active Hospital Problems    Diagnosis     **Intractable back pain     Severe malnutrition     Protein-calorie malnutrition     Anemia, chronic disease     Memory difficulties     Closed fracture of eighth thoracic vertebra     Closed T11 fracture     Stage 3b chronic kidney disease     " UTI (urinary tract infection)     Essential hypertension     Paroxysmal atrial fibrillation     Other emphysema        Impression/Problem List:  Intractable low back pain  Severe malnutrition   Memory difficulties - Chronic ischemic changes per MRI of brain   Closed T11 fracture s/p kyphoplasty   Closed fracture of eighth thoracic vertebrae  Anemia of chronic disease  Stage IIIb chronic kidney disease  Urinary tract infection due to pseudomonas aeruginosa   Paroxysmal atrial fibrillation  Other emphysema  History of kyphoplasty  Advanced age         Plan / Recommendations     Palliative Care Encounter   Goals of care include CODE STATUS NO CPR with limited support interventions.    Prognosis is guarded long-term secondary to intractable low back pain, severe malnutrition, chronic cerebral ischemic changes/memory difficulties, recent vertebral fractures requiring multiple kyphoplasty and other comorbidities listed above.     Followed up with Mr. Vance and his daughter to determine if they had any questions and/or concerns regarding previous discussions.  Details of discussion above.     Patient/family interested in hospice services.    Discussed with SW and hospice consult placed.      Patient/family denied any further questions and/or concerns at this time. Support provided.      Thank you for allowing us to participate in patient's plan of care. Palliative Care Team will continue to follow patient.     Time spent:35 minutes spent reviewing medical and medication records, assessing and examining patient, discussing with family, answering questions, providing some guidance about a plan and documentation of care, and coordinating care with other healthcare members, with > 50% time spent face to face.       Electronically signed by, CONCEPCION Harper, 11/06/23.

## 2023-11-07 VITALS
HEIGHT: 68 IN | OXYGEN SATURATION: 99 % | TEMPERATURE: 98.3 F | WEIGHT: 85.4 LBS | BODY MASS INDEX: 12.94 KG/M2 | DIASTOLIC BLOOD PRESSURE: 64 MMHG | SYSTOLIC BLOOD PRESSURE: 171 MMHG | RESPIRATION RATE: 18 BRPM | HEART RATE: 68 BPM

## 2023-11-07 LAB
ALBUMIN SERPL-MCNC: 2.7 G/DL (ref 3.5–5.2)
ALBUMIN/GLOB SERPL: 1.2 G/DL
ALP SERPL-CCNC: 71 U/L (ref 39–117)
ALT SERPL W P-5'-P-CCNC: 5 U/L (ref 1–41)
ANION GAP SERPL CALCULATED.3IONS-SCNC: 8 MMOL/L (ref 5–15)
AST SERPL-CCNC: 11 U/L (ref 1–40)
BASOPHILS # BLD AUTO: 0.01 10*3/MM3 (ref 0–0.2)
BASOPHILS NFR BLD AUTO: 0.2 % (ref 0–1.5)
BILIRUB SERPL-MCNC: <0.2 MG/DL (ref 0–1.2)
BUN SERPL-MCNC: 38 MG/DL (ref 8–23)
BUN/CREAT SERPL: 21.6 (ref 7–25)
CALCIUM SPEC-SCNC: 7.8 MG/DL (ref 8.6–10.5)
CHLORIDE SERPL-SCNC: 110 MMOL/L (ref 98–107)
CO2 SERPL-SCNC: 23 MMOL/L (ref 22–29)
CREAT SERPL-MCNC: 1.76 MG/DL (ref 0.76–1.27)
DEPRECATED RDW RBC AUTO: 50 FL (ref 37–54)
EGFRCR SERPLBLD CKD-EPI 2021: 37.2 ML/MIN/1.73
EOSINOPHIL # BLD AUTO: 0 10*3/MM3 (ref 0–0.4)
EOSINOPHIL NFR BLD AUTO: 0 % (ref 0.3–6.2)
ERYTHROCYTE [DISTWIDTH] IN BLOOD BY AUTOMATED COUNT: 14.2 % (ref 12.3–15.4)
GLOBULIN UR ELPH-MCNC: 2.2 GM/DL
GLUCOSE SERPL-MCNC: 117 MG/DL (ref 65–99)
HCT VFR BLD AUTO: 29.7 % (ref 37.5–51)
HGB BLD-MCNC: 9.6 G/DL (ref 13–17.7)
IMM GRANULOCYTES # BLD AUTO: 0.09 10*3/MM3 (ref 0–0.05)
IMM GRANULOCYTES NFR BLD AUTO: 1.5 % (ref 0–0.5)
LYMPHOCYTES # BLD AUTO: 0.79 10*3/MM3 (ref 0.7–3.1)
LYMPHOCYTES NFR BLD AUTO: 12.9 % (ref 19.6–45.3)
MCH RBC QN AUTO: 31.2 PG (ref 26.6–33)
MCHC RBC AUTO-ENTMCNC: 32.3 G/DL (ref 31.5–35.7)
MCV RBC AUTO: 96.4 FL (ref 79–97)
MONOCYTES # BLD AUTO: 0.26 10*3/MM3 (ref 0.1–0.9)
MONOCYTES NFR BLD AUTO: 4.2 % (ref 5–12)
NEUTROPHILS NFR BLD AUTO: 4.97 10*3/MM3 (ref 1.7–7)
NEUTROPHILS NFR BLD AUTO: 81.2 % (ref 42.7–76)
NRBC BLD AUTO-RTO: 0 /100 WBC (ref 0–0.2)
PLATELET # BLD AUTO: 224 10*3/MM3 (ref 140–450)
PMV BLD AUTO: 9.3 FL (ref 6–12)
POTASSIUM SERPL-SCNC: 3.8 MMOL/L (ref 3.5–5.2)
PROT SERPL-MCNC: 4.9 G/DL (ref 6–8.5)
RBC # BLD AUTO: 3.08 10*6/MM3 (ref 4.14–5.8)
SODIUM SERPL-SCNC: 141 MMOL/L (ref 136–145)
TROPONIN T SERPL HS-MCNC: 52 NG/L
WBC NRBC COR # BLD: 6.12 10*3/MM3 (ref 3.4–10.8)

## 2023-11-07 PROCEDURE — A9270 NON-COVERED ITEM OR SERVICE: HCPCS

## 2023-11-07 PROCEDURE — A9270 NON-COVERED ITEM OR SERVICE: HCPCS | Performed by: NEUROLOGICAL SURGERY

## 2023-11-07 PROCEDURE — 25010000002 DEXAMETHASONE PER 1 MG: Performed by: NEUROLOGICAL SURGERY

## 2023-11-07 PROCEDURE — 63710000001 PANTOPRAZOLE 40 MG TABLET DELAYED-RELEASE: Performed by: NEUROLOGICAL SURGERY

## 2023-11-07 PROCEDURE — 63710000001 LEVOTHYROXINE 50 MCG TABLET: Performed by: NEUROLOGICAL SURGERY

## 2023-11-07 PROCEDURE — A9270 NON-COVERED ITEM OR SERVICE: HCPCS | Performed by: HOSPITALIST

## 2023-11-07 PROCEDURE — 97535 SELF CARE MNGMENT TRAINING: CPT

## 2023-11-07 PROCEDURE — 94664 DEMO&/EVAL PT USE INHALER: CPT

## 2023-11-07 PROCEDURE — G0378 HOSPITAL OBSERVATION PER HR: HCPCS

## 2023-11-07 PROCEDURE — 63710000001 DILTIAZEM CD 180 MG CAPSULE SUSTAINED-RELEASE 24 HR: Performed by: HOSPITALIST

## 2023-11-07 PROCEDURE — 85025 COMPLETE CBC W/AUTO DIFF WBC: CPT | Performed by: STUDENT IN AN ORGANIZED HEALTH CARE EDUCATION/TRAINING PROGRAM

## 2023-11-07 PROCEDURE — 63710000001 SENNOSIDES-DOCUSATE 8.6-50 MG TABLET: Performed by: NEUROLOGICAL SURGERY

## 2023-11-07 PROCEDURE — 99214 OFFICE O/P EST MOD 30 MIN: CPT

## 2023-11-07 PROCEDURE — 63710000001 NICOTINE 14 MG/24HR PATCH 24 HOUR: Performed by: NEUROLOGICAL SURGERY

## 2023-11-07 PROCEDURE — 63710000001 CARVEDILOL 6.25 MG TABLET: Performed by: HOSPITALIST

## 2023-11-07 PROCEDURE — 99232 SBSQ HOSP IP/OBS MODERATE 35: CPT

## 2023-11-07 PROCEDURE — 94799 UNLISTED PULMONARY SVC/PX: CPT

## 2023-11-07 PROCEDURE — 96376 TX/PRO/DX INJ SAME DRUG ADON: CPT

## 2023-11-07 PROCEDURE — 80053 COMPREHEN METABOLIC PANEL: CPT | Performed by: STUDENT IN AN ORGANIZED HEALTH CARE EDUCATION/TRAINING PROGRAM

## 2023-11-07 PROCEDURE — 63710000001 ROSUVASTATIN 20 MG TABLET: Performed by: HOSPITALIST

## 2023-11-07 PROCEDURE — 63710000001 ASPIRIN 81 MG TABLET DELAYED-RELEASE

## 2023-11-07 PROCEDURE — 63710000001 CALCIUM CARB-CHOLECALCIFEROL 600-20 MG-MCG TABLET: Performed by: NEUROLOGICAL SURGERY

## 2023-11-07 PROCEDURE — 63710000001 HYDROXYZINE 25 MG TABLET: Performed by: NEUROLOGICAL SURGERY

## 2023-11-07 PROCEDURE — 63710000001 CARBAMAZEPINE 200 MG TABLET: Performed by: NEUROLOGICAL SURGERY

## 2023-11-07 PROCEDURE — 63710000001 GUAIFENESIN 600 MG TABLET SUSTAINED-RELEASE 12 HOUR: Performed by: NEUROLOGICAL SURGERY

## 2023-11-07 PROCEDURE — 63710000001 TAMSULOSIN 0.4 MG CAPSULE: Performed by: NEUROLOGICAL SURGERY

## 2023-11-07 PROCEDURE — 63710000001 FINASTERIDE 5 MG TABLET: Performed by: NEUROLOGICAL SURGERY

## 2023-11-07 PROCEDURE — 63710000001 ISOSORBIDE MONONITRATE 60 MG TABLET SUSTAINED-RELEASE 24 HOUR: Performed by: NEUROLOGICAL SURGERY

## 2023-11-07 PROCEDURE — 63710000001 CHOLECALCIFEROL 25 MCG (1000 UT) TABLET: Performed by: NEUROLOGICAL SURGERY

## 2023-11-07 PROCEDURE — 84484 ASSAY OF TROPONIN QUANT: CPT | Performed by: HOSPITALIST

## 2023-11-07 RX ORDER — HYDROXYZINE 50 MG/1
50 TABLET, FILM COATED ORAL EVERY 12 HOURS PRN
Start: 2023-11-07

## 2023-11-07 RX ORDER — NICOTINE 21 MG/24HR
1 PATCH, TRANSDERMAL 24 HOURS TRANSDERMAL
Qty: 30 PATCH | Refills: 0 | Status: SHIPPED | OUTPATIENT
Start: 2023-11-08

## 2023-11-07 RX ORDER — GINGER ROOT/GINGER ROOT EXT 262.5 MG
1 CAPSULE ORAL DAILY
Qty: 30 TABLET | Refills: 0 | Status: SHIPPED | OUTPATIENT
Start: 2023-11-08

## 2023-11-07 RX ORDER — AMOXICILLIN 250 MG
1 CAPSULE ORAL 2 TIMES DAILY
Qty: 20 TABLET | Refills: 0 | Status: SHIPPED | OUTPATIENT
Start: 2023-11-07

## 2023-11-07 RX ORDER — ASPIRIN 81 MG/1
81 TABLET ORAL DAILY
Qty: 30 TABLET | Refills: 1 | Status: SHIPPED | OUTPATIENT
Start: 2023-11-08

## 2023-11-07 RX ORDER — ISOSORBIDE MONONITRATE 120 MG/1
120 TABLET, EXTENDED RELEASE ORAL DAILY
Qty: 30 TABLET | Refills: 1 | Status: SHIPPED | OUTPATIENT
Start: 2023-11-08

## 2023-11-07 RX ORDER — NITROGLYCERIN 0.4 MG/1
0.4 TABLET SUBLINGUAL
Qty: 30 TABLET | Refills: 1 | Status: SHIPPED | OUTPATIENT
Start: 2023-11-07

## 2023-11-07 RX ORDER — DEXAMETHASONE 2 MG/1
TABLET ORAL
Qty: 14 TABLET | Refills: 0 | Status: SHIPPED | OUTPATIENT
Start: 2023-11-07 | End: 2023-11-13

## 2023-11-07 RX ORDER — ISOSORBIDE MONONITRATE 60 MG/1
120 TABLET, EXTENDED RELEASE ORAL DAILY
Status: DISCONTINUED | OUTPATIENT
Start: 2023-11-08 | End: 2023-11-07 | Stop reason: HOSPADM

## 2023-11-07 RX ORDER — ENOXAPARIN SODIUM 100 MG/ML
1 INJECTION SUBCUTANEOUS EVERY 24 HOURS
Status: DISCONTINUED | OUTPATIENT
Start: 2023-11-07 | End: 2023-11-07 | Stop reason: HOSPADM

## 2023-11-07 RX ORDER — HYDROCODONE BITARTRATE AND ACETAMINOPHEN 5; 325 MG/1; MG/1
1 TABLET ORAL EVERY 4 HOURS PRN
Qty: 12 TABLET | Refills: 0 | Status: SHIPPED | OUTPATIENT
Start: 2023-11-07 | End: 2023-11-10

## 2023-11-07 RX ADMIN — ISOSORBIDE MONONITRATE 60 MG: 60 TABLET, EXTENDED RELEASE ORAL at 08:38

## 2023-11-07 RX ADMIN — NICOTINE 1 PATCH: 14 PATCH, EXTENDED RELEASE TRANSDERMAL at 08:39

## 2023-11-07 RX ADMIN — FINASTERIDE 5 MG: 5 TABLET, FILM COATED ORAL at 08:39

## 2023-11-07 RX ADMIN — DEXAMETHASONE SODIUM PHOSPHATE 4 MG: 4 INJECTION, SOLUTION INTRAMUSCULAR; INTRAVENOUS at 05:40

## 2023-11-07 RX ADMIN — BUDESONIDE AND FORMOTEROL FUMARATE DIHYDRATE 2 PUFF: 160; 4.5 AEROSOL RESPIRATORY (INHALATION) at 06:21

## 2023-11-07 RX ADMIN — ROSUVASTATIN CALCIUM 40 MG: 20 TABLET, FILM COATED ORAL at 00:19

## 2023-11-07 RX ADMIN — Medication 10 ML: at 08:40

## 2023-11-07 RX ADMIN — TAMSULOSIN HYDROCHLORIDE 0.4 MG: 0.4 CAPSULE ORAL at 08:38

## 2023-11-07 RX ADMIN — PANTOPRAZOLE SODIUM 40 MG: 40 TABLET, DELAYED RELEASE ORAL at 08:37

## 2023-11-07 RX ADMIN — LEVOTHYROXINE SODIUM 50 MCG: 50 TABLET ORAL at 05:40

## 2023-11-07 RX ADMIN — CARBAMAZEPINE 200 MG: 200 TABLET ORAL at 08:38

## 2023-11-07 RX ADMIN — CARVEDILOL 12.5 MG: 6.25 TABLET, FILM COATED ORAL at 08:37

## 2023-11-07 RX ADMIN — Medication 1 TABLET: at 08:38

## 2023-11-07 RX ADMIN — DEXAMETHASONE SODIUM PHOSPHATE 4 MG: 4 INJECTION, SOLUTION INTRAMUSCULAR; INTRAVENOUS at 11:46

## 2023-11-07 RX ADMIN — Medication 1000 UNITS: at 08:37

## 2023-11-07 RX ADMIN — DOCUSATE SODIUM 50 MG AND SENNOSIDES 8.6 MG 1 TABLET: 8.6; 5 TABLET, FILM COATED ORAL at 08:37

## 2023-11-07 RX ADMIN — ASPIRIN 81 MG: 81 TABLET, COATED ORAL at 08:38

## 2023-11-07 RX ADMIN — HYDROXYZINE HYDROCHLORIDE 50 MG: 25 TABLET ORAL at 08:38

## 2023-11-07 RX ADMIN — DILTIAZEM HYDROCHLORIDE 180 MG: 180 CAPSULE, COATED, EXTENDED RELEASE ORAL at 08:39

## 2023-11-07 RX ADMIN — GUAIFENESIN 1200 MG: 600 TABLET, EXTENDED RELEASE ORAL at 08:38

## 2023-11-07 NOTE — THERAPY DISCHARGE NOTE
Acute Care - Occupational Therapy Discharge Summary  [facility]     Patient Name: Brennon Vance  : 1935  MRN: [id number]    Today's Date: 2023  Onset of Illness/Injury or Date of Surgery: 23    Date of Referral to OT: 23  Referring Physician: Lakshmi Montoya      Admit Date: 2023        OT Recommendation and Plan    Visit Dx:    ICD-10-CM ICD-9-CM   1. Acute midline thoracic back pain  M54.6 724.1   2. Urinary tract infection without hematuria, site unspecified  N39.0 599.0   3. Closed fracture of eighth thoracic vertebra, unspecified fracture morphology, initial encounter  S22.069A 805.2   4. Closed fracture of eleventh thoracic vertebra, unspecified fracture morphology, initial encounter  S22.089A 805.2   5. Intractable back pain  M54.9 724.5   6. Impaired mobility [Z74.09]  Z74.09 799.89   7. Closed wedge compression fracture of T11 vertebra, initial encounter  S22.080A 805.2   8. Decreased activities of daily living (ADL) [Z78.9]  Z78.9 V49.89          Time Calculation- OT       Row Name 23 1256             Time Calculation- OT    OT Start Time 0810  -TS      OT Stop Time 0850  -TS      OT Time Calculation (min) 40 min  -TS      Total Timed Code Minutes- OT 40 minute(s)  -TS      OT Received On 23  -TS         Timed Charges    84228 - OT Self Care/Mgmt Minutes 40  -TS         Total Minutes    Timed Charges Total Minutes 40  -TS       Total Minutes 40  -TS                User Key  (r) = Recorded By, (t) = Taken By, (c) = Cosigned By      Initials Name Provider Type    TS Shreya Sanderson COTA Occupational Therapist Assistant                       OT Rehab Goals       Row Name 23 1500             Transfer Goal 1 (OT)    Activity/Assistive Device (Transfer Goal 1, OT) sit-to-stand/stand-to-sit;bed-to-chair/chair-to-bed;toilet;commode  -EC      [place] Level/Cues Needed (Transfer Goal 1, OT) supervision required  -EC      Time Frame (Transfer Goal 1, OT) long  term goal (LTG)  -EC      Progress/Outcome (Transfer Goal 1, OT) goal not met  -EC         Dressing Goal 1 (OT)    Activity/Device (Dressing Goal 1, OT) upper body dressing;lower body dressing  -EC      [place]/Cues Needed (Dressing Goal 1, OT) minimum assist (75% or more patient effort)  -EC      Time Frame (Dressing Goal 1, OT) long term goal (LTG)  -EC      Progress/Outcome (Dressing Goal 1, OT) goal not met  -EC                User Key  (r) = Recorded By, (t) = Taken By, (c) = Cosigned By      Initials Name Provider Type Discipline    EC Manisha Greco, OTR/L Occupational Therapist OT                     Outcome Measures       Row Name 11/07/23 1200 11/06/23 0900 11/05/23 0934       How much help from another person do you currently need...    Turning from your back to your side while in flat bed without using bedrails? -- 3  -AH 3  -JAMEEL    Moving from lying on back to sitting on the side of a flat bed without bedrails? -- 3  - 3  -JAMEEL    Moving to and from a bed to a chair (including a wheelchair)? -- 3  - 3  -JAMEEL    Standing up from a chair using your arms (e.g., wheelchair, bedside chair)? -- 3  - 3  -JAMEEL    Climbing 3-5 steps with a railing? -- 3  -AH 3  -JAMEEL    To walk in hospital room? -- 3  -AH 3  -JAMEEL    AM-PAC 6 Clicks Score (PT) -- 18  -AH 18  -JAMEEL    Highest level of mobility -- 6 --> Walked 10 steps or more  - 6 --> Walked 10 steps or more  -JAMEEL       How much help from another is currently needed...    Putting on and taking off regular lower body clothing? 3  -TS -- --    Bathing (including washing, rinsing, and drying) 3  -TS -- --    Toileting (which includes using toilet bed pan or urinal) 3  -TS -- --    Putting on and taking off regular upper body clothing 4  -TS -- --    Taking care of personal grooming (such as brushing teeth) 3  -TS -- --    Eating meals 4  -TS -- --    AM-PAC 6 Clicks Score (OT) 20  -TS -- --       Functional Assessment    Outcome Measure Options -- AM-PAC 6 Clicks  Basic Mobility (PT)  - AM-PAC 6 Clicks Basic Mobility (PT)  -JAMEEL              User Key  (r) = Recorded By, (t) = Taken By, (c) = Cosigned By      Initials Name Provider Type    Lissette Hyde, PTA Physical Therapist Assistant    Shreya Sarmiento COTA Occupational Therapist Assistant    Lico Cross, EMY Physical Therapist Assistant                    Timed Therapy Charges  Total Units: 3      Suggested Charges  Total Units: 3      Procedure Name Documented Minutes Units Code    HC OT SELF CARE/MGMT/TRAIN EA 15 MIN 40 3   13519 (CPT®)                 Documented Minutes  Total Minutes: 40      Therapy Provided Minutes    53180 - OT Self Care/Mgmt Minutes 40                        OT Discharge Summary  Anticipated Discharge Disposition (OT): assisted living  Reason for Discharge: Discharge from facility  Outcomes Achieved: Refer to plan of care for updates on goals achieved  Discharge Destination: Assisted Living Facility      Manisha Greco OTR/L  11/7/2023

## 2023-11-07 NOTE — THERAPY TREATMENT NOTE
Acute Care - Occupational Therapy Treatment Note  [facility]     Patient Name: Brennon Vance  : 1935  MRN: [id number]  Today's Date: 2023  Onset of Illness/Injury or Date of Surgery: 23  Date of Referral to OT: 23  Referring Physician: Lakshmi Montoya    Admit Date: 2023       ICD-10-CM ICD-9-CM   1. Acute midline thoracic back pain  M54.6 724.1   2. Urinary tract infection without hematuria, site unspecified  N39.0 599.0   3. Closed fracture of eighth thoracic vertebra, unspecified fracture morphology, initial encounter  S22.069A 805.2   4. Closed fracture of eleventh thoracic vertebra, unspecified fracture morphology, initial encounter  S22.089A 805.2   5. Intractable back pain  M54.9 724.5   6. Impaired mobility [Z74.09]  Z74.09 799.89   7. Closed wedge compression fracture of T11 vertebra, initial encounter  S22.080A 805.2   8. Decreased activities of daily living (ADL) [Z78.9]  Z78.9 V49.89     Patient Active Problem List   Diagnosis    Chest pain    NSTEMI, initial episode of care    Coronary artery disease of native artery of native heart with stable angina pectoris    Paroxysmal atrial fibrillation    Other emphysema    Essential hypertension    Precordial pain    Hyperlipidemia LDL goal <70    Tobacco abuse    Cardiomyopathy    NSTEMI (non-ST elevated myocardial infarction)    UTI (urinary tract infection)    Aspiration pneumonia    Intractable low back pain    Stage 3b chronic kidney disease    Compression fracture of T12 vertebra    Closed compression fracture of first lumbar vertebra    Body mass index (BMI) of 20.0-20.9 in adult    Hiatal hernia    Stroke    Intractable back pain    Protein-calorie malnutrition    Anemia, chronic disease    Memory difficulties    Closed fracture of eighth thoracic vertebra    Closed T11 fracture    Severe malnutrition     Past Medical History:   Diagnosis Date    CAD (coronary artery disease)     CKD (chronic kidney disease) stage 3, GFR 30-59  ml/min     COPD (chronic obstructive pulmonary disease)     Hiatal hernia     Hyperlipidemia     Hypertension     Stroke      Past Surgical History:   Procedure Laterality Date    ABDOMINAL AORTIC ANEURYSM REPAIR      CARDIAC CATHETERIZATION N/A 5/20/2021    Procedure: Left Heart Cath;  Surgeon: Harrison Alcantara MD;  Location:  [facility]ATH [facility]AS[facility];  Service: Cardiology;  Laterality: N/A;    CORONARY ANGIOPLASTY WITH STENT PLACEMENT      FEMORAL ARTERY STENT      KYPHOPLASTY N/A 11/4/2023    Procedure: KYPHOPLASTY WITHOUT BIOPSY T-11;  Surgeon: Jeremiah Brantley MD;  Location:  [facility] OR;  Service: Neurosurgery;  Laterality: N/A;    KYPHOPLASTY WITH BIOPSY N/A 9/26/2023    Procedure: KYPHOPLASTY WITH BIOPSY T12 and L3;  Surgeon: Jeremiah Brantley MD;  Location:  [facility] OR;  Service: Neurosurgery;  Laterality: N/A;    LEG THROMBECTOMY/EMBOLECTOMY Right 5/20/2021    Procedure: RT GROIN EXPLORATION;  Surgeon: Geoff Remy DO;  Location:  [facility] HYBRID OR 12;  Service: Vascular;  Laterality: Right;    TRIGEMINAL NERVE DECOMPRESSION           OT ASSESSMENT FLOWSHEET (last 12 hours)       OT Evaluation and Treatment       Row Name 11/07/23 0810                   OT Time and Intention    Subjective Information no complaints  -TS        Document Type therapy note (daily note)  -TS        Mode of Treatment occupational therapy  -TS        Patient Effort good  -TS           General Information    Existing Precautions/Restrictions fall;spinal  -TS           Pain Assessment    Pretreatment Pain Rating 0/10 - no pain  -TS        Posttreatment Pain Rating 0/10 - no pain  -TS           Cognition    Personal Safety Interventions fall prevention program maintained;gait belt;nonskid shoes/slippers when out of bed  -TS           Activities of Daily Living    BADL Assessment/Intervention grooming;bathing  -TS           Bathing Assessment/Intervention    [place] Level (Bathing) minimum assist (75% patient  effort)  wash hair  -TS        Position (Bathing) edge of bed sitting  -TS           Grooming Assessment/Training    [place] Level (Grooming) grooming skills;wash face, hands;set up;shave face;minimum assist (75% patient effort)  -TS        Assistive Devices (Grooming) electric razor  -TS        Position (Grooming) edge of bed sitting;sitting up in bed  -TS           Bed Mobility    Supine-Sit [place] (Bed Mobility) supervision  -TS        Sit-Supine [place] (Bed Mobility) supervision  -TS        Assistive Device (Bed Mobility) bed rails;head of bed elevated  -TS           Wound 11/02/23 2354 Bilateral coccyx    Wound - Properties Group Placement Date: 11/02/23  -AM Placement Time: 2354  -AM Present on Original Admission: Y  -AM Side: Bilateral  -AM Location: coccyx  -AM    Retired Wound - Properties Group Placement Date: 11/02/23  -AM Placement Time: 2354  -AM Present on Original Admission: Y  -AM Side: Bilateral  -AM Location: coccyx  -AM    Retired Wound - Properties Group Date first assessed: 11/02/23  -AM Time first assessed: 2354  -AM Present on Original Admission: Y  -AM Side: Bilateral  -AM Location: coccyx  -AM       Wound 11/04/23 0917 thoracic spine Incision    Wound - Properties Group Placement Date: 11/04/23  -DT Placement Time: 0917 -DT Location: thoracic spine  -DT Primary Wound Type: Incision  -DT    Retired Wound - Properties Group Placement Date: 11/04/23  -DT Placement Time: 0917 -DT Location: thoracic spine  -DT Primary Wound Type: Incision  -DT    Retired Wound - Properties Group Date first assessed: 11/04/23  -DT Time first assessed: 0917 -DT Location: thoracic spine  -DT Primary Wound Type: Incision  -DT       Plan of Care Review    Plan of Care Reviewed With patient  -TS        Progress improving  -TS           Positioning and Restraints    Pre-Treatment Position in bed  -TS        Post Treatment Position bed  -TS        In Bed fowlers;call light within reach;encouraged to  call for assist;with family/caregiver;side rails up x2;with other staff  -TS                  User Key  (r) = Recorded By, (t) = Taken By, (c) = Cosigned By      Initials Name Effective Dates    TS Shreya Sanderson COTA 02/03/23 -     Pedro Phoenix RN 02/17/22 -     Jasmin Grace RN 06/16/21 -                      Occupational Therapy Education       Title: PT OT SLP Therapies (In Progress)       Topic: Occupational Therapy (Done)       Point: ADL training (Done)       Description:   Instruct learner(s) on proper safety adaptation and remediation techniques during self care or transfers.   Instruct in proper use of assistive devices.                  Learning Progress Summary             Patient Acceptance, E, VU by  at 11/4/2023 1345                         Point: Precautions (Done)       Description:   Instruct learner(s) on prescribed precautions during self-care and functional transfers.                  Learning Progress Summary             Patient Acceptance, E, VU by  at 11/4/2023 1345                         Point: Body mechanics (Done)       Description:   Instruct learner(s) on proper positioning and spine alignment during self-care, functional mobility activities and/or exercises.                  Learning Progress Summary             Patient Acceptance, E, VU by  at 11/4/2023 1345                                         User Key       Initials Effective Dates Name Provider Type Discipline     10/13/23 -  Manisha Greco OTR/L Occupational Therapist OT                      OT Recommendation and Plan     Plan of Care Review  Plan of Care Reviewed With: patient  Progress: improving  Plan of Care Reviewed With: patient     Outcome Measures       Row Name 11/07/23 1200 11/06/23 0900 11/05/23 0934       How much help from another person do you currently need...    Turning from your back to your side while in flat bed without using bedrails? -- 3  -AH 3  -JAMEEL    Moving from lying  on back to sitting on the side of a flat bed without bedrails? -- 3  - 3  -JAMEEL    Moving to and from a bed to a chair (including a wheelchair)? -- 3  - 3  -JAMEEL    Standing up from a chair using your arms (e.g., wheelchair, bedside chair)? -- 3  - 3  -JAMEEL    Climbing 3-5 steps with a railing? -- 3  - 3  -JAMEEL    To walk in hospital room? -- 3  - 3  -JAMEEL    AM-PAC 6 Clicks Score (PT) -- 18  - 18  -JAMEEL    Highest level of mobility -- 6 --> Walked 10 steps or more  - 6 --> Walked 10 steps or more  -JAMEEL       How much help from another is currently needed...    Putting on and taking off regular lower body clothing? 3  -TS -- --    Bathing (including washing, rinsing, and drying) 3  -TS -- --    Toileting (which includes using toilet bed pan or urinal) 3  -TS -- --    Putting on and taking off regular upper body clothing 4  -TS -- --    Taking care of personal grooming (such as brushing teeth) 3  -TS -- --    Eating meals 4  -TS -- --    AM-PAC 6 Clicks Score (OT) 20  -TS -- --       Functional Assessment    Outcome Measure Options -- AM-PAC 6 Clicks Basic Mobility (PT)  - AM-PAC 6 Clicks Basic Mobility (PT)  -      Row Name 11/04/23 1300             How much help from another is currently needed...    Putting on and taking off regular lower body clothing? 3  -EC      Bathing (including washing, rinsing, and drying) 3  -EC      Toileting (which includes using toilet bed pan or urinal) 3  -EC      Putting on and taking off regular upper body clothing 4  -EC      Taking care of personal grooming (such as brushing teeth) 4  -EC      Eating meals 4  -EC      AM-PAC 6 Clicks Score (OT) 21  -EC         Functional Assessment    Outcome Measure Options AM-PAC 6 Clicks Daily Activity (OT)  -EC                User Key  (r) = Recorded By, (t) = Taken By, (c) = Cosigned By      Initials Name Provider Type     Lissette Flood, PTA Physical Therapist Assistant    Shreya Sarmiento COTA Occupational Therapist  Assistant    Lico Cross, PTA Physical Therapist Assistant    EC Manisha Greco, OTR/L Occupational Therapist                    Time Calculation:    Time Calculation- OT       Row Name 11/07/23 1256             Time Calculation- OT    OT Start Time 0810  -TS      OT Stop Time 0850  -TS      OT Time Calculation (min) 40 min  -TS      Total Timed Code Minutes- OT 40 minute(s)  -TS      OT Received On 11/07/23  -TS         Timed Charges    57335 - OT Self Care/Mgmt Minutes 40  -TS         Total Minutes    Timed Charges Total Minutes 40  -TS       Total Minutes 40  -TS                User Key  (r) = Recorded By, (t) = Taken By, (c) = Cosigned By      Initials Name Provider Type    TS Shreya Sanderson COTA Occupational Therapist Assistant                           NICK Stevens  11/7/2023

## 2023-11-07 NOTE — PLAN OF CARE
Goal Outcome Evaluation:  Plan of Care Reviewed With: caregiver        Progress: no change  Outcome Evaluation: Ntn follow up. Oral intake 75% of three meals. Soft to chew,chopped meats. Cont. to follow per protocol.          5 cm fibroid/none

## 2023-11-07 NOTE — DISCHARGE INSTRUCTIONS
Patient admitted with intractable back pain, found with T11 compression fracture. Started on pain medication, seen by neurosurgery, post T11 Kyphoplasty. Started on Decadron IN as well.   H/o CAD, developed chest pain during admission, seen by cardiology, no benefit for cardiac cath.   The patient is feeling better today, finished antibiotics for UTI.   The patient will return to assisted living facility today. He will be evaluated for possible hospice care.   Smoking cessation provided.   The family will transport the patient by private car.

## 2023-11-07 NOTE — PLAN OF CARE
Goal Outcome Evaluation:  Plan of Care Reviewed With: patient        Progress: no change    pt daughter at bedside.  Pt has no complaints of pain, safety maintained and will continue to monitor. IV infusing. External cath in use.

## 2023-11-07 NOTE — PLAN OF CARE
Goal Outcome Evaluation:                       Patient is alert and oriented x4 with confusion at times. Vitals stable. Room Air. Patient complains of no pain on this shift. Upx1 with a walker. Voiding per external catheter. Safety precautions maintained. Education provided on fall precautions. Patient and family provided with discharge instructions and education. IV removed. Patient d/c with family to assisted living.

## 2023-11-07 NOTE — PROGRESS NOTES
[facility] Palliative Care Services  Progress Note  Patient Name: Brennon Vance  Date of Admission: 11/2/2023  Today's Date: 11/07/23     Code Status and Medical Interventions:   Ordered at: 11/03/23 1208     Medical Intervention Limits:    NO intubation (DNI)    NO cardioversion    NO artificial nutrition    NO dialysis     Level Of Support Discussed With:    Patient     Code Status (Patient has no pulse and is not breathing):    No CPR (Do Not Attempt to Resuscitate)     Medical Interventions (Patient has pulse or is breathing):    Limited Support     Subjective   Chief complaint/Reason for Referral/Visit: Follow up on Goals of Care/Advance Care Planning.    Medical record reviewed. Events noted.  He is lying in bed, alert and in no apparent distress at time of exam.  Reports his pain is well controlled at time of exam.  Provided details of events yesterday.  Denies any further left arm/chest pain.  No visitors currently present.     Advance Care Planning   Advanced Directives: Patient has an advance directive on file. Patient reports document is valid.    Advance Care Planning Discussion: Followed up with Mr. Vance and his daughter to determine if they had any questions and/or concerns regarding previous discussions.  He reports he was able to speak with providers further after experiencing left arm pain that caused him to worry it was cardiac in nature.  Reports he had discussed in the past and declined CABG intervention previously.  Shared he continues to have same wishes in regards to how aggressive he would wish to remain.  Confirmed he would not wish to have CPR and/or be placed on ventilator support as well as other interventions reflected in code status.  He reports his symptoms were relieved by nitroglycerin and he has it available at home.  We did discuss discharge options as concerned he is not currently able to meet hospice criteria.  He expressed understanding.  Reports his goal is to  "continue to have pain control and get back to assisted living facility with his spouse.  Shared he is hopeful he has no further complications with his back.  He appears to have good understanding at time of exam.  Call placed to patient's daughter, Gaby, to follow up on previous discussion.  Reports her sister, Homa, is now the contact for \"initiation of hospice\" and requested to speak with her.  Homa reports she was able to speak with hospice liaison earlier and provided additional information regarding her father's condition.  Reports she is awaiting to hear back from hospice to discuss further.       The patient receives support from his spouse and children. Patient reports his daughters are his healthcare POA.     Due to the palliative care topics discussed including goals of care, treatment options, discharge options, and hospice services we will establish an advance care plan.    Goals of care: Ongoing.    Review of Systems   HENT:  Negative for congestion, sore throat and stridor.    Eyes:  Negative for pain, photophobia and visual disturbance.   Cardiovascular:  Negative for chest pain.   Respiratory:  Negative for shortness of breath.    Endocrine: Negative for polydipsia, polyphagia and polyuria.   Skin:  Negative for dry skin, flushing and itching.   Musculoskeletal:  Positive for back pain (intermittently).   Psychiatric/Behavioral:  Negative for depression. The patient does not have insomnia and is not nervous/anxious.    Allergic/Immunologic: Negative for environmental allergies, hives and persistent infections.       Pain Assessment  Preferred Pain Scale: number (Numeric Rating Pain Scale)  Nonverbal Indicators of Pain: nonverbal indicators absent  CPOT Facial Expression: 0-->relaxed, neutral  CPOT Body Movements: 0-->absence of movements  CPOT Muscle Tension: 0-->relaxed  Ventilator Compliance/Vocalization: 0-->talking in normal tone or no sound  CPOT Score: 0  Pain Location: back  Pain " Description: burning  Objective   Diagnostics: Reviewed      Intake/Output Summary (Last 24 hours) at 11/7/2023 1109  Last data filed at 11/6/2023 1315  Gross per 24 hour   Intake 240 ml   Output --   Net 240 ml     Current Facility-Administered Medications   Medication Dose Route Frequency Provider Last Rate Last Admin    acetaminophen (TYLENOL) tablet 650 mg  650 mg Oral Q4H PRN Jeremiah Brantley MD        Or    acetaminophen (TYLENOL) 160 MG/5ML oral solution 650 mg  650 mg Oral Q4H PRN Jeremiah Brantley MD        Or    acetaminophen (TYLENOL) suppository 650 mg  650 mg Rectal Q4H PRN Jeremiah Brantley MD        albuterol (PROVENTIL) nebulizer solution 0.083% 2.5 mg/3mL  2.5 mg Nebulization Q4H PRN Jeremiah Brantley MD        aspirin EC tablet 81 mg  81 mg Oral Daily Karlo Pabon APRSHEFALI   81 mg at 11/07/23 0838    sennosides-docusate (PERICOLACE) 8.6-50 MG per tablet 1 tablet  1 tablet Oral BID Jeremiah Brantley MD   1 tablet at 11/07/23 0837    And    polyethylene glycol (MIRALAX) packet 17 g  17 g Oral Daily PRN Jeremiah Brantley MD        And    bisacodyl (DULCOLAX) EC tablet 5 mg  5 mg Oral Daily PRN Jeremiah Brantley MD        And    bisacodyl (DULCOLAX) suppository 10 mg  10 mg Rectal Daily PRN Jeremiah Brantley MD        budesonide-formoterol (SYMBICORT) 160-4.5 MCG/ACT inhaler 2 puff  2 puff Inhalation BID - RT Jeremiah Brantley MD   2 puff at 11/07/23 0621    Calcium Carb-Cholecalciferol 600-20 MG-MCG tablet 1 tablet  1 tablet Oral Daily Jeremiah Brantley MD   1 tablet at 11/07/23 0838    carBAMazepine (TEGretol) tablet 200 mg  200 mg Oral Daily Jeremiah Brantley MD   200 mg at 11/07/23 0838    carvedilol (COREG) tablet 12.5 mg  12.5 mg Oral BID With Meals Sukh Cruz MD   12.5 mg at 11/07/23 0837    cholecalciferol (VITAMIN D3) tablet 1,000 Units  1,000 Units Oral Daily Jeremiah Brantley MD   1,000 Units at 11/07/23 0837    dexAMETHasone (DECADRON) injection 4 mg  4 mg Intravenous Q6H Karon  Jeremiah GOULD MD   4 mg at 11/07/23 0540    dilTIAZem CD (CARDIZEM CD) 24 hr capsule 180 mg  180 mg Oral Daily Sukh Cruz MD   180 mg at 11/07/23 0839    Enoxaparin Sodium (LOVENOX) syringe 40 mg  1 mg/kg Subcutaneous Q24H Sukh Cruz MD        finasteride (PROSCAR) tablet 5 mg  5 mg Oral Daily Jeremiah Brantley MD   5 mg at 11/07/23 0839    guaiFENesin (MUCINEX) 12 hr tablet 1,200 mg  1,200 mg Oral Q12H Jeremiah Brantley MD   1,200 mg at 11/07/23 0838    HYDROcodone-acetaminophen (NORCO) 5-325 MG per tablet 1 tablet  1 tablet Oral Q4H PRN Jeremiah Brantley MD   1 tablet at 11/04/23 1720    HYDROmorphone (DILAUDID) injection 0.5 mg  0.5 mg Intravenous Q4H PRN Jeremiah Brantley MD   0.5 mg at 11/06/23 1110    And    naloxone (NARCAN) injection 0.4 mg  0.4 mg Intravenous Q5 Min PRN Jeremiah Brantley MD        hydrOXYzine (ATARAX) tablet 50 mg  50 mg Oral TID Jeremiah Brantley MD   50 mg at 11/07/23 0838    isosorbide mononitrate (IMDUR) 24 hr tablet 60 mg  60 mg Oral Daily Jeremiah Brantley MD   60 mg at 11/07/23 0838    levothyroxine (SYNTHROID, LEVOTHROID) tablet 50 mcg  50 mcg Oral Q AM Jeremiah Brantley MD   50 mcg at 11/07/23 0540    melatonin tablet 5 mg  5 mg Oral Nightly PRN Jeremiah Brantley MD        nicotine (NICODERM CQ) 14 MG/24HR patch 1 patch  1 patch Transdermal Q24H Jeremiah Brantley MD   1 patch at 11/07/23 0839    nitroglycerin (NITROSTAT) SL tablet 0.4 mg  0.4 mg Sublingual Q5 Min PRN Jeremiah Brantley MD   0.4 mg at 11/06/23 1430    ondansetron (ZOFRAN) tablet 4 mg  4 mg Oral Q6H PRN Jeremiah Brantley MD        Or    ondansetron (ZOFRAN) injection 4 mg  4 mg Intravenous Q6H PRN Jeremiah Brantley MD        pantoprazole (PROTONIX) EC tablet 40 mg  40 mg Oral Daily Jeremiah Brantley MD   40 mg at 11/07/23 0837    Pharmacy to Dose enoxaparin (LOVENOX)   Does not apply Continuous PRN Karlo Pabon APRN        rosuvastatin (CRESTOR) tablet 40 mg  40 mg Oral Nightly Sukh Cruz MD   40 mg at  "11/07/23 0019    sodium chloride 0.9 % flush 10 mL  10 mL Intravenous Q12H Jeremiah Brantley MD   10 mL at 11/07/23 0840    sodium chloride 0.9 % flush 10 mL  10 mL Intravenous PRN Jeremiah Brantley MD   10 mL at 11/05/23 2220    sodium chloride 0.9 % infusion 40 mL  40 mL Intravenous PRN Jeremiah Brantley MD        sodium chloride 0.9 % infusion  50 mL/hr Intravenous Continuous Jeremiah Brantley MD 50 mL/hr at 11/06/23 2149 50 mL/hr at 11/06/23 2149    tamsulosin (FLOMAX) 24 hr capsule 0.4 mg  0.4 mg Oral Daily Jeremiah Brantley MD   0.4 mg at 11/07/23 0838     Pharmacy to Dose enoxaparin (LOVENOX),   sodium chloride, 50 mL/hr, Last Rate: 50 mL/hr (11/06/23 2149)        acetaminophen **OR** acetaminophen **OR** acetaminophen    albuterol    senna-docusate sodium **AND** polyethylene glycol **AND** bisacodyl **AND** bisacodyl    HYDROcodone-acetaminophen    HYDROmorphone **AND** naloxone    melatonin    nitroglycerin    ondansetron **OR** ondansetron    Pharmacy to Dose enoxaparin (LOVENOX)    sodium chloride    sodium chloride  Current medications patient is presently taking including all prescriptions, over-the-counter, herbals and vitamin/mineral/dietary (nutritional) supplements with reviewed including route, type, dose and frequency and are current per MAR at time of dictation.    Assessment:  Vital Signs: BP (!) 199/88 (BP Location: Right arm, Patient Position: Lying) Comment: NURSE NOTIFIED  Pulse 83   Temp 98.3 °F (36.8 °C) (Oral)   Resp 16   Ht 172.7 cm (68\")   Wt 38.7 kg (85 lb 6.4 oz)   SpO2 96%   BMI 12.99 kg/m²     Physical Exam  Vitals and nursing note reviewed.   Constitutional:       General: He is not in acute distress.     Appearance: He is ill-appearing.   HENT:      Head: Normocephalic and atraumatic.   Eyes:      General: Lids are normal.      Extraocular Movements: Extraocular movements intact.   Neck:      Vascular: No JVD.      Trachea: Trachea normal.   Cardiovascular:      Rate and " Rhythm: Normal rate.   Pulmonary:      Effort: Pulmonary effort is normal.   Musculoskeletal:      Cervical back: Neck supple.   Skin:     General: Skin is warm and dry.   Neurological:      Mental Status: He is alert and oriented to person, place, and time.   Psychiatric:         Behavior: Behavior is cooperative.        Functional status: Palliative Performance Scale Score: Performance 50% based on the following measures: Ambulation: Mainly sit or lie down, Activity and Evidence of Disease: Unable to do any work, extensive evidence of disease, Self-Care: Considerable assistance required,  Intake: Normal or reduced, LOC: Full or confusion.  Nutritional status: Albumin 2.8. Body mass index is 12.99 kg/m².   Patient status: Disease state: Controlled with current treatments.    Active Hospital Problems    Diagnosis     **Intractable back pain     Severe malnutrition     Protein-calorie malnutrition     Anemia, chronic disease     Memory difficulties     Closed fracture of eighth thoracic vertebra     Closed T11 fracture     Stage 3b chronic kidney disease     UTI (urinary tract infection)     Essential hypertension     Paroxysmal atrial fibrillation     Other emphysema      Impression/Problem List:  Coronary artery disease - Declined intervention  Intractable low back pain  Severe malnutrition   Memory difficulties - Chronic ischemic changes per MRI of brain   Closed T11 fracture s/p kyphoplasty   Closed fracture of eighth thoracic vertebrae  Anemia of chronic disease  Stage IIIb chronic kidney disease  Urinary tract infection due to pseudomonas aeruginosa   Paroxysmal atrial fibrillation  Other emphysema  History of kyphoplasty  Advanced age         Plan / Recommendations     Palliative Care Encounter   Goals of care include CODE STATUS NO CPR with limited support interventions.    Prognosis is guarded long-term secondary to intractable low back pain, severe malnutrition, chronic cerebral ischemic changes/memory  "difficulties, recent vertebral fractures requiring multiple kyphoplasty and other comorbidities listed above.     Followed up with Mr. Vance and his daughter to determine if they had any questions and/or concerns regarding previous discussions. Details of discussion above.     Reports his goal is to continue to have pain control and get back to assisted living facility with his spouse.       Call placed to patient's daughter, Gaby, to follow up on previous discussion.  Reports her sister, Homa, is now the contact for \"initiation of hospice\" and requested to speak with her.    Homa reports she was able to speak with hospice liaison earlier and provided additional information regarding her father's condition.  Reports she is awaiting to hear back from hospice to discuss further.   Discussed with hospice liaison.      Thank you for allowing us to participate in patient's plan of care. Palliative Care Team will continue to follow patient.     Time spent:35 minutes spent reviewing medical and medication records, assessing and examining patient, discussing with family, answering questions, providing some guidance about a plan and documentation of care, and coordinating care with other healthcare members, with > 50% time spent face to face.       Electronically signed by, CONCEPICON Harper, 11/07/23.   "

## 2023-11-07 NOTE — PROGRESS NOTES
[facility] -  Progress Note     LOS: 0 days   Patient Care Team:  Javid Grajeda MD as PCP - General (Family Medicine)    Chief Complaint: Follow-up chest pain    Subjective     Interval History:   Overnight patient has had no recurrence of his left arm pain.  He states that him and his family had long discussions.  Patient does not want to pursue any advanced interventions at this point.  Patient does not want to go through cardiac catheterization at this point.  Patient is going back to skilled nursing facility potentially today.      Review of Systems:     Review of Systems   All other systems reviewed and are negative.    Objective     Vital Sign Min/Max for last 24 hours  Temp  Min: 97.8 °F (36.6 °C)  Max: 98.3 °F (36.8 °C)   BP  Min: 134/60  Max: 199/88   Pulse  Min: 66  Max: 116   Resp  Min: 16  Max: 18   SpO2  Min: 93 %  Max: 96 %   No data recorded   Weight  Min: 38.7 kg (85 lb 6.4 oz)  Max: 38.7 kg (85 lb 6.4 oz)         11/07/23  0545   Weight: 38.7 kg (85 lb 6.4 oz)           Physical Exam:    Constitutional:       Appearance: Healthy appearance. Not in distress.   Neck:      Vascular: JVD normal.   Pulmonary:      Effort: Pulmonary effort is normal.      Breath sounds: Normal breath sounds. No wheezing. No rhonchi. No rales.   Cardiovascular:      PMI at left midclavicular line. Normal rate. Regular rhythm. Normal S1. Normal S2.       Murmurs: There is no murmur.      No gallop.  No click. No rub.   Edema:     Peripheral edema absent.   Musculoskeletal: Normal range of motion.      Cervical back: Normal range of motion. Skin:     General: Skin is warm and dry.   Neurological:      Mental Status: Alert and oriented to person, place and time.       Results Review:   Lab Results (last 72 hours)       Procedure Component Value Units Date/Time    High Sensitivity Troponin T [[id number]]  (Abnormal) Collected: 11/07/23 0430    Specimen: Blood Updated: 11/07/23 0639     HS  Troponin T 52 ng/L     Narrative:      High Sensitive Troponin T Reference Range:  <10.0 ng/L- Negative Female for AMI  <15.0 ng/L- Negative Male for AMI  >=10 - Abnormal Female indicating possible myocardial injury.  >=15 - Abnormal Male indicating possible myocardial injury.   Clinicians would have to utilize clinical acumen, EKG, Troponin, and serial changes to determine if it is an Acute Myocardial Infarction or myocardial injury due to an underlying chronic condition.         Comprehensive Metabolic Panel [[id number]]  (Abnormal) Collected: 11/07/23 0430    Specimen: Blood Updated: 11/07/23 0613     Glucose 117 mg/dL      BUN 38 mg/dL      Creatinine 1.76 mg/dL      Sodium 141 mmol/L      Potassium 3.8 mmol/L      Chloride 110 mmol/L      CO2 23.0 mmol/L      Calcium 7.8 mg/dL      Total Protein 4.9 g/dL      Albumin 2.7 g/dL      ALT (SGPT) 5 U/L      AST (SGOT) 11 U/L      Alkaline Phosphatase 71 U/L      Total Bilirubin <0.2 mg/dL      Globulin 2.2 gm/dL      A/G Ratio 1.2 g/dL      BUN/Creatinine Ratio 21.6     Anion Gap 8.0 mmol/L      eGFR 37.2 mL/min/1.73     Narrative:      GFR Normal >60  Chronic Kidney Disease <60  Kidney Failure <15    The GFR formula is only valid for adults with stable renal function between ages 18 and 70.    CBC & Differential [[id number]]  (Abnormal) Collected: 11/07/23 0430    Specimen: Blood Updated: 11/07/23 0519    Narrative:      The following orders were created for panel order CBC & Differential.  Procedure                               Abnormality         Status                     ---------                               -----------         ------                     CBC Auto Differential[[id number]]        Abnormal            Final result                 Please view results for these tests on the individual orders.    CBC Auto Differential [[id number]]  (Abnormal) Collected: 11/07/23 0430    Specimen: Blood Updated: 11/07/23 0519     WBC 6.12 10*3/mm3      RBC 3.08 10*6/mm3       Hemoglobin 9.6 g/dL      Hematocrit 29.7 %      MCV 96.4 fL      MCH 31.2 pg      MCHC 32.3 g/dL      RDW 14.2 %      RDW-SD 50.0 fl      MPV 9.3 fL      Platelets 224 10*3/mm3      Neutrophil % 81.2 %      Lymphocyte % 12.9 %      Monocyte % 4.2 %      Eosinophil % 0.0 %      Basophil % 0.2 %      Immature Grans % 1.5 %      Neutrophils, Absolute 4.97 10*3/mm3      Lymphocytes, Absolute 0.79 10*3/mm3      Monocytes, Absolute 0.26 10*3/mm3      Eosinophils, Absolute 0.00 10*3/mm3      Basophils, Absolute 0.01 10*3/mm3      Immature Grans, Absolute 0.09 10*3/mm3      nRBC 0.0 /100 WBC     High Sensitivity Troponin T [[id number]]  (Abnormal) Collected: 11/06/23 2129    Specimen: Blood Updated: 11/06/23 2157     HS Troponin T 44 ng/L     Narrative:      High Sensitive Troponin T Reference Range:  <10.0 ng/L- Negative Female for AMI  <15.0 ng/L- Negative Male for AMI  >=10 - Abnormal Female indicating possible myocardial injury.  >=15 - Abnormal Male indicating possible myocardial injury.   Clinicians would have to utilize clinical acumen, EKG, Troponin, and serial changes to determine if it is an Acute Myocardial Infarction or myocardial injury due to an underlying chronic condition.         High Sensitivity Troponin T 2Hr [[id number]]  (Abnormal) Collected: 11/06/23 1740    Specimen: Blood Updated: 11/06/23 1828     HS Troponin T 35 ng/L      Troponin T Delta 4 ng/L     Narrative:      High Sensitive Troponin T Reference Range:  <10.0 ng/L- Negative Female for AMI  <15.0 ng/L- Negative Male for AMI  >=10 - Abnormal Female indicating possible myocardial injury.  >=15 - Abnormal Male indicating possible myocardial injury.   Clinicians would have to utilize clinical acumen, EKG, Troponin, and serial changes to determine if it is an Acute Myocardial Infarction or myocardial injury due to an underlying chronic condition.         High Sensitivity Troponin T [[id number]]  (Abnormal) Collected: 11/06/23 1503    Specimen:  Blood Updated: 11/06/23 1557     HS Troponin T 31 ng/L     Narrative:      High Sensitive Troponin T Reference Range:  <10.0 ng/L- Negative Female for AMI  <15.0 ng/L- Negative Male for AMI  >=10 - Abnormal Female indicating possible myocardial injury.  >=15 - Abnormal Male indicating possible myocardial injury.   Clinicians would have to utilize clinical acumen, EKG, Troponin, and serial changes to determine if it is an Acute Myocardial Infarction or myocardial injury due to an underlying chronic condition.         Comprehensive Metabolic Panel [[id number]]  (Abnormal) Collected: 11/06/23 0352    Specimen: Blood Updated: 11/06/23 0437     Glucose 118 mg/dL      BUN 33 mg/dL      Creatinine 1.65 mg/dL      Sodium 140 mmol/L      Potassium 3.9 mmol/L      Chloride 107 mmol/L      CO2 23.0 mmol/L      Calcium 8.1 mg/dL      Total Protein 5.1 g/dL      Albumin 2.8 g/dL      ALT (SGPT) 5 U/L      AST (SGOT) 11 U/L      Alkaline Phosphatase 70 U/L      Total Bilirubin 0.2 mg/dL      Globulin 2.3 gm/dL      A/G Ratio 1.2 g/dL      BUN/Creatinine Ratio 20.0     Anion Gap 10.0 mmol/L      eGFR 40.2 mL/min/1.73     Narrative:      GFR Normal >60  Chronic Kidney Disease <60  Kidney Failure <15    The GFR formula is only valid for adults with stable renal function between ages 18 and 70.    CBC & Differential [[id number]]  (Abnormal) Collected: 11/06/23 0352    Specimen: Blood Updated: 11/06/23 0435    Narrative:      The following orders were created for panel order CBC & Differential.  Procedure                               Abnormality         Status                     ---------                               -----------         ------                     CBC Auto Differential[[id number]]        Abnormal            Final result                 Please view results for these tests on the individual orders.    CBC Auto Differential [[id number]]  (Abnormal) Collected: 11/06/23 0352    Specimen: Blood Updated: 11/06/23 0435     WBC  5.41 10*3/mm3      RBC 2.98 10*6/mm3      Hemoglobin 8.9 g/dL      Hematocrit 28.6 %      MCV 96.0 fL      MCH 29.9 pg      MCHC 31.1 g/dL      RDW 13.9 %      RDW-SD 48.9 fl      MPV 9.2 fL      Platelets 192 10*3/mm3      Neutrophil % 83.7 %      Lymphocyte % 12.4 %      Monocyte % 2.8 %      Eosinophil % 0.0 %      Basophil % 0.0 %      Immature Grans % 1.1 %      Neutrophils, Absolute 4.53 10*3/mm3      Lymphocytes, Absolute 0.67 10*3/mm3      Monocytes, Absolute 0.15 10*3/mm3      Eosinophils, Absolute 0.00 10*3/mm3      Basophils, Absolute 0.00 10*3/mm3      Immature Grans, Absolute 0.06 10*3/mm3      nRBC 0.0 /100 WBC     Urine Culture - Urine, Urine, Clean Catch [[id number]]  (Abnormal)  (Susceptibility) Collected: 11/02/23 2024    Specimen: Urine, Clean Catch Updated: 11/05/23 1202     Urine Culture >100,000 CFU/mL Pseudomonas aeruginosa    Narrative:      Colonization of the urinary tract without infection is common. Treatment is discouraged unless the patient is symptomatic, pregnant, or undergoing an invasive urologic procedure.    Susceptibility        Pseudomonas aeruginosa      JUAN JOSÉ      Cefepime Susceptible      Ceftazidime Susceptible      Ciprofloxacin Susceptible      Gentamicin Susceptible      Levofloxacin Susceptible      Piperacillin + Tazobactam Susceptible                           Comprehensive Metabolic Panel [[id number]]  (Abnormal) Collected: 11/05/23 0914    Specimen: Blood Updated: 11/05/23 1054     Glucose 144 mg/dL      BUN 30 mg/dL      Creatinine 1.68 mg/dL      Sodium 141 mmol/L      Potassium 3.7 mmol/L      Comment: Slight hemolysis detected by analyzer. Results may be affected.        Chloride 104 mmol/L      CO2 23.0 mmol/L      Calcium 8.3 mg/dL      Total Protein 6.5 g/dL      Albumin 3.3 g/dL      ALT (SGPT) 7 U/L      AST (SGOT) 15 U/L      Alkaline Phosphatase 96 U/L      Total Bilirubin 0.2 mg/dL      Globulin 3.2 gm/dL      A/G Ratio 1.0 g/dL      BUN/Creatinine Ratio 17.9  "    Anion Gap 14.0 mmol/L      eGFR 39.3 mL/min/1.73     Narrative:      GFR Normal >60  Chronic Kidney Disease <60  Kidney Failure <15    The GFR formula is only valid for adults with stable renal function between ages 18 and 70.    CBC & Differential [[id number]]  (Abnormal) Collected: 11/05/23 0914    Specimen: Blood Updated: 11/05/23 1035    Narrative:      The following orders were created for panel order CBC & Differential.  Procedure                               Abnormality         Status                     ---------                               -----------         ------                     CBC Auto Differential[[id number]]        Abnormal            Final result                 Please view results for these tests on the individual orders.    CBC Auto Differential [[id number]]  (Abnormal) Collected: 11/05/23 0914    Specimen: Blood Updated: 11/05/23 1035     WBC 6.56 10*3/mm3      RBC 3.75 10*6/mm3      Hemoglobin 11.5 g/dL      Hematocrit 35.8 %      MCV 95.5 fL      MCH 30.7 pg      MCHC 32.1 g/dL      RDW 14.1 %      RDW-SD 48.8 fl      MPV 9.4 fL      Platelets 256 10*3/mm3      Neutrophil % 89.1 %      Lymphocyte % 8.2 %      Monocyte % 1.8 %      Eosinophil % 0.0 %      Basophil % 0.0 %      Immature Grans % 0.9 %      Neutrophils, Absolute 5.84 10*3/mm3      Lymphocytes, Absolute 0.54 10*3/mm3      Monocytes, Absolute 0.12 10*3/mm3      Eosinophils, Absolute 0.00 10*3/mm3      Basophils, Absolute 0.00 10*3/mm3      Immature Grans, Absolute 0.06 10*3/mm3      nRBC 0.0 /100 WBC                 Echo EF Estimated  Lab Results   Component Value Date    ECHOEFEST 65 05/09/2018         Cath Ejection Fraction Quantitative  No results found for: \"CATHEF\"        Medication Review: yes  Current Facility-Administered Medications   Medication Dose Route Frequency Provider Last Rate Last Admin    acetaminophen (TYLENOL) tablet 650 mg  650 mg Oral Q4H PRN Jeremiah Brantley MD        Or    acetaminophen (TYLENOL) 160 " MG/5ML oral solution 650 mg  650 mg Oral Q4H PRN Jeremiah Brantley MD        Or    acetaminophen (TYLENOL) suppository 650 mg  650 mg Rectal Q4H PRN Jeremiah Brantley MD        albuterol (PROVENTIL) nebulizer solution 0.083% 2.5 mg/3mL  2.5 mg Nebulization Q4H PRN Jeremiah Brantley MD        aspirin EC tablet 81 mg  81 mg Oral Daily Karlo Pabon APRN   81 mg at 11/07/23 0838    sennosides-docusate (PERICOLACE) 8.6-50 MG per tablet 1 tablet  1 tablet Oral BID Jeremiah Brantley MD   1 tablet at 11/07/23 0837    And    polyethylene glycol (MIRALAX) packet 17 g  17 g Oral Daily PRN Jeremiah Brantley MD        And    bisacodyl (DULCOLAX) EC tablet 5 mg  5 mg Oral Daily PRN Jeremiah Brantley MD        And    bisacodyl (DULCOLAX) suppository 10 mg  10 mg Rectal Daily PRN Jeremiah Brantley MD        budesonide-formoterol (SYMBICORT) 160-4.5 MCG/ACT inhaler 2 puff  2 puff Inhalation BID - RT Jeremiah Brantley MD   2 puff at 11/07/23 0621    Calcium Carb-Cholecalciferol 600-20 MG-MCG tablet 1 tablet  1 tablet Oral Daily Jeremiah Brantley MD   1 tablet at 11/07/23 0838    carBAMazepine (TEGretol) tablet 200 mg  200 mg Oral Daily Jeremiah Brantley MD   200 mg at 11/07/23 0838    carvedilol (COREG) tablet 12.5 mg  12.5 mg Oral BID With Meals Sukh Cruz MD   12.5 mg at 11/07/23 0837    cholecalciferol (VITAMIN D3) tablet 1,000 Units  1,000 Units Oral Daily Jeremiah Brantley MD   1,000 Units at 11/07/23 0837    dexAMETHasone (DECADRON) injection 4 mg  4 mg Intravenous Q6H Jeremiah Brantley MD   4 mg at 11/07/23 1146    dilTIAZem CD (CARDIZEM CD) 24 hr capsule 180 mg  180 mg Oral Daily Sukh Cruz MD   180 mg at 11/07/23 0839    Enoxaparin Sodium (LOVENOX) syringe 40 mg  1 mg/kg Subcutaneous Q24H Sukh Cruz MD        finasteride (PROSCAR) tablet 5 mg  5 mg Oral Daily Jeremiah Brantley MD   5 mg at 11/07/23 0839    guaiFENesin (MUCINEX) 12 hr tablet 1,200 mg  1,200 mg Oral Q12H Jeremiah Brantley MD   1,200 mg at  11/07/23 0838    HYDROcodone-acetaminophen (NORCO) 5-325 MG per tablet 1 tablet  1 tablet Oral Q4H PRN Jeremiah Brantley MD   1 tablet at 11/04/23 1720    HYDROmorphone (DILAUDID) injection 0.5 mg  0.5 mg Intravenous Q4H PRN Jeremiah Brantley MD   0.5 mg at 11/06/23 1110    And    naloxone (NARCAN) injection 0.4 mg  0.4 mg Intravenous Q5 Min PRN Jeremiah Brantley MD        hydrOXYzine (ATARAX) tablet 50 mg  50 mg Oral TID Jeremiah Brantley MD   50 mg at 11/07/23 0838    [START ON 11/8/2023] isosorbide mononitrate (IMDUR) 24 hr tablet 120 mg  120 mg Oral Daily Karlo Pabon APRSHEFALI        levothyroxine (SYNTHROID, LEVOTHROID) tablet 50 mcg  50 mcg Oral Q AM Jeremiah Brantley MD   50 mcg at 11/07/23 0540    melatonin tablet 5 mg  5 mg Oral Nightly PRN Jeremiah Brantley MD        nicotine (NICODERM CQ) 14 MG/24HR patch 1 patch  1 patch Transdermal Q24H Jeremiah Brantley MD   1 patch at 11/07/23 0839    nitroglycerin (NITROSTAT) SL tablet 0.4 mg  0.4 mg Sublingual Q5 Min PRN Jeremiah Brantley MD   0.4 mg at 11/06/23 1430    ondansetron (ZOFRAN) tablet 4 mg  4 mg Oral Q6H PRN Jeremiah Brantley MD        Or    ondansetron (ZOFRAN) injection 4 mg  4 mg Intravenous Q6H PRN Jeremiah Brantley MD        pantoprazole (PROTONIX) EC tablet 40 mg  40 mg Oral Daily Jeremiah Brantley MD   40 mg at 11/07/23 0837    Pharmacy to Dose enoxaparin (LOVENOX)   Does not apply Continuous PRN Karlo Pabon S, APRSHEFALI        rosuvastatin (CRESTOR) tablet 40 mg  40 mg Oral Nightly Sukh Cruz MD   40 mg at 11/07/23 0019    sodium chloride 0.9 % flush 10 mL  10 mL Intravenous Q12H Jeremiah Brantley MD   10 mL at 11/07/23 0840    sodium chloride 0.9 % flush 10 mL  10 mL Intravenous PRN Jeremiah Brantley MD   10 mL at 11/05/23 2220    sodium chloride 0.9 % infusion 40 mL  40 mL Intravenous PRN Jeremiah Brantley MD        sodium chloride 0.9 % infusion  50 mL/hr Intravenous Continuous Jeremiah Brantley MD 50 mL/hr at 11/06/23 2144 50 mL/hr at  11/06/23 2149    tamsulosin (FLOMAX) 24 hr capsule 0.4 mg  0.4 mg Oral Daily Jeremiah Brantley MD   0.4 mg at 11/07/23 0838         Assessment & Plan       Angina: Patient has had no recurrence of angina since yesterday's event  - Increasing Imdur to 120 mg daily for discharge  - Continue on aspirin 81 mg daily  - Attending service to resume Eliquis for discharge  - Continue rosuvastatin 40 mg daily  - Continue carvedilol 12.5 mg twice daily as well as diltiazem 180 mg daily  - Echo pending    If the patient in the future desires for potential cardiac catheterization he can reach out to the [facility] cardiology office for this to be arranged.  Given his previous cath showing multivessel disease, repeat coronary angiography is not recommended at this time.      Electronically signed by CONCEPCION Hilliard, 11/07/23, 11:48 AM CST.

## 2023-11-07 NOTE — DISCHARGE SUMMARY
[facility] Medicine Services  DISCHARGE SUMMARY       Date of Admission: 11/2/2023  Date of Discharge:  11/7/2023  Primary Care Physician: Javid Grajeda MD    Presenting Problem/History of Present Illness:Brennon Vance is an 88-year-old male with a past medical history of coronary artery disease, COPD with recent cessation of 4 pack a day smoking, hypertension, hyperlipidemia, BPH, stroke, chronic spinal compression fracture, chronic kidney disease stage IIIb, chronic anemia suspect of chronic disease, BMI 17 upon admission, less than 15 at discharge.  Recent admission 9/24 - 10/5, 2023 with intractable back pain found to have T12 and L3 fractures with need for kyphoplasty per Dr. Jeremiah Brantley.  Patient was also treated for UTI at that time (Pseudomonas aeruginosa), treated with IV antibiotics.  Patient discharged to [facility] rehab facility.  He has subsequently moved to assisted living 10/27/2023.  Daughter Gaby provides history as patient is confused.  She states she drove up from [place] to visit today.  He complained of severe back pain and apparently has been having pain for 3 days.  She reports he bent over to put on his socks 3 days ago and felt sudden pain mid back pain.  Work-up reveals T8 and T11 acute fractures.  Patient continues to have UTI-culture pending, hemoglobin 9 at baseline, creatinine 1.77 at baseline.  CTs of the cervical and lumbar spines without acute problems identified.  Patient unable to provide history.  I asked if he would like to have something to eat and he told me he just had breakfast.  Daughter states he has been extremely confused.  He has received multiple doses of opioids in the emergency department without voiced improvement in pain.  Currently he is unable to score his pain.  Condition is stable.  He is admitted for further evaluation treatment.       Final Discharge Diagnoses:  Active Hospital Problems    Diagnosis      **Intractable back pain     Severe malnutrition     Protein-calorie malnutrition     Anemia, chronic disease     Memory difficulties     Closed fracture of eighth thoracic vertebra     Closed T11 fracture     Stage 3b chronic kidney disease     UTI (urinary tract infection)     Essential hypertension     Paroxysmal atrial fibrillation     Other emphysema        Consults: Neurosurgery, cardiology, Palliative care    Procedures Performed: T11 Kyphoplasty and vertebral body biopsy on 11/4/23    Pertinent Test Results:   Results for orders placed during the hospital encounter of 05/09/18    Adult Transthoracic Echo Complete W/ Cont if Necessary Per Protocol    Interpretation Summary  · Left ventricular systolic function is normal. Estimated EF = 65%.  · Left ventricular diastolic dysfunction.  · No evidence of pulmonary hypertension is present.      Imaging Results (All)       Procedure Component Value Units Date/Time    XR Spine Lumbar AP & Lateral [[id number]] Collected: 11/04/23 0954     Updated: 11/04/23 1000    Narrative:      EXAMINATION: XR SPINE LUMBAR AP AND LATERAL-  11/4/2023 9:54 AM CDT     HISTORY: Kyphoplasty.     Fluoroscopy time: 22.4 seconds.     Dose: 1.8441 mGy. 2 images     FINDINGS: C-arm was used intraoperatively during performance of a  kyphoplasty at the T11 level. The patient has undergone previous  kyphoplasty at T12. Films are available to the OR for review.     This report was signed and finalized on 11/4/2023 9:57 AM CDT by Dr. Clinton Mcgrath MD.       FL C Arm During Surgery [[id number]] Resulted: 11/04/23 0954     Updated: 11/04/23 0954    Narrative:      This procedure was auto-finalized with no dictation required.    MRI Thoracic Spine Without Contrast [[id number]] Collected: 11/03/23 1530     Updated: 11/03/23 1544    Narrative:      EXAMINATION:  MRI THORACIC SPINE WO CONTRAST-  11/3/2023 11:40 AM CDT     HISTORY: comrpession fractures; M54.6-Pain in thoracic spine;  N39.0-Urinary  tract infection, site not specified; S22.069A-Unspecified  fracture of T7-t8 vertebra, initial encounter for closed fracture;  S22.089A-Unspecified fracture of t11-T12 vertebra, initial encounter for  closed fracture; M54.9-Dorsalgia, unspecified     TECHNIQUE: Multiplanar imaging was performed.     COMPARISON: No comparison study.     BONE MARROW AND SPINAL CORD: There has been kyphoplasty at T12. Minimal  posterior displacement of the superior endplate at T12 causes minimal  dural sac compression but no cord compression. There is edema within the  T12 vertebral body consistent with the fracture being acute. There is  also T1 low signal and T2 high signal within the T11 vertebral body with  very minimal wedge shape of T11. The findings are consistent with an  acute fracture. There is a thin linear band of T2 high signal within the  T8 vertebral body seen best on the STIR sequence. I suspect the T8  compression deformity is likely chronic. There are chronic compression  deformities of T4 and T9. There is a 9-10 mm T2 high signal lesion in  the T7 vertebral body of indeterminate etiology. There is no signal  abnormality within the thoracic spinal cord.     DISC LEVELS: The thoracic spine disc spaces are fairly well preserved in  height with mild narrowing at some levels. No significant disc  herniation or disc bulging is visualized.     ADDITIONAL FINDINGS: There are small bilateral pleural effusions.          Impression:      1. Status post kyphoplasty at T12. There is some edema within the T12  vertebral body consistent with the acute nature of the fracture.  2. Acute minimal compression deformity of T11. There is mild edema  within the vertebral body.  3. The T8 compression deformity is likely chronic in age. There is a  thin linear band of T2 high signal within the body that may represent  residual fracture line. However, there is no other significant edema  within the T8 vertebrae.  4. A 9-10 mm T2 high signal  lesion in the T7 vertebrae is not a typical  hemangioma. It may be an atypical hemangioma. A metastatic bone lesion  is also considered in this age group. However, it is probably less  likely with no clinical history of neoplasm.  5. Chronic compression deformities of T4 and T9.  6. Thoracic disc spaces are fairly well-maintained throughout. No disc  herniation or significant disc bulging is seen.  7. Small bilateral pleural effusions.        This report was signed and finalized on 11/3/2023 3:40 PM CDT by Dr. Juan Carlos Arevalo MD.       MRI Lumbar Spine Without Contrast [[id number]] Collected: 11/03/23 1350     Updated: 11/03/23 1405    Narrative:      EXAMINATION:  MRI LUMBAR SPINE WO CONTRAST-  11/3/2023 12:00 PM CDT     HISTORY: M54.6-Pain in thoracic spine; N39.0-Urinary tract infection,  site not specified; S22.069A-Unspecified fracture of T7-t8 vertebra,  initial encounter for closed fracture; S22.089A-Unspecified fracture of  t11-T12 vertebra, initial encounter for closed fracture;  M54.9-Dorsalgia, unspecified.     TECHNIQUE: Multiplanar imaging was performed. The patient had to stop  the examination before the axial T1 sequence. The axial T1 sequence was  not obtained. All of the other imaging sequences were obtained. There is  motion artifact on some of the imaging sequences.     COMPARISON: 9/25/2023.     ALIGNMENT, BONE MARROW AND SPINAL CORD: There are compression  deformities of T12 and L3. There has been interim kyphoplasty at both  levels since the prior study. Minimal posterior displacement of cortex  at the superior endplate of T12 is stable compared to the prior study.  This causes minimal dural sac compression. There is some residual edema  in both of these vertebral bodies. No new fracture is identified. There  is no signal abnormality in the visualized distal spinal cord. The tip  of the conus is located at L1.     ADDITIONAL FINDINGS: There are no additional findings.     DISC LEVELS:     L1-2:  The disc is maintained in height. There is mild facet arthropathy.  There is no spinal or foraminal stenosis.     L2-3: The disc is maintained in height. There is mild disc bulging and  mild dural sac compression. There is mild facet arthropathy. There is  mild narrowing of the central canal-dural sac. There is mild inferior  recess foraminal narrowing bilaterally.     L3-4: The disc is maintained in height. There is mild disc bulging  causing dural sac compression. There is mild facet arthropathy with  thickening of ligamentum flavum. There is mild narrowing of the central  canal-dural sac. There is moderate inferior recess foraminal narrowing  right greater than left.     L4-5: The disc is maintained in height. There is mild disc bulging  causing dural sac compression. There is moderate facet arthropathy with  mild thickening of ligamentum flavum. There is mild to moderate  narrowing of the central canal-dural sac. There is mild inferior recess  foraminal narrowing bilaterally.     L5-S1: The disc is maintained in height. There is mild to moderate facet  arthropathy. There is no central spinal canal narrowing. There is mild  foraminal narrowing on the left.          Impression:      1. There is mild motion artifact.  2. Interim kyphoplasty of compression deformities of T12 and L3. There  is edema remaining in both of these vertebral bodies. There is no  significant change in height. Minimal posterior displacement of bone at  the superior endplate of T12 is stable causing minimal dural sac  compression.  3. Degenerative changes at multiple disc levels, as described.        This report was signed and finalized on 11/3/2023 2:02 PM CDT by Dr. Juan Carlos Arevalo MD.       CT Thoracic Spine Without Contrast [[id number]] Collected: 11/02/23 1835     Updated: 11/02/23 1845    Narrative:      CT THORACIC SPINE WO CONTRAST- 11/2/2023 5:35 PM CDT     HISTORY: Back pain     COMPARISON: None      DLP: 352 mGy cm     TECHNIQUE:  Serial helical tomographic images of the thoracic spine were  obtained without the use of intravenous contrast. Multiplanar  reformatted images were provided for review.     Automated exposure control was utilized to reduce patient radiation  dose.     FINDINGS:     Bony elements are diffusely osteopenic. Previous T12 kyphoplasty. Acute  appearing T11 fracture with approximately 25% loss of height in the  anterior column. There is a subtle posterior cortical buckling as well  (series 9-images 24 and 23) without significant loss of height in the  middle column. The posterior elements are intact at this level. There  are additional T8, T9 and T4 level compression fractures. The T4 and T9  level fractures appear more chronic. The T8 fracture could be acute or  subacute and this is also a 2 column fracture with loss of height in  both the anterior and middle columns. There is no listhesis or evidence  of traumatic subluxation. In general, the posterior elements are intact  throughout the thoracic spine. Lung emphysema.       Impression:      1. Bony elements are diffusely osteopenic. There are 2 column fracture  deformities at both T8 and T11 which seem likely acute or subacute.  Approximately 25% loss of height in the middle column at T8. No  measurable loss of height in the middle column at T11. There is only  slight posterior cortical buckling at both levels, not resulting in a  significant spinal stenosis. Posterior elements are intact. No traumatic  subluxation.           This report was signed and finalized on 11/2/2023 6:42 PM CDT by Dr Ifeanyi Angulo.       CT Lumbar Spine Without Contrast [[id number]] Collected: 11/02/23 1831     Updated: 11/02/23 1838    Narrative:      CT LUMBAR SPINE WO CONTRAST- 11/2/2023 5:35 PM CDT     HISTORY: pain, recent kyphoplasty     COMPARISON: None      DLP: 270 mGy cm     TECHNIQUE: Serial helical tomographic images of the lumbar spine were  obtained without the use of intravenous  contrast. Additionally, sagittal  and coronal reformatted images were provided for review. Automated  exposure control is utilized to reduce patient radiation dose.     FINDINGS:     Counting will assume 5 lumbar-type vertebrae. The spine is imaged from  T12 to S3.     Bony elements are diffusely osteopenic. Previous T12 and L3 level  kyphoplasty. Vertebral body heights are otherwise maintained. Lordosis  is preserved. No listhesis or subluxation. Posterior elements are  intact. Previous AAA repair.       Impression:      1. Previous T12 and L3 kyphoplasties. Vertebral body heights are  otherwise maintained. No new fracture identified.  2. Osteopenia.           This report was signed and finalized on 11/2/2023 6:35 PM CDT by Dr Ifeanyi Angulo.       CT Cervical Spine Without Contrast [[id number]] Collected: 11/02/23 1822     Updated: 11/02/23 1835    Narrative:      CT CERVICAL SPINE WO CONTRAST- 11/2/2023 5:35 PM CDT     HISTORY: Neck pain     COMPARISON: None      DOSE LENGTH PRODUCT: 291 mGy cm. Automated exposure control was also  utilized to decrease patient radiation dose.     TECHNIQUE: Serial helical tomographic images of the cervical spine were  obtained without the use of intravenous contrast. Additionally, sagittal  and coronal reformatted images were also provided for review.     FINDINGS:     The spine is visualized from the posterior fossa through T2.  Incidentally noted atlantooccipital assimilation, incomplete type.  Additional C2-C3 level non-segmentation. Preserved cervical lordosis.  There is a degenerative retrolisthesis at C3-C4. Degenerative  anterolisthesis at C4-C5 and C7-T1. No acute fracture is identified.  Vertebral body heights are well-maintained. Multilevel loss of  intervertebral disc height. No high-grade bony narrowing of the spinal  canal is identified. Advanced facet arthropathy. Posterior elements are  intact. Carotid bulb atheromatous calcification. Bilateral apical  pleural  thickening.       Impression:      1. No acute fracture identified.  2. 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  3. Advanced osteoarthritis changes.           This report was signed and finalized on 11/2/2023 6:31 PM CDT by Dr Ifeanyi Angulo.             LAB RESULTS:      Lab 11/07/23 0430 11/06/23 0352 11/05/23 0914 11/03/23 0559 11/02/23 2035   WBC 6.12 5.41 6.56 5.00 5.36   HEMOGLOBIN 9.6* 8.9* 11.5* 9.9* 9.0*   HEMATOCRIT 29.7* 28.6* 35.8* 31.4* 28.3*   PLATELETS 224 192 256 198 196   NEUTROS ABS 4.97 4.53 5.84  --  3.18   IMMATURE GRANS (ABS) 0.09* 0.06* 0.06*  --  0.03   LYMPHS ABS 0.79 0.67* 0.54*  --  1.35   MONOS ABS 0.26 0.15 0.12  --  0.66   EOS ABS 0.00 0.00 0.00  --  0.10   MCV 96.4 96.0 95.5 96.9 97.3*         Lab 11/07/23 0430 11/06/23 0352 11/05/23 0914 11/03/23 0559 11/02/23 2035   SODIUM 141 140 141 139 137   POTASSIUM 3.8 3.9 3.7 4.0 3.7   CHLORIDE 110* 107 104 103 101   CO2 23.0 23.0 23.0 23.0 25.0   ANION GAP 8.0 10.0 14.0 13.0 11.0   BUN 38* 33* 30* 25* 28*   CREATININE 1.76* 1.65* 1.68* 1.66* 1.77*   EGFR 37.2* 40.2* 39.3* 39.9* 36.9*   GLUCOSE 117* 118* 144* 134* 93   CALCIUM 7.8* 8.1* 8.3* 8.6 8.2*   MAGNESIUM  --   --   --  1.5*  --    HEMOGLOBIN A1C  --   --   --  5.20  --          Lab 11/07/23 0430 11/06/23 0352 11/05/23 0914 11/03/23  0559   TOTAL PROTEIN 4.9* 5.1* 6.5 6.2   ALBUMIN 2.7* 2.8* 3.3* 3.3*   GLOBULIN 2.2 2.3 3.2 2.9   ALT (SGPT) 5 5 7 6   AST (SGOT) 11 11 15 11   BILIRUBIN <0.2 0.2 0.2 0.3   ALK PHOS 71 70 96 105         Lab 11/07/23  0430 11/06/23  2129 11/06/23  1740 11/06/23  1503   HSTROP T 52* 44* 35* 31*         Lab 11/03/23  0559   CHOLESTEROL 154   LDL CHOL 81   HDL CHOL 50   TRIGLYCERIDES 130         Lab 11/02/23  2035   IRON 39*   IRON SATURATION (TSAT) 17*   TIBC 226*   TRANSFERRIN 152*   FERRITIN 358.80         Brief Urine Lab Results  (Last result in the past 365 days)        Color   Clarity   Blood   Leuk Est   Nitrite    Protein   CREAT   Urine HCG        11/02/23 2024 Yellow   Cloudy   Trace   Large (3+)   Positive   Negative                 Microbiology Results (last 10 days)       Procedure Component Value - Date/Time    Urine Culture - Urine, Urine, Clean Catch [[id number]]  (Abnormal)  (Susceptibility) Collected: 11/02/23 2024    Lab Status: Final result Specimen: Urine, Clean Catch Updated: 11/05/23 1202     Urine Culture >100,000 CFU/mL Pseudomonas aeruginosa    Narrative:      Colonization of the urinary tract without infection is common. Treatment is discouraged unless the patient is symptomatic, pregnant, or undergoing an invasive urologic procedure.    Susceptibility        Pseudomonas aeruginosa      JUAN JOSÉ      Cefepime Susceptible      Ceftazidime Susceptible      Ciprofloxacin Susceptible      Gentamicin Susceptible      Levofloxacin Susceptible      Piperacillin + Tazobactam Susceptible                                   Hospital Course:    88 years old man with history of coronary artery disease and prior stents, peripheral vascular disease, history of paroxysmal atrial fibrillation currently in sinus rhythm on anticoagulant treatment with Eliquis, admitted with intractable back pain secondary to compression fracture.  Started on pain management.  The patient was seen by neurosurgery and he had T11 kyphoplasty, still on IV Decadron.  Started ongoing IV Zosyn for Pseudomonas UTI.  Left arm yesterday- angina equivalent, seen by cardiology.   Seen by palliative care for possible hospice.    -Intractable back pain at admission Multiple compression fractures  Seen by neurosurgery, s/p T11 kyphoplasty  S/p IV Decadron as well  Pain significant improved.  Taper Decadron dose at discharge, Lortab as needed for pain     -Coronary artery disease with multiple MIs and stents.  Suspect acute coronary syndrome-started on anticoagulant treatment with Lovenox also, 325 mg PO, then 81 mg/day  Left upper extremity complaints of the  arthritis, patient similar pain when she had prior acute coronary syndrome.  Troponin mildly elevated. Pain resolved after second dose of Nitroglycerin sl.  Treated with Dilaudid and nitroglycerin sublingual with  EKG-personally reviewed, sinus rhythm, isolated PVCs, QS on anteroseptal leads, no new significant ST segment changes  Continue aspirin, beta-blocker, long-acting nitrate-dose increased to 120 mg/day, calcium channel blocker, statin  Cardiology consult appreciated, explained to the patient as well that is not a good candidate for repeat cardiac catheterization     -High blood pressure-on hydralazine and Cardizem, Coreg     -Hypothyroidism-on levothyroxine     -Severe protein calorie malnutrition-nutritional support     -BPH-on Proscar and Flomax     -Chronic kidney disease stage IIIb-at his baseline     -History of COPD and long history of smoking, without COPD exacerbation  Smoking cessation provided  On Symbicort, bronchodilators as needed     -Bowel care protocol to avoid constipation     -GERD-on Protonix     -UTI with urine culture for Pseudomonas aeruginosa. Patient denies dysuria  No evidence of sepsis. Finished antibiotic treatment with Zosyn    -History of paroxysmal atrial fibrillation currently in sinus rhythm, chronic anticoagulant treatment with Eliquis- resume Eliquis at discharge- at lower dose of 2.5 mg BID     Multiple medical condition.  Failure to thrive and severe malnutrition.  Ongoing back pain.  The patient and the family requested palliative care involvement for possible hospice.    Discuss with the family present at bedside. The patient and the daughters in agreement that the patient is stable to be discharged back to assisted living facility where his wife lives, will be follow up with palliative care team and assess for possible hospice care.     Physical Exam on Discharge:  BP (!) 199/88 (BP Location: Right arm, Patient Position: Lying) Comment: NURSE NOTIFIED  Pulse 83   Temp  "98.3 °F (36.8 °C) (Oral)   Resp 16   Ht 172.7 cm (68\")   Wt 38.7 kg (85 lb 6.4 oz)   SpO2 96%   BMI 12.99 kg/m²   Physical Exam  Vitals reviewed.   Constitutional:       Appearance: He is normal weight.   HENT:      Head: Normocephalic and atraumatic.      Right Ear: Tympanic membrane normal.      Left Ear: Tympanic membrane normal.      Nose: Nose normal.      Mouth/Throat:      Mouth: Mucous membranes are moist.      Pharynx: Oropharynx is clear.   Eyes:      Conjunctiva/sclera: Conjunctivae normal.      Pupils: Pupils are equal, round, and reactive to light.   Cardiovascular:      Rate and Rhythm: Normal rate and regular rhythm.   Pulmonary:      Effort: Pulmonary effort is normal.      Breath sounds: Normal breath sounds.   Abdominal:      General: Abdomen is flat. Bowel sounds are normal.      Palpations: Abdomen is soft.   Musculoskeletal:         General: Normal range of motion.      Cervical back: Normal range of motion and neck supple.   Skin:     General: Skin is warm.      Capillary Refill: Capillary refill takes less than 2 seconds.   Neurological:      General: No focal deficit present.      Mental Status: He is alert and oriented to person, place, and time. Mental status is at baseline.   Psychiatric:         Mood and Affect: Mood normal.         Behavior: Behavior normal.         Thought Content: Thought content normal.         Judgment: Judgment normal.           Condition on Discharge:   stable    Discharge Disposition:  Long Term Care (DC - External)    Discharge Medications:     Discharge Medications        New Medications        Instructions Start Date   aspirin 81 MG EC tablet   81 mg, Oral, Daily   Start Date: November 8, 2023     Calcium Carb-Cholecalciferol 600-20 MG-MCG tablet   1 tablet, Oral, Daily   Start Date: November 8, 2023     dexAMETHasone 2 MG tablet  Commonly known as: DECADRON   Take 2 tablets by mouth 2 (Two) Times a Day With Meals for 2 days, THEN 1 tablet 2 (Two) Times a " Day With Meals for 2 days, THEN 1 tablet Daily With Breakfast for 2 days.   Start Date: November 7, 2023     HYDROcodone-acetaminophen 5-325 MG per tablet  Commonly known as: NORCO   1 tablet, Oral, Every 4 Hours PRN      nicotine 14 MG/24HR patch  Commonly known as: NICODERM CQ  Replaces: nicotine 21 MG/24HR patch   1 patch, Transdermal, Every 24 Hours Scheduled   Start Date: November 8, 2023     sennosides-docusate 8.6-50 MG per tablet  Commonly known as: PERICOLACE   1 tablet, Oral, 2 Times Daily             Changes to Medications        Instructions Start Date   apixaban 2.5 MG tablet tablet  Commonly known as: ELIQUIS  What changed:   medication strength  how much to take  when to take this   2.5 mg, Oral, Every 12 Hours Scheduled      hydrOXYzine 50 MG tablet  Commonly known as: ATARAX  What changed: Another medication with the same name was changed. Make sure you understand how and when to take each.   50 mg, Oral, Every 12 Hours PRN      hydrOXYzine 50 MG tablet  Commonly known as: ATARAX  What changed:   when to take this  reasons to take this  additional instructions   50 mg, Oral, Every 12 Hours PRN, anxiety      isosorbide mononitrate 120 MG 24 hr tablet  Commonly known as: IMDUR  What changed:   medication strength  how much to take   120 mg, Oral, Daily   Start Date: November 8, 2023     nitroglycerin 0.4 MG SL tablet  Commonly known as: NITROSTAT  What changed:   how to take this  when to take this  reasons to take this  additional instructions   0.4 mg, Sublingual, Every 5 Minutes PRN, Take no more than 3 doses in 15 minutes.             Continue These Medications        Instructions Start Date   albuterol sulfate  (90 Base) MCG/ACT inhaler  Commonly known as: PROVENTIL HFA;VENTOLIN HFA;PROAIR HFA   2 puffs, Inhalation, Every 4 Hours PRN      budesonide-formoterol 160-4.5 MCG/ACT inhaler  Commonly known as: SYMBICORT   2 puffs, Inhalation, 2 Times Daily - RT      carBAMazepine 200 MG  tablet  Commonly known as: TEGretol   200 mg, Oral, Daily      carvedilol 12.5 MG tablet  Commonly known as: COREG   12.5 mg, Oral, 2 Times Daily With Meals      cholecalciferol 25 MCG (1000 UT) tablet  Commonly known as: VITAMIN D3   1,000 Units, Oral, Daily      dilTIAZem  MG 24 hr capsule  Commonly known as: Cardizem CD   180 mg, Oral, Daily      finasteride 5 MG tablet  Commonly known as: PROSCAR   5 mg, Oral, Daily      guaiFENesin ER 1200 MG tablet sustained-release 12 hour   1,200 mg, Oral, 2 Times Daily      levothyroxine 50 MCG tablet  Commonly known as: SYNTHROID, LEVOTHROID   50 mcg, Oral, Daily      magnesium oxide 400 MG tablet  Commonly known as: MAG-OX   200 mg, Oral, 2 Times Daily      melatonin 5 MG tablet tablet   5 mg, Oral, Nightly PRN      pantoprazole 40 MG EC tablet  Commonly known as: PROTONIX   40 mg, Oral, Daily      rosuvastatin 20 MG tablet  Commonly known as: CRESTOR   20 mg, Oral, Nightly      tamsulosin 0.4 MG capsule 24 hr capsule  Commonly known as: FLOMAX   0.4 mg, Oral, Daily             Stop These Medications      furosemide 40 MG tablet  Commonly known as: LASIX     nicotine 21 MG/24HR patch  Commonly known as: NICODERM CQ  Replaced by: nicotine 14 MG/24HR patch     potassium chloride 20 MEQ CR tablet  Commonly known as: K-DUR,KLOR-CON                Discharge Diet: regular    Activity at Discharge: as tolerated, fall precautions    Follow-up Appointments:   No future appointments.    Test Results Pending at Discharge: repeated echo    Electronically signed by Sidra Brennan MD, 11/07/23, 12:04 CST.    Time: 58 minutes.

## 2023-11-07 NOTE — THERAPY DISCHARGE NOTE
Acute Care - Physical Therapy Discharge Summary  [facility]       Patient Name: Brennon Vance  : 1935  MRN: [id number]    Today's Date: 2023  Onset of Illness/Injury or Date of Surgery: 23       Referring Physician: Lakshmi Montoya      Admit Date: 2023      PT Recommendation and Plan    Visit Dx:    ICD-10-CM ICD-9-CM   1. Acute midline thoracic back pain  M54.6 724.1   2. Urinary tract infection without hematuria, site unspecified  N39.0 599.0   3. Closed fracture of eighth thoracic vertebra, unspecified fracture morphology, initial encounter  S22.069A 805.2   4. Closed fracture of eleventh thoracic vertebra, unspecified fracture morphology, initial encounter  S22.089A 805.2   5. Intractable back pain  M54.9 724.5   6. Impaired mobility [Z74.09]  Z74.09 799.89   7. Closed wedge compression fracture of T11 vertebra, initial encounter  S22.080A 805.2   8. Decreased activities of daily living (ADL) [Z78.9]  Z78.9 V49.89        Outcome Measures       Row Name 23 1200 23 0900 23 0934       How much help from another person do you currently need...    Turning from your back to your side while in flat bed without using bedrails? -- 3  -AH 3  -JAMEEL    Moving from lying on back to sitting on the side of a flat bed without bedrails? -- 3  -AH 3  -JAMEEL    Moving to and from a bed to a chair (including a wheelchair)? -- 3  -AH 3  -JAMEEL    Standing up from a chair using your arms (e.g., wheelchair, bedside chair)? -- 3  -AH 3  -JAMEEL    Climbing 3-5 steps with a railing? -- 3  -AH 3  -JAMEEL    To walk in hospital room? -- 3  -AH 3  -JAMEEL    AM-PAC 6 Clicks Score (PT) -- 18  -AH 18  -JAMEEL    Highest level of mobility -- 6 --> Walked 10 steps or more  - 6 --> Walked 10 steps or more  -JAMEEL       How much help from another is currently needed...    Putting on and taking off regular lower body clothing? 3  -TS -- --    Bathing (including washing, rinsing, and drying) 3  -TS -- --    Toileting (which includes  using toilet bed pan or urinal) 3  -TS -- --    Putting on and taking off regular upper body clothing 4  -TS -- --    Taking care of personal grooming (such as brushing teeth) 3  -TS -- --    Eating meals 4  -TS -- --    AM-PAC 6 Clicks Score (OT) 20  -TS -- --       Functional Assessment    Outcome Measure Options -- AM-PAC 6 Clicks Basic Mobility (PT)  -GLEN AM-PAC 6 Clicks Basic Mobility (PT)  -JAMEEL              User Key  (r) = Recorded By, (t) = Taken By, (c) = Cosigned By      Initials Name Provider Type    Lissette Hyde, PTA Physical Therapist Assistant    TS Shreya Sanderson, NICK Occupational Therapist Assistant    Lico Cross, EMY Physical Therapist Assistant                         PT Rehab Goals       Row Name 11/07/23 1446             Bed Mobility Goal 1 (PT)    Activity/Assistive Device (Bed Mobility Goal 1, PT) bed mobility activities, all  -LY      Bay Level/Cues Needed (Bed Mobility Goal 1, PT) independent  -LY      Time Frame (Bed Mobility Goal 1, PT) long term goal (LTG);by discharge  -LY      Progress/Outcomes (Bed Mobility Goal 1, PT) goal not met  -LY         Transfer Goal 1 (PT)    Activity/Assistive Device (Transfer Goal 1, PT) sit-to-stand/stand-to-sit;bed-to-chair/chair-to-bed;walker, rolling  -LY      Bay Level/Cues Needed (Transfer Goal 1, PT) modified independence  -LY      Time Frame (Transfer Goal 1, PT) long term goal (LTG);by discharge  -LY      Progress/Outcome (Transfer Goal 1, PT) goal not met  -LY         Gait Training Goal 1 (PT)    Activity/Assistive Device (Gait Training Goal 1, PT) gait (walking locomotion);assistive device use;decrease fall risk;increase endurance/gait distance;improve balance and speed;walker, rolling  -LY      Bay Level (Gait Training Goal 1, PT) modified independence  -LY      Distance (Gait Training Goal 1, PT) 400ft  -LY      Time Frame (Gait Training Goal 1, PT) long term goal (LTG);by discharge  -LY       Progress/Outcome (Gait Training Goal 1, PT) goal not met  -LY                User Key  (r) = Recorded By, (t) = Taken By, (c) = Cosigned By      Initials Name Provider Type Discipline    Jaquelin Boone, PTA Physical Therapist Assistant PT                        PT Discharge Summary  Anticipated Discharge Disposition (PT): assisted living  Reason for Discharge: Discharge from facility  Outcomes Achieved: Refer to plan of care for updates on goals achieved  Discharge Destination: Assisted Living Facility      Jaquelin Mendoza PTA   11/7/2023

## 2023-11-07 NOTE — PROGRESS NOTES
[facility] Medicine Services  INPATIENT PROGRESS NOTE    Patient Name: Brennon Vance  Date of Admission: 11/2/2023  Today's Date: 11/06/23  Length of Stay: 0  Primary Care Physician: Javid Grajeda MD    Subjective   Chief Complaint: Improving back pain.  Left upper extremity pain today at rest, patient had similar pain when he had a heart attack    Back Pain  Associated symptoms include chest pain.      88 years old man with history of coronary artery disease and prior stents, peripheral vascular disease, history of paroxysmal atrial fibrillation currently in sinus rhythm on anticoagulant treatment with Eliquis, admitted with intractable back pain secondary to compression fracture.  Started on pain management.  The patient was seen by neurosurgery and he had T11 kyphoplasty, still on IV Decadron.  Started ongoing IV Zosyn for Pseudomonas UTI.  Chest pain today-daughter present at bedside, insisting her father to be seen by cardiology.  Seen by palliative care for possible hospice.        Review of Systems   Constitutional:  Positive for activity change.   HENT: Negative.     Eyes: Negative.    Respiratory: Negative.     Cardiovascular:  Positive for chest pain.        Actually left upper extremity pain-patient usually gets with a heart attack   Endocrine: Negative.    Genitourinary: Negative.    Musculoskeletal:  Positive for back pain.   Skin: Negative.    Allergic/Immunologic: Negative.    Neurological: Negative.    Hematological: Negative.    Psychiatric/Behavioral: Negative.        All pertinent negatives and positives are as above. All other systems have been reviewed and are negative unless otherwise stated.     Objective    Temp:  [97.1 °F (36.2 °C)-97.8 °F (36.6 °C)] 97.8 °F (36.6 °C)  Heart Rate:  [] 73  Resp:  [16-18] 16  BP: (141-176)/(49-96) 151/66  Physical Exam  Vitals reviewed.   Constitutional:       Appearance: He is ill-appearing.   HENT:      Head:  Normocephalic and atraumatic.      Right Ear: Tympanic membrane normal.      Left Ear: Tympanic membrane normal.      Nose: Nose normal.      Mouth/Throat:      Mouth: Mucous membranes are moist.      Pharynx: Oropharynx is clear.   Eyes:      Conjunctiva/sclera: Conjunctivae normal.      Pupils: Pupils are equal, round, and reactive to light.   Cardiovascular:      Rate and Rhythm: Normal rate and regular rhythm.      Pulses: Normal pulses.      Heart sounds: Normal heart sounds.   Pulmonary:      Effort: Pulmonary effort is normal.      Breath sounds: Normal breath sounds.   Abdominal:      General: Abdomen is flat. Bowel sounds are normal.      Palpations: Abdomen is soft.   Musculoskeletal:         General: Normal range of motion.      Cervical back: Normal range of motion and neck supple. No rigidity.   Skin:     General: Skin is warm.      Capillary Refill: Capillary refill takes less than 2 seconds.   Neurological:      General: No focal deficit present.      Mental Status: He is alert and oriented to person, place, and time. Mental status is at baseline.   Psychiatric:         Mood and Affect: Mood normal.         Behavior: Behavior normal.         Thought Content: Thought content normal.         Judgment: Judgment normal.             Results Review:  I have reviewed the labs, radiology results, and diagnostic studies.    Laboratory Data:   Results from last 7 days   Lab Units 11/06/23  0352 11/05/23 0914 11/03/23  0559   WBC 10*3/mm3 5.41 6.56 5.00   HEMOGLOBIN g/dL 8.9* 11.5* 9.9*   HEMATOCRIT % 28.6* 35.8* 31.4*   PLATELETS 10*3/mm3 192 256 198        Results from last 7 days   Lab Units 11/06/23  0352 11/05/23  0914 11/03/23  0559   SODIUM mmol/L 140 141 139   POTASSIUM mmol/L 3.9 3.7 4.0   CHLORIDE mmol/L 107 104 103   CO2 mmol/L 23.0 23.0 23.0   BUN mg/dL 33* 30* 25*   CREATININE mg/dL 1.65* 1.68* 1.66*   CALCIUM mg/dL 8.1* 8.3* 8.6   BILIRUBIN mg/dL 0.2 0.2 0.3   ALK PHOS U/L 70 96 105   ALT (SGPT)  U/L 5 7 6   AST (SGOT) U/L 11 15 11   GLUCOSE mg/dL 118* 144* 134*       Culture Data:   @[facility]@    Radiology Data:   Imaging Results (Last 24 Hours)       ** No results found for the last 24 hours. **            I have reviewed the patient's current medications.     Assessment/Plan   Assessment  Active Hospital Problems    Diagnosis     **Intractable back pain     Severe malnutrition     Protein-calorie malnutrition     Anemia, chronic disease     Memory difficulties     Closed fracture of eighth thoracic vertebra     Closed T11 fracture     Stage 3b chronic kidney disease     UTI (urinary tract infection)     Essential hypertension     Paroxysmal atrial fibrillation     Other emphysema        Treatment Plan    -Intractable back pain.  Multiple compression fractures  Seen by neurosurgery, s/p T11 kyphoplasty  Pain management  On IV Decadron as well    -Coronary artery disease with multiple MIs and stents.  Suspect acute coronary syndrome-started on anticoagulant treatment with Lovenox also, 325 mg p.o. aspirin earlier today  Left upper extremity complaints of the arthritis, patient similar pain when she had prior acute coronary syndrome.  Daughter present at bedside insisting getting cardiology consult and cardiac catheterization.  Troponin mildly elevated  Treated with Dilaudid and nitroglycerin sublingual with  EKG-personally reviewed, sinus rhythm, isolated PVCs, QS on anteroseptal leads, no new significant ST segment changes  Continue aspirin, beta-blocker, long-acting nitrate, calcium channel blocker  Cardiology consult appreciated, explained to the patient as well that is not a good candidate for repeat cardiac catheterization  Echocardiography ordered; will repeat troponin    -High blood pressure-on hydralazine and Cardizem, Coreg    -Hypothyroidism-on levothyroxine    -Severe protein calorie malnutrition-nutritional support    -BPH-on Proscar and Flomax    -Chronic kidney disease stage IIIb-at his  baseline    -History of COPD and long history of smoking, without COPD exacerbation  Smoking cessation provided  On Symbicort, bronchodilators as needed    -Bowel care protocol to avoid constipation    -GERD-on Protonix    -UTI with urine culture for Pseudomonas aeruginosa-on Zosyn.  Patient denies dysuria  No evidence of sepsis    -Anemia, drop in H&H from yesterday but not obvious evidence of bleeding.  CBC    -History of paroxysmal atrial fibrillation currently in sinus rhythm, chronic anticoagulant treatment with Eliquis.  Request on hold for now, which to anticoagulant treatment with Lovenox subcu for acute coronary syndrome    Multiple medical condition.  Failure to thrive and severe malnutrition.  Ongoing back pain.  The patient and the family requested palliative care involvement for possible hospice.    Medical Decision Making  Number and Complexity of problems: 4      Conditions and Status        fair   MDM Data    Cardiac tracing (EKG, telemetry) interpretation: es  Radiology interpretation: yes  Labs reviewed: yes          Discussed with: daughter present at bedside     Care Planning    Code status and discussions: DNR    Disposition  Social Determinants of Health that impact treatment or disposition: yes  I expect the patient to be discharged to facility in 2 days.     Electronically signed by Sidra Brennan MD, 11/06/23, 18:00 CST.

## 2023-11-09 LAB
QT INTERVAL: 354 MS
QT INTERVAL: 394 MS
QTC INTERVAL: 437 MS
QTC INTERVAL: 451 MS

## 2023-11-15 ENCOUNTER — HOSPITAL ENCOUNTER (EMERGENCY)
Facility: HOSPITAL | Age: 88
Discharge: LEFT BEFORE DISCHARGE | End: 2023-11-15
Attending: EMERGENCY MEDICINE
Payer: MEDICARE

## 2023-11-15 VITALS
RESPIRATION RATE: 20 BRPM | SYSTOLIC BLOOD PRESSURE: 148 MMHG | HEIGHT: 68 IN | BODY MASS INDEX: 17.28 KG/M2 | TEMPERATURE: 97.3 F | HEART RATE: 67 BPM | OXYGEN SATURATION: 96 % | WEIGHT: 114 LBS | DIASTOLIC BLOOD PRESSURE: 57 MMHG

## 2023-11-15 DIAGNOSIS — N39.0 URINARY TRACT INFECTION WITHOUT HEMATURIA, SITE UNSPECIFIED: Primary | ICD-10-CM

## 2023-11-15 DIAGNOSIS — R33.9 URINARY RETENTION: ICD-10-CM

## 2023-11-15 LAB
ALBUMIN SERPL-MCNC: 3.2 G/DL (ref 3.5–5.2)
ALBUMIN/GLOB SERPL: 1.3 G/DL
ALP SERPL-CCNC: 105 U/L (ref 39–117)
ALT SERPL W P-5'-P-CCNC: 10 U/L (ref 1–41)
ANION GAP SERPL CALCULATED.3IONS-SCNC: 8 MMOL/L (ref 5–15)
AST SERPL-CCNC: 12 U/L (ref 1–40)
BACTERIA UR QL AUTO: ABNORMAL /HPF
BASOPHILS # BLD AUTO: 0.01 10*3/MM3 (ref 0–0.2)
BASOPHILS NFR BLD AUTO: 0.1 % (ref 0–1.5)
BILIRUB SERPL-MCNC: 0.5 MG/DL (ref 0–1.2)
BILIRUB UR QL STRIP: NEGATIVE
BUN SERPL-MCNC: 26 MG/DL (ref 8–23)
BUN/CREAT SERPL: 16 (ref 7–25)
CALCIUM SPEC-SCNC: 8.6 MG/DL (ref 8.6–10.5)
CHLORIDE SERPL-SCNC: 101 MMOL/L (ref 98–107)
CLARITY UR: ABNORMAL
CO2 SERPL-SCNC: 27 MMOL/L (ref 22–29)
COLOR UR: YELLOW
CREAT SERPL-MCNC: 1.62 MG/DL (ref 0.76–1.27)
D-LACTATE SERPL-SCNC: 1.3 MMOL/L (ref 0.5–2)
DEPRECATED RDW RBC AUTO: 52.9 FL (ref 37–54)
EGFRCR SERPLBLD CKD-EPI 2021: 41.1 ML/MIN/1.73
EOSINOPHIL # BLD AUTO: 0.02 10*3/MM3 (ref 0–0.4)
EOSINOPHIL NFR BLD AUTO: 0.2 % (ref 0.3–6.2)
ERYTHROCYTE [DISTWIDTH] IN BLOOD BY AUTOMATED COUNT: 15 % (ref 12.3–15.4)
GLOBULIN UR ELPH-MCNC: 2.5 GM/DL
GLUCOSE SERPL-MCNC: 99 MG/DL (ref 65–99)
GLUCOSE UR STRIP-MCNC: NEGATIVE MG/DL
HCT VFR BLD AUTO: 30.1 % (ref 37.5–51)
HGB BLD-MCNC: 9.5 G/DL (ref 13–17.7)
HGB UR QL STRIP.AUTO: ABNORMAL
IMM GRANULOCYTES # BLD AUTO: 0.06 10*3/MM3 (ref 0–0.05)
IMM GRANULOCYTES NFR BLD AUTO: 0.7 % (ref 0–0.5)
KETONES UR QL STRIP: NEGATIVE
LEUKOCYTE ESTERASE UR QL STRIP.AUTO: ABNORMAL
LYMPHOCYTES # BLD AUTO: 1.17 10*3/MM3 (ref 0.7–3.1)
LYMPHOCYTES NFR BLD AUTO: 14.1 % (ref 19.6–45.3)
MCH RBC QN AUTO: 30.6 PG (ref 26.6–33)
MCHC RBC AUTO-ENTMCNC: 31.6 G/DL (ref 31.5–35.7)
MCV RBC AUTO: 97.1 FL (ref 79–97)
MONOCYTES # BLD AUTO: 0.62 10*3/MM3 (ref 0.1–0.9)
MONOCYTES NFR BLD AUTO: 7.5 % (ref 5–12)
NEUTROPHILS NFR BLD AUTO: 6.42 10*3/MM3 (ref 1.7–7)
NEUTROPHILS NFR BLD AUTO: 77.4 % (ref 42.7–76)
NITRITE UR QL STRIP: NEGATIVE
NRBC BLD AUTO-RTO: 0 /100 WBC (ref 0–0.2)
PH UR STRIP.AUTO: 7 [PH] (ref 5–8)
PLATELET # BLD AUTO: 183 10*3/MM3 (ref 140–450)
PMV BLD AUTO: 8.8 FL (ref 6–12)
POTASSIUM SERPL-SCNC: 4.4 MMOL/L (ref 3.5–5.2)
PROCALCITONIN SERPL-MCNC: 0.12 NG/ML (ref 0–0.25)
PROT SERPL-MCNC: 5.7 G/DL (ref 6–8.5)
PROT UR QL STRIP: ABNORMAL
RBC # BLD AUTO: 3.1 10*6/MM3 (ref 4.14–5.8)
RBC # UR STRIP: ABNORMAL /HPF
REF LAB TEST METHOD: ABNORMAL
SODIUM SERPL-SCNC: 136 MMOL/L (ref 136–145)
SP GR UR STRIP: 1.01 (ref 1–1.03)
SQUAMOUS #/AREA URNS HPF: ABNORMAL /HPF
UROBILINOGEN UR QL STRIP: ABNORMAL
WBC # UR STRIP: ABNORMAL /HPF
WBC NRBC COR # BLD: 8.3 10*3/MM3 (ref 3.4–10.8)

## 2023-11-15 PROCEDURE — 36415 COLL VENOUS BLD VENIPUNCTURE: CPT

## 2023-11-15 PROCEDURE — 51798 US URINE CAPACITY MEASURE: CPT

## 2023-11-15 PROCEDURE — 85025 COMPLETE CBC W/AUTO DIFF WBC: CPT | Performed by: EMERGENCY MEDICINE

## 2023-11-15 PROCEDURE — 80053 COMPREHEN METABOLIC PANEL: CPT | Performed by: EMERGENCY MEDICINE

## 2023-11-15 PROCEDURE — 99283 EMERGENCY DEPT VISIT LOW MDM: CPT

## 2023-11-15 PROCEDURE — 87086 URINE CULTURE/COLONY COUNT: CPT | Performed by: EMERGENCY MEDICINE

## 2023-11-15 PROCEDURE — 87186 SC STD MICRODIL/AGAR DIL: CPT | Performed by: EMERGENCY MEDICINE

## 2023-11-15 PROCEDURE — 81001 URINALYSIS AUTO W/SCOPE: CPT | Performed by: EMERGENCY MEDICINE

## 2023-11-15 PROCEDURE — 84145 PROCALCITONIN (PCT): CPT | Performed by: EMERGENCY MEDICINE

## 2023-11-15 PROCEDURE — 25010000002 CEFEPIME PER 500 MG: Performed by: EMERGENCY MEDICINE

## 2023-11-15 PROCEDURE — 87077 CULTURE AEROBIC IDENTIFY: CPT | Performed by: EMERGENCY MEDICINE

## 2023-11-15 PROCEDURE — 83605 ASSAY OF LACTIC ACID: CPT | Performed by: EMERGENCY MEDICINE

## 2023-11-15 PROCEDURE — 87040 BLOOD CULTURE FOR BACTERIA: CPT | Performed by: EMERGENCY MEDICINE

## 2023-11-15 PROCEDURE — 51702 INSERT TEMP BLADDER CATH: CPT

## 2023-11-15 PROCEDURE — 96365 THER/PROPH/DIAG IV INF INIT: CPT

## 2023-11-15 RX ORDER — SODIUM CHLORIDE 0.9 % (FLUSH) 0.9 %
10 SYRINGE (ML) INJECTION AS NEEDED
Status: DISCONTINUED | OUTPATIENT
Start: 2023-11-15 | End: 2023-11-15 | Stop reason: HOSPADM

## 2023-11-15 RX ORDER — LIDOCAINE HYDROCHLORIDE 20 MG/ML
JELLY TOPICAL AS NEEDED
Status: DISCONTINUED | OUTPATIENT
Start: 2023-11-15 | End: 2023-11-15 | Stop reason: HOSPADM

## 2023-11-15 RX ORDER — LEVOFLOXACIN 500 MG/1
500 TABLET, FILM COATED ORAL DAILY
Qty: 7 TABLET | Refills: 0 | Status: SHIPPED | OUTPATIENT
Start: 2023-11-15

## 2023-11-15 RX ADMIN — CEFEPIME HYDROCHLORIDE 2000 MG: 2 INJECTION, POWDER, FOR SOLUTION INTRAVENOUS at 16:35

## 2023-11-15 NOTE — ED PROVIDER NOTES
Subjective   History of Present Illness  88-year-old male presents to the ED with complaint of dysuria, difficulty urinating, suprapubic discomfort.  History of recent admission back pain and UTI.  Culture positive for Pseudomonas.  For the past several days patient has had dysuria, frequency and urgency, difficulty urinating and inability to complete into his bladder.  No fever or chills, cough or congestion, chest pain or shortness of breath.  No nausea or vomiting.  On arrival to the ED, postvoid residual revealed 490 mL in the bladder.  No additional complaints    History provided by:  Patient      Review of Systems   All other systems reviewed and are negative.      Past Medical History:   Diagnosis Date    CAD (coronary artery disease)     CKD (chronic kidney disease) stage 3, GFR 30-59 ml/min     COPD (chronic obstructive pulmonary disease)     Hiatal hernia     Hyperlipidemia     Hypertension     Stroke        Allergies   Allergen Reactions    Benzodiazepines Confusion    Lyrica [Pregabalin] Other (See Comments)     Passing out/syncope       Past Surgical History:   Procedure Laterality Date    ABDOMINAL AORTIC ANEURYSM REPAIR      CARDIAC CATHETERIZATION N/A 5/20/2021    Procedure: Left Heart Cath;  Surgeon: Harrison Alcantara MD;  Location:  [facility] CATH INVASIVE LOCATION;  Service: Cardiology;  Laterality: N/A;    CORONARY ANGIOPLASTY WITH STENT PLACEMENT      FEMORAL ARTERY STENT      KYPHOPLASTY N/A 11/4/2023    Procedure: KYPHOPLASTY WITHOUT BIOPSY T-11;  Surgeon: Jeremiah Brantley MD;  Location:  [facility] OR;  Service: Neurosurgery;  Laterality: N/A;    KYPHOPLASTY WITH BIOPSY N/A 9/26/2023    Procedure: KYPHOPLASTY WITH BIOPSY T12 and L3;  Surgeon: Jeremiah Brantley MD;  Location:  [facility] OR;  Service: Neurosurgery;  Laterality: N/A;    LEG THROMBECTOMY/EMBOLECTOMY Right 5/20/2021    Procedure: RT GROIN EXPLORATION;  Surgeon: Geoff Remy DO;  Location:  [facility] HYBRID OR 12;  Service: Vascular;   Laterality: Right;    TRIGEMINAL NERVE DECOMPRESSION         Family History   Problem Relation Age of Onset    No Known Problems Mother     No Known Problems Father        Social History     Socioeconomic History    Marital status:    Tobacco Use    Smoking status: Former     Packs/day: 4     Types: Cigars, Cigarettes     Quit date: 10/24/2023     Years since quittin.0    Smokeless tobacco: Never   Vaping Use    Vaping Use: Never used   Substance and Sexual Activity    Alcohol use: No    Drug use: No    Sexual activity: Not Currently           Objective   Physical Exam  Vitals and nursing note reviewed.   Constitutional:       Appearance: Normal appearance. He is normal weight.   HENT:      Nose: Nose normal. No congestion or rhinorrhea.      Mouth/Throat:      Pharynx: No oropharyngeal exudate or posterior oropharyngeal erythema.   Eyes:      Extraocular Movements: Extraocular movements intact.      Conjunctiva/sclera: Conjunctivae normal.      Pupils: Pupils are equal, round, and reactive to light.   Cardiovascular:      Rate and Rhythm: Normal rate and regular rhythm.      Heart sounds: Normal heart sounds. No murmur heard.  Pulmonary:      Effort: Pulmonary effort is normal.      Breath sounds: Normal breath sounds. No wheezing, rhonchi or rales.   Abdominal:      General: Abdomen is flat. Bowel sounds are normal. There is distension.      Palpations: Abdomen is soft.      Comments: Suprapubic distention   Skin:     General: Skin is warm and dry.      Capillary Refill: Capillary refill takes less than 2 seconds.   Neurological:      General: No focal deficit present.      Mental Status: He is alert and oriented to person, place, and time. Mental status is at baseline.         Procedures       Lab Results (last 24 hours)       Procedure Component Value Units Date/Time    Urinalysis With Microscopic If Indicated (No Culture) - Indwelling Urethral Catheter [[id number]]  (Abnormal) Collected: 11/15/23  1421    Specimen: Urine from Indwelling Urethral Catheter Updated: 11/15/23 1445     Color, UA Yellow     Appearance, UA Turbid     pH, UA 7.0     Specific Gravity, UA 1.010     Glucose, UA Negative     Ketones, UA Negative     Bilirubin, UA Negative     Blood, UA Small (1+)     Protein, UA 30 mg/dL (1+)     Leuk Esterase, UA Large (3+)     Nitrite, UA Negative     Urobilinogen, UA 0.2 E.U./dL    Urinalysis, Microscopic Only - Indwelling Urethral Catheter [[id number]]  (Abnormal) Collected: 11/15/23 1421    Specimen: Urine from Indwelling Urethral Catheter Updated: 11/15/23 1445     RBC, UA 6-10 /HPF      WBC, UA Too Numerous to Count /HPF      Bacteria, UA 3+ /HPF      Squamous Epithelial Cells, UA 0-2 /HPF      Methodology Manual Light Microscopy    Urine Culture - Urine, Indwelling Urethral Catheter [[id number]] Collected: 11/15/23 1421    Specimen: Urine from Indwelling Urethral Catheter Updated: 11/15/23 1435    Blood Culture - Blood, Arm, Right [[id number]] Collected: 11/15/23 1425    Specimen: Blood from Arm, Right Updated: 11/15/23 1519    CBC & Differential [[id number]]  (Abnormal) Collected: 11/15/23 1433    Specimen: Blood Updated: 11/15/23 1456    Narrative:      The following orders were created for panel order CBC & Differential.  Procedure                               Abnormality         Status                     ---------                               -----------         ------                     CBC Auto Differential[[id number]]        Abnormal            Final result                 Please view results for these tests on the individual orders.    Comprehensive Metabolic Panel [[id number]]  (Abnormal) Collected: 11/15/23 1433    Specimen: Blood Updated: 11/15/23 1519     Glucose 99 mg/dL      BUN 26 mg/dL      Creatinine 1.62 mg/dL      Sodium 136 mmol/L      Potassium 4.4 mmol/L      Chloride 101 mmol/L      CO2 27.0 mmol/L      Calcium 8.6 mg/dL      Total Protein 5.7 g/dL      Albumin 3.2 g/dL       "ALT (SGPT) 10 U/L      AST (SGOT) 12 U/L      Alkaline Phosphatase 105 U/L      Total Bilirubin 0.5 mg/dL      Globulin 2.5 gm/dL      A/G Ratio 1.3 g/dL      BUN/Creatinine Ratio 16.0     Anion Gap 8.0 mmol/L      eGFR 41.1 mL/min/1.73     Narrative:      GFR Normal >60  Chronic Kidney Disease <60  Kidney Failure <15    The GFR formula is only valid for adults with stable renal function between ages 18 and 70.    CBC Auto Differential [[id number]]  (Abnormal) Collected: 11/15/23 1433    Specimen: Blood Updated: 11/15/23 1456     WBC 8.30 10*3/mm3      RBC 3.10 10*6/mm3      Hemoglobin 9.5 g/dL      Hematocrit 30.1 %      MCV 97.1 fL      MCH 30.6 pg      MCHC 31.6 g/dL      RDW 15.0 %      RDW-SD 52.9 fl      MPV 8.8 fL      Platelets 183 10*3/mm3      Neutrophil % 77.4 %      Lymphocyte % 14.1 %      Monocyte % 7.5 %      Eosinophil % 0.2 %      Basophil % 0.1 %      Immature Grans % 0.7 %      Neutrophils, Absolute 6.42 10*3/mm3      Lymphocytes, Absolute 1.17 10*3/mm3      Monocytes, Absolute 0.62 10*3/mm3      Eosinophils, Absolute 0.02 10*3/mm3      Basophils, Absolute 0.01 10*3/mm3      Immature Grans, Absolute 0.06 10*3/mm3      nRBC 0.0 /100 WBC     Procalcitonin [[id number]]  (Normal) Collected: 11/15/23 1433    Specimen: Blood Updated: 11/15/23 1546     Procalcitonin 0.12 ng/mL     Narrative:      As a Marker for Sepsis (Non-Neonates):    1. <0.5 ng/mL represents a low risk of severe sepsis and/or septic shock.  2. >2 ng/mL represents a high risk of severe sepsis and/or septic shock.    As a Marker for Lower Respiratory Tract Infections that require antibiotic therapy:    PCT on Admission    Antibiotic Therapy       6-12 Hrs later    >0.5                Strongly Recommended  >0.25 - <0.5        Recommended   0.1 - 0.25          Discouraged              Remeasure/reassess PCT  <0.1                Strongly Discouraged     Remeasure/reassess PCT    As 28 day mortality risk marker: \"Change in Procalcitonin " "Result\" (>80% or <=80%) if Day 0 (or Day 1) and Day 4 values are available. Refer to http://www.[facility]-pct-calculator.com    Change in PCT <=80%  A decrease of PCT levels below or equal to 80% defines a positive change in PCT test result representing a higher risk for 28-day all-cause mortality of patients diagnosed with severe sepsis for septic shock.    Change in PCT >80%  A decrease of PCT levels of more than 80% defines a negative change in PCT result representing a lower risk for 28-day all-cause mortality of patients diagnosed with severe sepsis or septic shock.       Lactic Acid, Plasma [[id number]]  (Normal) Collected: 11/15/23 1435    Specimen: Blood Updated: 11/15/23 1511     Lactate 1.3 mmol/L     Blood Culture - Blood, Hand, Right [[id number]] Collected: 11/15/23 1435    Specimen: Blood from Hand, Right Updated: 11/15/23 1519         No Radiology Exams Resulted Within Past 24 Hours     ED Course  ED Course as of 11/15/23 1726   Wed Nov 15, 2023   1722 88-year-old male presents to the ED with suprapubic discomfort, urinary retention/difficulty urinating, dysuria, frequency and urgency.  Postvoid residual 490 mL on arrival to the ED.  Gutiérrez catheter placed, urine turbid.  UA reveals 2 numerous count WBCs and 3+ bacteria.  Recent urine culture revealed Pseudomonas that was sensitive to several meds including cefepime and Levaquin.  Initial plan was for admission for IV cefepime, however, patient refused admission and is requesting to leave.  Explained to the patient that he has high likelihood of getting extremely sick but he states he is 88 years old and does not \"give a shit\".  Did complete his initial dose of antibiotics ordered in the ED.  Will DC with prescription for Levaquin, recommend he follow-up with urology for Gutiérrez removal, already on Proscar and Flomax. [AW]      ED Course User Index  [AW] Ronaldo Hurtado MD                                           Medical Decision Making  Amount and/or " Complexity of Data Reviewed  Labs: ordered.    Risk  Prescription drug management.        Final diagnoses:   Urinary tract infection without hematuria, site unspecified   Urinary retention       ED Disposition  ED Disposition       ED Disposition   AMA    Condition   --    Comment   --               Javid Grajeda MD  [address]  [facility] [address]  [phone]    Schedule an appointment as soon as possible for a visit       Solomon Patel MD  [address] [facility]  [address]  [facility] [address]  [phone]    Schedule an appointment as soon as possible for a visit in 3 days           Medication List        New Prescriptions      levoFLOXacin 500 MG tablet  Commonly known as: LEVAQUIN  Take 1 tablet by mouth Daily.               Where to Get Your Medications        These medications were [facility] to [facility] - [place], [place] - [address] [place] [address] - [phone] [facility] [phone] [address] [address]      Phone: [phone]   levoFLOXacin 500 MG tablet            Ronaldo Hurtado MD  11/15/23 5049

## 2023-11-17 LAB — BACTERIA SPEC AEROBE CULT: ABNORMAL

## 2023-11-20 LAB
BACTERIA SPEC AEROBE CULT: NORMAL
BACTERIA SPEC AEROBE CULT: NORMAL

## 2023-11-22 ENCOUNTER — HOME HEALTH ADMISSION (OUTPATIENT)
Dept: HOME HEALTH SERVICES | Facility: HOME HEALTHCARE | Age: 88
End: 2023-11-22
Payer: MEDICARE

## 2023-12-03 ENCOUNTER — NURSE TRIAGE (OUTPATIENT)
Dept: CALL CENTER | Facility: HOSPITAL | Age: 88
End: 2023-12-03
Payer: MEDICARE

## 2023-12-03 NOTE — TELEPHONE ENCOUNTER
"On-call home health nurse states daughter called requesting her to call PCP to get antibiotics. Green urine in [place]. Bleeding from ear. Home health nurse directed patient's daughter to take patient to the ER. States patient's daughter is now upset. Reviewed patient's chart. Patient's PCP is not through [facility]. Provided home health nurse with listed PCP information.    Reason for Disposition   [1] Caller requesting NON-URGENT health information AND [2] PCP's office is the best resource    Additional Information   Negative: [1] Caller is not with the adult (patient) AND [2] reporting urgent symptoms   Negative: Lab result questions   Negative: Medication questions   Negative: Caller can't be reached by phone   Negative: Caller has already spoken to PCP or another triager   Negative: RN needs further essential information from caller in order to complete triage   Negative: Requesting regular office appointment    Answer Assessment - Initial Assessment Questions  1. REASON FOR CALL or QUESTION: \"What is your reason for calling today?\" or \"How can I best help you?\" or \"What question do you have that I can help answer?\"      On-call home health nurse states daughter called requesting her to call PCP to get antibiotics. Green urine in [place]. Bleeding from ear.    Protocols used: Information Only Call - No Triage-ADULT-AH    "

## 2023-12-04 ENCOUNTER — HOSPITAL ENCOUNTER (EMERGENCY)
Facility: HOSPITAL | Age: 88
Discharge: HOME | End: 2023-12-04
Attending: FAMILY MEDICINE | Admitting: FAMILY MEDICINE
Payer: COMMERCIAL

## 2023-12-04 VITALS
HEIGHT: 68 IN | OXYGEN SATURATION: 98 % | SYSTOLIC BLOOD PRESSURE: 110 MMHG | BODY MASS INDEX: 17.28 KG/M2 | WEIGHT: 114 LBS | RESPIRATION RATE: 18 BRPM | TEMPERATURE: 98.5 F | HEART RATE: 75 BPM | DIASTOLIC BLOOD PRESSURE: 68 MMHG

## 2023-12-04 DIAGNOSIS — N39.0 URINARY TRACT INFECTION WITHOUT HEMATURIA, SITE UNSPECIFIED: ICD-10-CM

## 2023-12-04 DIAGNOSIS — R33.9 URINARY RETENTION: Primary | ICD-10-CM

## 2023-12-04 DIAGNOSIS — N18.2 CHRONIC RENAL IMPAIRMENT, STAGE 2 (MILD): ICD-10-CM

## 2023-12-04 LAB
ALBUMIN SERPL-MCNC: 3.6 G/DL (ref 3.5–5.2)
ALBUMIN/GLOB SERPL: 1.3 G/DL
ALP SERPL-CCNC: 143 U/L (ref 39–117)
ALT SERPL W P-5'-P-CCNC: 7 U/L (ref 1–41)
ANION GAP SERPL CALCULATED.3IONS-SCNC: 12 MMOL/L (ref 5–15)
AST SERPL-CCNC: 14 U/L (ref 1–40)
BACTERIA #/AREA URNS HPF: ABNORMAL /HPF
BACTERIA UR QL AUTO: ABNORMAL /HPF
BASOPHILS # BLD AUTO: 0.04 10*3/MM3 (ref 0–0.2)
BASOPHILS NFR BLD AUTO: 0.6 % (ref 0–1.5)
BILIRUB SERPL-MCNC: 0.2 MG/DL (ref 0–1.2)
BILIRUB UR QL STRIP: NEGATIVE
BILIRUB UR QL STRIP: NEGATIVE
BUN SERPL-MCNC: 21 MG/DL (ref 8–23)
BUN/CREAT SERPL: 14.4 (ref 7–25)
CALCIUM SPEC-SCNC: 8.8 MG/DL (ref 8.6–10.5)
CHLORIDE SERPL-SCNC: 102 MMOL/L (ref 98–107)
CLARITY UR: ABNORMAL
CLARITY UR: CLEAR
CO2 SERPL-SCNC: 22 MMOL/L (ref 22–29)
COLOR UR: ABNORMAL
COLOR UR: YELLOW
CREAT SERPL-MCNC: 1.46 MG/DL (ref 0.76–1.27)
CRYSTALS URNS MICRO: ABNORMAL /HPF
DEPRECATED RDW RBC AUTO: 51.5 FL (ref 37–54)
EGFRCR SERPLBLD CKD-EPI 2021: 46.5 ML/MIN/1.73
EOSINOPHIL # BLD AUTO: 0.04 10*3/MM3 (ref 0–0.4)
EOSINOPHIL NFR BLD AUTO: 0.6 % (ref 0.3–6.2)
ERYTHROCYTE [DISTWIDTH] IN BLOOD BY AUTOMATED COUNT: 14.2 % (ref 12.3–15.4)
GLOBULIN UR ELPH-MCNC: 2.8 GM/DL
GLUCOSE SERPL-MCNC: 105 MG/DL (ref 65–99)
GLUCOSE UR STRIP-MCNC: NEGATIVE MG/DL
GLUCOSE UR STRIP.AUTO-MCNC: 100 MG/DL
HCT VFR BLD AUTO: 35 % (ref 37.5–51)
HGB BLD-MCNC: 10.9 G/DL (ref 13–17.7)
HGB UR QL STRIP.AUTO: ABNORMAL
HGB UR STRIP.AUTO-MCNC: ABNORMAL MG/L
HYALINE CASTS UR QL AUTO: ABNORMAL /LPF
IMM GRANULOCYTES # BLD AUTO: 0.04 10*3/MM3 (ref 0–0.05)
IMM GRANULOCYTES NFR BLD AUTO: 0.6 % (ref 0–0.5)
KETONES UR QL STRIP: NEGATIVE
KETONES UR STRIP.AUTO-MCNC: ABNORMAL MG/DL
LEUKOCYTE ESTERASE UR QL STRIP.AUTO: ABNORMAL
LEUKOCYTE ESTERASE UR QL STRIP.AUTO: ABNORMAL
LYMPHOCYTES # BLD AUTO: 1.62 10*3/MM3 (ref 0.7–3.1)
LYMPHOCYTES NFR BLD AUTO: 23.9 % (ref 19.6–45.3)
MAGNESIUM SERPL-MCNC: 2.1 MG/DL (ref 1.6–2.4)
MCH RBC QN AUTO: 30.6 PG (ref 26.6–33)
MCHC RBC AUTO-ENTMCNC: 31.1 G/DL (ref 31.5–35.7)
MCV RBC AUTO: 98.3 FL (ref 79–97)
MONOCYTES # BLD AUTO: 0.84 10*3/MM3 (ref 0.1–0.9)
MONOCYTES NFR BLD AUTO: 12.4 % (ref 5–12)
NEUTROPHILS NFR BLD AUTO: 4.19 10*3/MM3 (ref 1.7–7)
NEUTROPHILS NFR BLD AUTO: 61.9 % (ref 42.7–76)
NITRITE UR QL STRIP.AUTO: NEGATIVE
NITRITE UR QL STRIP: NEGATIVE
NRBC BLD AUTO-RTO: 0 /100 WBC (ref 0–0.2)
PH UR STRIP.AUTO: 7 [PH] (ref 5–8)
PH UR STRIP.AUTO: >=9 [PH] (ref 5–8)
PLATELET # BLD AUTO: 253 10*3/MM3 (ref 140–450)
PMV BLD AUTO: 9.2 FL (ref 6–12)
POTASSIUM SERPL-SCNC: 4.1 MMOL/L (ref 3.5–5.2)
PROT SERPL-MCNC: 6.4 G/DL (ref 6–8.5)
PROT UR QL STRIP: ABNORMAL
PROT UR STRIP.AUTO-MCNC: =>1000 MG/DL
RBC # BLD AUTO: 3.56 10*6/MM3 (ref 4.14–5.8)
RBC # UR STRIP: ABNORMAL /HPF
RBC #/AREA URNS HPF: ABNORMAL /HPF (ref 0–2)
REF LAB TEST METHOD: ABNORMAL
SODIUM SERPL-SCNC: 136 MMOL/L (ref 136–145)
SP GR UR STRIP.AUTO: 1.03 (ref 1–1.03)
SP GR UR STRIP: 1.01 (ref 1–1.03)
SQUAMOUS #/AREA URNS HPF: ABNORMAL /HPF
UROBILINOGEN UR QL STRIP: ABNORMAL
UROBILINOGEN UR STRIP.AUTO-MCNC: 1 E.U./DL
WBC # UR STRIP: ABNORMAL /HPF
WBC #/AREA URNS HPF: ABNORMAL /HPF (ref 0–5)
WBC NRBC COR # BLD AUTO: 6.77 10*3/MM3 (ref 3.4–10.8)

## 2023-12-04 PROCEDURE — 51702 INSERT TEMP BLADDER CATH: CPT

## 2023-12-04 PROCEDURE — 81001 URINALYSIS AUTO W/SCOPE: CPT | Performed by: FAMILY MEDICINE

## 2023-12-04 PROCEDURE — 85025 COMPLETE CBC W/AUTO DIFF WBC: CPT | Performed by: FAMILY MEDICINE

## 2023-12-04 PROCEDURE — 80053 COMPREHEN METABOLIC PANEL: CPT | Performed by: FAMILY MEDICINE

## 2023-12-04 PROCEDURE — 99283 EMERGENCY DEPT VISIT LOW MDM: CPT

## 2023-12-04 PROCEDURE — 87086 URINE CULTURE/COLONY COUNT: CPT | Performed by: FAMILY MEDICINE

## 2023-12-04 PROCEDURE — 51798 US URINE CAPACITY MEASURE: CPT

## 2023-12-04 PROCEDURE — 83735 ASSAY OF MAGNESIUM: CPT | Performed by: FAMILY MEDICINE

## 2023-12-04 RX ORDER — CEFDINIR 300 MG/1
300 CAPSULE ORAL DAILY
Qty: 10 CAPSULE | Refills: 0 | Status: SHIPPED | OUTPATIENT
Start: 2023-12-04 | End: 2023-12-14

## 2023-12-05 LAB — BACTERIA SPEC AEROBE CULT: NO GROWTH

## 2023-12-05 NOTE — ED NOTES
"Patients daughter called requesting ER provider place an order for Hospice Consult.   Stating \" we have been trying to a month, but he does not meet criteria\"   Notified ER provider of this request.   Spoke with patient who is AxO X4 in regards to conversation.   Patient stated he does not want a hospice consult \"they have been trying to get me to go on hospice for weeks now. I dont want to go on hospice. Me and my wife have spoke about it.\"   Provider updated.   "

## 2023-12-08 ENCOUNTER — TELEPHONE (OUTPATIENT)
Dept: CARDIOLOGY | Facility: CLINIC | Age: 88
End: 2023-12-08
Payer: COMMERCIAL

## 2023-12-08 ENCOUNTER — TELEPHONE (OUTPATIENT)
Dept: PALLATIVE CARE | Age: 88
End: 2023-12-08

## 2023-12-08 NOTE — TELEPHONE ENCOUNTER
I left a voicemail for the patient to call [facility]. I would like to discuss the referral that we received. I will call back at a later date. I can be reached at [phone].

## 2023-12-08 NOTE — TELEPHONE ENCOUNTER
Pt daughter Gaby called in and stated she is trying to get pt approved for hospice and has not been successful as his diagnosis has not stated that it is terminal and that he will pass in 6 months. Daughter was asking if you could write a letter stating that his diagnosis is terminal and that he could possibly pass in 6 month without intervention. Pt daughter Gaby is requesting a call back at [phone] to let her know if this is possible or not.   Thank you!

## 2023-12-18 PROBLEM — N40.1 BENIGN PROSTATIC HYPERPLASIA WITH URINARY OBSTRUCTION: Status: ACTIVE | Noted: 2017-07-31

## 2023-12-18 PROBLEM — K44.9 HIATAL HERNIA: Status: ACTIVE | Noted: 2023-10-26

## 2023-12-18 PROBLEM — I25.118 CORONARY ARTERY DISEASE OF NATIVE ARTERY OF NATIVE HEART WITH STABLE ANGINA PECTORIS (HCC): Status: ACTIVE | Noted: 2021-05-19

## 2023-12-18 PROBLEM — I10 ESSENTIAL HYPERTENSION: Status: ACTIVE | Noted: 2021-05-19

## 2023-12-18 PROBLEM — I48.0 PAROXYSMAL ATRIAL FIBRILLATION (HCC): Status: ACTIVE | Noted: 2021-05-19

## 2023-12-18 PROBLEM — S22.080A COMPRESSION FRACTURE OF T12 VERTEBRA (HCC): Status: ACTIVE | Noted: 2023-09-24

## 2023-12-18 PROBLEM — N18.32 STAGE 3B CHRONIC KIDNEY DISEASE (HCC): Status: ACTIVE | Noted: 2023-09-24

## 2023-12-18 PROBLEM — I21.9 MYOCARDIAL INFARCTION (HCC): Status: ACTIVE | Noted: 2021-05-19

## 2023-12-18 PROBLEM — S22.089A CLOSED T11 FRACTURE (HCC): Status: ACTIVE | Noted: 2023-11-02

## 2023-12-18 PROBLEM — S32.010A CLOSED COMPRESSION FRACTURE OF FIRST LUMBAR VERTEBRA (HCC): Status: ACTIVE | Noted: 2023-09-24

## 2023-12-18 PROBLEM — N13.8 BENIGN PROSTATIC HYPERPLASIA WITH URINARY OBSTRUCTION: Status: ACTIVE | Noted: 2017-07-31

## 2023-12-18 PROBLEM — E78.5 HYPERLIPIDEMIA LDL GOAL <70: Status: ACTIVE | Noted: 2021-05-19

## 2023-12-18 PROBLEM — E46 PROTEIN-CALORIE MALNUTRITION (HCC): Status: ACTIVE | Noted: 2023-11-02

## 2023-12-18 PROBLEM — I42.9 CARDIOMYOPATHY (HCC): Status: ACTIVE | Noted: 2021-05-19

## 2023-12-18 PROBLEM — J43.8 OTHER EMPHYSEMA (HCC): Status: ACTIVE | Noted: 2021-05-19

## 2023-12-21 ENCOUNTER — TELEPHONE (OUTPATIENT)
Dept: NEUROSURGERY | Facility: CLINIC | Age: 88
End: 2023-12-21
Payer: COMMERCIAL

## 2023-12-21 NOTE — TELEPHONE ENCOUNTER
Received a call from Elyse today stating the patient was getting Norco 7.5 mg Q6 hours and a nurse practitioner, Anu, had written for a one time order of MSIR, which the patient did well on.  However, Anu can't write for a long term use of this medication being that she is a nurse practitioner.  Elyse thinks the patient would do well with MS Contin and is reaching out to our office to see if we would be willing to prescribe this medication for him.  She states he doesn't qualify for hospice but has a lot of pain complaints.  She states the daughter wants him to stay on MSIR.  I explained to Elyse that we did not give him pain medication other than what he was getting in the hospital.  They will have to talk to his primary care provider or oncologist (she says he doesn't see oncology).  I stated they would have to talk to his primary care provider and she expressed understanding.      KELSEY ABRAMS  [facility] [place] NEUROSURGERY

## 2023-12-27 ENCOUNTER — OFFICE VISIT (OUTPATIENT)
Dept: PALLATIVE CARE | Age: 88
End: 2023-12-27
Payer: MEDICARE

## 2023-12-27 DIAGNOSIS — N18.31 STAGE 3A CHRONIC KIDNEY DISEASE (CKD) (HCC): Primary | ICD-10-CM

## 2023-12-27 DIAGNOSIS — R41.0 CONFUSION: ICD-10-CM

## 2023-12-27 DIAGNOSIS — R62.7 FTT (FAILURE TO THRIVE) IN ADULT: ICD-10-CM

## 2023-12-27 DIAGNOSIS — N18.31 STAGE 3A CHRONIC KIDNEY DISEASE (CKD) (HCC): ICD-10-CM

## 2023-12-27 DIAGNOSIS — Z51.5 ENCOUNTER FOR PALLIATIVE CARE: ICD-10-CM

## 2023-12-27 DIAGNOSIS — R13.10 DYSPHAGIA, UNSPECIFIED TYPE: ICD-10-CM

## 2023-12-27 LAB
ALBUMIN SERPL-MCNC: 3.3 G/DL (ref 3.5–5.2)
ALP SERPL-CCNC: 104 U/L (ref 40–130)
ALT SERPL-CCNC: 12 U/L (ref 5–41)
ANION GAP SERPL CALCULATED.3IONS-SCNC: 17 MMOL/L (ref 7–19)
AST SERPL-CCNC: 32 U/L (ref 5–40)
BILIRUB SERPL-MCNC: 0.3 MG/DL (ref 0.2–1.2)
BUN SERPL-MCNC: 49 MG/DL (ref 8–23)
CALCIUM SERPL-MCNC: 8.5 MG/DL (ref 8.8–10.2)
CHLORIDE SERPL-SCNC: 99 MMOL/L (ref 98–111)
CO2 SERPL-SCNC: 23 MMOL/L (ref 22–29)
CREAT SERPL-MCNC: 2.2 MG/DL (ref 0.5–1.2)
ERYTHROCYTE [DISTWIDTH] IN BLOOD BY AUTOMATED COUNT: 13.9 % (ref 11.5–14.5)
GLUCOSE SERPL-MCNC: 91 MG/DL (ref 74–109)
HCT VFR BLD AUTO: 34 % (ref 42–52)
HGB BLD-MCNC: 11.2 G/DL (ref 14–18)
MCH RBC QN AUTO: 32.3 PG (ref 27–31)
MCHC RBC AUTO-ENTMCNC: 32.9 G/DL (ref 33–37)
MCV RBC AUTO: 98 FL (ref 80–94)
PLATELET # BLD AUTO: 278 K/UL (ref 130–400)
PMV BLD AUTO: 9.4 FL (ref 9.4–12.4)
POTASSIUM SERPL-SCNC: 3.3 MMOL/L (ref 3.5–5)
PROT SERPL-MCNC: 5.4 G/DL (ref 6.6–8.7)
RBC # BLD AUTO: 3.47 M/UL (ref 4.7–6.1)
SODIUM SERPL-SCNC: 139 MMOL/L (ref 136–145)
WBC # BLD AUTO: 10.9 K/UL (ref 4.8–10.8)

## 2023-12-27 PROCEDURE — 1123F ACP DISCUSS/DSCN MKR DOCD: CPT

## 2023-12-27 PROCEDURE — 99350 HOME/RES VST EST HIGH MDM 60: CPT

## 2023-12-27 NOTE — PROGRESS NOTES
[facility]iative [facility]  Follow Up Note        Patient Name:  Jose Alberto Rivera  Medical Record Number:  [id number]  YOB: 1935    Date of Visit: 12/27/2023  Location of Visit:  Home    Patient Care Team:  April Gongora MD as PCP - [facility], Fernandez Barrera MD as Neurologist (Neurology)  NILE Estrada CNP as Advanced Practice Nurse (Nurse Practitioner Family)    History obtained from:  patient, family member - [place], electronic medical record    PALLIATIVE DIAGNOSES AND ORDERS/RECOMMENDATIONS/PLAN:   1. Stage 3a chronic kidney disease (CKD) ([address])  - Comprehensive Metabolic Panel; Future  - CBC; Future    2. FTT (failure to thrive) in adult  Family wishes to pursue only comfort measures at this time    3. Dysphagia, unspecified type  Most medications DC'd, only comfort medications at this time, liquid form if possible    4. Confusion  Continue Vistaril for restlessness    5. Encounter for palliative care  Current goals of care include: Maintain or improve quality of life, Focus on comfort and quality of life, Achievement of a peaceful death    Comfort measures only, Hospice to evaluate  Primary care physician notified of clinical update  Follow up home visit in as needed    CHIEF COMPLAINT:     Chief Complaint   Patient presents with    Other     Family requested visit, believed patient is actively dying       CLINICAL SUMMARY:          Jose Alberto Rivera is a 82 y.o. male with PMH of degenerative disc disease with kyphoplasty, urinary retention with chronic indwelling Myers catheter, hypothyroidism, GERD, hyperlipidemia, CAD, aortic aneurysm, CKD stage III, COPD, and other comorbidities. Underwent kyphoplasty at [facility] 9/26/2023 following compression fracture T12. Last EF 65%    HPI AND VISIT SUMMARY:   I saw Jose Alberto Rivera in His  apartment at [facility] . Upon my arrival patient was noted to be lethargic, confused.   The patient's daughters iMgdalia

## 2023-12-30 VITALS
OXYGEN SATURATION: 96 % | SYSTOLIC BLOOD PRESSURE: 110 MMHG | HEART RATE: 68 BPM | RESPIRATION RATE: 18 BRPM | DIASTOLIC BLOOD PRESSURE: 60 MMHG

## (undated) DEVICE — ANTIBACTERIAL UNDYED BRAIDED (POLYGLACTIN 910), SYNTHETIC ABSORBABLE SUTURE: Brand: COATED VICRYL

## (undated) DEVICE — APPL CHLORAPREP HI/LITE 26ML ORNG

## (undated) DEVICE — MODEL BT2000 P/N 700287-012KIT CONTENTS: MANIFOLD WITH SALINE AND CONTRAST PORTS, SALINE TUBING WITH SPIKE AND HAND SYRINGE, TRANSDUCER: Brand: BT2000 AUTOMATED MANIFOLD KIT

## (undated) DEVICE — 3F 80 CM LEMAITRE EMBOLECTOMY CATHETER, EIFU: Brand: LEMAITRE EMBOLECTOMY CATHETER

## (undated) DEVICE — 3M™ STERI-DRAPE™ ISOLATION BAG, 10 PER CARTON / 4 CARTONS PER CASE, 1003: Brand: 3M™ STERI-DRAPE™

## (undated) DEVICE — PINNACLE INTRODUCER SHEATH: Brand: PINNACLE

## (undated) DEVICE — CONN FLX BREATHE CIRCT

## (undated) DEVICE — MODEL AT P65, P/N 701554-001KIT CONTENTS: HAND CONTROLLER, 3-WAY HIGH-PRESSURE STOPCOCK WITH ROTATING END AND PREMIUM HIGH-PRESSURE TUBING: Brand: ANGIOTOUCH® KIT

## (undated) DEVICE — DEV CUT BIOP BONE KYPHX

## (undated) DEVICE — DRSNG SURESITE WNDW 4X4.5

## (undated) DEVICE — CVR UNIV C/ARM

## (undated) DEVICE — BONE TAMP KIT KPX203PB FF X2 20/3 1 STP: Brand: KYPHOPAK™ TRAY

## (undated) DEVICE — CONTAINER,SPECIMEN,OR STERILE,4OZ: Brand: MEDLINE

## (undated) DEVICE — SOLIDIFIER LIQUI LOC PLUS 2000CC

## (undated) DEVICE — SPK10277 JACKSON/PRO-AXIS KIT: Brand: SPK10277 JACKSON/PRO-AXIS KIT

## (undated) DEVICE — CATH DIAG IMPULSE M/ PK 145 5FR

## (undated) DEVICE — CANN VESL ACRN TP 4MM

## (undated) DEVICE — PK CATH CARD 30 CA/4

## (undated) DEVICE — PK TURNOVER RM ADV

## (undated) DEVICE — SUT MNCRYL 4/0 PS2 27IN UD MCP426H

## (undated) DEVICE — SNAP KOVER: Brand: UNBRANDED

## (undated) DEVICE — GLV SURG BIOGEL LTX PF 8

## (undated) DEVICE — BONE TAMP KIT KPX203NB AF X2 20/3

## (undated) DEVICE — BONE BIOPSY DEVICE F07A TAPERED SIZE 2: Brand: MEDTRONIC REUSABLE INSTRUMENTS

## (undated) DEVICE — BARR HEMO VASC/PERIF CLOSUR/PAD 4X4CM

## (undated) DEVICE — DRSNG TELFA PAD NONADH STR 1S 3X8IN

## (undated) DEVICE — BAPTIST TURNOVER KIT: Brand: MEDLINE INDUSTRIES, INC.

## (undated) DEVICE — PROXIMATE RH ROTATING HEAD SKIN STAPLERS (35 WIDE) CONTAINS 35 STAINLESS STEEL STAPLES: Brand: PROXIMATE

## (undated) DEVICE — SPNG GZ STRL 2S 4X4 12PLY

## (undated) DEVICE — IBT KIT KEX152EB-A FF E2 15/2 OI: Brand: KYPHON® EXPRESS™ OSTEO INTRODUCER® SYSTEM AND BFD

## (undated) DEVICE — IMMOB KN 3PNL DLX CANVS 19IN BLU

## (undated) DEVICE — VAGINAL PREP TRAY: Brand: MEDLINE INDUSTRIES, INC.

## (undated) DEVICE — GLV SURG BIOGEL LTX PF 6 1/2

## (undated) DEVICE — GW STARTER FXD CORE J .035 3X150CM 3MM

## (undated) DEVICE — PAD MAJOR VASCULAR: Brand: MEDLINE INDUSTRIES, INC.

## (undated) DEVICE — TOWEL,OR,DSP,ST,BLUE,STD,4/PK,20PK/CS: Brand: MEDLINE

## (undated) DEVICE — SOL IRR NACL 0.9PCT BT 1000ML

## (undated) DEVICE — ANGIO-SEAL VIP VASCULAR CLOSURE DEVICE: Brand: ANGIO-SEAL

## (undated) DEVICE — PK KYPHOPLASTY 30

## (undated) DEVICE — SUT PROLN 5/0 C1 DA 24IN 8725H

## (undated) DEVICE — NDL HYPO PRECISIONGLIDE REG 25G 1 1/2

## (undated) DEVICE — CANN CO2/O2 NASL A/

## (undated) DEVICE — UTILITY MARKER W/MED LABELS: Brand: MEDLINE

## (undated) DEVICE — 3M™ IOBAN™ 2 ANTIMICROBIAL INCISE DRAPE 6651EZ: Brand: IOBAN™ 2

## (undated) DEVICE — GLV SURG SENSICARE W/ALOE PF LF 7.5 STRL

## (undated) DEVICE — SUT PROLN 6/0 4/24IN BV1 MON BL M8805

## (undated) DEVICE — STERILE ULTRASOUND GEL, SAFEWRAP: Brand: ECOVUE

## (undated) DEVICE — ELECTRD PAD DEFIB A/